# Patient Record
Sex: FEMALE | Race: WHITE | NOT HISPANIC OR LATINO | Employment: OTHER | URBAN - METROPOLITAN AREA
[De-identification: names, ages, dates, MRNs, and addresses within clinical notes are randomized per-mention and may not be internally consistent; named-entity substitution may affect disease eponyms.]

---

## 2017-03-20 ENCOUNTER — OFFICE VISIT (OUTPATIENT)
Dept: FAMILY MEDICINE | Facility: CLINIC | Age: 57
End: 2017-03-20
Payer: COMMERCIAL

## 2017-03-20 VITALS
HEART RATE: 92 BPM | DIASTOLIC BLOOD PRESSURE: 82 MMHG | HEIGHT: 62 IN | RESPIRATION RATE: 16 BRPM | SYSTOLIC BLOOD PRESSURE: 119 MMHG | OXYGEN SATURATION: 98 % | TEMPERATURE: 99 F | WEIGHT: 177.25 LBS | BODY MASS INDEX: 32.62 KG/M2

## 2017-03-20 DIAGNOSIS — J01.00 ACUTE MAXILLARY SINUSITIS, RECURRENCE NOT SPECIFIED: ICD-10-CM

## 2017-03-20 DIAGNOSIS — Z72.0 TOBACCO ABUSE: ICD-10-CM

## 2017-03-20 DIAGNOSIS — I73.00 RAYNAUD'S PHENOMENON WITHOUT GANGRENE: ICD-10-CM

## 2017-03-20 DIAGNOSIS — J20.9 ACUTE BRONCHITIS, UNSPECIFIED ORGANISM: Primary | ICD-10-CM

## 2017-03-20 DIAGNOSIS — Z12.39 BREAST CANCER SCREENING: ICD-10-CM

## 2017-03-20 PROCEDURE — 3074F SYST BP LT 130 MM HG: CPT | Mod: S$GLB,,, | Performed by: INTERNAL MEDICINE

## 2017-03-20 PROCEDURE — 1160F RVW MEDS BY RX/DR IN RCRD: CPT | Mod: S$GLB,,, | Performed by: INTERNAL MEDICINE

## 2017-03-20 PROCEDURE — 99213 OFFICE O/P EST LOW 20 MIN: CPT | Mod: S$GLB,,, | Performed by: INTERNAL MEDICINE

## 2017-03-20 PROCEDURE — 3079F DIAST BP 80-89 MM HG: CPT | Mod: S$GLB,,, | Performed by: INTERNAL MEDICINE

## 2017-03-20 RX ORDER — GUAIFENESIN, PSEUDOEPHEDRINE HYDROCHLORIDE 600; 60 MG/1; MG/1
1 TABLET, EXTENDED RELEASE ORAL 2 TIMES DAILY
Qty: 30 TABLET | Refills: 1 | Status: SHIPPED | OUTPATIENT
Start: 2017-03-20 | End: 2017-03-30

## 2017-03-20 RX ORDER — NIFEDIPINE 30 MG/1
30 TABLET, EXTENDED RELEASE ORAL DAILY
Qty: 30 TABLET | Refills: 11 | Status: SHIPPED | OUTPATIENT
Start: 2017-03-20 | End: 2017-07-26 | Stop reason: SDUPTHER

## 2017-03-20 RX ORDER — PREDNISONE 20 MG/1
40 TABLET ORAL DAILY
Qty: 4 TABLET | Refills: 0 | Status: SHIPPED | OUTPATIENT
Start: 2017-03-20 | End: 2017-03-22

## 2017-03-20 RX ORDER — BUPROPION HYDROCHLORIDE 75 MG/1
75 TABLET ORAL 2 TIMES DAILY
Qty: 60 TABLET | Refills: 11 | Status: SHIPPED | OUTPATIENT
Start: 2017-03-20 | End: 2018-03-22

## 2017-03-20 NOTE — PROGRESS NOTES
"Subjective:       Patient ID: Cristin Seagl is a 56 y.o. female.    Chief Complaint: Sinus Problem and Cough    HPI       CHIEF COMPLAINT: Cough(+).  HPI:     ONSET/TIMING: .    ago    DURATION:               Paroxysmal: no .    QUALITY/COURSE:. unchanged    INTENSITY/SEVERITY: Severity is #  5 (10 point scale)      The following symptoms/statements are positive if BOLD, negative otherwise.      CONTEXT/WHEN:  Tobacco_use. Smokers_in_home. Seasonal_pattern. Allergies/Hayfever. Sinusitis. Irritant_Exposure(smoke/dust/fumes). Exposure_to_others_with_similar_symptoms.        Similar_problems_in_past.   PAST TREATMENT OR EVALUATION:   previous PPD. Recent_previous_chest_x-ray. Recent_antibiotics.  Associated Symptoms:     sputum production: scant. copious. Hemoptysis.  Medical History: Past_pulmonary_infections.  Cardiovascular_disease.chronic_lung_disease.  tuberculosis. Asthma. AIDS. Gastroesophageal_reflux_disease .      Review of Systems   Constitutional: Negative for chills, diaphoresis, fever and unexpected weight change.   HENT: Positive for rhinorrhea and sinus pressure. Negative for sore throat.    Respiratory: Positive for cough. Negative for shortness of breath and wheezing.    Cardiovascular: Negative for chest pain.   Musculoskeletal: Negative for myalgias.       Objective:      Vitals:    03/20/17 1101   BP: 119/82   Pulse: 92   Resp: 16   Temp: 98.6 °F (37 °C)   TempSrc: Oral   SpO2: 98%   Weight: 80.4 kg (177 lb 4 oz)   Height: 5' 2" (1.575 m)   PainSc: 0-No pain     Physical Exam   Constitutional: She appears well-developed and well-nourished.   HENT:   Right Ear: External ear normal.   Left Ear: External ear normal.   Mouth/Throat: Oropharynx is clear and moist. No oropharyngeal exudate.   Congested   Cardiovascular: Normal rate, regular rhythm and normal heart sounds.  Exam reveals no friction rub.    No murmur heard.  Pulmonary/Chest: Effort normal and breath sounds normal. No respiratory " distress. She has no wheezes. She has no rales. She exhibits no tenderness.   Abdominal: Soft. Bowel sounds are normal. There is no tenderness.   Musculoskeletal: She exhibits no edema.   Lymphadenopathy:     She has no cervical adenopathy.   Nursing note and vitals reviewed.        Assessment:       1. Acute bronchitis, unspecified organism    2. Acute maxillary sinusitis, recurrence not specified    3. Tobacco abuse    4. Raynaud's phenomenon without gangrene    5. Breast cancer screening          Plan:     Acute bronchitis, unspecified organism  -     predniSONE (DELTASONE) 20 MG tablet; Take 2 tablets (40 mg total) by mouth once daily.  Dispense: 4 tablet; Refill: 0    Acute maxillary sinusitis, recurrence not specified  -     pseudoephedrine-guaifenesin  mg (MUCINEX D)  mg per tablet; Take 1 tablet by mouth 2 (two) times daily.  Dispense: 30 tablet; Refill: 1  -     predniSONE (DELTASONE) 20 MG tablet; Take 2 tablets (40 mg total) by mouth once daily.  Dispense: 4 tablet; Refill: 0    Tobacco abuse  -     buPROPion (WELLBUTRIN) 75 MG tablet; Take 1 tablet (75 mg total) by mouth 2 (two) times daily.  Dispense: 60 tablet; Refill: 11    Raynaud's phenomenon without gangrene  -     nifedipine 30 MG ORAL TR24 (PROCARDIA-XL) 30 MG (OSM) 24 hr tablet; Take 1 tablet (30 mg total) by mouth once daily.  Dispense: 30 tablet; Refill: 11    Breast cancer screening  -     Mammo Digital Screening Bilateral With CAD; Future; Expected date: 3/20/17    Return in about 6 weeks (around 5/1/2017).

## 2017-03-20 NOTE — PATIENT INSTRUCTIONS
Robitussin-DM for cough    Don't quit smoking for 2 weeks.  Tell 100 strangers that you can't wait to quit smoking but Dr. Sawant won't let you.  Then tell them the benefits of screening smoking.  Do this as enthusiastically as you can.    Start the Wellbutrin now.    Cool mist vaporizor.  Hot fluids. Hot tea with lemon and honey.

## 2017-03-20 NOTE — MR AVS SNAPSHOT
[facility]  [address] [address] [address]  Phone: [phone]  Fax: [phone]                  Cristin Segal   3/20/2017 11:00 AM   Office Visit    Description:  Female : 1960   Provider:  Vidal Sawant MD   Department:  [facility]           Reason for Visit     Sinus Problem     Cough           Diagnoses this Visit        Comments    Acute bronchitis, unspecified organism    -  Primary     Acute maxillary sinusitis, recurrence not specified         Tobacco abuse         Raynaud's phenomenon without gangrene         Breast cancer screening                To Do List           Future Appointments        Provider Department Dept Phone    2017 4:20 PM Vidal Sawant MD [facility] [phone]      Goals (5 Years of Data)     None      Follow-Up and Disposition     Return in about 6 weeks (around 2017).    Follow-up and Disposition History       These Medications        Disp Refills Start End    nifedipine 30 MG ORAL TR24 (PROCARDIA-XL) 30 MG (OSM) 24 hr tablet 30 tablet 11 3/20/2017 3/20/2018    Take 1 tablet (30 mg total) by mouth once daily. - Oral    Pharmacy: [facility]/pharmacy #[id number] - [place], [place] - [address] AT [facility] Ph #: [phone]       buPROPion (WELLBUTRIN) 75 MG tablet 60 tablet 11 3/20/2017 3/20/2018    Take 1 tablet (75 mg total) by mouth 2 (two) times daily. - Oral    Pharmacy: [facility]/pharmacy #[id number] - [place], [place] - [address] AT [facility] Ph #: [phone]       pseudoephedrine-guaifenesin  mg (MUCINEX D)  mg per tablet 30 tablet 1 3/20/2017 3/30/2017    Take 1 tablet by mouth 2 (two) times daily. - Oral    Pharmacy: [facility]/pharmacy #[id number] - [place], [place] - [address] AT [facility] Ph #: [phone]       predniSONE (DELTASONE) 20 MG tablet 4 tablet 0 3/20/2017 3/22/2017    Take 2 tablets (40 mg total) by mouth once  daily. - Oral    Pharmacy: [facility]/pharmacy #[id number] - [place], [place] - [address] AT [facility] Ph #: [phone]         [facility]     [facility] On Call Nurse Care Line - 24/7 Assistance  Registered nurses in the [facility] On Call Center provide clinical advisement, health education, appointment booking, and other advisory services.  Call for this free service at [phone].             Medications           Message regarding Medications     Verify the changes and/or additions to your medication regime listed below are the same as discussed with your clinician today.  If any of these changes or additions are incorrect, please notify your healthcare provider.        START taking these NEW medications        Refills    nifedipine 30 MG ORAL TR24 (PROCARDIA-XL) 30 MG (OSM) 24 hr tablet 11    Sig: Take 1 tablet (30 mg total) by mouth once daily.    Class: Normal    Route: Oral    buPROPion (WELLBUTRIN) 75 MG tablet 11    Sig: Take 1 tablet (75 mg total) by mouth 2 (two) times daily.    Class: Normal    Route: Oral    pseudoephedrine-guaifenesin  mg (MUCINEX D)  mg per tablet 1    Sig: Take 1 tablet by mouth 2 (two) times daily.    Class: Print    Route: Oral    predniSONE (DELTASONE) 20 MG tablet 0    Sig: Take 2 tablets (40 mg total) by mouth once daily.    Class: Normal    Route: Oral      STOP taking these medications     nifedipine (ADALAT CC) 30 MG TbSR Take 30 mg by mouth once daily.    hydrocodone-acetaminophen 10-325mg (NORCO)  mg Tab Take 1 tablet by mouth every 12 (twelve) hours.    hydrocodone-acetaminophen 10-325mg (NORCO)  mg Tab Take 1 tablet by mouth every 12 (twelve) hours as needed.           Verify that the below list of medications is an accurate representation of the medications you are currently taking.  If none reported, the list may be blank. If incorrect, please contact your healthcare provider. Carry this list with you in case of emergency.          "  Current Medications     cyclobenzaprine (FLEXERIL) 10 MG tablet Take 10 mg by mouth 3 (three) times daily as needed.    hydroxychloroquine (PLAQUENIL) 200 mg tablet Take 200 mg by mouth 2 (two) times daily as needed.     KLOR-CON M20 20 mEq tablet     metformin (GLUCOPHAGE) 500 MG tablet Take 1 tablet (500 mg total) by mouth 2 (two) times daily with meals.    omeprazole (PRILOSEC) 20 MG capsule Take 20 mg by mouth once daily.     buPROPion (WELLBUTRIN) 75 MG tablet Take 1 tablet (75 mg total) by mouth 2 (two) times daily.    nifedipine 30 MG ORAL TR24 (PROCARDIA-XL) 30 MG (OSM) 24 hr tablet Take 1 tablet (30 mg total) by mouth once daily.    predniSONE (DELTASONE) 20 MG tablet Take 2 tablets (40 mg total) by mouth once daily.    pseudoephedrine-guaifenesin  mg (MUCINEX D)  mg per tablet Take 1 tablet by mouth 2 (two) times daily.           Clinical Reference Information           Your Vitals Were     BP Pulse Temp Resp Height Weight    119/82 (BP Location: Right arm, Patient Position: Sitting, BP Method: Automatic) 92 98.6 °F (37 °C) (Oral) 16 5' 2" (1.575 m) 80.4 kg (177 lb 4 oz)    SpO2 BMI             98% 32.42 kg/m2         Blood Pressure          Most Recent Value    BP  119/82      Allergies as of 3/20/2017     Pcn [Penicillins]      Immunizations Administered on Date of Encounter - 3/20/2017     None      Orders Placed During Today's Visit     Future Labs/Procedures Expected by Expires    Mammo Digital Screening Bilateral With CAD  3/20/2017 5/20/2018      Instructions    Robitussin-DM for cough    Don't quit smoking for 2 weeks.  Tell 100 strangers that you can't wait to quit smoking but Dr. Sawant won't let you.  Then tell them the benefits of screening smoking.  Do this as enthusiastically as you can.    Start the Wellbutrin now.    Cool mist vaporizor.  Hot fluids. Hot tea with lemon and honey.         Smoking Cessation     If you would like to quit smoking:   You may be eligible for free " services if you are a [place] resident and started smoking cigarettes before September 1, 1988.  Call the [employer]oking [employer]ess[employer] ([employer]) toll free at (596) 733-7115 or (013) 547-7294.   Call 1-800-QUIT-NOW if you do not meet the above criteria.            Language Assistance Services     ATTENTION: Language assistance services are available, free of charge. Please call [phone].      ATENCIÓN: Si habla español, tiene a chance disposición servicios gratuitos de asistencia lingüística. Llame al [phone].     CHÚ Ý: N?u b?n nói Ti?ng Vi?t, có các d?ch v? h? tr? ngôn ng? mi?n phí dành cho b?n. G?i s? [phone].         [facility] complies with applicable Federal civil rights laws and does not discriminate on the basis of race, color, national origin, age, disability, or sex.

## 2017-03-30 ENCOUNTER — HISTORICAL (OUTPATIENT)
Dept: ADMINISTRATIVE | Facility: HOSPITAL | Age: 57
End: 2017-03-30

## 2017-03-30 LAB
ALBUMIN SERPL-MCNC: 4 G/DL (ref 3.1–4.7)
ALP SERPL-CCNC: 97 IU/L (ref 40–104)
ALT (SGPT): 62 IU/L (ref 3–33)
AST SERPL-CCNC: 42 IU/L (ref 10–40)
BASOPHILS NFR BLD: 0.1 K/UL (ref 0–0.2)
BASOPHILS NFR BLD: 0.9 %
BILIRUB SERPL-MCNC: 0.8 MG/DL (ref 0.3–1)
BUN SERPL-MCNC: 8 MG/DL (ref 8–20)
CALCIUM SERPL-MCNC: 9.1 MG/DL (ref 7.7–10.4)
CHLORIDE: 97 MMOL/L (ref 98–110)
CK SERPL-CCNC: 162 IU/L (ref 13–135)
CO2 SERPL-SCNC: 25.8 MMOL/L (ref 22.8–31.6)
CREATININE: 0.59 MG/DL (ref 0.6–1.4)
EOSINOPHIL NFR BLD: 0.1 K/UL (ref 0–0.7)
EOSINOPHIL NFR BLD: 0.9 %
ERYTHROCYTE [DISTWIDTH] IN BLOOD BY AUTOMATED COUNT: 14 % (ref 11.7–14.9)
GLUCOSE: 115 MG/DL (ref 70–99)
GRAN #: 6.3 K/UL (ref 1.4–6.5)
GRAN%: 55.5 %
HCT VFR BLD AUTO: 37.8 % (ref 36–48)
HGB BLD-MCNC: 12.9 G/DL (ref 12–15)
IMMATURE GRANS (ABS): 0.1 K/UL (ref 0–1)
IMMATURE GRANULOCYTES: 0.4 %
LYMPH #: 4 K/UL (ref 1.2–3.4)
LYMPH%: 34.8 %
MCH RBC QN AUTO: 31.9 PG (ref 25–35)
MCHC RBC AUTO-ENTMCNC: 34.1 G/DL (ref 31–36)
MCV RBC AUTO: 93.6 FL (ref 79–98)
MONO #: 0.9 K/UL (ref 0.1–0.6)
MONO%: 7.5 %
NUCLEATED RBCS: 0 %
PLATELET # BLD AUTO: 386 K/UL (ref 140–440)
PMV BLD AUTO: 9.1 FL (ref 8.8–12.7)
POTASSIUM SERPL-SCNC: 3.6 MMOL/L (ref 3.5–5)
PROT SERPL-MCNC: 8.1 G/DL (ref 6–8.2)
RBC # BLD AUTO: 4.04 M/UL (ref 3.5–5.5)
SODIUM: 138 MMOL/L (ref 134–144)
TSH SERPL DL<=0.005 MIU/L-ACNC: 2.82 ULU/ML (ref 0.3–5.6)
WBC # BLD AUTO: 11.4 K/UL (ref 5–10)

## 2017-04-27 DIAGNOSIS — E11.9 TYPE 2 DIABETES MELLITUS WITHOUT COMPLICATION, WITHOUT LONG-TERM CURRENT USE OF INSULIN: Primary | ICD-10-CM

## 2017-05-01 ENCOUNTER — DOCUMENTATION ONLY (OUTPATIENT)
Dept: FAMILY MEDICINE | Facility: CLINIC | Age: 57
End: 2017-05-01

## 2017-05-01 RX ORDER — METFORMIN HYDROCHLORIDE 500 MG/1
500 TABLET ORAL 2 TIMES DAILY WITH MEALS
Qty: 60 TABLET | Refills: 10 | Status: SHIPPED | OUTPATIENT
Start: 2017-05-01 | End: 2019-01-09 | Stop reason: SDUPTHER

## 2017-05-01 NOTE — PROGRESS NOTES
Health Maintenance Due   Topic Date Due    Foot Exam  09/11/1970    Eye Exam  09/11/1970    TETANUS VACCINE  09/11/1978    Pneumococcal PPSV23 (Medium Risk) (1) 09/11/1978    Pap Smear  09/11/1981    Colonoscopy  09/11/2010    Urine Microalbumin  06/05/2015    Hemoglobin A1c  05/03/2016    Mammogram  06/10/2016    Lipid Panel  02/03/2017

## 2017-05-24 RX ORDER — CYCLOBENZAPRINE HCL 10 MG
TABLET ORAL
Qty: 30 TABLET | Refills: 1 | Status: SHIPPED | OUTPATIENT
Start: 2017-05-24 | End: 2017-07-26 | Stop reason: SDUPTHER

## 2017-06-08 ENCOUNTER — HOSPITAL ENCOUNTER (OUTPATIENT)
Dept: RADIOLOGY | Facility: CLINIC | Age: 57
Discharge: HOME | End: 2017-06-08
Attending: INTERNAL MEDICINE
Payer: COMMERCIAL

## 2017-06-08 DIAGNOSIS — Z12.39 BREAST CANCER SCREENING: ICD-10-CM

## 2017-06-08 PROCEDURE — 77067 SCR MAMMO BI INCL CAD: CPT | Mod: 26,,, | Performed by: RADIOLOGY

## 2017-06-08 PROCEDURE — 77067 SCR MAMMO BI INCL CAD: CPT | Mod: TC

## 2017-06-08 PROCEDURE — 77063 BREAST TOMOSYNTHESIS BI: CPT | Mod: 26,,, | Performed by: RADIOLOGY

## 2017-07-26 ENCOUNTER — OFFICE VISIT (OUTPATIENT)
Dept: RHEUMATOLOGY | Facility: CLINIC | Age: 57
End: 2017-07-26
Payer: COMMERCIAL

## 2017-07-26 VITALS — WEIGHT: 188 LBS | DIASTOLIC BLOOD PRESSURE: 61 MMHG | BODY MASS INDEX: 34.39 KG/M2 | SYSTOLIC BLOOD PRESSURE: 124 MMHG

## 2017-07-26 DIAGNOSIS — I73.00 RAYNAUD'S PHENOMENON WITHOUT GANGRENE: ICD-10-CM

## 2017-07-26 DIAGNOSIS — M15.9 OSTEOARTHRITIS, GENERALIZED: ICD-10-CM

## 2017-07-26 DIAGNOSIS — M35.1 MIXED CONNECTIVE TISSUE DISEASE: Primary | ICD-10-CM

## 2017-07-26 PROCEDURE — 99213 OFFICE O/P EST LOW 20 MIN: CPT | Mod: ,,, | Performed by: INTERNAL MEDICINE

## 2017-07-26 RX ORDER — HYDROXYCHLOROQUINE SULFATE 200 MG/1
200 TABLET, FILM COATED ORAL DAILY
Qty: 30 TABLET | Refills: 3 | Status: SHIPPED | OUTPATIENT
Start: 2017-07-26 | End: 2017-08-29 | Stop reason: SDUPTHER

## 2017-07-26 RX ORDER — NIFEDIPINE 30 MG/1
30 TABLET, EXTENDED RELEASE ORAL DAILY
Qty: 30 TABLET | Refills: 11 | Status: SHIPPED | OUTPATIENT
Start: 2017-07-26 | End: 2018-07-26

## 2017-07-26 RX ORDER — CYCLOBENZAPRINE HCL 10 MG
10 TABLET ORAL 3 TIMES DAILY PRN
Qty: 30 TABLET | Refills: 3 | Status: SHIPPED | OUTPATIENT
Start: 2017-07-26 | End: 2018-02-09 | Stop reason: SDUPTHER

## 2017-07-26 NOTE — PROGRESS NOTES
[facility] RHEUMATOLOGY            PROGRESS NOTE      Subjective:       Patient ID:   NAME: Cristin Segal : 1960     56 y.o. female    Referring Doc: No ref. provider found  Other Physicians:    Chief Complaint:  diffuse connective tissue disease (follow up, no changes since last visit) and Raynauds Syndrome      History of Present Illness:     Patient returns today for a regularly scheduled follow-up visit forMixed connective tissue disease and osteoarthritis       The patient is doing well. No chronic fatigue skin rashes chest pains cough or shortness of breath. No recent Raynaud's episodes  No gastrointestinal complaints. Patient has not had a colonoscopy ever.            ROS:   GEN:  No  fever, night sweats . weight is stable   No fatigue  SKIN: no rashes, no bruising, no ulcerations, no Raynaud's  HEENT: no HA's, No visual changes, no mucosal ulcers, +sicca symptoms only oral mucosa  CV:   no CP, SOB, PND, SANTILLAN, no orthopnea, no palpitations  PULM: normal with no SOB, cough, hemoptysis, sputum or pleuritic pain  GI:  no abdominal pain, nausea, vomiting, constipation, diarrhea, melanotic stools, BRBPR, hematemesis, no dysphagia  :   no dysuria  NEURO: occ l hand  paresthesias, headaches, visual disturbances, muscle weakness  MUSCULOSKELETAL:no joint swelling, prolonged AM stiffness, no back pain, no muscle pain  Allergies:  Review of patient's allergies indicates:   Allergen Reactions    Pcn [penicillins]        Medications:    Current Outpatient Prescriptions:     buPROPion (WELLBUTRIN) 75 MG tablet, Take 1 tablet (75 mg total) by mouth 2 (two) times daily., Disp: 60 tablet, Rfl: 11    cyclobenzaprine (FLEXERIL) 10 MG tablet, TAKE 1 TABLET BY MOUTH 3 TIMES A DAY AS NEEDED, Disp: 30 tablet, Rfl: 1    hydroxychloroquine (PLAQUENIL) 200 mg tablet, Take 200 mg by mouth 2 (two) times daily as needed. , Disp: , Rfl:     KLOR-CON M20 20 mEq tablet, , Disp: , Rfl: 10    metformin (GLUCOPHAGE) 500  MG tablet, Take 1 tablet (500 mg total) by mouth 2 (two) times daily with meals., Disp: 60 tablet, Rfl: 10    nifedipine 30 MG ORAL TR24 (PROCARDIA-XL) 30 MG (OSM) 24 hr tablet, Take 1 tablet (30 mg total) by mouth once daily., Disp: 30 tablet, Rfl: 11    omeprazole (PRILOSEC) 20 MG capsule, Take 20 mg by mouth once daily. , Disp: , Rfl:     PMHx/PSHx Updates:    Last Eye Exam : Feb 2017      Objective:     Vitals:  Blood pressure 124/61, weight 85.3 kg (188 lb).    Physical Examination:   GEN: no apparent distress, comfortable; AAOx3  SKIN: no rashes,no ulceration, no Raynaud's, no petechiae, no SQ nodules,  HEAD: normal  EYES: no pallor, no icterus,ENT:  ,no mucosal dryness or ulcerations  NECK: no masses, thyroid normal, trachea midline, no LAD/LN's, supple  CV: RRR with no murmur; l S1 and S2 reg. ,no gallop no rubs,   CHEST: Normal respiratory effort; CTAB; normal breath sounds; no wheeze or crackles  ABDOM: nontender and nondistended; soft; no masses; no rebound/guarding  MUSC/Skeletal: ROM normal; no crepitus; joints without synovitis,  no deformities  No joint swelling or tenderness of PIP, MCP, wrist, elbow, shoulder, or knee joints  EXTREM: no clubbing, cyanosis, no edema,normal  pulses   NEURO: grossly intact; motor WNL; AAOx3; ,PSYCH: normal mood, affect and behavior  LYMPH: normal cervical, supraclavicular          Labs:   Lab Results   Component Value Date    WBC 11.4 (H) 03/30/2017    HGB 12.9 03/30/2017    HCT 37.8 03/30/2017    MCV 93.6 03/30/2017     03/30/2017    CMP  @LASTLAB(NA,K,CL,CO2,GLU,BUN,Creatinine,Calcium,PROT,Albumin,Bilitot,Alkphos,AST,ALT,CRP,ESR,RF,CCP,BROOK,SSA,CPK,uric acid) )@  I have reviewed all available lab results and radiology reports.    Radiology/Diagnostic Studies:        Assessment/Plan:   (1) 56 y.o. female with diagnosis of Mixed connective tissue disease and Osteoarthritis. Patient is doing well.    Plan: CBC, CMP, UA and CRP    Strongly encouraged her to make  an appointment with a gastroenterologist for colonoscopy.        Discussion:     I have explained all of the above in detail and the patient understands all of the current recommendation(s). I have answered all questions to the best of my ability and to their complete satisfaction.       The patient is to continue with the current management plan         RTC in   4 months      Electronically signed by Erica Guzman MD

## 2017-07-31 RX ORDER — CYCLOBENZAPRINE HCL 10 MG
TABLET ORAL
Qty: 30 TABLET | Refills: 1 | Status: SHIPPED | OUTPATIENT
Start: 2017-07-31 | End: 2018-03-22 | Stop reason: SDUPTHER

## 2017-08-29 RX ORDER — HYDROXYCHLOROQUINE SULFATE 200 MG/1
200 TABLET, FILM COATED ORAL DAILY
Qty: 30 TABLET | Refills: 3 | Status: SHIPPED | OUTPATIENT
Start: 2017-08-29 | End: 2018-03-28 | Stop reason: SDUPTHER

## 2017-10-26 DIAGNOSIS — Z12.11 COLON CANCER SCREENING: ICD-10-CM

## 2017-12-11 RX ORDER — OMEPRAZOLE 20 MG/1
CAPSULE, DELAYED RELEASE ORAL
Qty: 90 CAPSULE | Refills: 3 | Status: SHIPPED | OUTPATIENT
Start: 2017-12-11 | End: 2019-02-03 | Stop reason: SDUPTHER

## 2018-02-09 RX ORDER — CYCLOBENZAPRINE HCL 10 MG
10 TABLET ORAL 3 TIMES DAILY PRN
Qty: 30 TABLET | Refills: 3 | Status: SHIPPED | OUTPATIENT
Start: 2018-02-09 | End: 2018-03-28 | Stop reason: SDUPTHER

## 2018-03-22 ENCOUNTER — OFFICE VISIT (OUTPATIENT)
Dept: PODIATRY | Facility: CLINIC | Age: 58
End: 2018-03-22
Payer: COMMERCIAL

## 2018-03-22 VITALS — BODY MASS INDEX: 33.79 KG/M2 | WEIGHT: 183.63 LBS | HEIGHT: 62 IN

## 2018-03-22 DIAGNOSIS — M79.672 FOOT PAIN, LEFT: ICD-10-CM

## 2018-03-22 DIAGNOSIS — L84 CALLUS: ICD-10-CM

## 2018-03-22 DIAGNOSIS — M24.573 EQUINUS CONTRACTURE OF ANKLE: ICD-10-CM

## 2018-03-22 DIAGNOSIS — M77.9 CAPSULITIS: Primary | ICD-10-CM

## 2018-03-22 PROCEDURE — 29540 STRAPPING ANKLE &/FOOT: CPT | Mod: LT,S$GLB,, | Performed by: PODIATRIST

## 2018-03-22 PROCEDURE — 99213 OFFICE O/P EST LOW 20 MIN: CPT | Mod: 25,S$GLB,, | Performed by: PODIATRIST

## 2018-03-22 PROCEDURE — 99999 PR PBB SHADOW E&M-EST. PATIENT-LVL III: CPT | Mod: PBBFAC,,, | Performed by: PODIATRIST

## 2018-03-22 RX ORDER — NIFEDIPINE 30 MG/1
TABLET, FILM COATED, EXTENDED RELEASE ORAL
COMMUNITY
Start: 2018-03-15 | End: 2018-12-30 | Stop reason: SDUPTHER

## 2018-03-22 RX ORDER — DICLOFENAC SODIUM 10 MG/G
2 GEL TOPICAL 4 TIMES DAILY
Qty: 100 G | Refills: 2 | Status: SHIPPED | OUTPATIENT
Start: 2018-03-22 | End: 2019-01-09

## 2018-03-22 NOTE — PROGRESS NOTES
Subjective:      Patient ID: Cristin Segal is a 57 y.o. female.    Chief Complaint: Foot Problem (possible wart/vallouses)    Sharp deep pain bottom left forefoot.  Gradual onset, worsening over past several weeks, aggravated by increased weight bearing, shoe gear, pressure.  No previous medical treatment.  OTC pain med not helping.  Denies trauma, surgery both feet.    Review of Systems   Constitution: Negative for chills, diaphoresis, fever, malaise/fatigue and night sweats.   Cardiovascular: Negative for claudication, cyanosis, leg swelling and syncope.   Skin: Positive for suspicious lesions. Negative for color change, dry skin, nail changes, rash and unusual hair distribution.   Musculoskeletal: Positive for joint pain. Negative for falls, joint swelling, muscle cramps, muscle weakness and stiffness.   Gastrointestinal: Negative for constipation, diarrhea, nausea and vomiting.   Neurological: Positive for sensory change. Negative for brief paralysis, disturbances in coordination, focal weakness, numbness, paresthesias and tremors.           Objective:      Physical Exam   Constitutional: She is oriented to person, place, and time. She appears well-developed and well-nourished. She is cooperative. No distress.   Cardiovascular:   Pulses:       Popliteal pulses are 2+ on the right side, and 2+ on the left side.        Dorsalis pedis pulses are 2+ on the right side, and 2+ on the left side.        Posterior tibial pulses are 2+ on the right side, and 2+ on the left side.   Capillary refill 3 seconds all toes/distal feet, all toes/both feet warm to touch.      Negative lymphadenopathy bilateral popliteal fossa and tarsal tunnel.      Negavie lower extremity edema bilateral.     Musculoskeletal:        Right ankle: She exhibits normal range of motion, no swelling, no ecchymosis, no deformity, no laceration and normal pulse. Achilles tendon normal. Achilles tendon exhibits no pain, no defect and normal  Giang's test results.   Pain to palpation inferior mtpj 2 left without evidence of trauma or infection.    Ankle dorsiflexion decreased at <10 degrees bilateral with moderate increase with knee flexion bilateral.    Otherwise, Normal angle, base, station of gait. All ten toes without clubbing, cyanosis, or signs of ischemia.  No pain to palpation bilateral lower extremities.  Range of motion, stability, muscle strength, and muscle tone normal bilateral feet and legs.     Lymphadenopathy:   Negative lymphadenopathy bilateral popliteal fossa and tarsal tunnel.    Negative lymphangitic streaking bilateral feet/ankles/legs.   Neurological: She is alert and oriented to person, place, and time. She has normal strength. She displays no atrophy and no tremor. A sensory deficit is present. She exhibits normal muscle tone. Gait normal.   Reflex Scores:       Patellar reflexes are 2+ on the right side and 2+ on the left side.       Achilles reflexes are 2+ on the right side and 2+ on the left side.  Negative tinel sign to percussion sural, superficial peroneal, deep peroneal, saphenous, and posterior tibial nerves right and left ankles and feet.      Negative moulder sign/click bilateral all intermetatarsal spaces.    Decreased/absent vibratory sensation bilateral feet to 128Hz tuning fork.     Skin: Skin is warm, dry and intact. Capillary refill takes 2 to 3 seconds. No abrasion, no bruising, no burn, no ecchymosis, no laceration, no lesion and no rash noted. She is not diaphoretic. No cyanosis or erythema. No pallor. Nails show no clubbing.     Focal hyperkeratotic lesion consisting entirely of hyperkeratotic tissue without open skin, drainage, pus, fluctuance, malodor, or signs of infection plantar 2nd mtpj left.    Otherwise, Skin is normal age and health appropriate color, turgor, texture, and temperature bilateral lower extremities without ulceration, hyperpigmentation, discoloration, masses nodules or cords palpated.   No ecchymosis, erythema, edema, or cardinal signs of infection bilateral lower extremities.     Psychiatric: She has a normal mood and affect.             Assessment:       Encounter Diagnoses   Name Primary?    Capsulitis Yes    Equinus contracture of ankle     Callus     Foot pain, left          Plan:       Cristin was seen today for foot problem.    Diagnoses and all orders for this visit:    Capsulitis  -     X-Ray Foot Complete Left; Future    Equinus contracture of ankle  -     X-Ray Foot Complete Left; Future    Callus    Foot pain, left  -     X-Ray Foot Complete Left; Future    Other orders  -     diclofenac sodium 1 % Gel; Apply 2 g topically 4 (four) times daily.      I counseled the patient on her conditions, their implications and medical management.        Patient will stretch the tendo achilles complex three times daily as demonstrated in the office.  Literature was dispensed illustrating proper stretching technique.    I applied a plantar rest strapping to the patient's left foot to offload symptomatic area, support the arch, and relieve pain.    Patient will obtain over the counter arch supports and wear them in shoes whenever possible.  Athletic shoes intended for walking or running are usually best.    The patient was advised that NSAID-type medications have two very important potential side effects: gastrointestinal irritation including hemorrhage and renal injuries. She was asked to take the medication with food and to stop if she experiences any GI upset. I asked her to call for vomiting, abdominal pain or black/bloody stools. The patient expresses understanding of these issues and questions were answered.    Discussed conservative treatment with shoes of adequate dimensions, material, and style to alleviate symptoms and delay or prevent surgical intervention.    Rx xrays, voltaren gel.          Follow-up in about 1 month (around 4/22/2018).

## 2018-03-23 ENCOUNTER — HOSPITAL ENCOUNTER (OUTPATIENT)
Dept: RADIOLOGY | Facility: CLINIC | Age: 58
Discharge: HOME | End: 2018-03-23
Attending: PODIATRIST
Payer: COMMERCIAL

## 2018-03-23 DIAGNOSIS — M24.573 EQUINUS CONTRACTURE OF ANKLE: ICD-10-CM

## 2018-03-23 DIAGNOSIS — M77.9 CAPSULITIS: ICD-10-CM

## 2018-03-23 DIAGNOSIS — M79.672 FOOT PAIN, LEFT: ICD-10-CM

## 2018-03-23 PROCEDURE — 73630 X-RAY EXAM OF FOOT: CPT | Mod: TC,FY,PO,LT

## 2018-03-23 PROCEDURE — 73630 X-RAY EXAM OF FOOT: CPT | Mod: 26,LT,S$GLB, | Performed by: RADIOLOGY

## 2018-03-28 ENCOUNTER — OFFICE VISIT (OUTPATIENT)
Dept: RHEUMATOLOGY | Facility: CLINIC | Age: 58
End: 2018-03-28
Payer: COMMERCIAL

## 2018-03-28 VITALS
WEIGHT: 182.81 LBS | SYSTOLIC BLOOD PRESSURE: 128 MMHG | BODY MASS INDEX: 33.43 KG/M2 | DIASTOLIC BLOOD PRESSURE: 84 MMHG

## 2018-03-28 DIAGNOSIS — M89.9 BONE DISEASE: ICD-10-CM

## 2018-03-28 DIAGNOSIS — M35.1 MIXED CONNECTIVE TISSUE DISEASE: Primary | ICD-10-CM

## 2018-03-28 DIAGNOSIS — M15.9 OSTEOARTHRITIS, GENERALIZED: ICD-10-CM

## 2018-03-28 PROCEDURE — 99213 OFFICE O/P EST LOW 20 MIN: CPT | Mod: ,,, | Performed by: INTERNAL MEDICINE

## 2018-03-28 RX ORDER — CYCLOBENZAPRINE HCL 10 MG
10 TABLET ORAL 3 TIMES DAILY PRN
Qty: 60 TABLET | Refills: 3 | Status: SHIPPED | OUTPATIENT
Start: 2018-03-28 | End: 2019-02-14 | Stop reason: SDUPTHER

## 2018-03-28 RX ORDER — HYDROXYCHLOROQUINE SULFATE 200 MG/1
200 TABLET, FILM COATED ORAL DAILY
Qty: 30 TABLET | Refills: 3 | Status: SHIPPED | OUTPATIENT
Start: 2018-03-28 | End: 2018-08-13 | Stop reason: SDUPTHER

## 2018-03-28 NOTE — PROGRESS NOTES
[facility], formerly [facility] RHEUMATOLOGY            PROGRESS NOTE      Subjective:       Patient ID:   NAME: Cristin Segal : 1960     57 y.o. female    Referring Doc: No ref. provider found  Other Physicians:    Chief Complaint:  Mixed connective tissue disease      History of Present Illness:     Patient returns today for a regularly scheduled follow-up visit for  MCTD     The patient is doing well. No chest pains, cough or shortness of breath. No swollen joints. No mucosal ulcerations.  No Raynaud's          ROS:   GEN:  No  fever, night sweats . weight is stable   No fatigue  SKIN: no rashes, no bruising, no ulcerations, no Raynaud's  HEENT: no HA's, No visual changes, no mucosal ulcers, no sicca symptoms,  CV:   no CP, SOB, PND, SANTILLAN, no orthopnea, no palpitations  PULM: normal with no SOB, cough, hemoptysis, sputum or pleuritic pain  GI:  no abdominal pain, nausea, vomiting, constipation, diarrhea, melanotic stools, BRBPR, hematemesis, no dysphagia  :   no dysuria  NEURO: no paresthesias, headaches, visual disturbances, muscle weakness  MUSCULOSKELETAL:no joint swelling, prolonged AM stiffness, no back pain, no muscle pain  Allergies:  Review of patient's allergies indicates:   Allergen Reactions    Pcn [penicillins]        Medications:    Current Outpatient Prescriptions:     cyclobenzaprine (FLEXERIL) 10 MG tablet, Take 1 tablet (10 mg total) by mouth 3 (three) times daily as needed., Disp: 60 tablet, Rfl: 3    diclofenac sodium 1 % Gel, Apply 2 g topically 4 (four) times daily., Disp: 100 g, Rfl: 2    hydroxychloroquine (PLAQUENIL) 200 mg tablet, Take 1 tablet (200 mg total) by mouth once daily., Disp: 30 tablet, Rfl: 3    KLOR-CON M20 20 mEq tablet, , Disp: , Rfl: 10    metformin (GLUCOPHAGE) 500 MG tablet, Take 1 tablet (500 mg total) by mouth 2 (two) times daily with meals., Disp: 60 tablet, Rfl: 10    NIFEdipine (ADALAT CC) 30 MG TbSR, , Disp: , Rfl:     nifedipine (PROCARDIA-XL) 30 MG (OSM) 24  hr tablet, Take 1 tablet (30 mg total) by mouth once daily., Disp: 30 tablet, Rfl: 11    omeprazole (PRILOSEC) 20 MG capsule, 1 CAPSULE BY MOUTH DAILY, Disp: 90 capsule, Rfl: 3    PMHx/PSHx Updates:      Last Eye Exam: due this month      Objective:     Vitals:  Blood pressure 128/84, weight 82.9 kg (182 lb 12.8 oz).    Physical Examination:   GEN: no apparent distress, comfortable; AAOx3  SKIN: no rashes,no ulceration, no Raynaud's, no petechiae, no SQ nodules,  HEAD: normal  EYES: no pallor, no icterus, PERRLA  ENT:  ,no mucosal dryness or ulcerations  NECK: no masses, thyroid normal, trachea midline, no LAD/LN's, supple  CV: RRR with no murmur; l S1 and S2 reg. ,no gallop no rubs,   CHEST: Normal respiratory effort; CTAB; normal breath sounds; no wheeze or crackles  MUSC/Skeletal: ROM normal; no crepitus; joints without synovitis,  no deformities  No joint swelling or tenderness of PIP, MCP, wrist, elbow, shoulder, or knee joints  EXTREM: no clubbing, cyanosis, + trace  edema,normal  pulses   NEURO: grossly intact; motor WNL; AAOx3;   PSYCH: normal mood, affect and behavior  LYMPH: normal cervical, supraclavicular          Labs:   Lab Results   Component Value Date    WBC 11.4 (H) 03/30/2017    HGB 12.9 03/30/2017    HCT 37.8 03/30/2017    MCV 93.6 03/30/2017     03/30/2017    CMP  @LASTLAB(NA,K,CL,CO2,GLU,BUN,Creatinine,Calcium,PROT,Albumin,Bilitot,Alkphos,AST,ALT,CRP,ESR,RF,CCP,BROOK,SSA,CPK,uric acid) )@  I have reviewed all available lab results and radiology reports.    Radiology/Diagnostic Studies:        Assessment/Plan:   (1) 57 y.o. female with diagnosis of Mixed Connective Tissue Disease. She is stable      CBC CMP urinalysis  DEXA bone density          Discussion:     I have explained all of the above in detail and the patient understands all of the current recommendation(s). I have answered all questions to the best of my ability and to their complete satisfaction.       The patient is to  continue with the current management plan         RTC in 4 months        Electronically signed by Erica Guzman MD

## 2018-04-19 ENCOUNTER — OFFICE VISIT (OUTPATIENT)
Dept: PODIATRY | Facility: CLINIC | Age: 58
End: 2018-04-19
Payer: COMMERCIAL

## 2018-04-19 VITALS — HEIGHT: 62 IN | WEIGHT: 182 LBS | BODY MASS INDEX: 33.49 KG/M2

## 2018-04-19 DIAGNOSIS — M77.9 CAPSULITIS: Primary | ICD-10-CM

## 2018-04-19 DIAGNOSIS — M79.672 FOOT PAIN, LEFT: ICD-10-CM

## 2018-04-19 DIAGNOSIS — M24.573 EQUINUS CONTRACTURE OF ANKLE: ICD-10-CM

## 2018-04-19 PROCEDURE — 99999 PR PBB SHADOW E&M-EST. PATIENT-LVL III: CPT | Mod: PBBFAC,,, | Performed by: PODIATRIST

## 2018-04-19 PROCEDURE — 99212 OFFICE O/P EST SF 10 MIN: CPT | Mod: S$GLB,,, | Performed by: PODIATRIST

## 2018-04-19 NOTE — PROGRESS NOTES
Subjective:      Patient ID: Cristin Segal is a 57 y.o. female.    Chief Complaint: Foot Pain (Left)    Sharp deep pain bottom left forefoot.  Gradual onset, worsening over past several weeks, aggravated by increased weight bearing, shoe gear, pressure.  Improved with arch supports, stretches, athletic shoes.  xrays negative acute injury.  Denies trauma, surgery both feet.    Review of Systems   Constitution: Negative for chills, diaphoresis, fever, malaise/fatigue and night sweats.   Cardiovascular: Negative for claudication, cyanosis, leg swelling and syncope.   Skin: Positive for suspicious lesions. Negative for color change, dry skin, nail changes, rash and unusual hair distribution.   Musculoskeletal: Positive for joint pain. Negative for falls, joint swelling, muscle cramps, muscle weakness and stiffness.   Gastrointestinal: Negative for constipation, diarrhea, nausea and vomiting.   Neurological: Positive for sensory change. Negative for brief paralysis, disturbances in coordination, focal weakness, numbness, paresthesias and tremors.           Objective:      Physical Exam   Constitutional: She is oriented to person, place, and time. She appears well-developed and well-nourished. She is cooperative. No distress.   Cardiovascular:   Pulses:       Popliteal pulses are 2+ on the right side, and 2+ on the left side.        Dorsalis pedis pulses are 2+ on the right side, and 2+ on the left side.        Posterior tibial pulses are 2+ on the right side, and 2+ on the left side.   Capillary refill 3 seconds all toes/distal feet, all toes/both feet warm to touch.      Negative lymphadenopathy bilateral popliteal fossa and tarsal tunnel.      Negavie lower extremity edema bilateral.     Musculoskeletal:        Right ankle: She exhibits normal range of motion, no swelling, no ecchymosis, no deformity, no laceration and normal pulse. Achilles tendon normal. Achilles tendon exhibits no pain, no defect and normal  Giang's test results.   No current pain to palpation inferior mtpj 2 left without evidence of trauma or infection.    Ankle dorsiflexion decreased at <10 degrees bilateral with moderate increase with knee flexion bilateral.    Otherwise, Normal angle, base, station of gait. All ten toes without clubbing, cyanosis, or signs of ischemia.  No pain to palpation bilateral lower extremities.  Range of motion, stability, muscle strength, and muscle tone normal bilateral feet and legs.     Lymphadenopathy:   Negative lymphadenopathy bilateral popliteal fossa and tarsal tunnel.    Negative lymphangitic streaking bilateral feet/ankles/legs.   Neurological: She is alert and oriented to person, place, and time. She has normal strength. She displays no atrophy and no tremor. A sensory deficit is present. She exhibits normal muscle tone. Gait normal.   Reflex Scores:       Patellar reflexes are 2+ on the right side and 2+ on the left side.       Achilles reflexes are 2+ on the right side and 2+ on the left side.  Negative tinel sign to percussion sural, superficial peroneal, deep peroneal, saphenous, and posterior tibial nerves right and left ankles and feet.      Negative moulder sign/click bilateral all intermetatarsal spaces.    Decreased/absent vibratory sensation bilateral feet to 128Hz tuning fork.     Skin: Skin is warm, dry and intact. Capillary refill takes 2 to 3 seconds. No abrasion, no bruising, no burn, no ecchymosis, no laceration, no lesion and no rash noted. She is not diaphoretic. No cyanosis or erythema. No pallor. Nails show no clubbing.     Focal hyperkeratotic lesion consisting entirely of hyperkeratotic tissue without open skin, drainage, pus, fluctuance, malodor, or signs of infection plantar 2nd mtpj left improved well with self care.    Otherwise, Skin is normal age and health appropriate color, turgor, texture, and temperature bilateral lower extremities without ulceration, hyperpigmentation,  discoloration, masses nodules or cords palpated.  No ecchymosis, erythema, edema, or cardinal signs of infection bilateral lower extremities.     Psychiatric: She has a normal mood and affect.             Assessment:       Encounter Diagnoses   Name Primary?    Capsulitis Yes    Equinus contracture of ankle     Foot pain, left          Plan:       Cristin was seen today for foot pain.    Diagnoses and all orders for this visit:    Capsulitis    Equinus contracture of ankle    Foot pain, left      I counseled the patient on her conditions, their implications and medical management.    Continue stretches, inserts, athletic shoes.    voltaren gel prn.          Follow-up if symptoms worsen or fail to improve.

## 2018-08-02 ENCOUNTER — TELEPHONE (OUTPATIENT)
Dept: SPINE | Facility: CLINIC | Age: 58
End: 2018-08-02

## 2018-08-02 NOTE — TELEPHONE ENCOUNTER
----- Message from Linda Lopez sent at 8/2/2018 11:52 AM CDT -----  Type: Needs Medical Advice    Who Called: patient  Symptoms (please be specific):  S[facility]  How long has patient had these symptoms:  Sugar  Pharmacy name and phone #:  [facility]  Best Call Back Number: [phone] (home)     Additional Information: Patient called regarding her appt with JODY Jean was cancelled on tomorrow and wanted to schedule now with Dr. Victor

## 2018-08-13 RX ORDER — HYDROXYCHLOROQUINE SULFATE 200 MG/1
200 TABLET, FILM COATED ORAL DAILY
Qty: 30 TABLET | Refills: 3 | Status: SHIPPED | OUTPATIENT
Start: 2018-08-13 | End: 2019-02-14 | Stop reason: SDUPTHER

## 2018-08-14 RX ORDER — HYDROXYCHLOROQUINE SULFATE 200 MG/1
200 TABLET, FILM COATED ORAL DAILY
Qty: 30 TABLET | Refills: 3 | Status: SHIPPED | OUTPATIENT
Start: 2018-08-14 | End: 2019-06-26 | Stop reason: SDUPTHER

## 2018-11-01 DIAGNOSIS — Z12.11 COLON CANCER SCREENING: ICD-10-CM

## 2018-12-07 RX ORDER — METFORMIN HYDROCHLORIDE 500 MG/1
500 TABLET ORAL 2 TIMES DAILY WITH MEALS
Qty: 60 TABLET | Refills: 9 | OUTPATIENT
Start: 2018-12-07

## 2018-12-31 RX ORDER — NIFEDIPINE 30 MG/1
TABLET, FILM COATED, EXTENDED RELEASE ORAL
Qty: 90 TABLET | Refills: 2 | Status: SHIPPED | OUTPATIENT
Start: 2018-12-31 | End: 2019-02-14 | Stop reason: SDUPTHER

## 2019-01-02 RX ORDER — METFORMIN HYDROCHLORIDE 500 MG/1
500 TABLET ORAL 2 TIMES DAILY WITH MEALS
Qty: 60 TABLET | Refills: 10 | OUTPATIENT
Start: 2019-01-02

## 2019-01-02 NOTE — TELEPHONE ENCOUNTER
Pt is requesting medication refill on Metformin 500 mg   Last refill: 5/1/17  Last visit: 3/20/17  Follow Up: 7/2/19

## 2019-01-02 NOTE — TELEPHONE ENCOUNTER
Called pt regarding below message. Pt states she needs to establish care with a new PCP. Informed pt that Dr. Katz is no longer accepting new patients. Scheduled est care appt with Dr. Gtz. Appt date, time, and location given. Pt verbalized understanding with no further questions.     ----- Message from Jayashree Garcia sent at 1/2/2019  2:05 PM CST -----  Contact: self   Patient states last time she was there with  Jose De Jesus Segal, Dr Katz said she would take her as a new patient. Please call back at [phone] (home)

## 2019-01-03 NOTE — TELEPHONE ENCOUNTER
Pt returned call to clinic regarding below message. Informed pt that refill was denied due to needing an appt. Appt date, time, and location given. Pt verbalized understanding with no further questions.

## 2019-01-09 ENCOUNTER — LAB VISIT (OUTPATIENT)
Dept: LAB | Facility: HOSPITAL | Age: 59
End: 2019-01-09
Attending: NURSE PRACTITIONER
Payer: COMMERCIAL

## 2019-01-09 ENCOUNTER — OFFICE VISIT (OUTPATIENT)
Dept: FAMILY MEDICINE | Facility: CLINIC | Age: 59
End: 2019-01-09
Payer: COMMERCIAL

## 2019-01-09 VITALS
HEART RATE: 101 BPM | BODY MASS INDEX: 29.82 KG/M2 | TEMPERATURE: 98 F | HEIGHT: 62 IN | SYSTOLIC BLOOD PRESSURE: 137 MMHG | WEIGHT: 162.06 LBS | DIASTOLIC BLOOD PRESSURE: 88 MMHG

## 2019-01-09 DIAGNOSIS — Z12.31 BREAST CANCER SCREENING BY MAMMOGRAM: ICD-10-CM

## 2019-01-09 DIAGNOSIS — Z00.00 ANNUAL PHYSICAL EXAM: ICD-10-CM

## 2019-01-09 DIAGNOSIS — Z00.00 ANNUAL PHYSICAL EXAM: Primary | ICD-10-CM

## 2019-01-09 DIAGNOSIS — E11.8 CONTROLLED TYPE 2 DIABETES MELLITUS WITH COMPLICATION, WITHOUT LONG-TERM CURRENT USE OF INSULIN: ICD-10-CM

## 2019-01-09 DIAGNOSIS — E78.5 HYPERLIPIDEMIA, UNSPECIFIED HYPERLIPIDEMIA TYPE: ICD-10-CM

## 2019-01-09 LAB
ALBUMIN SERPL BCP-MCNC: 4 G/DL
ALP SERPL-CCNC: 82 U/L
ALT SERPL W/O P-5'-P-CCNC: 48 U/L
ANION GAP SERPL CALC-SCNC: 13 MMOL/L
AST SERPL-CCNC: 55 U/L
BASOPHILS # BLD AUTO: 0.08 K/UL
BASOPHILS NFR BLD: 1.1 %
BILIRUB SERPL-MCNC: 0.4 MG/DL
BUN SERPL-MCNC: 9 MG/DL
CALCIUM SERPL-MCNC: 10.4 MG/DL
CHLORIDE SERPL-SCNC: 101 MMOL/L
CHOLEST SERPL-MCNC: 248 MG/DL
CHOLEST/HDLC SERPL: 3 {RATIO}
CO2 SERPL-SCNC: 26 MMOL/L
CREAT SERPL-MCNC: 0.8 MG/DL
DIFFERENTIAL METHOD: ABNORMAL
EOSINOPHIL # BLD AUTO: 0.1 K/UL
EOSINOPHIL NFR BLD: 1 %
ERYTHROCYTE [DISTWIDTH] IN BLOOD BY AUTOMATED COUNT: 13.6 %
EST. GFR  (AFRICAN AMERICAN): >60 ML/MIN/1.73 M^2
EST. GFR  (NON AFRICAN AMERICAN): >60 ML/MIN/1.73 M^2
ESTIMATED AVG GLUCOSE: 100 MG/DL
GLUCOSE SERPL-MCNC: 97 MG/DL
HBA1C MFR BLD HPLC: 5.1 %
HCT VFR BLD AUTO: 41.6 %
HDLC SERPL-MCNC: 84 MG/DL
HDLC SERPL: 33.9 %
HGB BLD-MCNC: 14 G/DL
IMM GRANULOCYTES # BLD AUTO: 0.03 K/UL
IMM GRANULOCYTES NFR BLD AUTO: 0.4 %
LDLC SERPL CALC-MCNC: 146.2 MG/DL
LYMPHOCYTES # BLD AUTO: 2.9 K/UL
LYMPHOCYTES NFR BLD: 40.7 %
MCH RBC QN AUTO: 35.2 PG
MCHC RBC AUTO-ENTMCNC: 33.7 G/DL
MCV RBC AUTO: 105 FL
MONOCYTES # BLD AUTO: 0.7 K/UL
MONOCYTES NFR BLD: 9.6 %
NEUTROPHILS # BLD AUTO: 3.4 K/UL
NEUTROPHILS NFR BLD: 47.2 %
NONHDLC SERPL-MCNC: 164 MG/DL
NRBC BLD-RTO: 0 /100 WBC
PLATELET # BLD AUTO: 370 K/UL
PMV BLD AUTO: 9.5 FL
POTASSIUM SERPL-SCNC: 3.7 MMOL/L
PROT SERPL-MCNC: 8.7 G/DL
RBC # BLD AUTO: 3.98 M/UL
SODIUM SERPL-SCNC: 140 MMOL/L
TRIGL SERPL-MCNC: 89 MG/DL
WBC # BLD AUTO: 7.17 K/UL

## 2019-01-09 PROCEDURE — 99396 PREV VISIT EST AGE 40-64: CPT | Mod: S$GLB,,, | Performed by: NURSE PRACTITIONER

## 2019-01-09 PROCEDURE — 99999 PR PBB SHADOW E&M-EST. PATIENT-LVL III: ICD-10-PCS | Mod: PBBFAC,,, | Performed by: NURSE PRACTITIONER

## 2019-01-09 PROCEDURE — 80061 LIPID PANEL: CPT

## 2019-01-09 PROCEDURE — 36415 COLL VENOUS BLD VENIPUNCTURE: CPT | Mod: PO

## 2019-01-09 PROCEDURE — 80053 COMPREHEN METABOLIC PANEL: CPT

## 2019-01-09 PROCEDURE — 83036 HEMOGLOBIN GLYCOSYLATED A1C: CPT

## 2019-01-09 PROCEDURE — 99396 PR PREVENTIVE VISIT,EST,40-64: ICD-10-PCS | Mod: S$GLB,,, | Performed by: NURSE PRACTITIONER

## 2019-01-09 PROCEDURE — 99999 PR PBB SHADOW E&M-EST. PATIENT-LVL III: CPT | Mod: PBBFAC,,, | Performed by: NURSE PRACTITIONER

## 2019-01-09 PROCEDURE — 85025 COMPLETE CBC W/AUTO DIFF WBC: CPT

## 2019-01-09 RX ORDER — METFORMIN HYDROCHLORIDE 500 MG/1
500 TABLET ORAL 2 TIMES DAILY WITH MEALS
Qty: 60 TABLET | Refills: 10 | Status: SHIPPED | OUTPATIENT
Start: 2019-01-09 | End: 2019-02-25 | Stop reason: ALTCHOICE

## 2019-01-09 NOTE — PROGRESS NOTES
Subjective:       Patient ID: Cristin Segal is a 58 y.o. female.    Chief Complaint: Medication Refill    HPI     Ms. Segal is a 58 year old female who presents today for medication refill, it has been a while since she has been seen by primary care.  Her history is reflected in the history and problem list section.  She does not have any new complaints today.   She is compliant with her medications, however, she has been out of her metformin for 1.5 weeks.  Her blood glucose has been running during this time 80's-low 100's.  Her blood pressure today is 137/88, her weight is 73.5kg ( 4/2018 she was 82.5kg).   Patient is down approx 20lbs and has been doing the ketogenic diet.  She walks for exercise with her dogs.      Eye Exam- Appointment today , Dr. Cat   Mammogram- Needs, last one was last year  Pap Smear - will make appointment on her own with Dr. Malone   Fit Kit- Patient received in mail last week, reminded to complete and return as directed.     Review of Systems   Constitutional: Negative for chills, fatigue and fever.   HENT: Positive for congestion (chronic pnd). Negative for sinus pressure and sinus pain.    Eyes: Negative for pain, discharge, redness and itching.   Respiratory: Negative for chest tightness, shortness of breath and wheezing.    Cardiovascular: Negative for chest pain and palpitations.   Gastrointestinal: Negative for blood in stool, constipation, diarrhea, nausea and vomiting.   Endocrine: Negative for polydipsia, polyphagia and polyuria.   Genitourinary: Negative for difficulty urinating, dysuria, flank pain and frequency.   Musculoskeletal: Negative for back pain, myalgias and neck pain.   Skin: Negative for color change, pallor, rash and wound.   Neurological: Negative for dizziness, light-headedness, numbness and headaches.   Hematological: Does not bruise/bleed easily.   Psychiatric/Behavioral: Negative for dysphoric mood. The patient is not nervous/anxious.         Objective:      Physical Exam   Constitutional: She is oriented to person, place, and time. She appears well-developed and well-nourished.   HENT:   Head: Normocephalic and atraumatic.   Eyes: Conjunctivae are normal. Pupils are equal, round, and reactive to light. Right eye exhibits no discharge. Left eye exhibits no discharge.   Neck: Normal range of motion. Neck supple. No thyromegaly present.   Cardiovascular: Normal rate, regular rhythm, normal heart sounds and intact distal pulses.   Pulmonary/Chest: Breath sounds normal. No respiratory distress. She has no wheezes.   Abdominal: Soft. Bowel sounds are normal. She exhibits no distension. There is no tenderness. There is no rebound.   Musculoskeletal: Normal range of motion. She exhibits no edema or deformity.   Lymphadenopathy:     She has no cervical adenopathy.   Neurological: She is alert and oriented to person, place, and time. No cranial nerve deficit or sensory deficit.   Skin: Skin is warm and dry. No rash noted.   Psychiatric: She has a normal mood and affect.       Assessment:       1. Controlled type 2 diabetes mellitus with complication, without long-term current use of insulin    2. Annual physical exam    3. Breast cancer screening by mammogram    4. Hyperlipidemia, unspecified hyperlipidemia type    5. BMI 29.0-29.9,adult        Plan:         Annual physical exam  -     Comprehensive metabolic panel; Future; Expected date: 01/09/2019  -     CBC auto differential; Future; Expected date: 01/09/2019    Controlled type 2 diabetes mellitus with complication, without long-term current use of insulin  -     Hemoglobin A1c; Future; Expected date: 07/08/2019  -     Lipid panel; Future; Expected date: 07/08/2019  -     metFORMIN (GLUCOPHAGE) 500 MG tablet; Take 1 tablet (500 mg total) by mouth 2 (two) times daily with meals.  Dispense: 60 tablet; Refill: 10    Breast cancer screening by mammogram  -     Mammo Digital Screening Bilateral With CAD; Future;  Expected date: 01/09/2019    Hyperlipidemia, unspecified hyperlipidemia type  - Currently not taking any medications, will reevaluate with results of Lipid panel  BMI 29.0-29.9,adult  - Patient with a 20lb intentional weight loss   - Continue diet and exercise   The 10-year ASCVD risk score (Brigida SWAIN Jr., et al., 2013) is: 5.9%    Values used to calculate the score:      Age: 58 years      Sex: Female      Is Non- : No      Diabetic: No      Tobacco smoker: Yes      Systolic Blood Pressure: 137 mmHg      Is BP treated: No      HDL Cholesterol: 84 mg/dL      Total Cholesterol: 248 mg/dL

## 2019-01-09 NOTE — PATIENT INSTRUCTIONS
Mammogram scheduled today , Complete and return fitkit as directed   Will call with lab results as they come in  Keep follow up appointment with Dr. Gtz in July

## 2019-01-10 ENCOUNTER — TELEPHONE (OUTPATIENT)
Dept: FAMILY MEDICINE | Facility: CLINIC | Age: 59
End: 2019-01-10

## 2019-01-10 DIAGNOSIS — R74.01 TRANSAMINITIS: ICD-10-CM

## 2019-01-10 DIAGNOSIS — D53.9 MACROCYTIC ANEMIA: Primary | ICD-10-CM

## 2019-01-10 NOTE — TELEPHONE ENCOUNTER
Spoke with patient and gave her the results per DARIANA George. Patient confirmed her understanding of the results without any further questions.

## 2019-01-15 ENCOUNTER — HOSPITAL ENCOUNTER (OUTPATIENT)
Dept: RADIOLOGY | Facility: CLINIC | Age: 59
Discharge: HOME | End: 2019-01-15
Attending: NURSE PRACTITIONER
Payer: COMMERCIAL

## 2019-01-15 ENCOUNTER — LAB VISIT (OUTPATIENT)
Dept: LAB | Facility: HOSPITAL | Age: 59
End: 2019-01-15
Attending: NURSE PRACTITIONER
Payer: COMMERCIAL

## 2019-01-15 ENCOUNTER — TELEPHONE (OUTPATIENT)
Dept: FAMILY MEDICINE | Facility: CLINIC | Age: 59
End: 2019-01-15

## 2019-01-15 DIAGNOSIS — Z12.31 BREAST CANCER SCREENING BY MAMMOGRAM: ICD-10-CM

## 2019-01-15 DIAGNOSIS — R74.01 TRANSAMINITIS: ICD-10-CM

## 2019-01-15 DIAGNOSIS — D53.9 MACROCYTIC ANEMIA: ICD-10-CM

## 2019-01-15 LAB
FOLATE SERPL-MCNC: 7.9 NG/ML
HAV IGM SERPL QL IA: NEGATIVE
HBV CORE IGM SERPL QL IA: NEGATIVE
HBV SURFACE AG SERPL QL IA: NEGATIVE
HCV AB SERPL QL IA: NEGATIVE
VIT B12 SERPL-MCNC: 395 PG/ML

## 2019-01-15 PROCEDURE — 77063 MAMMO DIGITAL SCREENING BILAT WITH TOMOSYNTHESIS_CAD: ICD-10-PCS | Mod: 26,,, | Performed by: RADIOLOGY

## 2019-01-15 PROCEDURE — 77063 BREAST TOMOSYNTHESIS BI: CPT | Mod: 26,,, | Performed by: RADIOLOGY

## 2019-01-15 PROCEDURE — 36415 COLL VENOUS BLD VENIPUNCTURE: CPT | Mod: PO

## 2019-01-15 PROCEDURE — 80074 ACUTE HEPATITIS PANEL: CPT

## 2019-01-15 PROCEDURE — 77067 SCR MAMMO BI INCL CAD: CPT | Mod: 26,,, | Performed by: RADIOLOGY

## 2019-01-15 PROCEDURE — 82746 ASSAY OF FOLIC ACID SERUM: CPT

## 2019-01-15 PROCEDURE — 77067 SCR MAMMO BI INCL CAD: CPT | Mod: TC,PO

## 2019-01-15 PROCEDURE — 77067 MAMMO DIGITAL SCREENING BILAT WITH TOMOSYNTHESIS_CAD: ICD-10-PCS | Mod: 26,,, | Performed by: RADIOLOGY

## 2019-01-15 PROCEDURE — 82607 VITAMIN B-12: CPT

## 2019-01-17 ENCOUNTER — HOSPITAL ENCOUNTER (OUTPATIENT)
Dept: RADIOLOGY | Facility: CLINIC | Age: 59
Discharge: HOME | End: 2019-01-17
Attending: NURSE PRACTITIONER
Payer: COMMERCIAL

## 2019-01-17 DIAGNOSIS — R74.01 TRANSAMINITIS: ICD-10-CM

## 2019-01-17 PROCEDURE — 76700 US ABDOMEN COMPLETE: ICD-10-PCS | Mod: 26,,, | Performed by: RADIOLOGY

## 2019-01-17 PROCEDURE — 76700 US EXAM ABDOM COMPLETE: CPT | Mod: 26,,, | Performed by: RADIOLOGY

## 2019-01-17 PROCEDURE — 76700 US EXAM ABDOM COMPLETE: CPT | Mod: TC,PO

## 2019-01-18 ENCOUNTER — TELEPHONE (OUTPATIENT)
Dept: FAMILY MEDICINE | Facility: CLINIC | Age: 59
End: 2019-01-18

## 2019-01-18 DIAGNOSIS — D53.9 MACROCYTIC ANEMIA: ICD-10-CM

## 2019-01-18 DIAGNOSIS — R74.01 TRANSAMINITIS: Primary | ICD-10-CM

## 2019-01-18 NOTE — TELEPHONE ENCOUNTER
----- Message from Doris Tovar NP sent at 1/18/2019 10:00 AM CST -----  Please let patient know that I received the results of her blood work and u/s     Her hepatitis panel was negative   The u/s did not show any real abnormalities, and because liver enzymes can be transiently elevated due to viral or other inflammatory causes I would like to recheck this again in 3 months.     Additionally, her B12 and Folate levels were normal, and as such I would also like to recheck her CBC in 3 months, we can do these two things together.  I will place the orders for these.

## 2019-01-22 ENCOUNTER — LAB VISIT (OUTPATIENT)
Dept: LAB | Facility: HOSPITAL | Age: 59
End: 2019-01-22
Attending: INTERNAL MEDICINE
Payer: COMMERCIAL

## 2019-01-22 DIAGNOSIS — Z12.11 COLON CANCER SCREENING: ICD-10-CM

## 2019-01-22 PROCEDURE — 82274 ASSAY TEST FOR BLOOD FECAL: CPT

## 2019-01-23 LAB — HEMOCCULT STL QL IA: NEGATIVE

## 2019-02-03 RX ORDER — OMEPRAZOLE 20 MG/1
CAPSULE, DELAYED RELEASE ORAL
Qty: 90 CAPSULE | Refills: 2 | Status: SHIPPED | OUTPATIENT
Start: 2019-02-03 | End: 2019-12-23 | Stop reason: SDUPTHER

## 2019-02-14 ENCOUNTER — OFFICE VISIT (OUTPATIENT)
Dept: RHEUMATOLOGY | Facility: CLINIC | Age: 59
End: 2019-02-14
Payer: COMMERCIAL

## 2019-02-14 VITALS — WEIGHT: 160 LBS | BODY MASS INDEX: 29.26 KG/M2 | SYSTOLIC BLOOD PRESSURE: 127 MMHG | DIASTOLIC BLOOD PRESSURE: 83 MMHG

## 2019-02-14 DIAGNOSIS — M35.1 MIXED CONNECTIVE TISSUE DISEASE: Primary | ICD-10-CM

## 2019-02-14 PROCEDURE — 99213 OFFICE O/P EST LOW 20 MIN: CPT | Mod: ,,, | Performed by: INTERNAL MEDICINE

## 2019-02-14 PROCEDURE — 99213 PR OFFICE/OUTPT VISIT, EST, LEVL III, 20-29 MIN: ICD-10-PCS | Mod: ,,, | Performed by: INTERNAL MEDICINE

## 2019-02-14 PROCEDURE — 3008F PR BODY MASS INDEX (BMI) DOCUMENTED: ICD-10-PCS | Mod: ,,, | Performed by: INTERNAL MEDICINE

## 2019-02-14 PROCEDURE — 3008F BODY MASS INDEX DOCD: CPT | Mod: ,,, | Performed by: INTERNAL MEDICINE

## 2019-02-14 RX ORDER — HYDROXYCHLOROQUINE SULFATE 200 MG/1
200 TABLET, FILM COATED ORAL DAILY
Qty: 30 TABLET | Refills: 3 | Status: SHIPPED | OUTPATIENT
Start: 2019-02-14 | End: 2019-02-25 | Stop reason: SDUPTHER

## 2019-02-14 RX ORDER — NIFEDIPINE 30 MG/1
30 TABLET, FILM COATED, EXTENDED RELEASE ORAL DAILY
Qty: 90 TABLET | Refills: 2 | Status: SHIPPED | OUTPATIENT
Start: 2019-02-14 | End: 2019-06-26 | Stop reason: SDUPTHER

## 2019-02-14 RX ORDER — CYCLOBENZAPRINE HCL 10 MG
10 TABLET ORAL 3 TIMES DAILY PRN
Qty: 60 TABLET | Refills: 3 | Status: SHIPPED | OUTPATIENT
Start: 2019-02-14 | End: 2019-06-26 | Stop reason: SDUPTHER

## 2019-02-14 NOTE — PROGRESS NOTES
[facility] RHEUMATOLOGY            PROGRESS NOTE      Subjective:       Patient ID:   NAME: Cristin Segal : 1960     58 y.o. female    Referring Doc: No ref. provider found  Other Physicians:    Chief Complaint:  mixed connective tissue disease      History of Present Illness:     Patient returns today for a regularly scheduled follow-up visit for  Mixed connective tissue disease     The patient  Doing well. No chest pains. Occasional cough with environmental allergies. No joint swelling. No muscle weakness. No dysphagia. No Raynaud's. Since her last visit she has been treated for dog bites  right arm and leg. She is doing well.            ROS:   GEN:  No  fever, night sweats . weight is stable   No fatigue  SKIN: no rashes, no bruising, no ulcerations, no Raynaud's  HEENT: no HA's, No visual changes, no mucosal ulcers, no sicca symptoms,  CV:   no CP, SOB, PND, SANTILLAN, no orthopnea, no palpitations  PULM: normal with no SOB,+ occ  cough, hemoptysis, sputum or pleuritic pain  GI:  no abdominal pain, nausea, vomiting, constipation, diarrhea, melanotic stools, BRBPR, hematemesis, no dysphagia  :   no dysuria  NEURO: no paresthesias, headaches, visual disturbances, muscle weakness  MUSCULOSKELETAL:no joint swelling, prolonged AM stiffness, no back pain, no muscle pain  Allergies:  Review of patient's allergies indicates:   Allergen Reactions    Pcn [penicillins]        Medications:    Current Outpatient Medications:     cyclobenzaprine (FLEXERIL) 10 MG tablet, Take 1 tablet (10 mg total) by mouth 3 (three) times daily as needed., Disp: 60 tablet, Rfl: 3    hydroxychloroquine (PLAQUENIL) 200 mg tablet, Take 1 tablet (200 mg total) by mouth once daily., Disp: 30 tablet, Rfl: 3    hydroxychloroquine (PLAQUENIL) 200 mg tablet, TAKE 1 TABLET (200 MG TOTAL) BY MOUTH ONCE DAILY., Disp: 30 tablet, Rfl: 3    KLOR-CON M20 20 mEq tablet, , Disp: , Rfl: 10    metFORMIN (GLUCOPHAGE) 500 MG tablet, Take 1 tablet  (500 mg total) by mouth 2 (two) times daily with meals., Disp: 60 tablet, Rfl: 10    NIFEdipine (ADALAT CC) 30 MG TbSR, TAKE 1 TABLET BY MOUTH EVERY DAY, Disp: 90 tablet, Rfl: 2    omeprazole (PRILOSEC) 20 MG capsule, TAKE ONE CAPSULE BY MOUTH EVERY DAY, Disp: 90 capsule, Rfl: 2    PMHx/PSHx Updates:        Last Eye Exam: UTD      Objective:     Vitals:  Blood pressure 127/83, weight 72.6 kg (160 lb).    Physical Examination:   GEN: no apparent distress, comfortable; AAOx3  SKIN: no rashes,no ulceration, no Raynaud's, no petechiae, no SQ nodules,  HEAD: normal  EYES: no pallor, no icterus,  NECK: no masses, thyroid normal, trachea midline, no LAD/LN's, supple  CV: RRR with no murmur; l S1 and S2 reg. ,no gallop no rubs,   CHEST: Normal respiratory effort; CTAB; normal breath sounds; no wheeze or crackles  MUSC/Skeletal: ROM normal; no crepitus; joints without synovitis,  no deformities  No joint swelling or tenderness of PIP, MCP, wrist, elbow, shoulder, or knee joints  EXTREM: no clubbing, cyanosis, no edema,normal  pulses   NEURO: grossly intact; motor WNL; AAOx3;   PSYCH: normal mood, affect and behavior  LYMPH: normal cervical, supraclavicular          Labs:   Lab Results   Component Value Date    WBC 7.17 01/09/2019    HGB 14.0 01/09/2019    HCT 41.6 01/09/2019     (H) 01/09/2019     (H) 01/09/2019    CMP  @LASTLAB(NA,K,CL,CO2,GLU,BUN,Creatinine,Calcium,PROT,Albumin,Bilitot,Alkphos,AST,ALT,CRP,ESR,RF,CCP,BROOK,SSA,CPK,uric acid) )@  I have reviewed all available lab results and radiology reports.    Radiology/Diagnostic Studies:        Assessment/Plan:   (1) 58 y.o. female with diagnosis of mixed connective tissue disease. She is doing well on Plaquenil; has not have any active disease for a long time.    CBC CMP done recently. Overall ok except for macrocytosis. She had B12 and folate checked; they were normal.  PLAN:  urinalysis            Discussion:     I have explained all of the above in  detail and the patient understands all of the current recommendation(s). I have answered all questions to the best of my ability and to their complete satisfaction.       The patient is to continue with the current management plan         RTC in   4 months       Electronically signed by Erica Guzman MD

## 2019-02-25 ENCOUNTER — OFFICE VISIT (OUTPATIENT)
Dept: FAMILY MEDICINE | Facility: CLINIC | Age: 59
End: 2019-02-25
Payer: COMMERCIAL

## 2019-02-25 ENCOUNTER — HOSPITAL ENCOUNTER (OUTPATIENT)
Dept: RADIOLOGY | Facility: HOSPITAL | Age: 59
Discharge: HOME | End: 2019-02-25
Attending: PHYSICIAN ASSISTANT
Payer: COMMERCIAL

## 2019-02-25 ENCOUNTER — TELEPHONE (OUTPATIENT)
Dept: FAMILY MEDICINE | Facility: CLINIC | Age: 59
End: 2019-02-25

## 2019-02-25 ENCOUNTER — DOCUMENTATION ONLY (OUTPATIENT)
Dept: FAMILY MEDICINE | Facility: CLINIC | Age: 59
End: 2019-02-25

## 2019-02-25 VITALS
SYSTOLIC BLOOD PRESSURE: 111 MMHG | DIASTOLIC BLOOD PRESSURE: 75 MMHG | WEIGHT: 156.94 LBS | BODY MASS INDEX: 28.88 KG/M2 | HEART RATE: 108 BPM | HEIGHT: 62 IN | TEMPERATURE: 98 F

## 2019-02-25 DIAGNOSIS — K11.20 PAROTITIS: Primary | ICD-10-CM

## 2019-02-25 DIAGNOSIS — K11.20 PAROTITIS: ICD-10-CM

## 2019-02-25 PROCEDURE — 99214 PR OFFICE/OUTPT VISIT, EST, LEVL IV, 30-39 MIN: ICD-10-PCS | Mod: S$GLB,,, | Performed by: PHYSICIAN ASSISTANT

## 2019-02-25 PROCEDURE — 70488 CT MAXILLOFACIAL W/O & W/DYE: CPT | Mod: 26,,, | Performed by: RADIOLOGY

## 2019-02-25 PROCEDURE — 99214 OFFICE O/P EST MOD 30 MIN: CPT | Mod: S$GLB,,, | Performed by: PHYSICIAN ASSISTANT

## 2019-02-25 PROCEDURE — 99999 PR PBB SHADOW E&M-EST. PATIENT-LVL III: ICD-10-PCS | Mod: PBBFAC,,, | Performed by: PHYSICIAN ASSISTANT

## 2019-02-25 PROCEDURE — 70488 CT MAXILLOFACIAL W/O & W/DYE: CPT | Mod: TC

## 2019-02-25 PROCEDURE — 3008F BODY MASS INDEX DOCD: CPT | Mod: CPTII,S$GLB,, | Performed by: PHYSICIAN ASSISTANT

## 2019-02-25 PROCEDURE — 70488 CT MAXILLOFACIAL W WO CONTRAST: ICD-10-PCS | Mod: 26,,, | Performed by: RADIOLOGY

## 2019-02-25 PROCEDURE — 3008F PR BODY MASS INDEX (BMI) DOCUMENTED: ICD-10-PCS | Mod: CPTII,S$GLB,, | Performed by: PHYSICIAN ASSISTANT

## 2019-02-25 PROCEDURE — 25500020 PHARM REV CODE 255: Performed by: PHYSICIAN ASSISTANT

## 2019-02-25 PROCEDURE — 99999 PR PBB SHADOW E&M-EST. PATIENT-LVL III: CPT | Mod: PBBFAC,,, | Performed by: PHYSICIAN ASSISTANT

## 2019-02-25 RX ADMIN — IOHEXOL 75 ML: 350 INJECTION, SOLUTION INTRAVENOUS at 04:02

## 2019-02-25 NOTE — PROGRESS NOTES
Pre-Visit Chart Review  For Appointment Scheduled on 02/25/19    Health Maintenance Due   Topic Date Due    Eye Exam  09/11/1970    TETANUS VACCINE  09/11/1978    Pneumococcal Vaccine (Medium Risk) (1 of 1 - PPSV23) 09/11/1979    Pap Smear with HPV Cotest  09/11/1981    Urine Microalbumin  05/05/2017    Influenza Vaccine  08/01/2018    Foot Exam  03/22/2019

## 2019-02-25 NOTE — TELEPHONE ENCOUNTER
Spoke to Loni notified that Mrs Daniels is gone for the day advised if this can be completed tomorrow.   Can be completed tomorrow, but only tomorrow requesting this to be completed in the AM  it is the last day [employer] has a window and then she will not be able to obtain auth after tomorrow

## 2019-02-25 NOTE — TELEPHONE ENCOUNTER
Loni Wolf Staff ([facility])             Hello,     I need clincial notes to be put in pt's chart asap please. I am tring to obtain auth and can not proceed with out them. [employer] has a 1 day window after the test has been completed to obtain auth and I need the notes to be put in today.       Thanks, Loni Fuentes   [facility]   [phone]  [id number]

## 2019-02-26 ENCOUNTER — TELEPHONE (OUTPATIENT)
Dept: FAMILY MEDICINE | Facility: CLINIC | Age: 59
End: 2019-02-26

## 2019-02-26 RX ORDER — CLINDAMYCIN HYDROCHLORIDE 300 MG/1
300 CAPSULE ORAL EVERY 8 HOURS
Qty: 30 CAPSULE | Refills: 0 | Status: SHIPPED | OUTPATIENT
Start: 2019-02-26 | End: 2019-04-24 | Stop reason: ALTCHOICE

## 2019-02-26 NOTE — PROGRESS NOTES
Subjective:       Patient ID: Cristin Segal is a 58 y.o. female.    Chief Complaint: Swelling right side of jaw    HPI   Patient is a 58-year-old  female with diabetes type 2, hyperlipidemia, mixed connective tissue disease presenting to the clinic with 1 day of left-sided facial pain, swelling, redness and warmth.  Patient states that the area is very tender to palpation in she can feel a knot in this area.  She denies any fevers, difficulty swallowing, stridor, trismus.  She has never had this before.  Review of Systems   Constitutional: Negative for activity change, appetite change, chills, diaphoresis, fatigue and fever.   HENT: Positive for ear pain, facial swelling and voice change. Negative for congestion, ear discharge, hearing loss, mouth sores, nosebleeds, postnasal drip, rhinorrhea, sneezing, sore throat, tinnitus and trouble swallowing.    Respiratory: Negative.  Negative for cough, shortness of breath and wheezing.    Cardiovascular: Negative.  Negative for chest pain.   Gastrointestinal: Negative for abdominal pain, blood in stool, constipation, diarrhea, nausea and vomiting.   Genitourinary: Negative for dysuria, frequency, hematuria and urgency.   Musculoskeletal: Negative.    Skin: Negative.  Negative for color change and rash.   Neurological: Negative for dizziness and syncope.   Psychiatric/Behavioral: Negative for agitation, behavioral problems and confusion.       Objective:      Physical Exam   Constitutional: Vital signs are normal. She appears well-developed and well-nourished. No distress.   HENT:   Head: Normocephalic and atraumatic.       Right Ear: Hearing, tympanic membrane and ear canal normal. No lacerations. No tenderness. No foreign bodies. There is mastoid tenderness. No middle ear effusion.   Left Ear: Hearing, tympanic membrane, external ear and ear canal normal.   Nose: Nose normal. No mucosal edema or rhinorrhea. Right sinus exhibits no maxillary sinus tenderness  and no frontal sinus tenderness. Left sinus exhibits no maxillary sinus tenderness and no frontal sinus tenderness.   Mouth/Throat: Uvula is midline, oropharynx is clear and moist and mucous membranes are normal. She does not have dentures. No oral lesions. No trismus in the jaw. Normal dentition. No uvula swelling, lacerations or dental caries.   Cardiovascular: Normal rate, regular rhythm, S1 normal, S2 normal and normal heart sounds. Exam reveals no gallop.   No murmur heard.  Pulses:       Radial pulses are 2+ on the right side, and 2+ on the left side.   <2sec cap refill fingers bilat     Pulmonary/Chest: Effort normal and breath sounds normal. No respiratory distress. She has no wheezes. She has no rhonchi.   Lymphadenopathy:        Head (right side): Preauricular and posterior auricular adenopathy present. No submental, no submandibular, no tonsillar and no occipital adenopathy present.   Skin: Skin is warm and dry. She is not diaphoretic.   Appropriate skin turgor   Psychiatric: She has a normal mood and affect. Her speech is normal and behavior is normal. Judgment and thought content normal. Cognition and memory are normal.       Assessment:       1. Parotitis        Plan:       Cristin was seen today for swelling right side of jaw.    Diagnoses and all orders for this visit:    Parotitis  -     CT Maxillofacial W Wo Contrast; Future  -     Creatinine, serum; Future  Will f/u with imaging and discuss further recommendations. Will wait for imaging as this may not be of an infectious etiology or may be early onset?

## 2019-02-26 NOTE — TELEPHONE ENCOUNTER
----- Message from Vale Dyer sent at 2/26/2019 10:23 AM CST -----  Contact: patient  Type:  Test Results    Who Called:  patient  Name of Test (Lab/Mammo/Etc):  CT Scan  Date of Test:  02/25/19  Ordering Provider:  Maryann Daniels  Where the test was performed[facility] Call Back Number:  [phone]  Additional Information:  Requesting a call back to discuss test results

## 2019-02-26 NOTE — TELEPHONE ENCOUNTER
Spoke with the patient myself.  Gave patient results of CT exam.  She agrees to see Dr. Maki tomorrow.  We inform patient to start clindamycin 300 mg t.i.d. With probiotic. Dr. Maki will provide further recommendations tomorrow

## 2019-02-26 NOTE — TELEPHONE ENCOUNTER
Spoke to patient notified of results. Advised waiting for nurse to call back regarding starting treatment. Advised appointment tomorrow with Dr Uriarte patient states she works tomorrow when she called to reschedule appointment first available appointment to rescheduled 3/20 advised that she can see Dr Hernandez or Dr Christina advise patient to contact office for appointment that fits her schedule patient states understanding

## 2019-02-26 NOTE — TELEPHONE ENCOUNTER
----- Message from JODY Penn sent at 2/26/2019  8:13 AM CST -----  Please call patient and let her know that CT did now mass in the right parotid glad with surrounding swelling. No stone was appreciated. There is concern that this could be the start of an early abcsess.    I would like her to see ENT asap. Can we call our ENT and see if they can see her today?

## 2019-02-27 ENCOUNTER — OFFICE VISIT (OUTPATIENT)
Dept: OTOLARYNGOLOGY | Facility: CLINIC | Age: 59
End: 2019-02-27
Payer: COMMERCIAL

## 2019-02-27 VITALS — BODY MASS INDEX: 29.53 KG/M2 | WEIGHT: 160.5 LBS | HEIGHT: 62 IN

## 2019-02-27 DIAGNOSIS — K11.21 ACUTE PAROTITIS: Primary | ICD-10-CM

## 2019-02-27 PROCEDURE — 99243 PR OFFICE CONSULTATION,LEVEL III: ICD-10-PCS | Mod: S$GLB,,, | Performed by: OTOLARYNGOLOGY

## 2019-02-27 PROCEDURE — 99999 PR PBB SHADOW E&M-EST. PATIENT-LVL III: ICD-10-PCS | Mod: PBBFAC,,, | Performed by: OTOLARYNGOLOGY

## 2019-02-27 PROCEDURE — 99999 PR PBB SHADOW E&M-EST. PATIENT-LVL III: CPT | Mod: PBBFAC,,, | Performed by: OTOLARYNGOLOGY

## 2019-02-27 PROCEDURE — 99243 OFF/OP CNSLTJ NEW/EST LOW 30: CPT | Mod: S$GLB,,, | Performed by: OTOLARYNGOLOGY

## 2019-02-27 RX ORDER — IBUPROFEN 800 MG/1
800 TABLET ORAL EVERY 6 HOURS PRN
Qty: 40 TABLET | Refills: 0 | Status: SHIPPED | OUTPATIENT
Start: 2019-02-27 | End: 2020-08-17

## 2019-02-27 NOTE — PATIENT INSTRUCTIONS
"  Salivary Gland Infection  Salivary glands make saliva. Saliva is mostly water. It also has minerals and proteins that help break down food and keep the mouth and teeth healthy. There are three pairs of salivary glands:  · Parotid glands (in front of the ear)  · Submandibular glands (below the jaw)  · Sublingual glands (below the tongue)  A salivary gland can become infected by bacteria (germs). Things that make this more likely include dehydration and taking medicines that affect saliva flow. Infection is also more likely when the duct (channel) that carries saliva from the gland to the mouth is blocked. It may be blocked by a salivary gland "stone." This is a collection of minerals that forms in the salivary gland.  Signs of infection include fever, severe pain in the gland, and redness and swelling over the gland. It may hurt to open the mouth. Symptoms may be worse when the flow of saliva is stimulated, such as by the smell of food.   Antibiotics are used to treat the infection. Drainage of the infection with a simple surgical procedure is sometimes needed. It a salivary gland stone is present, a procedure may be done to remove it.  Home Care:  · Take antibiotics as directed until they are finished. Do this even if you start to feel better after only a few days.  · Unless another medicine was prescribed, take over-the-counter medicines, such as acetaminophen or ibuprofen, to help relieve pain.  · Moist heat can also help relieve swelling and pain. Wet a cloth with warm water and put it over the affected gland for 10-15 minutes several times a day.  · Gently massage the gland a few times a day.   · Suck on lemon or other tart hard candies to encourage flow of saliva.  To help prevent blockages and infections:  · Drink 6-8 glasses of fluid per day (such as water, tea, and clear soup) to keep well hydrated.  · If you smoke, ask your healthcare provider for help to quit. Smoking makes salivary gland stones more " likely.  · Maintain good dental hygiene. Brush and floss your teeth daily. See your dentist for regular cleanings.  Follow-up care  Follow up with your healthcare provider or as advised.   When to seek medical advice  Call your healthcare provider if any of the following occur:  · Fever over 100.4°F (38ºC) after two days of taking antibiotics  · Symptoms get worse or don't improve within a few days  · Trouble breathing or swallowing  Date Last Reviewed: 5/4/2015  © 7849-9128 [employer] [address] [place] [address], [place] [address]. All rights reserved. This information is not intended as a substitute for professional medical care. Always follow your healthcare professional's instructions.

## 2019-02-27 NOTE — PROGRESS NOTES
Subjective:       Patient ID: Cristin Segal is a 58 y.o. female.    Chief Complaint: parotid mass    Cristin is here for right parotid swelling.   Length of symptoms: 4 days, has been increasing at this time. Began with what she thought was a pimple inside her ear and then progressed to right sided parotid / facial swelling, + erythema, + warmth. No fevers. Was started Clindamycin. Since starting antibiotic, a small amount of improvement but still + TTP and swollen.  No prior history of parotitis. No pain with eating.   Therapies tried include: warm compress.   Associated symptoms:  No facial weakness.       Social History     Tobacco Use   Smoking Status Current Every Day Smoker    Packs/day: 0.50   Smokeless Tobacco Never Used     Social History     Substance and Sexual Activity   Alcohol Use Yes    Comment: occasional          Review of Systems   Constitutional: Negative for activity change and appetite change.   Eyes: Negative for discharge.   Respiratory: Negative for difficulty breathing and wheezing   Cardiovascular: Negative for chest pain.   Gastrointestinal: Negative for abdominal distention and abdominal pain.   Endocrine: Negative for cold intolerance and heat intolerance.   Genitourinary: Negative for dysuria.   Musculoskeletal: Negative for gait problem and joint swelling.   Skin: Negative for color change and pallor.   Neurological: Negative for syncope and weakness.   Psychiatric/Behavioral: Negative for agitation and confusion.     Objective:        Constitutional:   She is oriented to person, place, and time. She appears well-developed and well-nourished. She appears alert. She is active. Normal speech.      Head:  Normocephalic and atraumatic. Head is without TMJ tenderness. No scars. Parotid tender.  Facial strength is normal.    2 cm right firm swelling of the parotid in the preauricular region extending down into the tail.  Moderate tenderness to palpation  No purulence expressed from  Stensen's duct, dry oral cavity.      Ears:    Right Ear: No drainage or swelling. No middle ear effusion.   Left Ear: No drainage or swelling.  No middle ear effusion.     Nose:  No mucosal edema, rhinorrhea or sinus tenderness. No turbinate hypertrophy.      Mouth/Throat  Oropharynx clear and moist without lesions or asymmetry, normal uvula midline and mirror exam normal. Normal dentition. No uvula swelling, lacerations or trismus. No oropharyngeal exudate. Tonsillar erythema, tonsillar exudate.      Neck:  Full range of motion with neck supple. Thyroid tenderness is present. No tracheal deviation, no edema, no erythema, normal range of motion, no stridor, no crepitus and no neck rigidity present. No thyroid mass present.     She has cervical adenopathy (Mildly tender external jugular chain node, right).         Cardiovascular:   Intact distal pulses and normal pulses.      Pulmonary/Chest:   Effort normal and breath sounds normal. No stridor.     Psychiatric:   Her speech is normal and behavior is normal. Her mood appears not anxious. Her affect is not labile.     Neurological:   She is alert and oriented to person, place, and time. No sensory deficit.     Skin:   No abrasions, lacerations, lesions, or rashes. No abrasion and no bruising noted.         Tests / Results:  I personally reviewed the CT max face and my findings reveal:  2 cm irregular hypodense lesion of superficial parotid on the right with mild subtle contrast enhancement in the periphery.  Reactive adenopathy in the jugular chain and level 2 3      Assessment:       1. Acute parotitis          Plan:       She is starting to respond to treatment  Continue clindamycin for 10 days  Warm compresses, smoking cessation, high-dose ibuprofen  Discussed that once infection is improved, we will confirm whether mass is still present.  If so, biopsy.  She will call me if she has worsening, low suspicion it will turn into an abscess but will monitor  Follow-up  3 weeks or sooner as needed

## 2019-02-27 NOTE — LETTER
February 27, 2019      JODY Penn  [address] [facility] [address]           [facility]  [address] [facility] [address] [place] [address]  Phone: [phone]  Fax: [phone]          Patient: Cristin Segal   MR Number: [id number]   YOB: 1960   Date of Visit: 2/27/2019       Dear Maryann Daniels:    Thank you for referring Cristin Segal to me for evaluation. Attached you will find relevant portions of my assessment and plan of care.    If you have questions, please do not hesitate to call me. I look forward to following Cristin Segal along with you.    Sincerely,    Abner Maki MD    Enclosure  CC:  No Recipients    If you would like to receive this communication electronically, please contact [email or web address] or [phone] to request more information on [employer] Link access.    For providers and/or their staff who would like to refer a patient to [facility], please contact us through our one-stop-shop provider referral line, [facility], at [phone].    If you feel you have received this communication in error or would no longer like to receive these types of communications, please e-mail [email or web address]

## 2019-03-13 ENCOUNTER — TELEPHONE (OUTPATIENT)
Dept: OTOLARYNGOLOGY | Facility: CLINIC | Age: 59
End: 2019-03-13

## 2019-03-13 NOTE — TELEPHONE ENCOUNTER
----- Message from Mk Lipscomb sent at 3/13/2019  2:55 PM CDT -----  Contact: same  Patient called in and stated Dr. Maki wanted to see her back in office if after she took her antibiotics the gland in her neck did not go down.  Patient stated it has not and would like to be seen sooner then the next available of 4/2/19.  Patient call back is [phone] ([facility]).  [phone] cell

## 2019-03-21 ENCOUNTER — OFFICE VISIT (OUTPATIENT)
Dept: OTOLARYNGOLOGY | Facility: CLINIC | Age: 59
End: 2019-03-21
Payer: COMMERCIAL

## 2019-03-21 VITALS
BODY MASS INDEX: 29.33 KG/M2 | WEIGHT: 159.38 LBS | HEIGHT: 62 IN | DIASTOLIC BLOOD PRESSURE: 80 MMHG | SYSTOLIC BLOOD PRESSURE: 120 MMHG

## 2019-03-21 DIAGNOSIS — K11.21 ACUTE PAROTITIS: Primary | ICD-10-CM

## 2019-03-21 PROCEDURE — 99999 PR PBB SHADOW E&M-EST. PATIENT-LVL III: CPT | Mod: PBBFAC,,, | Performed by: OTOLARYNGOLOGY

## 2019-03-21 PROCEDURE — 99999 PR PBB SHADOW E&M-EST. PATIENT-LVL III: ICD-10-PCS | Mod: PBBFAC,,, | Performed by: OTOLARYNGOLOGY

## 2019-03-21 PROCEDURE — 3008F BODY MASS INDEX DOCD: CPT | Mod: CPTII,S$GLB,, | Performed by: OTOLARYNGOLOGY

## 2019-03-21 PROCEDURE — 99213 OFFICE O/P EST LOW 20 MIN: CPT | Mod: S$GLB,,, | Performed by: OTOLARYNGOLOGY

## 2019-03-21 PROCEDURE — 3008F PR BODY MASS INDEX (BMI) DOCUMENTED: ICD-10-PCS | Mod: CPTII,S$GLB,, | Performed by: OTOLARYNGOLOGY

## 2019-03-21 PROCEDURE — 99213 PR OFFICE/OUTPT VISIT, EST, LEVL III, 20-29 MIN: ICD-10-PCS | Mod: S$GLB,,, | Performed by: OTOLARYNGOLOGY

## 2019-03-21 NOTE — PROGRESS NOTES
Subjective:       Patient ID: Cristin Segal is a 58 y.o. female.    Chief Complaint: Other (lump in neck painful to touch per patient )    Cristin is here for follow-up of R parotitis in setting of possible underlying parotid mass.     HPI 2/27/19  Length of symptoms: 4 days, has been increasing at this time. Began with what she thought was a pimple inside her ear and then progressed to right sided parotid / facial swelling, + erythema, + warmth. No fevers. Was started Clindamycin. Since starting antibiotic, a small amount of improvement but still + TTP and swollen.  No prior history of parotitis. No pain with eating.   Therapies tried include: warm compress.   Associated symptoms:  No facial weakness.    Interval: completed antibiotics. Improvement in parotid swelling and pain. Still having neck swelling and tenderness but improved. No fevers. No other new issues.     Review of Systems   Constitutional: Negative for activity change and appetite change.   Respiratory: Negative for difficulty breathing and wheezing   Cardiovascular: Negative for chest pain.      Objective:        Constitutional:   Vital signs are normal. She appears well-developed and well-nourished.     Head:  Normocephalic and atraumatic. Facial strength is normal.      Ears:  Hearing normal to normal and whispered voice; external ear normal without scars, lesions, or masses; ear canal, tympanic membrane, and middle ear normal..     Nose:  Nose normal including turbinates, nasal mucosa, sinuses and nasal septum.     Mouth/Throat  Oropharynx clear and moist without lesions or asymmetry.     Neck:  Neck normal without thyromegaly masses, asymmetry, normal tracheal structure, crepitus, and tenderness.             Tests / Results:  None    Assessment:       1. Acute parotitis          Plan:         Improved parotid infection, I can no longer palpate abnormality in parotid. She still have a reactive level II node which I will continue to follow.  Continue conservative therapy. FU 1 month for re-eval (will image if persistent / worrisome) or sooner as needed.

## 2019-04-24 ENCOUNTER — OFFICE VISIT (OUTPATIENT)
Dept: OTOLARYNGOLOGY | Facility: CLINIC | Age: 59
End: 2019-04-24
Payer: COMMERCIAL

## 2019-04-24 VITALS — BODY MASS INDEX: 30.26 KG/M2 | WEIGHT: 164.44 LBS | HEIGHT: 62 IN

## 2019-04-24 DIAGNOSIS — R59.0 CERVICAL LYMPHADENOPATHY: ICD-10-CM

## 2019-04-24 DIAGNOSIS — K11.21 ACUTE PAROTITIS: Primary | ICD-10-CM

## 2019-04-24 PROCEDURE — 99214 OFFICE O/P EST MOD 30 MIN: CPT | Mod: S$GLB,,, | Performed by: OTOLARYNGOLOGY

## 2019-04-24 PROCEDURE — 99999 PR PBB SHADOW E&M-EST. PATIENT-LVL II: ICD-10-PCS | Mod: PBBFAC,,, | Performed by: OTOLARYNGOLOGY

## 2019-04-24 PROCEDURE — 99214 PR OFFICE/OUTPT VISIT, EST, LEVL IV, 30-39 MIN: ICD-10-PCS | Mod: S$GLB,,, | Performed by: OTOLARYNGOLOGY

## 2019-04-24 PROCEDURE — 99999 PR PBB SHADOW E&M-EST. PATIENT-LVL II: CPT | Mod: PBBFAC,,, | Performed by: OTOLARYNGOLOGY

## 2019-04-24 PROCEDURE — 3008F BODY MASS INDEX DOCD: CPT | Mod: CPTII,S$GLB,, | Performed by: OTOLARYNGOLOGY

## 2019-04-24 PROCEDURE — 3008F PR BODY MASS INDEX (BMI) DOCUMENTED: ICD-10-PCS | Mod: CPTII,S$GLB,, | Performed by: OTOLARYNGOLOGY

## 2019-04-24 RX ORDER — AMOXICILLIN AND CLAVULANATE POTASSIUM 875; 125 MG/1; MG/1
1 TABLET, FILM COATED ORAL 2 TIMES DAILY
Qty: 20 TABLET | Refills: 0 | Status: SHIPPED | OUTPATIENT
Start: 2019-04-24 | End: 2019-05-04

## 2019-04-25 NOTE — PROGRESS NOTES
Subjective:       Patient ID: Cristin Segal is a 58 y.o. female.    Chief Complaint: Lump (painful to touch at time per patient)    Cristin is here for follow-up of R parotitis in setting of possible underlying parotid mass.     HPI 2/27/19  Length of symptoms: 4 days, has been increasing at this time. Began with what she thought was a pimple inside her ear and then progressed to right sided parotid / facial swelling, + erythema, + warmth. No fevers. Was started Clindamycin. Since starting antibiotic, a small amount of improvement but still + TTP and swollen.  No prior history of parotitis. No pain with eating.   Therapies tried include: warm compress.   Associated symptoms:  No facial weakness.    Interval: completed antibiotics. Improvement in parotid swelling and pain. Still having neck swelling and tenderness but improved. No fevers. No other new issues.     Interval: still feeling neck tenderness and swelling Level II, right. No parotid concerns. No change in size.     Review of Systems   Constitutional: Negative for activity change and appetite change.   Respiratory: Negative for difficulty breathing and wheezing   Cardiovascular: Negative for chest pain.      Objective:        Constitutional:   Vital signs are normal. She appears well-developed and well-nourished.     Head:  Normocephalic and atraumatic. Facial strength is normal.      Ears:  Hearing normal to normal and whispered voice; external ear normal without scars, lesions, or masses; ear canal, tympanic membrane, and middle ear normal..     Nose:  Nose normal including turbinates, nasal mucosa, sinuses and nasal septum.     Mouth/Throat  Oropharynx clear and moist without lesions or asymmetry.     Neck:  Neck normal without thyromegaly masses, asymmetry, normal tracheal structure, crepitus, and tenderness.             Tests / Results:  None    Assessment:       1. Acute parotitis    2. Cervical lymphadenopathy          Plan:         Improved  parotid infection, I can no longer palpate abnormality in parotid. She still have a reactive level II node which is not concerning based on size, but  and mildly enlarged. Augmentin x 10 days. I will touch base with her neck week and if persistent, will repeat imaging.

## 2019-05-06 ENCOUNTER — TELEPHONE (OUTPATIENT)
Dept: OTOLARYNGOLOGY | Facility: CLINIC | Age: 59
End: 2019-05-06

## 2019-05-06 DIAGNOSIS — R22.1 NECK MASS: Primary | ICD-10-CM

## 2019-05-06 NOTE — TELEPHONE ENCOUNTER
----- Message from Marcella Smith sent at 5/6/2019 11:49 AM CDT -----  Contact: patient  Type: Needs Medical Advice    Who Called:  patient    Best Call Back Number: [phone]  Additional Information: states that the lumps in her neck is still there after taking the antibiotics and would like to know what to do next. Please advise

## 2019-05-07 NOTE — TELEPHONE ENCOUNTER
I spoke with patient returning call from yesterday she still has the swelling and wanted to discuss the plan.

## 2019-05-08 ENCOUNTER — LAB VISIT (OUTPATIENT)
Dept: LAB | Facility: HOSPITAL | Age: 59
End: 2019-05-08
Attending: OTOLARYNGOLOGY
Payer: COMMERCIAL

## 2019-05-08 DIAGNOSIS — R22.1 NECK MASS: ICD-10-CM

## 2019-05-08 LAB
CREAT SERPL-MCNC: 0.9 MG/DL (ref 0.5–1.4)
EST. GFR  (AFRICAN AMERICAN): >60 ML/MIN/1.73 M^2
EST. GFR  (NON AFRICAN AMERICAN): >60 ML/MIN/1.73 M^2

## 2019-05-08 PROCEDURE — 36415 COLL VENOUS BLD VENIPUNCTURE: CPT | Mod: PO

## 2019-05-08 PROCEDURE — 82565 ASSAY OF CREATININE: CPT | Mod: PO

## 2019-05-08 PROCEDURE — 82565 ASSAY OF CREATININE: CPT

## 2019-05-10 ENCOUNTER — HOSPITAL ENCOUNTER (OUTPATIENT)
Dept: RADIOLOGY | Facility: HOSPITAL | Age: 59
Discharge: HOME | End: 2019-05-10
Attending: OTOLARYNGOLOGY
Payer: COMMERCIAL

## 2019-05-10 DIAGNOSIS — R22.1 NECK MASS: ICD-10-CM

## 2019-05-10 PROCEDURE — 70491 CT SOFT TISSUE NECK W/DYE: CPT | Mod: 26,,, | Performed by: RADIOLOGY

## 2019-05-10 PROCEDURE — 25500020 PHARM REV CODE 255: Performed by: OTOLARYNGOLOGY

## 2019-05-10 PROCEDURE — 70491 CT SOFT TISSUE NECK WITH CONTRAST: ICD-10-PCS | Mod: 26,,, | Performed by: RADIOLOGY

## 2019-05-10 PROCEDURE — 70491 CT SOFT TISSUE NECK W/DYE: CPT | Mod: TC

## 2019-05-10 RX ADMIN — IOHEXOL 75 ML: 350 INJECTION, SOLUTION INTRAVENOUS at 09:05

## 2019-05-14 ENCOUNTER — TELEPHONE (OUTPATIENT)
Dept: OTOLARYNGOLOGY | Facility: CLINIC | Age: 59
End: 2019-05-14

## 2019-05-14 DIAGNOSIS — R59.0 CERVICAL LYMPHADENOPATHY: ICD-10-CM

## 2019-05-14 DIAGNOSIS — K11.8 MASS OF PAROTID GLAND: Primary | ICD-10-CM

## 2019-05-14 NOTE — TELEPHONE ENCOUNTER
----- Message from Nevaeh Garcia sent at 5/14/2019  3:50 PM CDT -----  Type:  Patient Returning Call    Who Called:  self  Who Left Message for Patient:   Dr Uriarte   Does the patient know what this is regarding?:     Best Call Back Number:  [phone] (home)     Additional Information:

## 2019-05-20 ENCOUNTER — TELEPHONE (OUTPATIENT)
Dept: OTOLARYNGOLOGY | Facility: CLINIC | Age: 59
End: 2019-05-20

## 2019-05-20 DIAGNOSIS — R59.0 CERVICAL LYMPHADENOPATHY: Primary | ICD-10-CM

## 2019-05-20 NOTE — TELEPHONE ENCOUNTER
----- Message from Suzan Vides sent at 5/20/2019  9:30 AM CDT -----  On patients procedures, both need to be under Ultrasound , the Lymph node needs to be changed to be US FNA Lymph node , and make sure put same notes in comment section for radiologist to read as you have . Any questions please call the number below, thanking you in advance for your help in this important matter.    Suzan Vides    [phone]

## 2019-05-29 ENCOUNTER — HOSPITAL ENCOUNTER (OUTPATIENT)
Dept: RADIOLOGY | Facility: HOSPITAL | Age: 59
Discharge: HOME | End: 2019-05-29
Attending: OTOLARYNGOLOGY
Payer: COMMERCIAL

## 2019-05-29 DIAGNOSIS — K11.8 MASS OF PAROTID GLAND: ICD-10-CM

## 2019-05-29 DIAGNOSIS — R59.0 CERVICAL LYMPHADENOPATHY: ICD-10-CM

## 2019-05-29 PROCEDURE — 10005 PR FINE NEEDLE ASP BIOPSY, W/US GUIDANCE, 1ST LESION: ICD-10-PCS | Mod: RT,,, | Performed by: RADIOLOGY

## 2019-05-29 PROCEDURE — 88172 CYTP DX EVAL FNA 1ST EA SITE: CPT | Performed by: PATHOLOGY

## 2019-05-29 PROCEDURE — 10005 FNA BX W/US GDN 1ST LES: CPT

## 2019-05-29 PROCEDURE — 10006 FNA BX W/US GDN EA ADDL: CPT

## 2019-05-29 PROCEDURE — 10006 US FINE NEEDLE ASPIRATION BIOPSY , ADDL LESION: ICD-10-PCS | Mod: RT,,, | Performed by: RADIOLOGY

## 2019-05-29 PROCEDURE — 88173 CYTOLOGY SPECIMEN-FNA NON-RADIOLOGY CLINICIAN PERFORMED W/O ON SITE: ICD-10-PCS | Mod: 26,,, | Performed by: PATHOLOGY

## 2019-05-29 PROCEDURE — 10005 FNA BX W/US GDN 1ST LES: CPT | Mod: RT,,, | Performed by: RADIOLOGY

## 2019-05-29 PROCEDURE — 88173 CYTOPATH EVAL FNA REPORT: CPT | Mod: 26,,, | Performed by: PATHOLOGY

## 2019-05-29 PROCEDURE — 10006 FNA BX W/US GDN EA ADDL: CPT | Mod: RT,,, | Performed by: RADIOLOGY

## 2019-06-06 ENCOUNTER — TELEPHONE (OUTPATIENT)
Dept: OTOLARYNGOLOGY | Facility: CLINIC | Age: 59
End: 2019-06-06

## 2019-06-06 NOTE — TELEPHONE ENCOUNTER
----- Message from Abner Maki MD sent at 6/3/2019  5:17 PM CDT -----  Discussed with patient.  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  Discussed treatment options including observation with repeat imaging in 6 months verses superficial parotidectomy with frozen section and possible total / neck dissection.     Please call her tomorrow or the following day to set a 1 month follow-up with me to discuss.    I discussed risk of parotidectomy including hematoma, seroma, scar, sialocele, injury to facial nerve causing temporary or permanent weakness, recurrence, need for further treatment, cosmetic change (depression over side of face), gustatory sweating, first bite.  I also discussed that in the case of high grade malignancy, concomitant neck dissection would be performed. Questions were answered and we will move forward as planned.

## 2019-06-19 ENCOUNTER — PATIENT OUTREACH (OUTPATIENT)
Dept: ADMINISTRATIVE | Facility: HOSPITAL | Age: 59
End: 2019-06-19

## 2019-06-25 ENCOUNTER — OFFICE VISIT (OUTPATIENT)
Dept: OTOLARYNGOLOGY | Facility: CLINIC | Age: 59
End: 2019-06-25
Payer: COMMERCIAL

## 2019-06-25 VITALS — WEIGHT: 160.69 LBS | BODY MASS INDEX: 29.57 KG/M2 | HEIGHT: 62 IN

## 2019-06-25 DIAGNOSIS — K11.8 MASS OF PAROTID GLAND: Primary | ICD-10-CM

## 2019-06-25 PROCEDURE — 3008F BODY MASS INDEX DOCD: CPT | Mod: CPTII,S$GLB,, | Performed by: OTOLARYNGOLOGY

## 2019-06-25 PROCEDURE — 99214 OFFICE O/P EST MOD 30 MIN: CPT | Mod: S$GLB,,, | Performed by: OTOLARYNGOLOGY

## 2019-06-25 PROCEDURE — 99999 PR PBB SHADOW E&M-EST. PATIENT-LVL III: ICD-10-PCS | Mod: PBBFAC,,, | Performed by: OTOLARYNGOLOGY

## 2019-06-25 PROCEDURE — 99214 PR OFFICE/OUTPT VISIT, EST, LEVL IV, 30-39 MIN: ICD-10-PCS | Mod: S$GLB,,, | Performed by: OTOLARYNGOLOGY

## 2019-06-25 PROCEDURE — 99999 PR PBB SHADOW E&M-EST. PATIENT-LVL III: CPT | Mod: PBBFAC,,, | Performed by: OTOLARYNGOLOGY

## 2019-06-25 PROCEDURE — 3008F PR BODY MASS INDEX (BMI) DOCUMENTED: ICD-10-PCS | Mod: CPTII,S$GLB,, | Performed by: OTOLARYNGOLOGY

## 2019-06-26 ENCOUNTER — OFFICE VISIT (OUTPATIENT)
Dept: RHEUMATOLOGY | Facility: CLINIC | Age: 59
End: 2019-06-26
Payer: COMMERCIAL

## 2019-06-26 VITALS
WEIGHT: 156.19 LBS | BODY MASS INDEX: 28.57 KG/M2 | SYSTOLIC BLOOD PRESSURE: 103 MMHG | DIASTOLIC BLOOD PRESSURE: 69 MMHG

## 2019-06-26 DIAGNOSIS — M35.1 MIXED CONNECTIVE TISSUE DISEASE: Primary | ICD-10-CM

## 2019-06-26 PROCEDURE — 3008F PR BODY MASS INDEX (BMI) DOCUMENTED: ICD-10-PCS | Mod: ,,, | Performed by: INTERNAL MEDICINE

## 2019-06-26 PROCEDURE — 3008F BODY MASS INDEX DOCD: CPT | Mod: ,,, | Performed by: INTERNAL MEDICINE

## 2019-06-26 PROCEDURE — 99213 PR OFFICE/OUTPT VISIT, EST, LEVL III, 20-29 MIN: ICD-10-PCS | Mod: ,,, | Performed by: INTERNAL MEDICINE

## 2019-06-26 PROCEDURE — 99213 OFFICE O/P EST LOW 20 MIN: CPT | Mod: ,,, | Performed by: INTERNAL MEDICINE

## 2019-06-26 RX ORDER — NIFEDIPINE 30 MG/1
30 TABLET, FILM COATED, EXTENDED RELEASE ORAL DAILY
Qty: 90 TABLET | Refills: 2 | Status: SHIPPED | OUTPATIENT
Start: 2019-06-26 | End: 2020-01-08 | Stop reason: SDUPTHER

## 2019-06-26 RX ORDER — HYDROXYCHLOROQUINE SULFATE 200 MG/1
200 TABLET, FILM COATED ORAL DAILY
Qty: 30 TABLET | Refills: 3 | Status: SHIPPED | OUTPATIENT
Start: 2019-06-26 | End: 2020-01-06 | Stop reason: SDUPTHER

## 2019-06-26 RX ORDER — CYCLOBENZAPRINE HCL 10 MG
10 TABLET ORAL 3 TIMES DAILY PRN
Qty: 60 TABLET | Refills: 3 | Status: SHIPPED | OUTPATIENT
Start: 2019-06-26 | End: 2020-01-08 | Stop reason: SDUPTHER

## 2019-06-26 NOTE — PROGRESS NOTES
[facility] formerly [facility] RHEUMATOLOGY            PROGRESS NOTE      Subjective:       Patient ID:   NAME: Cristin Segal : 1960     58 y.o. female    Referring Doc: No ref. provider found  Other Physicians:    Chief Complaint:  Mixed connective tissue disease      History of Present Illness:     Patient returns today for a regularly scheduled follow-up visit for    Mixed connective Tissue Disease.   The patient is doing well except for right parotid mass. She will have surgery in the near future. Occasional postnasal drip with cough. No chest pains. No rashes. No mucosal ulcerations ,no sicca symptoms            ROS:   GEN:  No  fever, night sweats . weight is stable   + some fatigue  SKIN: no rashes, no bruising, no ulcerations, no Raynaud's  HEENT: no HA's, No visual changes, no mucosal ulcers, no sicca symptoms,  CV:   no CP, SOB, PND, SANTILLAN, no orthopnea, no palpitations  PULM: normal with no SOB, cough, hemoptysis, sputum or pleuritic pain  GI:  no abdominal pain, nausea, vomiting, constipation, diarrhea, melanotic stools, BRBPR, hematemesis, no dysphagia  :   no dysuria  NEURO: no paresthesias, headaches, visual disturbances, muscle weakness  MUSCULOSKELETAL:no joint swelling, prolonged AM stiffness, no back pain, no muscle pain  Allergies:  Review of patient's allergies indicates:   Allergen Reactions    Pcn [penicillins]        Medications:    Current Outpatient Medications:     cyclobenzaprine (FLEXERIL) 10 MG tablet, Take 1 tablet (10 mg total) by mouth 3 (three) times daily as needed., Disp: 60 tablet, Rfl: 3    hydroxychloroquine (PLAQUENIL) 200 mg tablet, TAKE 1 TABLET (200 MG TOTAL) BY MOUTH ONCE DAILY., Disp: 30 tablet, Rfl: 3    ibuprofen (ADVIL,MOTRIN) 800 MG tablet, Take 1 tablet (800 mg total) by mouth every 6 (six) hours as needed for Pain., Disp: 40 tablet, Rfl: 0    KLOR-CON M20 20 mEq tablet, , Disp: , Rfl: 10    NIFEdipine (ADALAT CC) 30 MG TbSR, Take 1 tablet (30 mg total) by mouth  once daily., Disp: 90 tablet, Rfl: 2    omeprazole (PRILOSEC) 20 MG capsule, TAKE ONE CAPSULE BY MOUTH EVERY DAY, Disp: 90 capsule, Rfl: 2    PMHx/PSHx Updates:    Last Eye Exam UTD      Objective:     Vitals:  Blood pressure 103/69, weight 70.9 kg (156 lb 3.2 oz).    Physical Examination:   GEN: no apparent distress, comfortable; AAOx3  SKIN: no rashes,no ulceration, no Raynaud's, no petechiae, no SQ nodules,  HEAD: normal  EYES: no pallor, no icterus,  NECK:small slightly tender 1 cm mass in the inferirangle of the jaw , thyroid normal, trachea midline, no LAD/LN's, supple  CV: RRR with no murmur; l S1 and S2 reg. ,no gallop no rubs,   CHEST: Normal respiratory effort; CTAB; normal breath sounds; no wheeze or crackle  MUSC/Skeletal: ROM normal; no crepitus; joints without synovitis,  no deformities  No joint swelling or tenderness of PIP, MCP, wrist, elbow, shoulder, or knee joints  EXTREM: no clubbing, cyanosis, no edema,normal  pulses   NEURO: grossly intact; motor WNL; AAOx3;   PSYCH: normal mood, affect and behavior  LYMPH: normal cervical, supraclavicular          Labs:   Lab Results   Component Value Date    WBC 7.17 01/09/2019    HGB 14.0 01/09/2019    HCT 41.6 01/09/2019     (H) 01/09/2019     (H) 01/09/2019    CMP  @LASTLAB(NA,K,CL,CO2,GLU,BUN,Creatinine,Calcium,PROT,Albumin,Bilitot,Alkphos,AST,ALT,CRP,ESR,RF,CCP,JOSH,SSA,CPK,uric acid) )@  I have reviewed all available lab results and radiology reports.    Radiology/Diagnostic Studies:        Assessment/Plan:   (1) 58 y.o. female with diagnosis of mixed connective tissue disease and fibromyalgia.  2)right parotid mass. Will have surgery sound  3)osteoarthritis      cBC CMP urinalysis and josh panel      Discussion:     I have explained all of the above in detail and the patient understands all of the current recommendation(s). I have answered all questions to the best of my ability and to their complete satisfaction.       The patient is to  continue with the current management plan         RTC in   4 months      Electronically signed by Erica Guzman MD

## 2019-06-26 NOTE — PROGRESS NOTES
Subjective:       Patient ID: Cristin Segal is a 58 y.o. female.    Chief Complaint: Follow-up    Cristin is here for follow-up of R parotitis in setting of possible underlying parotid mass.     HPI 2/27/19  Length of symptoms: 4 days, has been increasing at this time. Began with what she thought was a pimple inside her ear and then progressed to right sided parotid / facial swelling, + erythema, + warmth. No fevers. Was started Clindamycin. Since starting antibiotic, a small amount of improvement but still + TTP and swollen.  No prior history of parotitis. No pain with eating.   Therapies tried include: warm compress.   Associated symptoms:  No facial weakness.    Interval:  Still with tender inferior nodule.  Swelling has improved following FNA.  She had discussed with her family the plan and is decided to proceed with surgery.     Review of Systems   Constitutional: Negative for activity change and appetite change.   Respiratory: Negative for difficulty breathing and wheezing   Cardiovascular: Negative for chest pain.      Objective:        Constitutional:   Vital signs are normal. She appears well-developed and well-nourished.     Head:  Normocephalic and atraumatic. Facial strength is normal.      Ears:  Hearing normal to normal and whispered voice; external ear normal without scars, lesions, or masses; ear canal, tympanic membrane, and middle ear normal..     Nose:  Nose normal including turbinates, nasal mucosa, sinuses and nasal septum.     Mouth/Throat  Oropharynx clear and moist without lesions or asymmetry.     Neck:  Neck normal without thyromegaly masses, asymmetry, normal tracheal structure, crepitus, and tenderness.             Tests / Results:  1. Right lymph node (fine needle aspiration):  Abundant obscuring blood  Rare benign oncocytic cells and lymphocytes  Warthin's tumor is a consideration  2. Right parotid gland (fine needle aspiration):  Cyst contents and lymphocytes    Assessment:        1. 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  She does have pain in 1 of the nodules inferiorly.  We did discuss options.  She would like to proceed with surgery including right superficial parotidectomy with frozen section and possible neck dissection.    I discussed risk of parotidectomy including hematoma, seroma, scar, sialocele, injury to facial nerve causing temporary or permanent weakness, recurrence, need for further treatment, cosmetic change (depression over side of face), gustatory sweating, first bite.  I also discussed that in the case of high grade malignancy, concomitant neck dissection would be performed. Questions were answered and we will move forward as planned.

## 2019-06-28 LAB
ALBUMIN SERPL-MCNC: 4.5 G/DL (ref 3.1–4.7)
ALP SERPL-CCNC: 73 IU/L (ref 40–104)
ALT (SGPT): 49 IU/L (ref 3–33)
AST SERPL-CCNC: 62 IU/L (ref 10–40)
BASOPHILS NFR BLD: 0.1 K/UL (ref 0–0.2)
BASOPHILS NFR BLD: 1.3 %
BILIRUB SERPL-MCNC: 1.1 MG/DL (ref 0.3–1)
BUN SERPL-MCNC: 14 MG/DL (ref 8–20)
CALCIUM SERPL-MCNC: 9.5 MG/DL (ref 7.7–10.4)
CHLORIDE: 101 MMOL/L (ref 98–110)
CO2 SERPL-SCNC: 26 MMOL/L (ref 22.8–31.6)
CREATININE: 0.74 MG/DL (ref 0.6–1.4)
CRP SERPL-MCNC: 2.17 MG/DL (ref 0–1.4)
EOSINOPHIL NFR BLD: 0.2 K/UL (ref 0–0.7)
EOSINOPHIL NFR BLD: 3.1 %
ERYTHROCYTE [DISTWIDTH] IN BLOOD BY AUTOMATED COUNT: 12.6 % (ref 11.7–14.9)
GLUCOSE: 111 MG/DL (ref 70–99)
GRAN #: 3.1 K/UL (ref 1.4–6.5)
GRAN%: 46 %
HCT VFR BLD AUTO: 39.7 % (ref 36–48)
HGB BLD-MCNC: 13.5 G/DL (ref 12–15)
IMMATURE GRANS (ABS): 0 K/UL (ref 0–1)
IMMATURE GRANULOCYTES: 0.4 %
LYMPH #: 2.7 K/UL (ref 1.2–3.4)
LYMPH%: 39.3 %
MCH RBC QN AUTO: 34.9 PG (ref 25–35)
MCHC RBC AUTO-ENTMCNC: 34 G/DL (ref 31–36)
MCV RBC AUTO: 102.6 FL (ref 79–98)
MONO #: 0.7 K/UL (ref 0.1–0.6)
MONO%: 9.9 %
NUCLEATED RBCS: 0 %
PLATELET # BLD AUTO: 323 K/UL (ref 140–440)
PMV BLD AUTO: 8.9 FL (ref 8.8–12.7)
POTASSIUM SERPL-SCNC: 3.7 MMOL/L (ref 3.5–5)
PROT SERPL-MCNC: 8.8 G/DL (ref 6–8.2)
RBC # BLD AUTO: 3.87 M/UL (ref 3.5–5.5)
SODIUM: 138 MMOL/L (ref 134–144)
WBC # BLD AUTO: 6.8 K/UL (ref 5–10)

## 2019-06-29 LAB
C3 SERPL-MCNC: 157 MG/DL (ref 82–167)
C4 NEF SERPL-MCNC: 36 MG/DL (ref 14–44)

## 2019-07-01 LAB — DSDNA AB SER IA-ACNC: <1 IU/ML (ref 0–9)

## 2019-07-02 ENCOUNTER — HOSPITAL ENCOUNTER (OUTPATIENT)
Dept: RADIOLOGY | Facility: CLINIC | Age: 59
Discharge: HOME | End: 2019-07-02
Attending: FAMILY MEDICINE
Payer: COMMERCIAL

## 2019-07-02 ENCOUNTER — OFFICE VISIT (OUTPATIENT)
Dept: FAMILY MEDICINE | Facility: CLINIC | Age: 59
End: 2019-07-02
Payer: COMMERCIAL

## 2019-07-02 VITALS
DIASTOLIC BLOOD PRESSURE: 80 MMHG | HEIGHT: 62 IN | HEART RATE: 105 BPM | SYSTOLIC BLOOD PRESSURE: 125 MMHG | BODY MASS INDEX: 29.25 KG/M2 | TEMPERATURE: 98 F | WEIGHT: 158.94 LBS

## 2019-07-02 DIAGNOSIS — E78.5 HYPERLIPIDEMIA, UNSPECIFIED HYPERLIPIDEMIA TYPE: ICD-10-CM

## 2019-07-02 DIAGNOSIS — M79.642 LEFT HAND PAIN: ICD-10-CM

## 2019-07-02 DIAGNOSIS — K21.9 GASTROESOPHAGEAL REFLUX DISEASE, ESOPHAGITIS PRESENCE NOT SPECIFIED: ICD-10-CM

## 2019-07-02 DIAGNOSIS — E11.9 DIABETES MELLITUS WITHOUT COMPLICATION: Primary | ICD-10-CM

## 2019-07-02 DIAGNOSIS — Z91.89 AT RISK FOR CORONARY ARTERY DISEASE: ICD-10-CM

## 2019-07-02 DIAGNOSIS — E13.43 GASTROPARESIS DUE TO SECONDARY DIABETES: ICD-10-CM

## 2019-07-02 DIAGNOSIS — K11.8 PAROTID MASS: ICD-10-CM

## 2019-07-02 DIAGNOSIS — I73.00 RAYNAUD'S DISEASE WITHOUT GANGRENE: ICD-10-CM

## 2019-07-02 PROCEDURE — 73130 XR HAND COMPLETE 3 VIEW LEFT: ICD-10-PCS | Mod: 26,LT,S$GLB, | Performed by: RADIOLOGY

## 2019-07-02 PROCEDURE — 73130 X-RAY EXAM OF HAND: CPT | Mod: TC,FY,PO,LT

## 2019-07-02 PROCEDURE — 73130 X-RAY EXAM OF HAND: CPT | Mod: 26,LT,S$GLB, | Performed by: RADIOLOGY

## 2019-07-02 PROCEDURE — 99999 PR PBB SHADOW E&M-EST. PATIENT-LVL III: ICD-10-PCS | Mod: PBBFAC,,, | Performed by: FAMILY MEDICINE

## 2019-07-02 PROCEDURE — 99999 PR PBB SHADOW E&M-EST. PATIENT-LVL III: CPT | Mod: PBBFAC,,, | Performed by: FAMILY MEDICINE

## 2019-07-02 PROCEDURE — 3044F HG A1C LEVEL LT 7.0%: CPT | Mod: CPTII,S$GLB,, | Performed by: FAMILY MEDICINE

## 2019-07-02 PROCEDURE — 99214 PR OFFICE/OUTPT VISIT, EST, LEVL IV, 30-39 MIN: ICD-10-PCS | Mod: S$GLB,,, | Performed by: FAMILY MEDICINE

## 2019-07-02 PROCEDURE — 3008F BODY MASS INDEX DOCD: CPT | Mod: CPTII,S$GLB,, | Performed by: FAMILY MEDICINE

## 2019-07-02 PROCEDURE — 3044F PR MOST RECENT HEMOGLOBIN A1C LEVEL <7.0%: ICD-10-PCS | Mod: CPTII,S$GLB,, | Performed by: FAMILY MEDICINE

## 2019-07-02 PROCEDURE — 99214 OFFICE O/P EST MOD 30 MIN: CPT | Mod: S$GLB,,, | Performed by: FAMILY MEDICINE

## 2019-07-02 PROCEDURE — 3008F PR BODY MASS INDEX (BMI) DOCUMENTED: ICD-10-PCS | Mod: CPTII,S$GLB,, | Performed by: FAMILY MEDICINE

## 2019-07-02 NOTE — PROGRESS NOTES
Subjective:       Patient ID: Cristin Segal is a 58 y.o. female.    Chief Complaint: Establish Care    HPI  Review of Systems   Constitutional: Negative for fatigue and unexpected weight change.   Respiratory: Negative for chest tightness and shortness of breath.    Cardiovascular: Negative for chest pain, palpitations and leg swelling.   Gastrointestinal: Negative for abdominal pain.   Musculoskeletal: Positive for arthralgias and joint swelling.   Neurological: Negative for dizziness, syncope, light-headedness and headaches.       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 Maki.  Had face swelling    Left 4th digit DIP twisted in dog leash 1 week ago. Still swollen and red    Rheum Dr. Guzman MCTD on plaquenil. Pneumo vaccine 4 years.      Elevated LFTs - no fatty liver or enlargement on either CT ab 5/15 nor u/s and 1/19. Hepatitis panel normal    Type 2 DM- controlled without meds.  Last a1c 1/19  5.1. 5.4 yesterday.   LAst eye exam Dr. Cat 1/19    GYn PAP Dr. Elmore < 3 years    Macrocytosis- b12 and folate normal 1/19    HPL - high hdl, ldl 146  Your cholesterol is high.   Your 10 year risk of having a heart attack or a stroke is:The 10-year ASCVD risk score ([place] BRYNN Jr., et al., 2013) is: 9.4%    Values used to calculate the score:      Age: 58 years      Sex: Female      Is Non- : No      Diabetic: Yes      Tobacco smoker: Yes      Systolic Blood Pressure: 125 mmHg      Is BP treated: No      HDL  Cholesterol: 84 mg/dL      Total Cholesterol: 248 mg/dL      Objective:      Physical Exam   Constitutional: She is oriented to person, place, and time. She appears well-developed and well-nourished.   Cardiovascular: Normal rate, regular rhythm and normal heart sounds.   Pulses:       Dorsalis pedis pulses are 3+ on the right side, and 3+ on the left side.        Posterior tibial pulses are 3+ on the right side, and 3+ on the left side.   Pulmonary/Chest: Effort normal and breath sounds normal.   Musculoskeletal: She exhibits no edema.        Hands:       Right foot: There is normal range of motion and no deformity.        Left foot: There is normal range of motion and no deformity.   Feet:   Right Foot:   Protective Sensation: 10 sites tested. 10 sites sensed.   Skin Integrity: Negative for ulcer, blister or skin breakdown.   Left Foot:   Protective Sensation: 10 sites tested. 10 sites sensed.   Skin Integrity: Positive for callus. Negative for ulcer, blister or skin breakdown. Left foot warmth: giovanna heel.   Neurological: She is alert and oriented to person, place, and time.   Skin: Skin is warm and dry.   Psychiatric: She has a normal mood and affect.   Nursing note and vitals reviewed.      Assessment:       1. Diabetes mellitus without complication    2. Raynaud's disease without gangrene    3. Gastroparesis due to secondary diabetes    4. Gastroesophageal reflux disease, esophagitis presence not specified    5. At risk for coronary artery disease    6. Hyperlipidemia, unspecified hyperlipidemia type    7. Left hand pain    8. 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 Diabetes mellitus without complication  Stable condition.  Continue current medications.  Will adjust based on lab findings or if condition changes.    - Microalbumin/creatinine urine ratio; Future  - CBC auto differential; Future  - Comprehensive metabolic panel; Future  - Hemoglobin A1c; Future    2. Raynaud's disease without  gangrene  Cont to avoid cold, vibration and cont adalat    3. Gastroparesis due to secondary diabetes  Cont small frequent meals and current mgmt    4. Gastroesophageal reflux disease, esophagitis presence not specified  Cont ppi    5. At risk for coronary artery disease  Screen and treat as indicated:    - CT Cardiac Scoring; Future    6. Hyperlipidemia, unspecified hyperlipidemia type  Screen and treat as indicated:    - Lipid panel; Future    7. Left hand pain  R/o fracture   - X-Ray Hand Complete Left; Future    8. 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

## 2019-07-03 ENCOUNTER — TELEPHONE (OUTPATIENT)
Dept: FAMILY MEDICINE | Facility: CLINIC | Age: 59
End: 2019-07-03

## 2019-07-03 ENCOUNTER — TELEPHONE (OUTPATIENT)
Dept: ORTHOPEDICS | Facility: CLINIC | Age: 59
End: 2019-07-03

## 2019-07-03 DIAGNOSIS — S62.609A CLOSED FRACTURE DISLOCATION OF DISTAL INTERPHALANGEAL (DIP) JOINT OF FINGER, INITIAL ENCOUNTER: Primary | ICD-10-CM

## 2019-07-03 NOTE — TELEPHONE ENCOUNTER
I attempted to contact pt to schedule her an appointment with Dr Garcia for a ring finger injury.  No answer, left message.

## 2019-07-03 NOTE — TELEPHONE ENCOUNTER
Left message for patient that I needed to discuss her xray results and that she should be looking out for a call from [place] to see an orthopedist per Dr. Gtz.

## 2019-07-05 ENCOUNTER — TELEPHONE (OUTPATIENT)
Dept: FAMILY MEDICINE | Facility: CLINIC | Age: 59
End: 2019-07-05

## 2019-07-05 NOTE — TELEPHONE ENCOUNTER
----- Message from Kolton Akbar LPN sent at 7/5/2019 12:12 PM CDT -----  I have been attempting to contact pt to schedule her to see Dr Garcia this Monday 7/8.  I have left several messages but have not gotten any response back from the patient.

## 2019-07-05 NOTE — TELEPHONE ENCOUNTER
I left a voicemail on the patient's home and cell phone numbers to try to help Dr. Delgadillo's office in touch with her.

## 2019-07-07 PROBLEM — K11.8 PAROTID MASS: Status: ACTIVE | Noted: 2019-07-07

## 2019-07-08 LAB — MISCELLANEOUS LAB TEST ORDER: NORMAL

## 2019-07-09 ENCOUNTER — TELEPHONE (OUTPATIENT)
Dept: FAMILY MEDICINE | Facility: CLINIC | Age: 59
End: 2019-07-09

## 2019-07-10 NOTE — TELEPHONE ENCOUNTER
"Dr. Garcia's office was finally able to reach her and she told them "stop calling me" because she had a doctor she wanted to see and was going to instead of [facility].   "

## 2019-07-23 NOTE — TELEPHONE ENCOUNTER
----- Message from Jem Mendoza sent at 7/23/2019  3:15 PM CDT -----  Type:  RX Refill Request    Who Called:  Self   Refill or New Rx: refill RX Name and Strength:  KLOR-CON M20 20 mEq tablet  How is the patient currently taking it? (ex. 1XDay):  1 x day Is this a 30 day or 90 day RX:  30 day supply   Preferred Pharmacy with phone number:  [facility] on file  Local or Mail Order:  local  Ordering Provider:  Dr Gtz  [facility] Call Back Number:  [phone] Information:

## 2019-07-24 RX ORDER — POTASSIUM CHLORIDE 20 MEQ/1
20 TABLET, EXTENDED RELEASE ORAL DAILY
Qty: 30 TABLET | Refills: 11 | Status: SHIPPED | OUTPATIENT
Start: 2019-07-24 | End: 2020-07-13

## 2019-08-01 PROBLEM — K11.8 MASS OF PAROTID GLAND: Status: ACTIVE | Noted: 2019-08-01

## 2019-08-08 ENCOUNTER — TELEPHONE (OUTPATIENT)
Dept: OTOLARYNGOLOGY | Facility: CLINIC | Age: 59
End: 2019-08-08

## 2019-08-08 NOTE — TELEPHONE ENCOUNTER
----- Message from Abner Maki MD sent at 8/8/2019  7:43 AM CDT -----  Please let patient know that the tumor was benign on final pathology. If she has any questions, let me know. Otherwise will see her at followup appointment.

## 2019-08-12 ENCOUNTER — OFFICE VISIT (OUTPATIENT)
Dept: OTOLARYNGOLOGY | Facility: CLINIC | Age: 59
End: 2019-08-12
Payer: COMMERCIAL

## 2019-08-12 VITALS
SYSTOLIC BLOOD PRESSURE: 132 MMHG | WEIGHT: 158.94 LBS | HEIGHT: 62 IN | BODY MASS INDEX: 29.25 KG/M2 | DIASTOLIC BLOOD PRESSURE: 81 MMHG

## 2019-08-12 DIAGNOSIS — Z98.890 H/O SUPERFICIAL PAROTIDECTOMY: Primary | ICD-10-CM

## 2019-08-12 PROCEDURE — 99999 PR PBB SHADOW E&M-EST. PATIENT-LVL III: CPT | Mod: PBBFAC,,, | Performed by: NURSE PRACTITIONER

## 2019-08-12 PROCEDURE — 99024 PR POST-OP FOLLOW-UP VISIT: ICD-10-PCS | Mod: S$GLB,,, | Performed by: NURSE PRACTITIONER

## 2019-08-12 PROCEDURE — 99024 POSTOP FOLLOW-UP VISIT: CPT | Mod: S$GLB,,, | Performed by: NURSE PRACTITIONER

## 2019-08-12 PROCEDURE — 99999 PR PBB SHADOW E&M-EST. PATIENT-LVL III: ICD-10-PCS | Mod: PBBFAC,,, | Performed by: NURSE PRACTITIONER

## 2019-08-12 NOTE — PROGRESS NOTES
Subjective:       Patient ID: Cristin Segal is a 58 y.o. female.    Chief Complaint: Other (10 post op suture removal )    HPI   Patient is 11 days postop right supericial parotidectomy with dissection and preservation of facial nerve by Dr. Maki. Doing well. Minimal right facial weakness and numbness. Some swelling. No drainage. No pain (she only took 3 of the 15 pills prescribed and states she only took them to help fall asleep, not for pain).     Review of Systems   Constitutional: Negative.    HENT: Positive for facial swelling.    Eyes: Negative.    Respiratory: Negative.    Cardiovascular: Negative.    Gastrointestinal: Negative.    Musculoskeletal: Negative.    Skin: Negative.    Neurological: Negative.    Hematological: Negative.    Psychiatric/Behavioral: Negative.        Objective:      Physical Exam   Constitutional: She is oriented to person, place, and time. Vital signs are normal. She appears well-developed and well-nourished. She is cooperative. She does not appear ill. No distress.   HENT:   Head: Normocephalic and atraumatic.   Right Ear: Hearing, tympanic membrane, external ear and ear canal normal. Tympanic membrane is not erythematous. No middle ear effusion.   Left Ear: Hearing, tympanic membrane, external ear and ear canal normal. Tympanic membrane is not erythematous.  No middle ear effusion.   Nose: Nose normal.   Mouth/Throat: Uvula is midline, oropharynx is clear and moist and mucous membranes are normal.   Eyes: EOM and lids are normal. Right eye exhibits no discharge. Left eye exhibits no discharge. No scleral icterus.   Neck: Trachea normal and normal range of motion. Neck supple. No tracheal deviation present.       Cardiovascular: Normal rate.   Pulmonary/Chest: Effort normal. No stridor. No respiratory distress. She has no wheezes.   Musculoskeletal: Normal range of motion.   Neurological: She is alert and oriented to person, place, and time. She has normal strength.  Coordination and gait normal.   Skin: Skin is warm, dry and intact. She is not diaphoretic. No cyanosis. No pallor.   Psychiatric: She has a normal mood and affect. Her speech is normal and behavior is normal. Judgment and thought content normal. Cognition and memory are normal.   Nursing note and vitals reviewed.   Sutures were removed from right lateral neck incision to right preauricular/tragus    Assessment:     S/P right supericial parotidectomy with dissection and preservation of facial nerve      Plan:     Return in 3 weeks to see Dr. Maki as scheduled. Call us immediately for any questions/concerns.

## 2019-08-29 ENCOUNTER — PATIENT OUTREACH (OUTPATIENT)
Dept: ADMINISTRATIVE | Facility: HOSPITAL | Age: 59
End: 2019-08-29

## 2019-09-03 ENCOUNTER — OFFICE VISIT (OUTPATIENT)
Dept: OTOLARYNGOLOGY | Facility: CLINIC | Age: 59
End: 2019-09-03
Payer: COMMERCIAL

## 2019-09-03 VITALS — BODY MASS INDEX: 28.93 KG/M2 | WEIGHT: 157.19 LBS | HEIGHT: 62 IN

## 2019-09-03 DIAGNOSIS — D11.9 WARTHIN'S TUMOR: Primary | ICD-10-CM

## 2019-09-03 PROCEDURE — 99024 POSTOP FOLLOW-UP VISIT: CPT | Mod: S$GLB,,, | Performed by: OTOLARYNGOLOGY

## 2019-09-03 PROCEDURE — 99999 PR PBB SHADOW E&M-EST. PATIENT-LVL II: ICD-10-PCS | Mod: PBBFAC,,, | Performed by: OTOLARYNGOLOGY

## 2019-09-03 PROCEDURE — 99024 PR POST-OP FOLLOW-UP VISIT: ICD-10-PCS | Mod: S$GLB,,, | Performed by: OTOLARYNGOLOGY

## 2019-09-03 PROCEDURE — 99999 PR PBB SHADOW E&M-EST. PATIENT-LVL II: CPT | Mod: PBBFAC,,, | Performed by: OTOLARYNGOLOGY

## 2019-09-03 NOTE — PROGRESS NOTES
Cristin is here for a post-operative visit following right superficial parotidectomy 08/01/2019    Pathology: multi-focal Warthin's tumor    No issues, doing well  Mild buccal weakness improving  Swelling improving    Exam:  Alert, active, appropriate  Incision c/d/i, wound with appropriate mild edema, no erythema  Expected mild edema and swelling over SCM  Good facial movements with subtle upper lip weakness with excursion - improved from post-op       Healing well  Follow-up 3 mos. Sooner prn

## 2019-09-05 ENCOUNTER — PATIENT OUTREACH (OUTPATIENT)
Dept: ADMINISTRATIVE | Facility: HOSPITAL | Age: 59
End: 2019-09-05

## 2019-09-30 ENCOUNTER — PATIENT OUTREACH (OUTPATIENT)
Dept: ADMINISTRATIVE | Facility: HOSPITAL | Age: 59
End: 2019-09-30

## 2019-09-30 NOTE — LETTER
AUTHORIZATION FOR RELEASE OF   CONFIDENTIAL INFORMATION    Dear Dr. Ryne Cat,    We are seeing Cristin Segal, date of birth 1960, in the clinic at [facility]. Angela Gtz MD is the patient's PCP. Cristin Segal has an outstanding lab/procedure at the time we reviewed her chart. In order to help keep her health information updated, she has authorized us to request the following medical record(s):        (  )  MAMMOGRAM                                      (  )  COLONOSCOPY      (  )  PAP SMEAR                                          (  )  OUTSIDE LAB RESULTS     (  )  DEXA SCAN                                          ( X )  EYE EXAM            (  )  FOOT EXAM                                          (  )  ENTIRE RECORD     (  )  OUTSIDE IMMUNIZATIONS                 (  )  _______________         Please fax records to [facility], Amy L Hammons, MD, [phone]    Laura Alfaro LPN  Clinical Care Coordinator  [address] [address]  P: [phone]  F: [phone]            Patient Name: Cristin Segal  : 1960  Patient Phone #: [phone]

## 2019-12-18 RX ORDER — HYDROXYCHLOROQUINE SULFATE 200 MG/1
TABLET, FILM COATED ORAL
Qty: 90 TABLET | Refills: 1 | OUTPATIENT
Start: 2019-12-18

## 2019-12-23 RX ORDER — OMEPRAZOLE 20 MG/1
CAPSULE, DELAYED RELEASE ORAL
Qty: 90 CAPSULE | Refills: 2 | Status: SHIPPED | OUTPATIENT
Start: 2019-12-23 | End: 2020-01-08 | Stop reason: SDUPTHER

## 2020-01-06 RX ORDER — HYDROXYCHLOROQUINE SULFATE 200 MG/1
TABLET, FILM COATED ORAL
Qty: 90 TABLET | Refills: 1 | Status: SHIPPED | OUTPATIENT
Start: 2020-01-06 | End: 2020-01-08 | Stop reason: SDUPTHER

## 2020-01-08 ENCOUNTER — OFFICE VISIT (OUTPATIENT)
Dept: RHEUMATOLOGY | Facility: CLINIC | Age: 60
End: 2020-01-08
Payer: COMMERCIAL

## 2020-01-08 VITALS
WEIGHT: 153.38 LBS | BODY MASS INDEX: 28.06 KG/M2 | DIASTOLIC BLOOD PRESSURE: 73 MMHG | SYSTOLIC BLOOD PRESSURE: 111 MMHG

## 2020-01-08 DIAGNOSIS — M35.1 MIXED CONNECTIVE TISSUE DISEASE: Primary | ICD-10-CM

## 2020-01-08 PROCEDURE — 99213 OFFICE O/P EST LOW 20 MIN: CPT | Mod: S$GLB,,, | Performed by: INTERNAL MEDICINE

## 2020-01-08 PROCEDURE — 99213 PR OFFICE/OUTPT VISIT, EST, LEVL III, 20-29 MIN: ICD-10-PCS | Mod: S$GLB,,, | Performed by: INTERNAL MEDICINE

## 2020-01-08 PROCEDURE — 3008F BODY MASS INDEX DOCD: CPT | Mod: S$GLB,,, | Performed by: INTERNAL MEDICINE

## 2020-01-08 PROCEDURE — 3008F PR BODY MASS INDEX (BMI) DOCUMENTED: ICD-10-PCS | Mod: S$GLB,,, | Performed by: INTERNAL MEDICINE

## 2020-01-08 RX ORDER — CYCLOBENZAPRINE HCL 10 MG
10 TABLET ORAL 3 TIMES DAILY PRN
Qty: 60 TABLET | Refills: 3 | Status: SHIPPED | OUTPATIENT
Start: 2020-01-08 | End: 2020-08-17 | Stop reason: SDUPTHER

## 2020-01-08 RX ORDER — OMEPRAZOLE 20 MG/1
20 CAPSULE, DELAYED RELEASE ORAL DAILY
Qty: 90 CAPSULE | Refills: 2 | Status: SHIPPED | OUTPATIENT
Start: 2020-01-08 | End: 2021-01-31 | Stop reason: SDUPTHER

## 2020-01-08 RX ORDER — HYDROXYCHLOROQUINE SULFATE 200 MG/1
200 TABLET, FILM COATED ORAL DAILY
Qty: 90 TABLET | Refills: 1 | Status: SHIPPED | OUTPATIENT
Start: 2020-01-08 | End: 2020-08-17 | Stop reason: SDUPTHER

## 2020-01-08 RX ORDER — NIFEDIPINE 30 MG/1
30 TABLET, FILM COATED, EXTENDED RELEASE ORAL DAILY
Qty: 90 TABLET | Refills: 2 | Status: SHIPPED | OUTPATIENT
Start: 2020-01-08 | End: 2020-08-17 | Stop reason: SDUPTHER

## 2020-01-08 NOTE — PROGRESS NOTES
[facility] RHEUMATOLOGY            PROGRESS NOTE      Subjective:       Patient ID:   NAME: Cristin Segal : 1960     59 y.o. female    Referring Doc: Self, Aaareferral  Other Physicians:    Chief Complaint:  Mixed connective tissue disease (Needs refills on Flexeril, Nifedipine, and Prilosec)      History of Present Illness:     Patient returns today for a regularly scheduled follow-up visit for   mixed connective tissue disease.    The patient is doing well except for allergic rhinitis.  No chest pains.  Occasional cough.  No GI complaints.  No joint swelling or rashes.  Occasional episodes of Raynaud's.  No dysphagia            ROS:   GEN:  No  fever, night sweats . weight is stable   + some  fatigue  SKIN: no rashes, no bruising, no ulcerations, + Raynaud's  HEENT: no HA's, No visual changes, no mucosal ulcers, no sicca symptoms,  CV:   no CP, SOB, PND, SANTILLAN, no orthopnea, no palpitations  PULM: normal with no SOB, cough, hemoptysis, sputum or pleuritic pain  GI:  no abdominal pain, nausea, vomiting, constipation, diarrhea, melanotic stools, BRBPR, hematemesis, no dysphagia  :   no dysuria  NEURO: no paresthesias, headaches, visual disturbances, muscle weakness  MUSCULOSKELETAL:no joint swelling, prolonged AM stiffness, + occ back pain, no muscle pain  Allergies:  Review of patient's allergies indicates:   Allergen Reactions    Pcn [penicillins] Anaphylaxis     Unknown for her- family history with anaphylaxis       Medications:    Current Outpatient Medications:     cyclobenzaprine (FLEXERIL) 10 MG tablet, Take 1 tablet (10 mg total) by mouth 3 (three) times daily as needed., Disp: 60 tablet, Rfl: 3    hydroxychloroquine (PLAQUENIL) 200 mg tablet, TAKE 1 TABLET BY MOUTH EVERY DAY, Disp: 90 tablet, Rfl: 1    ibuprofen (ADVIL,MOTRIN) 800 MG tablet, Take 1 tablet (800 mg total) by mouth every 6 (six) hours as needed for Pain., Disp: 40 tablet, Rfl: 0    NIFEdipine (ADALAT CC) 30 MG TbSR, Take 1  tablet (30 mg total) by mouth once daily., Disp: 90 tablet, Rfl: 2    omeprazole (PRILOSEC) 20 MG capsule, TAKE ONE CAPSULE BY MOUTH EVERY DAY, Disp: 90 capsule, Rfl: 2    ondansetron (ZOFRAN-ODT) 4 MG TbDL, Take 1 tablet (4 mg total) by mouth every 6 (six) hours as needed., Disp: 10 tablet, Rfl: 0    oxyCODONE-acetaminophen (PERCOCET) 5-325 mg per tablet, Take 1 tablet by mouth every 4 (four) hours as needed for Pain., Disp: 15 tablet, Rfl: 0    potassium chloride SA (KLOR-CON M20) 20 MEQ tablet, Take 1 tablet (20 mEq total) by mouth once daily., Disp: 30 tablet, Rfl: 11  No current facility-administered medications for this visit.     Facility-Administered Medications Ordered in Other Visits:     lactated ringers infusion, , Intravenous, Continuous, Ean Patricio MD, Last Rate: 100 mL/hr at 08/01/19 0745    PMHx/PSHx Updates:        Last Eye Exam: UTD      Objective:     Vitals:  Blood pressure 111/73, weight 69.6 kg (153 lb 6.4 oz).    Physical Examination:   GEN: no apparent distress, comfortable; AAOx3  SKIN: no rashes,no ulceration, no Raynaud's, no petechiae, no SQ nodules,  HEAD: normal  EYES: no pallor, no icterus,  NECK: no masses, thyroid normal, trachea midline, no LAD/LN's, supple  CV: RRR with no murmur; l S1 and S2 reg. ,no gallop no rubs,   CHEST: Normal respiratory effort; CTAB; normal breath sounds; + sl exp wheeze ,no  crackles  MUSC/Skeletal: ROM normal; no crepitus; joints without synovitis,  no deformities  No joint swelling or tenderness of PIP, MCP, wrist, elbow, shoulder, or knee joints  EXTREM: no clubbing, cyanosis, no edema,normal  pulses   NEURO: grossly intact; motor WNL; AAOx3; ,  PSYCH: normal mood, affect and behavior  LYMPH: normal cervical, supraclavicular          Labs:   Lab Results   Component Value Date    WBC 6.8 06/28/2019    HGB 12.9 08/01/2019    HCT 39.7 06/28/2019    .6 (H) 06/28/2019     06/28/2019     CMP  @LASTLAB(NA,K,CL,CO2,GLU,BUN,Creatinine,Calcium,PROT,Albumin,Bilitot,Alkphos,AST,ALT,CRP,ESR,RF,CCP,BROOK,SSA,CPK,uric acid) )@  I have reviewed all available lab results and radiology reports.    Radiology/Diagnostic Studies:    Eye Exam UTD    Assessment/Plan:   (1) 59 y.o. female with diagnosis of mixed connective tissue disease.  She is stable  CBC CMP urinalysis                Discussion:     I have explained all of the above in detail and the patient understands all of the current recommendation(s). I have answered all questions to the best of my ability and to their complete satisfaction.       The patient is to continue with the current management plan         RTC in  4 months or before if needed       Electronically signed by Erica Guzman MD

## 2020-01-15 ENCOUNTER — HOSPITAL ENCOUNTER (OUTPATIENT)
Dept: RADIOLOGY | Facility: CLINIC | Age: 60
Discharge: HOME | End: 2020-01-15
Attending: FAMILY MEDICINE
Payer: COMMERCIAL

## 2020-01-15 ENCOUNTER — OFFICE VISIT (OUTPATIENT)
Dept: FAMILY MEDICINE | Facility: CLINIC | Age: 60
End: 2020-01-15
Payer: COMMERCIAL

## 2020-01-15 VITALS
HEART RATE: 100 BPM | RESPIRATION RATE: 14 BRPM | TEMPERATURE: 98 F | SYSTOLIC BLOOD PRESSURE: 122 MMHG | BODY MASS INDEX: 28.07 KG/M2 | WEIGHT: 152.56 LBS | HEIGHT: 62 IN | OXYGEN SATURATION: 96 % | DIASTOLIC BLOOD PRESSURE: 70 MMHG

## 2020-01-15 DIAGNOSIS — R22.30 MASS OF SHOULDER REGION: ICD-10-CM

## 2020-01-15 DIAGNOSIS — L53.9 ERYTHEMA: ICD-10-CM

## 2020-01-15 DIAGNOSIS — M25.512 ACUTE PAIN OF LEFT SHOULDER: ICD-10-CM

## 2020-01-15 DIAGNOSIS — M25.512 ACUTE PAIN OF LEFT SHOULDER: Primary | ICD-10-CM

## 2020-01-15 PROCEDURE — 99999 PR PBB SHADOW E&M-EST. PATIENT-LVL IV: ICD-10-PCS | Mod: PBBFAC,,, | Performed by: FAMILY MEDICINE

## 2020-01-15 PROCEDURE — 73030 XR SHOULDER COMPLETE 2 OR MORE VIEWS LEFT: ICD-10-PCS | Mod: 26,LT,S$GLB, | Performed by: RADIOLOGY

## 2020-01-15 PROCEDURE — 73030 X-RAY EXAM OF SHOULDER: CPT | Mod: TC,FY,PO,LT

## 2020-01-15 PROCEDURE — 99213 OFFICE O/P EST LOW 20 MIN: CPT | Mod: S$GLB,,, | Performed by: FAMILY MEDICINE

## 2020-01-15 PROCEDURE — 99213 PR OFFICE/OUTPT VISIT, EST, LEVL III, 20-29 MIN: ICD-10-PCS | Mod: S$GLB,,, | Performed by: FAMILY MEDICINE

## 2020-01-15 PROCEDURE — 99999 PR PBB SHADOW E&M-EST. PATIENT-LVL IV: CPT | Mod: PBBFAC,,, | Performed by: FAMILY MEDICINE

## 2020-01-15 PROCEDURE — 3008F BODY MASS INDEX DOCD: CPT | Mod: CPTII,S$GLB,, | Performed by: FAMILY MEDICINE

## 2020-01-15 PROCEDURE — 3008F PR BODY MASS INDEX (BMI) DOCUMENTED: ICD-10-PCS | Mod: CPTII,S$GLB,, | Performed by: FAMILY MEDICINE

## 2020-01-15 PROCEDURE — 73030 X-RAY EXAM OF SHOULDER: CPT | Mod: 26,LT,S$GLB, | Performed by: RADIOLOGY

## 2020-01-15 RX ORDER — HYDROCODONE BITARTRATE AND ACETAMINOPHEN 5; 325 MG/1; MG/1
1 TABLET ORAL EVERY 6 HOURS PRN
Qty: 20 TABLET | Refills: 0 | Status: SHIPPED | OUTPATIENT
Start: 2020-01-15 | End: 2020-03-17

## 2020-01-15 NOTE — PROGRESS NOTES
Subjective:       Patient ID: Cristin Segal is a 59 y.o. female.    Chief Complaint: Pain / Stiffness in left arm    HPI  Review of Systems   Constitutional: Negative for fatigue and unexpected weight change.   Respiratory: Negative for chest tightness and shortness of breath.    Cardiovascular: Negative for chest pain, palpitations and leg swelling.   Gastrointestinal: Negative for abdominal pain.   Musculoskeletal: Positive for arthralgias.   Neurological: Negative for dizziness, syncope, light-headedness and headaches.       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    Felt sudden left upper arm/shoulder pain while reaching up for large manual from [employer] yesterday. Has not been able to flex shoulder and do routine manual activities.   Objective:      Physical Exam   Constitutional: She is oriented to person, place, and time. She appears well-developed and well-nourished.   Cardiovascular: Normal rate, regular rhythm and normal heart sounds.   Pulmonary/Chest: Effort normal and breath sounds normal.   Musculoskeletal: She exhibits no edema.        Left shoulder: She exhibits decreased range of motion, tenderness, swelling, pain and spasm. She exhibits no bony tenderness, no effusion, no crepitus, no deformity, no laceration, normal pulse and normal strength.        Arms:  Neurological: She is alert and oriented to person, place, and time.   Skin: Skin is warm and dry.   Psychiatric: She has a normal mood and affect.   Nursing note and  vitals reviewed.      Assessment:       1. Acute pain of left shoulder    2. Mass of shoulder region    3. Erythema        Plan:         1. Acute pain of left shoulder  Suspected tendon rupture of shoulder or possible spontaneous infection/abscess.  Refer  - X-Ray Shoulder 2 or More Views Left; Future  - MRI Shoulder W WO Contrast Left; Future  - HYDROcodone-acetaminophen (NORCO) 5-325 mg per tablet; Take 1 tablet by mouth every 6 (six) hours as needed for Pain.  Dispense: 20 tablet; Refill: 0    2. Mass of shoulder region  Work up  - MRI Shoulder W WO Contrast Left; Future    3. Erythema  Order and treat as indicated  - MRI Shoulder W WO Contrast Left; Future        Time spent with patient: 20 minutes    Patient with be reevaluated in based on work up or sooner prn    Greater than 50% of this visit was spent counseling as described in above documentation:Yes

## 2020-01-16 ENCOUNTER — TELEPHONE (OUTPATIENT)
Dept: FAMILY MEDICINE | Facility: CLINIC | Age: 60
End: 2020-01-16

## 2020-01-16 ENCOUNTER — HOSPITAL ENCOUNTER (OUTPATIENT)
Dept: RADIOLOGY | Facility: HOSPITAL | Age: 60
Discharge: HOME | End: 2020-01-16
Attending: FAMILY MEDICINE
Payer: COMMERCIAL

## 2020-01-16 DIAGNOSIS — M25.512 ACUTE PAIN OF LEFT SHOULDER: ICD-10-CM

## 2020-01-16 DIAGNOSIS — M25.512 ACUTE PAIN OF LEFT SHOULDER: Primary | ICD-10-CM

## 2020-01-16 DIAGNOSIS — R22.30 MASS OF SHOULDER REGION: ICD-10-CM

## 2020-01-16 DIAGNOSIS — R93.6 ABNORMAL MRI, SHOULDER: ICD-10-CM

## 2020-01-16 DIAGNOSIS — L53.9 ERYTHEMA: ICD-10-CM

## 2020-01-16 PROCEDURE — 73223 MRI JOINT UPR EXTR W/O&W/DYE: CPT | Mod: TC,LT

## 2020-01-16 PROCEDURE — A9585 GADOBUTROL INJECTION: HCPCS | Performed by: FAMILY MEDICINE

## 2020-01-16 PROCEDURE — 25500020 PHARM REV CODE 255: Performed by: FAMILY MEDICINE

## 2020-01-16 RX ORDER — GADOBUTROL 604.72 MG/ML
6.5 INJECTION INTRAVENOUS
Status: COMPLETED | OUTPATIENT
Start: 2020-01-16 | End: 2020-01-16

## 2020-01-16 RX ADMIN — GADOBUTROL 6.5 ML: 604.72 INJECTION INTRAVENOUS at 10:01

## 2020-01-16 NOTE — TELEPHONE ENCOUNTER
----- Message from Mk Lipscomb sent at 1/16/2020  2:29 PM CST -----  Contact: same  Type:  Test Results    Who Called:  patient  Name of Test (Lab/Mammo/Etc):  MRI  Date of Test:  1/16/2020  Ordering Provider:  Ulisses  Where the test was performed:  [facility]  Best Call Back Number:  [phone]  Additional Information:  n/a

## 2020-01-16 NOTE — TELEPHONE ENCOUNTER
----- Message from Vale Dyer sent at 1/16/2020  9:57 AM CST -----  Contact: Leda  Type: Needs Medical Advice    Who Called:  Leda with [facility]  Symptoms (please be specific):    How long has patient had these symptoms:    Pharmacy name and phone #:    Best Call Back Number: [phone]  Additional Information: requesting a call back have questions regarding mri order,patient is at the facility now

## 2020-01-17 ENCOUNTER — LAB VISIT (OUTPATIENT)
Dept: LAB | Facility: HOSPITAL | Age: 60
End: 2020-01-17
Attending: INTERNAL MEDICINE
Payer: COMMERCIAL

## 2020-01-17 ENCOUNTER — LAB VISIT (OUTPATIENT)
Dept: LAB | Facility: HOSPITAL | Age: 60
End: 2020-01-17
Attending: FAMILY MEDICINE
Payer: COMMERCIAL

## 2020-01-17 DIAGNOSIS — M35.1 MIXED CONNECTIVE TISSUE DISEASE: ICD-10-CM

## 2020-01-17 DIAGNOSIS — E11.9 DIABETES MELLITUS WITHOUT COMPLICATION: ICD-10-CM

## 2020-01-17 DIAGNOSIS — E78.5 HYPERLIPIDEMIA, UNSPECIFIED HYPERLIPIDEMIA TYPE: ICD-10-CM

## 2020-01-17 LAB
ALBUMIN SERPL BCP-MCNC: 4.3 G/DL (ref 3.5–5.2)
ALBUMIN/CREAT UR: 43.3 UG/MG (ref 0–30)
ALP SERPL-CCNC: 72 U/L (ref 55–135)
ALT SERPL W/O P-5'-P-CCNC: 35 U/L (ref 10–44)
ANION GAP SERPL CALC-SCNC: 13 MMOL/L (ref 8–16)
AST SERPL-CCNC: 41 U/L (ref 10–40)
BACTERIA #/AREA URNS HPF: NEGATIVE /HPF
BASOPHILS # BLD AUTO: 0.1 K/UL (ref 0–0.2)
BASOPHILS NFR BLD: 1.3 % (ref 0–1.9)
BILIRUB SERPL-MCNC: 0.6 MG/DL (ref 0.1–1)
BILIRUB UR QL STRIP: NEGATIVE
BUN SERPL-MCNC: 13 MG/DL (ref 6–20)
CALCIUM SERPL-MCNC: 9.3 MG/DL (ref 8.7–10.5)
CHLORIDE SERPL-SCNC: 99 MMOL/L (ref 95–110)
CHOLEST SERPL-MCNC: 234 MG/DL (ref 120–199)
CHOLEST/HDLC SERPL: 2.5 {RATIO} (ref 2–5)
CLARITY UR: CLEAR
CO2 SERPL-SCNC: 26 MMOL/L (ref 23–29)
COLOR UR: YELLOW
CREAT SERPL-MCNC: 0.7 MG/DL (ref 0.5–1.4)
CREAT UR-MCNC: 183 MG/DL (ref 15–325)
DIFFERENTIAL METHOD: ABNORMAL
EOSINOPHIL # BLD AUTO: 0.2 K/UL (ref 0–0.5)
EOSINOPHIL NFR BLD: 2.1 % (ref 0–8)
ERYTHROCYTE [DISTWIDTH] IN BLOOD BY AUTOMATED COUNT: 12.9 % (ref 11.5–14.5)
EST. GFR  (AFRICAN AMERICAN): >60 ML/MIN/1.73 M^2
EST. GFR  (NON AFRICAN AMERICAN): >60 ML/MIN/1.73 M^2
ESTIMATED AVG GLUCOSE: 108 MG/DL (ref 68–131)
GLUCOSE SERPL-MCNC: 108 MG/DL (ref 70–110)
GLUCOSE UR QL STRIP: NEGATIVE
HBA1C MFR BLD HPLC: 5.4 % (ref 4.5–6.2)
HCT VFR BLD AUTO: 36.6 % (ref 37–48.5)
HDLC SERPL-MCNC: 93 MG/DL (ref 40–75)
HDLC SERPL: 39.7 % (ref 20–50)
HGB BLD-MCNC: 12.4 G/DL (ref 12–16)
HGB UR QL STRIP: NEGATIVE
HYALINE CASTS #/AREA URNS LPF: 48 /LPF
IMM GRANULOCYTES # BLD AUTO: 0.04 K/UL (ref 0–0.04)
IMM GRANULOCYTES NFR BLD AUTO: 0.5 % (ref 0–0.5)
KETONES UR QL STRIP: ABNORMAL
LDLC SERPL CALC-MCNC: 122.8 MG/DL (ref 63–159)
LEUKOCYTE ESTERASE UR QL STRIP: NEGATIVE
LYMPHOCYTES # BLD AUTO: 2.7 K/UL (ref 1–4.8)
LYMPHOCYTES NFR BLD: 35.3 % (ref 18–48)
MCH RBC QN AUTO: 35.1 PG (ref 27–31)
MCHC RBC AUTO-ENTMCNC: 33.9 G/DL (ref 32–36)
MCV RBC AUTO: 104 FL (ref 82–98)
MICROALBUMIN UR DL<=1MG/L-MCNC: 79.2 UG/ML
MICROSCOPIC COMMENT: ABNORMAL
MONOCYTES # BLD AUTO: 0.7 K/UL (ref 0.3–1)
MONOCYTES NFR BLD: 9.3 % (ref 4–15)
NEUTROPHILS # BLD AUTO: 3.9 K/UL (ref 1.8–7.7)
NEUTROPHILS NFR BLD: 51.5 % (ref 38–73)
NITRITE UR QL STRIP: NEGATIVE
NONHDLC SERPL-MCNC: 141 MG/DL
NRBC BLD-RTO: 0 /100 WBC
PH UR STRIP: 6 [PH] (ref 5–8)
PLATELET # BLD AUTO: 381 K/UL (ref 150–350)
PMV BLD AUTO: 9.3 FL (ref 9.2–12.9)
POTASSIUM SERPL-SCNC: 3.8 MMOL/L (ref 3.5–5.1)
PROT SERPL-MCNC: 8.6 G/DL (ref 6–8.4)
PROT UR QL STRIP: ABNORMAL
RBC # BLD AUTO: 3.53 M/UL (ref 4–5.4)
RBC #/AREA URNS HPF: 5 /HPF (ref 0–4)
SODIUM SERPL-SCNC: 138 MMOL/L (ref 136–145)
SP GR UR STRIP: 1.02 (ref 1–1.03)
SQUAMOUS #/AREA URNS HPF: 7 /HPF
TRIGL SERPL-MCNC: 91 MG/DL (ref 30–150)
URN SPEC COLLECT METH UR: ABNORMAL
UROBILINOGEN UR STRIP-ACNC: ABNORMAL EU/DL
WBC # BLD AUTO: 7.6 K/UL (ref 3.9–12.7)
WBC #/AREA URNS HPF: 2 /HPF (ref 0–5)

## 2020-01-17 PROCEDURE — 80061 LIPID PANEL: CPT

## 2020-01-17 PROCEDURE — 81001 URINALYSIS AUTO W/SCOPE: CPT

## 2020-01-17 PROCEDURE — 80053 COMPREHEN METABOLIC PANEL: CPT

## 2020-01-17 PROCEDURE — 85025 COMPLETE CBC W/AUTO DIFF WBC: CPT

## 2020-01-17 PROCEDURE — 36415 COLL VENOUS BLD VENIPUNCTURE: CPT

## 2020-01-17 PROCEDURE — 86038 ANTINUCLEAR ANTIBODIES: CPT

## 2020-01-17 PROCEDURE — 83036 HEMOGLOBIN GLYCOSYLATED A1C: CPT

## 2020-01-17 PROCEDURE — 82043 UR ALBUMIN QUANTITATIVE: CPT

## 2020-01-17 NOTE — TELEPHONE ENCOUNTER
Patient states she cannot get to [place] for an appointment this afternoon. Reports that pain has actually improved and she is afebrile. Dr Gtz was informed and agreeable to patient seeing ortho next week. Patient has been scheduled to see Dr Tee on Monday 1/20/20 at 3:15 PM. She has been instructed to report to the ER over the weekend if she develops fever, increased pain or swelling/redness in the area. Patient verbalized understanding.

## 2020-01-18 LAB — ANA TITR SER IF: POSITIVE {TITER}

## 2020-01-20 ENCOUNTER — OFFICE VISIT (OUTPATIENT)
Dept: ORTHOPEDICS | Facility: CLINIC | Age: 60
End: 2020-01-20
Payer: COMMERCIAL

## 2020-01-20 VITALS
DIASTOLIC BLOOD PRESSURE: 75 MMHG | HEART RATE: 103 BPM | WEIGHT: 152.56 LBS | BODY MASS INDEX: 28.07 KG/M2 | SYSTOLIC BLOOD PRESSURE: 124 MMHG | HEIGHT: 62 IN

## 2020-01-20 DIAGNOSIS — M25.512 ACUTE PAIN OF LEFT SHOULDER: ICD-10-CM

## 2020-01-20 DIAGNOSIS — R93.6 ABNORMAL MRI, SHOULDER: ICD-10-CM

## 2020-01-20 PROCEDURE — 3008F BODY MASS INDEX DOCD: CPT | Mod: CPTII,S$GLB,, | Performed by: ORTHOPAEDIC SURGERY

## 2020-01-20 PROCEDURE — 99203 PR OFFICE/OUTPT VISIT, NEW, LEVL III, 30-44 MIN: ICD-10-PCS | Mod: 25,S$GLB,, | Performed by: ORTHOPAEDIC SURGERY

## 2020-01-20 PROCEDURE — 99203 OFFICE O/P NEW LOW 30 MIN: CPT | Mod: 25,S$GLB,, | Performed by: ORTHOPAEDIC SURGERY

## 2020-01-20 PROCEDURE — 3008F PR BODY MASS INDEX (BMI) DOCUMENTED: ICD-10-PCS | Mod: CPTII,S$GLB,, | Performed by: ORTHOPAEDIC SURGERY

## 2020-01-20 PROCEDURE — 99999 PR PBB SHADOW E&M-EST. PATIENT-LVL III: CPT | Mod: PBBFAC,,, | Performed by: ORTHOPAEDIC SURGERY

## 2020-01-20 PROCEDURE — 99999 PR PBB SHADOW E&M-EST. PATIENT-LVL III: ICD-10-PCS | Mod: PBBFAC,,, | Performed by: ORTHOPAEDIC SURGERY

## 2020-01-20 PROCEDURE — 20610 DRAIN/INJ JOINT/BURSA W/O US: CPT | Mod: LT,S$GLB,, | Performed by: ORTHOPAEDIC SURGERY

## 2020-01-20 PROCEDURE — 20610 LARGE JOINT ASPIRATION/INJECTION: L SUBACROMIAL BURSA: ICD-10-PCS | Mod: LT,S$GLB,, | Performed by: ORTHOPAEDIC SURGERY

## 2020-01-20 RX ADMIN — TRIAMCINOLONE ACETONIDE 40 MG: 40 INJECTION, SUSPENSION INTRA-ARTICULAR; INTRAMUSCULAR at 03:01

## 2020-01-20 NOTE — LETTER
January 22, 2020      Angela Gtz MD  [address]  [place] [address]           [facility]  [facility] [address]  Phone: [phone]          Patient: Cristin Segal   MR Number: [id number]   YOB: 1960   Date of Visit: 1/20/2020       Dear Dr. Angela Gtz:    Thank you for referring Cristin Segal to me for evaluation. Attached you will find relevant portions of my assessment and plan of care.    If you have questions, please do not hesitate to call me. I look forward to following Cristin Segal along with you.    Sincerely,    Sergo Tee MD    Enclosure  CC:  No Recipients    If you would like to receive this communication electronically, please contact [email or web address] or [phone] to request more information on [employer] Link access.    For providers and/or their staff who would like to refer a patient to [facility], please contact us through our one-stop-shop provider referral line, [facility], at [phone].    If you feel you have received this communication in error or would no longer like to receive these types of communications, please e-mail [email or web address]

## 2020-01-22 ENCOUNTER — HOSPITAL ENCOUNTER (OUTPATIENT)
Dept: RADIOLOGY | Facility: HOSPITAL | Age: 60
Discharge: HOME | End: 2020-01-22
Attending: FAMILY MEDICINE

## 2020-01-22 DIAGNOSIS — Z91.89 AT RISK FOR CORONARY ARTERY DISEASE: ICD-10-CM

## 2020-01-22 PROCEDURE — 75571 CT HRT W/O DYE W/CA TEST: CPT | Mod: TC

## 2020-01-22 PROCEDURE — 75571 CT HRT W/O DYE W/CA TEST: CPT | Mod: 26,,, | Performed by: RADIOLOGY

## 2020-01-22 PROCEDURE — 75571 CT CALCIUM SCORING CARDIAC: ICD-10-PCS | Mod: 26,,, | Performed by: RADIOLOGY

## 2020-01-22 RX ORDER — TRIAMCINOLONE ACETONIDE 40 MG/ML
40 INJECTION, SUSPENSION INTRA-ARTICULAR; INTRAMUSCULAR
Status: DISCONTINUED | OUTPATIENT
Start: 2020-01-20 | End: 2020-01-22 | Stop reason: HOSPADM

## 2020-01-22 NOTE — PROCEDURES
Large Joint Aspiration/Injection: L subacromial bursa  Date/Time: 1/20/2020 3:15 PM  Performed by: Sergo Tee MD  Authorized by: Sergo Tee MD     Consent Done?:  Yes (Verbal)  Indications:  Pain  Timeout: Prior to procedure the correct patient, procedure, and site was verified      Location:  Shoulder  Site:  L subacromial bursa  Prep: Patient was prepped and draped in usual sterile fashion    Approach:  Lateral  Medications:  40 mg triamcinolone acetonide 40 mg/mL

## 2020-01-22 NOTE — PROGRESS NOTES
Past Medical History:   Diagnosis Date    Cataract     Diabetes mellitus type II     diet controlled    Fracture     left 4th finger  - splinted    Hyperlipidemia     MCTD (mixed connective tissue disease)     Raynaud's disease        Past Surgical History:   Procedure Laterality Date    CARPAL TUNNEL RELEASE      right    CATARACT EXTRACTION W/ INTRAOCULAR LENS  IMPLANT, BILATERAL      EXCISION OF PAROTID GLAND Right 8/1/2019    Procedure: PAROTIDECTOMY;  Surgeon: Abner Maki MD;  [facility];  Service: ENT;  Laterality: Right;    HIP SURGERY Left 6/18/15    JANA    KNEE CARTILAGE SURGERY      right    TOTAL HIP ARTHROPLASTY Right 05/19/2016    JANA       Current Outpatient Medications   Medication Sig    cyclobenzaprine (FLEXERIL) 10 MG tablet Take 1 tablet (10 mg total) by mouth 3 (three) times daily as needed.    HYDROcodone-acetaminophen (NORCO) 5-325 mg per tablet Take 1 tablet by mouth every 6 (six) hours as needed for Pain.    hydroxychloroquine (PLAQUENIL) 200 mg tablet Take 1 tablet (200 mg total) by mouth once daily.    ibuprofen (ADVIL,MOTRIN) 800 MG tablet Take 1 tablet (800 mg total) by mouth every 6 (six) hours as needed for Pain.    NIFEdipine (ADALAT CC) 30 MG TbSR Take 1 tablet (30 mg total) by mouth once daily.    omeprazole (PRILOSEC) 20 MG capsule Take 1 capsule (20 mg total) by mouth once daily.    ondansetron (ZOFRAN-ODT) 4 MG TbDL Take 1 tablet (4 mg total) by mouth every 6 (six) hours as needed.    potassium chloride SA (KLOR-CON M20) 20 MEQ tablet Take 1 tablet (20 mEq total) by mouth once daily.     No current facility-administered medications for this visit.      Facility-Administered Medications Ordered in Other Visits   Medication    lactated ringers infusion       Review of patient's allergies indicates:   Allergen Reactions    Pcn [penicillins] Anaphylaxis     Unknown for her- family history with anaphylaxis       Family History   Problem Relation Age  of Onset    Diabetes Mother     Kidney disease Mother     Aneurysm Mother 73        brain    Hyperlipidemia Mother     Hypertension Sister     Breast cancer Sister     Diabetes Sister        Social History     Socioeconomic History    Marital status:      Spouse name: Not on file    Number of children: Not on file    Years of education: Not on file    Highest education level: Not on file   Occupational History     Employer: [employer]   Social Needs    Financial resource strain: Not on file    Food insecurity:     Worry: Not on file     Inability: Not on file    Transportation needs:     Medical: Not on file     Non-medical: Not on file   Tobacco Use    Smoking status: Current Every Day Smoker     Packs/day: 0.50    Smokeless tobacco: Never Used   Substance and Sexual Activity    Alcohol use: Yes     Comment: occasional    Drug use: No    Sexual activity: Not on file   Lifestyle    Physical activity:     Days per week: Not on file     Minutes per session: Not on file    Stress: Not on file   Relationships    Social connections:     Talks on phone: Not on file     Gets together: Not on file     Attends Voodoo service: Not on file     Active member of club or organization: Not on file     Attends meetings of clubs or organizations: Not on file     Relationship status: Not on file   Other Topics Concern    Not on file   Social History Narrative    Not on file       Chief Complaint:   Chief Complaint   Patient presents with    Left Shoulder - Pain, Initial Visit       Consulting Physician: Angela Gtz MD    History of present illness:    This is a 59 y.o. year old female who complains of left shoulder pain since 01/13/2020.  She states that she was reaching out and felt a sudden pain in her arm.  Since that time she has had a dull achy pain in the arm.  She puts her pain today as a 3/10 worse with activities.  She feels that she is getting better since the initial  "injury.    Review of Systems:    Constitution:   Denies chills, fever, and sweats.  HENT:   Denies headaches or blurry vision.  Cardiovascular:  Denies chest pain or irregular heart beat.  Respiratory:   Denies cough or shortness of breath.  Gastrointestinal:  Denies abdominal pain, nausea, or vomiting.  Musculoskeletal:   Denies muscle cramps.  Neurological:   Denies dizziness or focal weakness.  Psychiatric/Behavior: Normal mental status.  Hematology/Lymph:  Denies bleeding problem or easy bruising/bleeding.  Skin:    Denies rash or suspicious lesions.    Examination:    Vital Signs:    Vitals:    01/20/20 1529   BP: 124/75   Pulse: 103   Weight: 69.2 kg (152 lb 8.9 oz)   Height: 5' 2" (1.575 m)   PainSc:   3   PainLoc: Shoulder       Body mass index is 27.9 kg/m².    Constitution:   Well-developed, well nourished patient in no acute distress.  Neurological:   Alert and oriented x 3 and cooperative to examination.     Psychiatric/Behavior: Normal mental status.  Respiratory:   No shortness of breath.  Eyes:    Extraoccular muscles intact  Skin:    No scars, rash or suspicious lesions.    Physical Exam: Left Shoulder Exam     Skin  Rash:   None  Scars:   None    Inspection/Observation   Swelling:   None  Erythema:   None  Bruising:   None  Wounds:   None  Scapular Winging:  None  Scapular Dyskinesia:  None  Atrophy:   None  Masses:   None  Lymphadenopathy: None    Tenderness   AC Joint:   None  Biceps tendon  Mild    Range of Motion   Active Forward Flexion:  180   Active External Rotation:  >45  Active Internal Rotation:  Low Back    Passive Forward Flexion:  180  Passive External Rotation: >45  Passive Internal Rotation at 90 degrees: Normal    Muscle Strength   Forward Flexion:  5/5  External Rotation: 5/5  Internal Rotation:  5/5    Instability:  None    Tests & Signs   Cross Arm:   Negative  Hawkin's test:   Negative  Impingement:   Positive    Other   Sensation:  Normal  Pulse:    2+ radial          Imaging: " X-rays ordered and images interpreted today personally by me of left shoulder appear normal. She has an MRI that we reviewed today that shows some small fraying of the rotator cuff along with bursitis and biceps tendinitis.         Assessment: Acute pain of left shoulder  -     Ambulatory referral/consult to Orthopedics  -     Large Joint Aspiration/Injection: L subacromial bursa    Abnormal MRI, shoulder  -     Ambulatory referral/consult to Orthopedics        Plan:  We discussed treatment options and she would like to proceed with an injection and see how she responds to this.      DISCLAIMER: This note may have been dictated using voice recognition software and may contain grammatical errors.     NOTE: Consult report sent to referring provider via [employer]Â® EMR.

## 2020-01-26 ENCOUNTER — PATIENT OUTREACH (OUTPATIENT)
Dept: ADMINISTRATIVE | Facility: OTHER | Age: 60
End: 2020-01-26

## 2020-01-26 DIAGNOSIS — R93.1 AGATSTON CORONARY ARTERY CALCIUM SCORE GREATER THAN 400: Primary | ICD-10-CM

## 2020-01-26 DIAGNOSIS — Z12.11 ENCOUNTER FOR FECAL IMMUNOCHEMICAL TEST SCREENING: Primary | ICD-10-CM

## 2020-01-28 ENCOUNTER — OFFICE VISIT (OUTPATIENT)
Dept: OTOLARYNGOLOGY | Facility: CLINIC | Age: 60
End: 2020-01-28
Payer: COMMERCIAL

## 2020-01-28 VITALS — BODY MASS INDEX: 27.47 KG/M2 | HEIGHT: 62 IN | WEIGHT: 149.25 LBS

## 2020-01-28 DIAGNOSIS — Z98.890 H/O SUPERFICIAL PAROTIDECTOMY: ICD-10-CM

## 2020-01-28 DIAGNOSIS — D11.9 WARTHIN'S TUMOR: Primary | ICD-10-CM

## 2020-01-28 PROCEDURE — 99212 OFFICE O/P EST SF 10 MIN: CPT | Mod: S$GLB,,, | Performed by: OTOLARYNGOLOGY

## 2020-01-28 PROCEDURE — 99999 PR PBB SHADOW E&M-EST. PATIENT-LVL II: ICD-10-PCS | Mod: PBBFAC,,, | Performed by: OTOLARYNGOLOGY

## 2020-01-28 PROCEDURE — 3008F BODY MASS INDEX DOCD: CPT | Mod: CPTII,S$GLB,, | Performed by: OTOLARYNGOLOGY

## 2020-01-28 PROCEDURE — 99999 PR PBB SHADOW E&M-EST. PATIENT-LVL II: CPT | Mod: PBBFAC,,, | Performed by: OTOLARYNGOLOGY

## 2020-01-28 PROCEDURE — 99212 PR OFFICE/OUTPT VISIT, EST, LEVL II, 10-19 MIN: ICD-10-PCS | Mod: S$GLB,,, | Performed by: OTOLARYNGOLOGY

## 2020-01-28 PROCEDURE — 3008F PR BODY MASS INDEX (BMI) DOCUMENTED: ICD-10-PCS | Mod: CPTII,S$GLB,, | Performed by: OTOLARYNGOLOGY

## 2020-01-28 RX ORDER — LORATADINE 10 MG/1
10 TABLET ORAL DAILY
COMMUNITY
End: 2020-09-11 | Stop reason: ALTCHOICE

## 2020-01-28 NOTE — PROGRESS NOTES
Subjective:       Patient ID: Cristin Segal is a 59 y.o. female.    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    No issues. Doing well. Facial sensation and movement improved    Review of Systems   Constitutional: Negative for activity change and appetite change.   Respiratory: Negative for difficulty breathing and wheezing   Cardiovascular: Negative for chest pain.      Objective:        Constitutional:   Vital signs are normal. She appears well-developed and well-nourished.     Head:  Normocephalic and atraumatic.     Ears:  Hearing normal to normal and whispered voice; external ear normal without scars, lesions, or masses; ear canal, tympanic membrane, and middle ear normal..     Nose:  Nose normal including turbinates, nasal mucosa, sinuses and nasal septum.     Mouth/Throat  Oropharynx clear and moist without lesions or asymmetry.     Neck:  Neck normal without thyromegaly masses, asymmetry, normal tracheal structure, crepitus, and tenderness and no adenopathy.   Well healed modified jannet         Tests / Results:  none    Assessment:       1. Warthin's tumor    2. H/O superficial parotidectomy          Plan:         LOLIS  Doing well, sensation and motor improved  FU prn

## 2020-02-03 ENCOUNTER — LAB VISIT (OUTPATIENT)
Dept: LAB | Facility: HOSPITAL | Age: 60
End: 2020-02-03
Attending: INTERNAL MEDICINE
Payer: COMMERCIAL

## 2020-02-03 DIAGNOSIS — R07.9 CHEST PAIN, UNSPECIFIED: Primary | ICD-10-CM

## 2020-02-03 DIAGNOSIS — E78.5 HYPERLIPEMIA: ICD-10-CM

## 2020-02-03 LAB
CK MB SERPL-MCNC: 3.8 NG/ML (ref 0.1–6.5)
CK SERPL-CCNC: 89 U/L (ref 20–180)
TROPONIN I SERPL DL<=0.01 NG/ML-MCNC: <0.03 NG/ML

## 2020-02-03 PROCEDURE — 84484 ASSAY OF TROPONIN QUANT: CPT

## 2020-02-03 PROCEDURE — 82550 ASSAY OF CK (CPK): CPT

## 2020-02-03 PROCEDURE — 36415 COLL VENOUS BLD VENIPUNCTURE: CPT

## 2020-02-03 PROCEDURE — 82553 CREATINE MB FRACTION: CPT

## 2020-03-17 ENCOUNTER — HOSPITAL ENCOUNTER (EMERGENCY)
Facility: HOSPITAL | Age: 60
Discharge: HOME | End: 2020-03-17
Attending: EMERGENCY MEDICINE
Payer: COMMERCIAL

## 2020-03-17 VITALS
SYSTOLIC BLOOD PRESSURE: 161 MMHG | WEIGHT: 140 LBS | RESPIRATION RATE: 16 BRPM | HEIGHT: 62 IN | TEMPERATURE: 99 F | BODY MASS INDEX: 25.76 KG/M2 | OXYGEN SATURATION: 95 % | DIASTOLIC BLOOD PRESSURE: 89 MMHG | HEART RATE: 101 BPM

## 2020-03-17 DIAGNOSIS — R52 PAIN: ICD-10-CM

## 2020-03-17 DIAGNOSIS — S82.842A CLOSED BIMALLEOLAR FRACTURE OF LEFT ANKLE, INITIAL ENCOUNTER: Primary | ICD-10-CM

## 2020-03-17 PROCEDURE — 25000003 PHARM REV CODE 250: Performed by: EMERGENCY MEDICINE

## 2020-03-17 PROCEDURE — 99283 EMERGENCY DEPT VISIT LOW MDM: CPT | Mod: 25

## 2020-03-17 RX ORDER — HYDROCODONE BITARTRATE AND ACETAMINOPHEN 5; 325 MG/1; MG/1
1 TABLET ORAL
Status: COMPLETED | OUTPATIENT
Start: 2020-03-17 | End: 2020-03-17

## 2020-03-17 RX ORDER — HYDROCODONE BITARTRATE AND ACETAMINOPHEN 5; 325 MG/1; MG/1
1 TABLET ORAL EVERY 6 HOURS PRN
Qty: 12 TABLET | Refills: 0 | Status: SHIPPED | OUTPATIENT
Start: 2020-03-17 | End: 2020-11-08

## 2020-03-17 RX ORDER — ONDANSETRON 2 MG/ML
4 INJECTION INTRAMUSCULAR; INTRAVENOUS
Status: DISCONTINUED | OUTPATIENT
Start: 2020-03-17 | End: 2020-03-17

## 2020-03-17 RX ADMIN — HYDROCODONE BITARTRATE AND ACETAMINOPHEN 1 TABLET: 5; 325 TABLET ORAL at 09:03

## 2020-03-17 NOTE — ED PROVIDER NOTES
Encounter Date: 3/17/2020       History     Chief Complaint   Patient presents with    Ankle Pain     TRIP AND FALL LASTNITE, NEG SCREEN     Patient here with reported left ankle pain status post trip and fall last night complains of swelling and pain to the left ankle she denies any other injuries patient placed a make shift splint on the ankle is unable to bear weight at this time she denies any previous injury to this ankle she is up-to-date on her tetanus immunization        Review of patient's allergies indicates:   Allergen Reactions    Pcn [penicillins] Anaphylaxis     Unknown for her- family history with anaphylaxis     Past Medical History:   Diagnosis Date    Cataract     Diabetes mellitus type II     diet controlled    Fracture     left 4th finger  - splinted    Hyperlipidemia     MCTD (mixed connective tissue disease)     Raynaud's disease      Past Surgical History:   Procedure Laterality Date    CARPAL TUNNEL RELEASE      right    CATARACT EXTRACTION W/ INTRAOCULAR LENS  IMPLANT, BILATERAL      EXCISION OF PAROTID GLAND Right 8/1/2019    Procedure: PAROTIDECTOMY;  Surgeon: Abner Maki MD;  Location: [facility];  Service: ENT;  Laterality: Right;    HIP SURGERY Left 6/18/15    JANA    KNEE CARTILAGE SURGERY      right    TOTAL HIP ARTHROPLASTY Right 05/19/2016    JANA     Family History   Problem Relation Age of Onset    Diabetes Mother     Kidney disease Mother     Aneurysm Mother 73        brain    Hyperlipidemia Mother     Hypertension Sister     Breast cancer Sister     Diabetes Sister      Social History     Tobacco Use    Smoking status: Current Every Day Smoker     Packs/day: 0.50    Smokeless tobacco: Never Used   Substance Use Topics    Alcohol use: Yes     Comment: occasional    Drug use: No     Review of Systems   Constitutional: Negative.    HENT: Negative.    Cardiovascular: Negative.    Gastrointestinal: Negative.    Musculoskeletal: Positive for arthralgias, gait  problem and joint swelling.   Skin: Negative.        Physical Exam     Initial Vitals [03/17/20 0833]   BP Pulse Resp Temp SpO2   (!) 160/86 91 16 98.7 °F (37.1 °C) 97 %      MAP       --         Physical Exam    Constitutional: She appears well-developed and well-nourished. No distress.   HENT:   Head: Normocephalic and atraumatic.   Cardiovascular: Normal rate, regular rhythm and intact distal pulses.   Pulmonary/Chest: No respiratory distress.   Musculoskeletal:   Swelling tenderness bruising noted to the left ankle decreased range of motion secondary to pain cap refill less than 2 sec 2+ dorsalis pedis posterior tibial pulses no proximal fibular pain   Neurological: She is alert and oriented to person, place, and time. GCS score is 15. GCS eye subscore is 4. GCS verbal subscore is 5. GCS motor subscore is 6.   Skin: Skin is warm and dry. Capillary refill takes less than 2 seconds. There is erythema.   Psychiatric: She has a normal mood and affect. Her behavior is normal.         ED Course   Procedures  Labs Reviewed - No data to display       Imaging Results          X-Ray Ankle Complete Left (In process)                  Medical Decision Making:   ED Management:  Discussed patient's radiographic findings with Dr. Rhodes he recommended posterior splinting with stirrups follow-up with him in the office tomorrow for preop screening will provide patient with pain control recommend ice elevation as much as possible today                                 Clinical Impression:       ICD-10-CM ICD-9-CM   1. Closed bimalleolar fracture of left ankle, initial encounter S82.842A 824.4   2. Pain R52 780.96                                El Phillips MD  03/17/20 3864

## 2020-03-17 NOTE — ED NOTES
Last night, patient's feet got tangled up and she fell.  She cannot put weight on it.  She feels some grinding when she moves it.

## 2020-03-19 ENCOUNTER — PATIENT OUTREACH (OUTPATIENT)
Dept: ADMINISTRATIVE | Facility: HOSPITAL | Age: 60
End: 2020-03-19

## 2020-03-19 NOTE — PROGRESS NOTES
external pap requested Dr Malone   external eye exam requested from Dr Cat    NO PAP FOUND IN LAB [facility] AS OF 3/18/20

## 2020-03-19 NOTE — LETTER
AUTHORIZATION FOR RELEASE OF   CONFIDENTIAL INFORMATION    Dr Cat    We are seeing Cristin Segal, date of birth 1960, in the clinic at [facility]. Angela Gtz MD is the patient's PCP. Cristin Segal has an outstanding lab/procedure at the time we reviewed her chart. In order to help keep her health information updated, she has authorized us to request the following medical record(s):        (  )  MAMMOGRAM                                      (  )  COLONOSCOPY      (  )  PAP SMEAR                                          (  )  OUTSIDE LAB RESULTS     (  )  DEXA SCAN                                          (  X)  EYE EXAM            (  )  FOOT EXAM                                          (  )  ENTIRE RECORD     (  )  OUTSIDE IMMUNIZATIONS                 (  )  _______________         Please fax records to [facility], Amy L Hammons, MD, [phone]    Thank you in advance,      Tania THOMAS  Panel Care Coordinator  [facility] [facility] [address] [place] [address]  Phone [phone]  Fax [phone]          Patient Name: Cristni Segal  : 1960  Patient Phone #: [phone]

## 2020-03-19 NOTE — LETTER
AUTHORIZATION FOR RELEASE OF   CONFIDENTIAL INFORMATION    Dr Malone    We are seeing Cristin Segal, date of birth 1960, in the clinic at [facility]. Angela Gtz MD is the patient's PCP. Cristin Segal has an outstanding lab/procedure at the time we reviewed her chart. In order to help keep her health information updated, she has authorized us to request the following medical record(s):        (  )  MAMMOGRAM                                      (  )  COLONOSCOPY      ( X )  PAP SMEAR                                          (  )  OUTSIDE LAB RESULTS     (  )  DEXA SCAN                                          (  )  EYE EXAM            (  )  FOOT EXAM                                          (  )  ENTIRE RECORD     (  )  OUTSIDE IMMUNIZATIONS                 (  )  _______________         Please fax records to [facility], Amy L Hammons, MD, [phone]    Thank you in advance,        Tania THOMAS  Panel Care Coordinator  [facility] [address] [place] [address]  Phone [phone]  Fax [phone]          Patient Name: Cristin Segal  : 1960  Patient Phone #: [phone]

## 2020-04-23 DIAGNOSIS — Z01.84 ANTIBODY RESPONSE EXAMINATION: ICD-10-CM

## 2020-05-05 ENCOUNTER — PATIENT MESSAGE (OUTPATIENT)
Dept: ADMINISTRATIVE | Facility: HOSPITAL | Age: 60
End: 2020-05-05

## 2020-05-23 DIAGNOSIS — Z01.84 ANTIBODY RESPONSE EXAMINATION: ICD-10-CM

## 2020-06-22 DIAGNOSIS — Z01.84 ANTIBODY RESPONSE EXAMINATION: ICD-10-CM

## 2020-07-21 ENCOUNTER — APPOINTMENT (OUTPATIENT)
Dept: LAB | Facility: HOSPITAL | Age: 60
End: 2020-07-21
Attending: INTERNAL MEDICINE
Payer: COMMERCIAL

## 2020-07-21 LAB
ALBUMIN SERPL BCP-MCNC: 4.6 G/DL (ref 3.5–5.2)
ALP SERPL-CCNC: 92 U/L (ref 55–135)
ALT SERPL W/O P-5'-P-CCNC: 17 U/L (ref 10–44)
ANION GAP SERPL CALC-SCNC: 12 MMOL/L (ref 8–16)
AST SERPL-CCNC: 21 U/L (ref 10–40)
BASOPHILS # BLD AUTO: 0.08 K/UL (ref 0–0.2)
BASOPHILS NFR BLD: 0.9 % (ref 0–1.9)
BILIRUB SERPL-MCNC: 0.5 MG/DL (ref 0.1–1)
BUN SERPL-MCNC: 14 MG/DL (ref 6–20)
CALCIUM SERPL-MCNC: 10 MG/DL (ref 8.7–10.5)
CHLORIDE SERPL-SCNC: 99 MMOL/L (ref 95–110)
CHOLEST SERPL-MCNC: 236 MG/DL (ref 120–199)
CHOLEST/HDLC SERPL: 2.3 {RATIO} (ref 2–5)
CO2 SERPL-SCNC: 27 MMOL/L (ref 23–29)
CREAT SERPL-MCNC: 0.9 MG/DL (ref 0.5–1.4)
DIFFERENTIAL METHOD: ABNORMAL
EOSINOPHIL # BLD AUTO: 0.2 K/UL (ref 0–0.5)
EOSINOPHIL NFR BLD: 1.7 % (ref 0–8)
ERYTHROCYTE [DISTWIDTH] IN BLOOD BY AUTOMATED COUNT: 13.4 % (ref 11.5–14.5)
EST. GFR  (AFRICAN AMERICAN): >60 ML/MIN/1.73 M^2
EST. GFR  (NON AFRICAN AMERICAN): >60 ML/MIN/1.73 M^2
ESTIMATED AVG GLUCOSE: 105 MG/DL (ref 68–131)
GLUCOSE SERPL-MCNC: 111 MG/DL (ref 70–110)
HBA1C MFR BLD HPLC: 5.3 % (ref 4.5–6.2)
HCT VFR BLD AUTO: 37.4 % (ref 37–48.5)
HDLC SERPL-MCNC: 104 MG/DL (ref 40–75)
HDLC SERPL: 44.1 % (ref 20–50)
HGB BLD-MCNC: 12.9 G/DL (ref 12–16)
IMM GRANULOCYTES # BLD AUTO: 0.03 K/UL (ref 0–0.04)
IMM GRANULOCYTES NFR BLD AUTO: 0.3 % (ref 0–0.5)
LDLC SERPL CALC-MCNC: 97.6 MG/DL (ref 63–159)
LYMPHOCYTES # BLD AUTO: 3.6 K/UL (ref 1–4.8)
LYMPHOCYTES NFR BLD: 40.1 % (ref 18–48)
MCH RBC QN AUTO: 34.1 PG (ref 27–31)
MCHC RBC AUTO-ENTMCNC: 34.5 G/DL (ref 32–36)
MCV RBC AUTO: 99 FL (ref 82–98)
MONOCYTES # BLD AUTO: 0.6 K/UL (ref 0.3–1)
MONOCYTES NFR BLD: 6.4 % (ref 4–15)
NEUTROPHILS # BLD AUTO: 4.6 K/UL (ref 1.8–7.7)
NEUTROPHILS NFR BLD: 50.6 % (ref 38–73)
NONHDLC SERPL-MCNC: 132 MG/DL
NRBC BLD-RTO: 0 /100 WBC
PLATELET # BLD AUTO: 380 K/UL (ref 150–350)
PMV BLD AUTO: 8.8 FL (ref 9.2–12.9)
POTASSIUM SERPL-SCNC: 3.9 MMOL/L (ref 3.5–5.1)
PROT SERPL-MCNC: 8.6 G/DL (ref 6–8.4)
RBC # BLD AUTO: 3.78 M/UL (ref 4–5.4)
SODIUM SERPL-SCNC: 138 MMOL/L (ref 136–145)
TRIGL SERPL-MCNC: 172 MG/DL (ref 30–150)
WBC # BLD AUTO: 9.03 K/UL (ref 3.9–12.7)

## 2020-07-21 PROCEDURE — 83036 HEMOGLOBIN GLYCOSYLATED A1C: CPT

## 2020-07-21 PROCEDURE — 80061 LIPID PANEL: CPT

## 2020-07-21 PROCEDURE — 85025 COMPLETE CBC W/AUTO DIFF WBC: CPT

## 2020-07-21 PROCEDURE — 80053 COMPREHEN METABOLIC PANEL: CPT

## 2020-07-21 PROCEDURE — 36415 COLL VENOUS BLD VENIPUNCTURE: CPT

## 2020-07-22 DIAGNOSIS — Z01.84 ANTIBODY RESPONSE EXAMINATION: ICD-10-CM

## 2020-08-10 DIAGNOSIS — M85.89 OTHER SPECIFIED DISORDERS OF BONE DENSITY AND STRUCTURE, MULTIPLE SITES: Primary | ICD-10-CM

## 2020-08-10 DIAGNOSIS — M35.1 MIXED CONNECTIVE TISSUE DISEASE: ICD-10-CM

## 2020-08-12 ENCOUNTER — HOSPITAL ENCOUNTER (OUTPATIENT)
Dept: RADIOLOGY | Facility: HOSPITAL | Age: 60
Discharge: HOME | End: 2020-08-12
Attending: INTERNAL MEDICINE
Payer: COMMERCIAL

## 2020-08-12 DIAGNOSIS — M35.1 MIXED CONNECTIVE TISSUE DISEASE: ICD-10-CM

## 2020-08-12 DIAGNOSIS — M85.89 OTHER SPECIFIED DISORDERS OF BONE DENSITY AND STRUCTURE, MULTIPLE SITES: ICD-10-CM

## 2020-08-12 PROCEDURE — 77080 DXA BONE DENSITY AXIAL: CPT | Mod: TC,PO

## 2020-08-17 ENCOUNTER — OFFICE VISIT (OUTPATIENT)
Dept: RHEUMATOLOGY | Facility: CLINIC | Age: 60
End: 2020-08-17
Payer: COMMERCIAL

## 2020-08-17 VITALS
DIASTOLIC BLOOD PRESSURE: 71 MMHG | WEIGHT: 141.31 LBS | TEMPERATURE: 98 F | BODY MASS INDEX: 25.84 KG/M2 | SYSTOLIC BLOOD PRESSURE: 106 MMHG

## 2020-08-17 DIAGNOSIS — E55.9 HYPOVITAMINOSIS D: Primary | ICD-10-CM

## 2020-08-17 PROCEDURE — 3008F PR BODY MASS INDEX (BMI) DOCUMENTED: ICD-10-PCS | Mod: S$GLB,,, | Performed by: INTERNAL MEDICINE

## 2020-08-17 PROCEDURE — 3008F BODY MASS INDEX DOCD: CPT | Mod: S$GLB,,, | Performed by: INTERNAL MEDICINE

## 2020-08-17 PROCEDURE — 99213 OFFICE O/P EST LOW 20 MIN: CPT | Mod: S$GLB,,, | Performed by: INTERNAL MEDICINE

## 2020-08-17 PROCEDURE — 99213 PR OFFICE/OUTPT VISIT, EST, LEVL III, 20-29 MIN: ICD-10-PCS | Mod: S$GLB,,, | Performed by: INTERNAL MEDICINE

## 2020-08-17 RX ORDER — HYDROXYCHLOROQUINE SULFATE 200 MG/1
200 TABLET, FILM COATED ORAL DAILY
Qty: 90 TABLET | Refills: 1 | Status: SHIPPED | OUTPATIENT
Start: 2020-08-17 | End: 2020-12-17 | Stop reason: SDUPTHER

## 2020-08-17 RX ORDER — ROSUVASTATIN CALCIUM 10 MG/1
10 TABLET, COATED ORAL NIGHTLY
COMMUNITY
Start: 2020-07-28 | End: 2021-03-23

## 2020-08-17 RX ORDER — CYCLOBENZAPRINE HCL 10 MG
10 TABLET ORAL 3 TIMES DAILY PRN
Qty: 60 TABLET | Refills: 3 | Status: SHIPPED | OUTPATIENT
Start: 2020-08-17 | End: 2020-12-17 | Stop reason: SDUPTHER

## 2020-08-17 RX ORDER — NIFEDIPINE 30 MG/1
30 TABLET, FILM COATED, EXTENDED RELEASE ORAL DAILY
Qty: 90 TABLET | Refills: 2 | Status: SHIPPED | OUTPATIENT
Start: 2020-08-17 | End: 2020-12-17 | Stop reason: SDUPTHER

## 2020-08-17 RX ORDER — TRAMADOL HYDROCHLORIDE 50 MG/1
TABLET ORAL
COMMUNITY
Start: 2020-06-16 | End: 2020-09-11 | Stop reason: ALTCHOICE

## 2020-08-17 NOTE — PROGRESS NOTES
[facility] RHEUMATOLOGY            PROGRESS NOTE      Subjective:       Patient ID:   NAME: Cristin Segal : 1960     59 y.o. female    Referring Doc: No ref. provider found  Other Physicians:    Chief Complaint:  Mixed connective tissue disease      History of Present Illness:     Patient returns today for a regularly scheduled follow-up visit for   mixed connective tissue disease.  The patient is doing well recovering from surgery in the right ankle due to fracture back in March.  No fevers ,cough or shortness of breath.  No chest pains.  No skin rashes, mucosal ulcerations or sicca symptoms.  No GI complaints.  No dysphagia.  No muscle weakness.          ROS:   GEN:  No  fever, night sweats . weight is stable   No fatigue  SKIN: no rashes, no bruising, no ulcerations, no Raynaud's  HEENT: no HA's, No visual changes, no mucosal ulcers, no sicca symptoms,  CV:   no CP, SOB, PND, SANTILLAN, no orthopnea, no palpitations  PULM: normal with no SOB, cough, hemoptysis, sputum or pleuritic pain  GI:  no abdominal pain, nausea, vomiting, constipation, diarrhea, melanotic stools, BRBPR, hematemesis, no dysphagia  :   no dysuria  NEURO: no paresthesias, headaches, visual disturbances, muscle weakness  MUSCULOSKELETAL:no joint swelling, prolonged AM stiffness, no back pain, no muscle pain  Allergies:  Review of patient's allergies indicates:   Allergen Reactions    Pcn [penicillins] Anaphylaxis     Unknown for her- family history with anaphylaxis       Medications:    Current Outpatient Medications:     cyclobenzaprine (FLEXERIL) 10 MG tablet, Take 1 tablet (10 mg total) by mouth 3 (three) times daily as needed., Disp: 60 tablet, Rfl: 3    HYDROcodone-acetaminophen (NORCO) 5-325 mg per tablet, Take 1 tablet by mouth every 6 (six) hours as needed for Pain., Disp: 12 tablet, Rfl: 0    hydrOXYchloroQUINE (PLAQUENIL) 200 mg tablet, Take 1 tablet (200 mg total) by mouth once daily., Disp: 90 tablet, Rfl: 1     KLOR-CON M20 20 mEq tablet, TAKE 1 TABLET (20 MEQ TOTAL) BY MOUTH ONCE DAILY., Disp: 90 tablet, Rfl: 3    loratadine (CLARITIN) 10 mg tablet, Take 10 mg by mouth once daily., Disp: , Rfl:     NIFEdipine (ADALAT CC) 30 MG TbSR, Take 1 tablet (30 mg total) by mouth once daily., Disp: 90 tablet, Rfl: 2    omeprazole (PRILOSEC) 20 MG capsule, Take 1 capsule (20 mg total) by mouth once daily., Disp: 90 capsule, Rfl: 2    rosuvastatin (CRESTOR) 10 MG tablet, Take 10 mg by mouth once daily., Disp: , Rfl:     traMADoL (ULTRAM) 50 mg tablet, TAKE 1 TABLET BY MOUTH EVERY 12 HOURS AS NEEDED FOR PAIN, Disp: , Rfl:     ondansetron (ZOFRAN-ODT) 4 MG TbDL, Take 1 tablet (4 mg total) by mouth every 6 (six) hours as needed. (Patient not taking: Reported on 1/28/2020), Disp: 10 tablet, Rfl: 0  No current facility-administered medications for this visit.     Facility-Administered Medications Ordered in Other Visits:     lactated ringers infusion, , Intravenous, Continuous, Ean Patricio MD, Last Rate: 100 mL/hr at 08/01/19 0745    PMHx/PSHx Updates:        Last Eye Exam :UTD      Objective:     Vitals:  Blood pressure 106/71, temperature 97.7 °F (36.5 °C), weight 64.1 kg (141 lb 4.8 oz).    Physical Examination:   GEN: no apparent distress, comfortable; AAOx3  SKIN: no rashes,no ulceration, no Raynaud's, no petechiae, no SQ nodules,  HEAD: normal  EYES: no pallor, no icterus  NECK: no masses, thyroid normal, trachea midline, no LAD/LN's, supple  CV: RRR with no murmur; l S1 and S2 reg. ,no gallop no rubs,   CHEST: Normal respiratory effort; CTAB; normal breath sounds; no wheeze or crackles  MUSC/Skeletal: ROM normal; no crepitus; joints without synovitis,  no deformities  No joint swelling or tenderness of PIP, MCP, wrist, elbow, shoulder, or knee joints  EXTREM: no clubbing, cyanosis, no edema,normal  pulses   NEURO: grossly intact; motor WNL; AAOx3;   PSYCH: normal mood, affect and behavior  LYMPH: normal cervical,  supraclavicular          Labs:   Lab Results   Component Value Date    WBC 9.03 07/21/2020    HGB 12.9 07/21/2020    HCT 37.4 07/21/2020    MCV 99 (H) 07/21/2020     (H) 07/21/2020    CMP  @LASTLAB(NA,K,CL,CO2,GLU,BUN,Creatinine,Calcium,PROT,Albumin,Bilitot,Alkphos,AST,ALT,CRP,ESR,RF,CCP,BROOK,SSA,CPK,uric acid) )@  I have reviewed all available lab results and radiology reports.    Radiology/Diagnostic Studies:        Assessment/Plan:   (1) 59 y.o. female with diagnosis of mixed connective tissue disease.  Patient is doing well.  Recent ankle fracture.  Bone density is normal  Reviewed recent CBC and CMP.  They are normal    Vitamin-D level      Discussion:     I have explained all of the above in detail and the patient understands all of the current recommendation(s). I have answered all questions to the best of my ability and to their complete satisfaction.       The patient is to continue with the current management plan         RTC in   4 months or before if needed      Electronically signed by Erica Guzman MD

## 2020-08-21 DIAGNOSIS — Z01.84 ANTIBODY RESPONSE EXAMINATION: ICD-10-CM

## 2020-08-25 ENCOUNTER — LAB VISIT (OUTPATIENT)
Dept: LAB | Facility: HOSPITAL | Age: 60
End: 2020-08-25
Attending: INTERNAL MEDICINE
Payer: COMMERCIAL

## 2020-08-25 DIAGNOSIS — E55.9 HYPOVITAMINOSIS D: ICD-10-CM

## 2020-08-25 DIAGNOSIS — Z01.84 ANTIBODY RESPONSE EXAMINATION: ICD-10-CM

## 2020-08-25 LAB
25(OH)D3+25(OH)D2 SERPL-MCNC: 21 NG/ML (ref 30–96)
SARS-COV-2 IGG SERPLBLD QL IA.RAPID: NEGATIVE

## 2020-08-25 PROCEDURE — 36415 COLL VENOUS BLD VENIPUNCTURE: CPT

## 2020-08-25 PROCEDURE — 82306 VITAMIN D 25 HYDROXY: CPT

## 2020-08-25 PROCEDURE — 86769 SARS-COV-2 COVID-19 ANTIBODY: CPT

## 2020-08-30 NOTE — TELEPHONE ENCOUNTER
Spoke with patient and gave her the results of her mammogram. Patient confirmed her understanding of the results without any further questions.   
none

## 2020-09-11 ENCOUNTER — OFFICE VISIT (OUTPATIENT)
Dept: FAMILY MEDICINE | Facility: CLINIC | Age: 60
End: 2020-09-11
Payer: COMMERCIAL

## 2020-09-11 VITALS
DIASTOLIC BLOOD PRESSURE: 62 MMHG | TEMPERATURE: 98 F | WEIGHT: 145.06 LBS | SYSTOLIC BLOOD PRESSURE: 110 MMHG | BODY MASS INDEX: 26.69 KG/M2 | HEART RATE: 104 BPM | HEIGHT: 62 IN

## 2020-09-11 DIAGNOSIS — S01.01XA LACERATION OF OCCIPITAL SCALP, INITIAL ENCOUNTER: ICD-10-CM

## 2020-09-11 DIAGNOSIS — Z48.02 VISIT FOR SUTURE REMOVAL: Primary | ICD-10-CM

## 2020-09-11 DIAGNOSIS — E11.9 TYPE 2 DIABETES MELLITUS WITHOUT COMPLICATION, UNSPECIFIED WHETHER LONG TERM INSULIN USE: ICD-10-CM

## 2020-09-11 PROCEDURE — 3008F BODY MASS INDEX DOCD: CPT | Mod: CPTII,S$GLB,, | Performed by: NURSE PRACTITIONER

## 2020-09-11 PROCEDURE — 99213 OFFICE O/P EST LOW 20 MIN: CPT | Mod: S$GLB,,, | Performed by: NURSE PRACTITIONER

## 2020-09-11 PROCEDURE — 99213 PR OFFICE/OUTPT VISIT, EST, LEVL III, 20-29 MIN: ICD-10-PCS | Mod: S$GLB,,, | Performed by: NURSE PRACTITIONER

## 2020-09-11 PROCEDURE — 3008F PR BODY MASS INDEX (BMI) DOCUMENTED: ICD-10-PCS | Mod: CPTII,S$GLB,, | Performed by: NURSE PRACTITIONER

## 2020-09-11 PROCEDURE — 99999 PR PBB SHADOW E&M-EST. PATIENT-LVL III: CPT | Mod: PBBFAC,,, | Performed by: NURSE PRACTITIONER

## 2020-09-11 PROCEDURE — 99999 PR PBB SHADOW E&M-EST. PATIENT-LVL III: ICD-10-PCS | Mod: PBBFAC,,, | Performed by: NURSE PRACTITIONER

## 2020-09-11 NOTE — PROGRESS NOTES
This dictation has been generated using Modal Fluency Dictation some phonetic errors may occur. Please contact author for clarification if needed.     Problem List Items Addressed This Visit     None      Visit Diagnoses     Visit for suture removal    -  Primary    Laceration of occipital scalp, initial encounter                 REMOVED STAPLES AND ONE SUTURE. MILTON CHECK WNL.     No follow-ups on file.    ________________________________________________________________  ________________________________________________________________      Chief Complaint   Patient presents with    Suture / Staple Removal     History of present illness  This 60 y.o. presents today for complaint of STAPLE/SUTURE REMOVAL.   Patient reports slip and fall.  Had staples and sutures placed.  ER visit September 2nd.  No loss of consciousness.  Limited review of systems negative      Past Medical History:   Diagnosis Date    Cataract     Diabetes mellitus type II     diet controlled    Fracture     left 4th finger  - splinted    Hyperlipidemia     MCTD (mixed connective tissue disease)     Raynaud's disease        Past Surgical History:   Procedure Laterality Date    CARPAL TUNNEL RELEASE      right    CATARACT EXTRACTION W/ INTRAOCULAR LENS  IMPLANT, BILATERAL      EXCISION OF PAROTID GLAND Right 8/1/2019    Procedure: PAROTIDECTOMY;  Surgeon: Abner Maki MD;  [facility];  Service: ENT;  Laterality: Right;    HIP SURGERY Left 6/18/15    JA[facility]    KNEE CARTILAGE SURGERY      right    TOTAL HIP ARTHROPLASTY Right 05/19/2016    JANA       Family History   Problem Relation Age of Onset    Diabetes Mother     Kidney disease Mother     Aneurysm Mother 73        brain    Hyperlipidemia Mother     Hypertension Sister     Breast cancer Sister     Diabetes Sister        Social History     Socioeconomic History    Marital status:      Spouse name: Not on file    Number of children: Not on file    Years of  education: Not on file    Highest education level: Not on file   Occupational History     Employer: [employer]   Social Needs    Financial resource strain: Not on file    Food insecurity     Worry: Not on file     Inability: Not on file    Transportation needs     Medical: Not on file     Non-medical: Not on file   Tobacco Use    Smoking status: Current Every Day Smoker     Packs/day: 0.50    Smokeless tobacco: Never Used   Substance and Sexual Activity    Alcohol use: Yes     Comment: occasional    Drug use: No    Sexual activity: Not on file   Lifestyle    Physical activity     Days per week: Not on file     Minutes per session: Not on file    Stress: Not on file   Relationships    Social connections     Talks on phone: Not on file     Gets together: Not on file     Attends Che[employer] service: Not on file     Active member of club or organization: Not on file     Attends meetings of clubs or organizations: Not on file     Relationship status: Not on file   Other Topics Concern    Not on file   Social History Narrative    Not on file       Current Outpatient Medications   Medication Sig Dispense Refill    cyclobenzaprine (FLEXERIL) 10 MG tablet Take 1 tablet (10 mg total) by mouth 3 (three) times daily as needed. 60 tablet 3    HYDROcodone-acetaminophen (NORCO) 5-325 mg per tablet Take 1 tablet by mouth every 6 (six) hours as needed for Pain. 12 tablet 0    hydrOXYchloroQUINE (PLAQUENIL) 200 mg tablet Take 1 tablet (200 mg total) by mouth once daily. 90 tablet 1    KLOR-CON M20 20 mEq tablet TAKE 1 TABLET (20 MEQ TOTAL) BY MOUTH ONCE DAILY. 90 tablet 3    NIFEdipine (ADALAT CC) 30 MG TbSR Take 1 tablet (30 mg total) by mouth once daily. 90 tablet 2    omeprazole (PRILOSEC) 20 MG capsule Take 1 capsule (20 mg total) by mouth once daily. 90 capsule 2    rosuvastatin (CRESTOR) 10 MG tablet Take 10 mg by mouth once daily.       No current facility-administered medications for this  visit.      Facility-Administered Medications Ordered in Other Visits   Medication Dose Route Frequency Provider Last Rate Last Dose    lactated ringers infusion   Intravenous Continuous Ean Patricio  mL/hr at 08/01/19 0745         Review of patient's allergies indicates:   Allergen Reactions    Pcn [penicillins] Anaphylaxis     Unknown for her- family history with anaphylaxis    Lipitor [atorvastatin]        Physical examination  Vitals Reviewed\  Vitals:    09/11/20 1415   BP: 110/62   Pulse: 104   Temp: 98.1 °F (36.7 °C)     Weight: 65.8 kg (145 lb 1 oz)    Gen. Well-dressed well-nourished   Skin warm dry and intact.  No rashes noted.  Four staples 1 suture intact.  No evidence of infection.  Removed tolerated well.  Neck is supple without adenopathy  Chest.  Respirations are even unlabored.    Neuro. Awake alert oriented x4.  Normal judgment and cognition noted.  Extremities no clubbing cyanosis or edema noted.     Call or return to clinic prn if these symptoms worsen or fail to improve as anticipated.

## 2020-09-17 ENCOUNTER — PATIENT MESSAGE (OUTPATIENT)
Dept: ADMINISTRATIVE | Facility: HOSPITAL | Age: 60
End: 2020-09-17

## 2020-09-25 ENCOUNTER — PATIENT MESSAGE (OUTPATIENT)
Dept: OTHER | Facility: OTHER | Age: 60
End: 2020-09-25

## 2020-10-05 ENCOUNTER — PATIENT MESSAGE (OUTPATIENT)
Dept: ADMINISTRATIVE | Facility: HOSPITAL | Age: 60
End: 2020-10-05

## 2020-11-08 ENCOUNTER — HOSPITAL ENCOUNTER (INPATIENT)
Facility: HOSPITAL | Age: 60
LOS: 1 days | Discharge: HOME HEALTH | DRG: 536 | End: 2020-11-09
Attending: EMERGENCY MEDICINE | Admitting: INTERNAL MEDICINE
Payer: COMMERCIAL

## 2020-11-08 DIAGNOSIS — S72.122A DISPLACED FRACTURE OF LESSER TROCHANTER OF LEFT FEMUR, INITIAL ENCOUNTER FOR CLOSED FRACTURE: Primary | ICD-10-CM
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
## 2020-11-08 DIAGNOSIS — R52 PAIN: ICD-10-CM

## 2020-11-08 PROBLEM — E87.20 METABOLIC ACIDOSIS: Status: ACTIVE | Noted: 2020-11-08

## 2020-11-08 PROBLEM — D53.9 MACROCYTIC ANEMIA: Status: ACTIVE | Noted: 2020-11-08

## 2020-11-08 PROBLEM — I10 ESSENTIAL HYPERTENSION: Status: ACTIVE | Noted: 2020-11-08

## 2020-11-08 PROBLEM — M25.552 HIP PAIN, LEFT: Status: ACTIVE | Noted: 2020-11-08

## 2020-11-08 LAB
ALBUMIN SERPL BCP-MCNC: 4.3 G/DL (ref 3.5–5.2)
ALP SERPL-CCNC: 81 U/L (ref 55–135)
ALT SERPL W/O P-5'-P-CCNC: 22 U/L (ref 10–44)
ANION GAP SERPL CALC-SCNC: 17 MMOL/L (ref 8–16)
AST SERPL-CCNC: 30 U/L (ref 10–40)
BASOPHILS # BLD AUTO: 0.07 K/UL (ref 0–0.2)
BASOPHILS NFR BLD: 0.9 % (ref 0–1.9)
BILIRUB SERPL-MCNC: 1 MG/DL (ref 0.1–1)
BILIRUB UR QL STRIP: NEGATIVE
BUN SERPL-MCNC: 14 MG/DL (ref 6–20)
CALCIUM SERPL-MCNC: 9.3 MG/DL (ref 8.7–10.5)
CHLORIDE SERPL-SCNC: 97 MMOL/L (ref 95–110)
CLARITY UR: CLEAR
CO2 SERPL-SCNC: 21 MMOL/L (ref 23–29)
COLOR UR: YELLOW
CREAT SERPL-MCNC: 0.8 MG/DL (ref 0.5–1.4)
DIFFERENTIAL METHOD: ABNORMAL
EOSINOPHIL # BLD AUTO: 0.1 K/UL (ref 0–0.5)
EOSINOPHIL NFR BLD: 0.8 % (ref 0–8)
ERYTHROCYTE [DISTWIDTH] IN BLOOD BY AUTOMATED COUNT: 12.6 % (ref 11.5–14.5)
EST. GFR  (AFRICAN AMERICAN): >60 ML/MIN/1.73 M^2
EST. GFR  (NON AFRICAN AMERICAN): >60 ML/MIN/1.73 M^2
GLUCOSE SERPL-MCNC: 72 MG/DL (ref 70–110)
GLUCOSE UR QL STRIP: NEGATIVE
HCT VFR BLD AUTO: 30.3 % (ref 37–48.5)
HGB BLD-MCNC: 10.4 G/DL (ref 12–16)
HGB UR QL STRIP: NEGATIVE
IMM GRANULOCYTES # BLD AUTO: 0.02 K/UL (ref 0–0.04)
IMM GRANULOCYTES NFR BLD AUTO: 0.3 % (ref 0–0.5)
INR PPP: 1.1
KETONES UR QL STRIP: ABNORMAL
LEUKOCYTE ESTERASE UR QL STRIP: NEGATIVE
LYMPHOCYTES # BLD AUTO: 3.3 K/UL (ref 1–4.8)
LYMPHOCYTES NFR BLD: 43 % (ref 18–48)
MCH RBC QN AUTO: 34.2 PG (ref 27–31)
MCHC RBC AUTO-ENTMCNC: 34.3 G/DL (ref 32–36)
MCV RBC AUTO: 100 FL (ref 82–98)
MONOCYTES # BLD AUTO: 0.7 K/UL (ref 0.3–1)
MONOCYTES NFR BLD: 8.7 % (ref 4–15)
NEUTROPHILS # BLD AUTO: 3.6 K/UL (ref 1.8–7.7)
NEUTROPHILS NFR BLD: 46.3 % (ref 38–73)
NITRITE UR QL STRIP: NEGATIVE
NRBC BLD-RTO: 0 /100 WBC
PH UR STRIP: 5 [PH] (ref 5–8)
PLATELET # BLD AUTO: 249 K/UL (ref 150–350)
PMV BLD AUTO: 8.8 FL (ref 9.2–12.9)
POTASSIUM SERPL-SCNC: 3.6 MMOL/L (ref 3.5–5.1)
PROT SERPL-MCNC: 7.9 G/DL (ref 6–8.4)
PROT UR QL STRIP: ABNORMAL
PROTHROMBIN TIME: 13.3 SEC (ref 10.6–14.8)
RBC # BLD AUTO: 3.04 M/UL (ref 4–5.4)
SARS-COV-2 RDRP RESP QL NAA+PROBE: NEGATIVE
SODIUM SERPL-SCNC: 135 MMOL/L (ref 136–145)
SP GR UR STRIP: 1.02 (ref 1–1.03)
URN SPEC COLLECT METH UR: ABNORMAL
UROBILINOGEN UR STRIP-ACNC: NEGATIVE EU/DL
WBC # BLD AUTO: 7.66 K/UL (ref 3.9–12.7)

## 2020-11-08 PROCEDURE — 99285 EMERGENCY DEPT VISIT HI MDM: CPT | Mod: 25

## 2020-11-08 PROCEDURE — 12000002 HC ACUTE/MED SURGE SEMI-PRIVATE ROOM

## 2020-11-08 PROCEDURE — U0002 COVID-19 LAB TEST NON-CDC: HCPCS

## 2020-11-08 PROCEDURE — 93005 ELECTROCARDIOGRAM TRACING: CPT | Performed by: INTERNAL MEDICINE

## 2020-11-08 PROCEDURE — 85025 COMPLETE CBC W/AUTO DIFF WBC: CPT

## 2020-11-08 PROCEDURE — 81003 URINALYSIS AUTO W/O SCOPE: CPT

## 2020-11-08 PROCEDURE — 80053 COMPREHEN METABOLIC PANEL: CPT

## 2020-11-08 PROCEDURE — 93010 ELECTROCARDIOGRAM REPORT: CPT | Mod: ,,, | Performed by: INTERNAL MEDICINE

## 2020-11-08 PROCEDURE — 93010 EKG 12-LEAD: ICD-10-PCS | Mod: ,,, | Performed by: INTERNAL MEDICINE

## 2020-11-08 PROCEDURE — 85610 PROTHROMBIN TIME: CPT

## 2020-11-08 PROCEDURE — 25000003 PHARM REV CODE 250: Performed by: NURSE PRACTITIONER

## 2020-11-08 PROCEDURE — 25000003 PHARM REV CODE 250: Performed by: EMERGENCY MEDICINE

## 2020-11-08 RX ORDER — MORPHINE SULFATE 4 MG/ML
4 INJECTION, SOLUTION INTRAMUSCULAR; INTRAVENOUS EVERY 4 HOURS PRN
Status: DISCONTINUED | OUTPATIENT
Start: 2020-11-08 | End: 2020-11-09 | Stop reason: HOSPADM

## 2020-11-08 RX ORDER — MAGNESIUM SULFATE 1 G/100ML
1 INJECTION INTRAVENOUS
Status: DISCONTINUED | OUTPATIENT
Start: 2020-11-08 | End: 2020-11-09 | Stop reason: HOSPADM

## 2020-11-08 RX ORDER — PANTOPRAZOLE SODIUM 40 MG/1
40 TABLET, DELAYED RELEASE ORAL DAILY
Status: DISCONTINUED | OUTPATIENT
Start: 2020-11-09 | End: 2020-11-09 | Stop reason: HOSPADM

## 2020-11-08 RX ORDER — ONDANSETRON 2 MG/ML
4 INJECTION INTRAMUSCULAR; INTRAVENOUS EVERY 8 HOURS PRN
Status: DISCONTINUED | OUTPATIENT
Start: 2020-11-08 | End: 2020-11-09 | Stop reason: HOSPADM

## 2020-11-08 RX ORDER — POTASSIUM CHLORIDE 7.45 MG/ML
40 INJECTION INTRAVENOUS
Status: DISCONTINUED | OUTPATIENT
Start: 2020-11-08 | End: 2020-11-09 | Stop reason: HOSPADM

## 2020-11-08 RX ORDER — NIFEDIPINE 30 MG/1
30 TABLET, EXTENDED RELEASE ORAL DAILY
Status: DISCONTINUED | OUTPATIENT
Start: 2020-11-09 | End: 2020-11-09 | Stop reason: HOSPADM

## 2020-11-08 RX ORDER — HYDROCODONE BITARTRATE AND ACETAMINOPHEN 5; 325 MG/1; MG/1
1 TABLET ORAL EVERY 8 HOURS PRN
Qty: 21 TABLET | Refills: 0 | Status: SHIPPED | OUTPATIENT
Start: 2020-11-08 | End: 2020-11-09 | Stop reason: SDUPTHER

## 2020-11-08 RX ORDER — MAGNESIUM SULFATE HEPTAHYDRATE 40 MG/ML
2 INJECTION, SOLUTION INTRAVENOUS
Status: DISCONTINUED | OUTPATIENT
Start: 2020-11-08 | End: 2020-11-09 | Stop reason: HOSPADM

## 2020-11-08 RX ORDER — POTASSIUM CHLORIDE 20 MEQ/1
40 TABLET, EXTENDED RELEASE ORAL
Status: DISCONTINUED | OUTPATIENT
Start: 2020-11-08 | End: 2020-11-09 | Stop reason: HOSPADM

## 2020-11-08 RX ORDER — POLYETHYLENE GLYCOL 3350 17 G/17G
17 POWDER, FOR SOLUTION ORAL 2 TIMES DAILY PRN
Status: DISCONTINUED | OUTPATIENT
Start: 2020-11-08 | End: 2020-11-09 | Stop reason: HOSPADM

## 2020-11-08 RX ORDER — AMOXICILLIN 250 MG
1 CAPSULE ORAL 2 TIMES DAILY
Status: DISCONTINUED | OUTPATIENT
Start: 2020-11-08 | End: 2020-11-09 | Stop reason: HOSPADM

## 2020-11-08 RX ORDER — ROSUVASTATIN CALCIUM 10 MG/1
10 TABLET, COATED ORAL NIGHTLY
Status: DISCONTINUED | OUTPATIENT
Start: 2020-11-08 | End: 2020-11-09 | Stop reason: HOSPADM

## 2020-11-08 RX ORDER — POTASSIUM CHLORIDE 20 MEQ/1
20 TABLET, EXTENDED RELEASE ORAL
Status: DISCONTINUED | OUTPATIENT
Start: 2020-11-08 | End: 2020-11-09 | Stop reason: HOSPADM

## 2020-11-08 RX ORDER — LANOLIN ALCOHOL/MO/W.PET/CERES
800 CREAM (GRAM) TOPICAL
Status: DISCONTINUED | OUTPATIENT
Start: 2020-11-08 | End: 2020-11-09 | Stop reason: HOSPADM

## 2020-11-08 RX ORDER — MAGNESIUM SULFATE HEPTAHYDRATE 40 MG/ML
4 INJECTION, SOLUTION INTRAVENOUS
Status: DISCONTINUED | OUTPATIENT
Start: 2020-11-08 | End: 2020-11-09 | Stop reason: HOSPADM

## 2020-11-08 RX ORDER — HYDROCODONE BITARTRATE AND ACETAMINOPHEN 5; 325 MG/1; MG/1
1 TABLET ORAL
Status: COMPLETED | OUTPATIENT
Start: 2020-11-08 | End: 2020-11-08

## 2020-11-08 RX ORDER — CYCLOBENZAPRINE HCL 10 MG
10 TABLET ORAL 3 TIMES DAILY PRN
Status: DISCONTINUED | OUTPATIENT
Start: 2020-11-08 | End: 2020-11-09 | Stop reason: HOSPADM

## 2020-11-08 RX ORDER — SODIUM CHLORIDE 0.9 % (FLUSH) 0.9 %
10 SYRINGE (ML) INJECTION
Status: DISCONTINUED | OUTPATIENT
Start: 2020-11-08 | End: 2020-11-09 | Stop reason: HOSPADM

## 2020-11-08 RX ORDER — ACETAMINOPHEN 325 MG/1
650 TABLET ORAL EVERY 4 HOURS PRN
Status: DISCONTINUED | OUTPATIENT
Start: 2020-11-08 | End: 2020-11-09 | Stop reason: HOSPADM

## 2020-11-08 RX ORDER — POTASSIUM CHLORIDE 7.45 MG/ML
20 INJECTION INTRAVENOUS
Status: DISCONTINUED | OUTPATIENT
Start: 2020-11-08 | End: 2020-11-09 | Stop reason: HOSPADM

## 2020-11-08 RX ORDER — TALC
6 POWDER (GRAM) TOPICAL NIGHTLY PRN
Status: DISCONTINUED | OUTPATIENT
Start: 2020-11-08 | End: 2020-11-09 | Stop reason: HOSPADM

## 2020-11-08 RX ORDER — HYDROCODONE BITARTRATE AND ACETAMINOPHEN 5; 325 MG/1; MG/1
1 TABLET ORAL EVERY 6 HOURS PRN
Status: DISCONTINUED | OUTPATIENT
Start: 2020-11-08 | End: 2020-11-09 | Stop reason: HOSPADM

## 2020-11-08 RX ORDER — HYDROXYCHLOROQUINE SULFATE 200 MG/1
200 TABLET, FILM COATED ORAL DAILY
Status: DISCONTINUED | OUTPATIENT
Start: 2020-11-09 | End: 2020-11-09 | Stop reason: HOSPADM

## 2020-11-08 RX ADMIN — ROSUVASTATIN CALCIUM 10 MG: 10 TABLET, FILM COATED ORAL at 11:11

## 2020-11-08 RX ADMIN — HYDROCODONE BITARTRATE AND ACETAMINOPHEN 1 TABLET: 5; 325 TABLET ORAL at 05:11

## 2020-11-08 RX ADMIN — HYDROCODONE BITARTRATE AND ACETAMINOPHEN 1 TABLET: 5; 325 TABLET ORAL at 11:11

## 2020-11-08 RX ADMIN — SENNOSIDES AND DOCUSATE SODIUM 1 TABLET: 8.6; 5 TABLET ORAL at 11:11

## 2020-11-08 NOTE — FIRST PROVIDER EVALUATION
Medical screening exam completed.  I have conducted a focused provider triage encounter, findings are as follows:    Brief history of present illness: left hip pain     There were no vitals filed for this visit.    Pertinent physical exam:  S/p mechnical fall last pm slipped on wet mold. Has previous hip replacement.     Brief workup plan:  xrays    Preliminary workup initiated; this workup will be continued and followed by the physician or advanced practice provider that is assigned to the patient when roomed.

## 2020-11-08 NOTE — ED NOTES
Pt states that she slipped on sidewalk last night while walking her dog. She landed on her left hip. Pt denies hitting her head or any LOC

## 2020-11-08 NOTE — ED PROVIDER NOTES
Encounter Date: 11/8/2020       History     Chief Complaint   Patient presents with    Hip Pain     left hip pain after fall last pm     60-year-old female presents complaining of left hip pain, patient reports she had a slip and fall on concrete last night, patient denies striking her head and denies loss of consciousness patient reports that she is able to ambulate although she is only able to do so with pain, patient has a history of bilateral hip replacement patient also has a history of diabetes.  Patient denies any other injuries or complaints at this time.        Review of patient's allergies indicates:   Allergen Reactions    Pcn [penicillins] Anaphylaxis     Unknown for her- family history with anaphylaxis    Lipitor [atorvastatin]      Past Medical History:   Diagnosis Date    Cataract     Diabetes mellitus type II     diet controlled    Fracture     left 4th finger  - splinted    Hyperlipidemia     MCTD (mixed connective tissue disease)     Raynaud's disease      Past Surgical History:   Procedure Laterality Date    CARPAL TUNNEL RELEASE      right    CATARACT EXTRACTION W/ INTRAOCULAR LENS  IMPLANT, BILATERAL      EXCISION OF PAROTID GLAND Right 8/1/2019    Procedure: PAROTIDECTOMY;  Surgeon: Abner Maki MD;  Location: [facility];  Service: ENT;  Laterality: Right;    HIP SURGERY Left 6/18/15    [facility]    KNEE CARTILAGE SURGERY      right    TOTAL HIP ARTHROPLASTY Right 05/19/2016    [facility]     Family History   Problem Relation Age of Onset    Diabetes Mother     Kidney disease Mother     Aneurysm Mother 73        brain    Hyperlipidemia Mother     Hypertension Sister     Breast cancer Sister     Diabetes Sister      Social History     Tobacco Use    Smoking status: Current Every Day Smoker     Packs/day: 0.50    Smokeless tobacco: Never Used   Substance Use Topics    Alcohol use: Yes     Comment: occasional    Drug use: No     Review of Systems   Constitutional: Negative for  fever.   HENT: Negative for congestion, rhinorrhea, sore throat and trouble swallowing.    Eyes: Negative for visual disturbance.   Respiratory: Negative for cough, chest tightness, shortness of breath and wheezing.    Cardiovascular: Negative for chest pain, palpitations and leg swelling.   Gastrointestinal: Negative for abdominal distention, abdominal pain, constipation, diarrhea, nausea and vomiting.   Genitourinary: Negative for difficulty urinating, dysuria, flank pain and frequency.   Musculoskeletal: Positive for arthralgias. Negative for back pain, joint swelling and neck pain.   Skin: Negative for color change and rash.   Neurological: Negative for dizziness, syncope, speech difficulty, weakness, numbness and headaches.   All other systems reviewed and are negative.      Physical Exam     Initial Vitals [11/08/20 1626]   BP Pulse Resp Temp SpO2   130/72 98 (!) 158 98.8 °F (37.1 °C) 100 %      MAP       --         Physical Exam    Nursing note and vitals reviewed.  Constitutional: She appears well-developed and well-nourished. She is not diaphoretic. No distress.   HENT:   Head: Normocephalic and atraumatic.   Right Ear: External ear normal.   Left Ear: External ear normal.   Nose: Nose normal.   Mouth/Throat: Oropharynx is clear and moist. No oropharyngeal exudate.   Eyes: Conjunctivae and EOM are normal. Pupils are equal, round, and reactive to light. Right eye exhibits no discharge. Left eye exhibits no discharge. No scleral icterus.   Neck: Normal range of motion. Neck supple. No thyromegaly present. No tracheal deviation present. No JVD present.   Cardiovascular: Normal rate, regular rhythm, normal heart sounds and intact distal pulses. Exam reveals no gallop and no friction rub.    No murmur heard.  Pulmonary/Chest: Breath sounds normal. No stridor. No respiratory distress. She has no wheezes. She has no rhonchi. She has no rales. She exhibits no tenderness.   Abdominal: Soft. Bowel sounds are normal.  She exhibits no distension and no mass. There is no abdominal tenderness. There is no rebound and no guarding.   Musculoskeletal: Normal range of motion. Tenderness present. No edema.      Comments: Tenderness to palpation to the left hip, there is no crepitus there is no ligamentous laxity patient retains full range of motion patient has normal movement of the knee and ankle on the left with 2+ distal pulses and capillary refill less than 2 seconds patient has a stable pelvis and has no bony tenderness to palpation around the pelvis   Lymphadenopathy:     She has no cervical adenopathy.   Neurological: She is alert and oriented to person, place, and time. She has normal strength. She displays normal reflexes. No cranial nerve deficit or sensory deficit.   Skin: Skin is warm and dry. No rash and no abscess noted. No erythema. No pallor.         ED Course   Procedures  Labs Reviewed   CBC W/ AUTO DIFFERENTIAL - Abnormal; Notable for the following components:       Result Value    RBC 3.04 (*)     Hemoglobin 10.4 (*)     Hematocrit 30.3 (*)      (*)     MCH 34.2 (*)     MPV 8.8 (*)     All other components within normal limits   COMPREHENSIVE METABOLIC PANEL - Abnormal; Notable for the following components:    Sodium 135 (*)     CO2 21 (*)     Anion Gap 17 (*)     All other components within normal limits   URINALYSIS - Abnormal; Notable for the following components:    Protein, UA Trace (*)     Ketones, UA 1+ (*)     All other components within normal limits   PROTIME-INR          Imaging Results          X-Ray Chest AP Portable (In process)                CT Hip Without Contrast Left (Final result)  Result time 11/08/20 18:59:27    Final result by Bharath Epstein MD (11/08/20 18:59:27)                 Narrative:    CT left hip without contrast    CLINICAL DATA: Trauma, left hip pain    CMS MANDATED QUALITY DATA - CT RADIATION  436    All CT scans at this facility utilize dose modulation,  iterative  reconstruction, and/or weight based dosing when appropriate to reduce  radiation dose to as low as reasonably achievable.    Findings: Thin section axial noncontrast images were obtained, with  reformatted imaging in the sagittal and coronal planes.    As reported radiographically, there is an acute vertically oriented  noncomminuted fracture at the base of the lesser trochanter of the  left femur. The lesser trochanteric fracture fragment is displaced  anteriorly from its donor site. The level of the fracture is traversed  by the intramedullary portion of the left hip prosthesis, which  remains normally positioned, without evidence of hardware loosening.    No other fractures are identified. There is no dislocation.    IMPRESSION:  1. Acute vertically oriented fracture at the base of the lesser  trochanter, with anterior and slight medial displacement of the lesser  trochanteric fracture fragment.  2. Left hip arthroplasty without complicating features.    Electronically Signed by Yevgeniy Epstein M.D. on 11/8/2020 7:09 PM                             X-Ray Hip 2 View Left (Final result)  Result time 11/08/20 17:35:23    Final result by Bharath Epstein MD (11/08/20 17:35:23)                 Narrative:    Left hip 2 views    CLINICAL DATA: Fall injury    FINDINGS: 2 views are submitted.    Changes of left hip arthroplasty are noted. Prosthesis appears  appropriately positioned, with no evidence of loosening.    There is an acute vertically oriented fracture at the level of the  lesser trochanter, new when compared to November 2017, but presumably  involving a nonweightbearing component of the proximal femur. Soft  tissues are unremarkable.    IMPRESSION:  1. Acute vertically oriented fracture at the base of the lesser  trochanter on the left as described.  2. Left hip arthroplasty in appropriate position.    Electronically Signed by Yevgeniy Epstein M.D. on 11/8/2020 5:39 PM                              X-Ray Pelvis Routine AP (Final result)  Result time 11/08/20 17:35:23    Final result by Bharath Epstein MD (11/08/20 17:35:23)                 Narrative:    Pelvis 2 views    CLINICAL DATA: Trauma    FINDINGS: 2 views are submitted. Changes of bilateral hip arthroplasty  are noted. The prostheses appear appropriately positioned.    Vertically oriented acute fracture at the base of the lesser  trochanter on the left is again noted. No other fractures are  identified. Soft tissues are unremarkable.    IMPRESSION:  1. Acute fracture at the base of the lesser trochanter on the left as  noted previously.  2. Bilateral hip arthroplasties.    Electronically Signed by Yevgeniy Epstein M.D. on 11/8/2020 5:41 PM                               Medical Decision Making:   History:   Old Medical Records: I decided to obtain old medical records.  Initial Assessment:   Emergent evaluation of a 60-year-old female presenting with left hip pain differential diagnosis includes soft tissue injury, fracture, sprain              Attending Attestation:             Attending ED Notes:   I discussed the case with Dr. Tee, he requests CT of the hip, he reports that if it is an isolated lesser trochanter fracture that it does not require surgery, patient reports currently her pain is well controlled, patient will receive CT of hip and will re-evaluate.    CT shows no significant different findings from x-ray, patient does have a small vertically oriented fracture along the area of the lesser trochanter however no instability noted of the prosthesis, I discussed the findings with Dr. Tee who suggests patient be discharged with pain control, she is to follow up with Dr. Curran and is advised to ambulate with assistance such as crutches or cane.  She is cautioned to return immediately to the emergency department for any worsening or for any further concerns.    Addendum:  Dr. Tee called back and reports he believes he sees another  fracture on the CT of the lower extremity which was not communicated by the radiologist, given this concern he recommends patient be admitted to hospital and orthopedics will evaluate the patient for further operative repair.                    Clinical Impression:       ICD-10-CM ICD-9-CM   1. Displaced fracture of lesser trochanter of left femur, initial encounter for closed fracture  S72.122A 820.20   2. Fall  W19.XXXA E888.9   3. Pain  R52 780.96                          ED Disposition Condition    Admit                             Tyrese Willard MD  11/08/20 2033

## 2020-11-09 VITALS
HEIGHT: 62 IN | TEMPERATURE: 98 F | DIASTOLIC BLOOD PRESSURE: 74 MMHG | OXYGEN SATURATION: 97 % | RESPIRATION RATE: 19 BRPM | BODY MASS INDEX: 25.88 KG/M2 | SYSTOLIC BLOOD PRESSURE: 121 MMHG | HEART RATE: 81 BPM | WEIGHT: 140.63 LBS

## 2020-11-09 LAB
ANION GAP SERPL CALC-SCNC: 12 MMOL/L (ref 8–16)
BASOPHILS # BLD AUTO: 0.09 K/UL (ref 0–0.2)
BASOPHILS NFR BLD: 1.4 % (ref 0–1.9)
BUN SERPL-MCNC: 13 MG/DL (ref 6–20)
CALCIUM SERPL-MCNC: 9.4 MG/DL (ref 8.7–10.5)
CHLORIDE SERPL-SCNC: 97 MMOL/L (ref 95–110)
CO2 SERPL-SCNC: 27 MMOL/L (ref 23–29)
CREAT SERPL-MCNC: 0.7 MG/DL (ref 0.5–1.4)
DIFFERENTIAL METHOD: ABNORMAL
EOSINOPHIL # BLD AUTO: 0.1 K/UL (ref 0–0.5)
EOSINOPHIL NFR BLD: 2.1 % (ref 0–8)
ERYTHROCYTE [DISTWIDTH] IN BLOOD BY AUTOMATED COUNT: 12.7 % (ref 11.5–14.5)
EST. GFR  (AFRICAN AMERICAN): >60 ML/MIN/1.73 M^2
EST. GFR  (NON AFRICAN AMERICAN): >60 ML/MIN/1.73 M^2
GLUCOSE SERPL-MCNC: 83 MG/DL (ref 70–110)
HCT VFR BLD AUTO: 31.2 % (ref 37–48.5)
HGB BLD-MCNC: 10.4 G/DL (ref 12–16)
IMM GRANULOCYTES # BLD AUTO: 0.02 K/UL (ref 0–0.04)
IMM GRANULOCYTES NFR BLD AUTO: 0.3 % (ref 0–0.5)
LYMPHOCYTES # BLD AUTO: 2.7 K/UL (ref 1–4.8)
LYMPHOCYTES NFR BLD: 43.5 % (ref 18–48)
MAGNESIUM SERPL-MCNC: 1.3 MG/DL (ref 1.6–2.6)
MCH RBC QN AUTO: 34 PG (ref 27–31)
MCHC RBC AUTO-ENTMCNC: 33.3 G/DL (ref 32–36)
MCV RBC AUTO: 102 FL (ref 82–98)
MONOCYTES # BLD AUTO: 0.6 K/UL (ref 0.3–1)
MONOCYTES NFR BLD: 9.3 % (ref 4–15)
NEUTROPHILS # BLD AUTO: 2.7 K/UL (ref 1.8–7.7)
NEUTROPHILS NFR BLD: 43.4 % (ref 38–73)
NRBC BLD-RTO: 0 /100 WBC
PLATELET # BLD AUTO: 238 K/UL (ref 150–350)
PMV BLD AUTO: 8.6 FL (ref 9.2–12.9)
POTASSIUM SERPL-SCNC: 3.8 MMOL/L (ref 3.5–5.1)
RBC # BLD AUTO: 3.06 M/UL (ref 4–5.4)
SODIUM SERPL-SCNC: 136 MMOL/L (ref 136–145)
WBC # BLD AUTO: 6.23 K/UL (ref 3.9–12.7)

## 2020-11-09 PROCEDURE — 85025 COMPLETE CBC W/AUTO DIFF WBC: CPT

## 2020-11-09 PROCEDURE — 36415 COLL VENOUS BLD VENIPUNCTURE: CPT

## 2020-11-09 PROCEDURE — 99223 1ST HOSP IP/OBS HIGH 75: CPT | Mod: ,,, | Performed by: ORTHOPAEDIC SURGERY

## 2020-11-09 PROCEDURE — 80048 BASIC METABOLIC PNL TOTAL CA: CPT

## 2020-11-09 PROCEDURE — 25000003 PHARM REV CODE 250: Performed by: NURSE PRACTITIONER

## 2020-11-09 PROCEDURE — 99223 PR INITIAL HOSPITAL CARE,LEVL III: ICD-10-PCS | Mod: ,,, | Performed by: ORTHOPAEDIC SURGERY

## 2020-11-09 PROCEDURE — 63600175 PHARM REV CODE 636 W HCPCS: Performed by: NURSE PRACTITIONER

## 2020-11-09 PROCEDURE — 83735 ASSAY OF MAGNESIUM: CPT

## 2020-11-09 RX ORDER — HYDROCODONE BITARTRATE AND ACETAMINOPHEN 5; 325 MG/1; MG/1
1 TABLET ORAL EVERY 8 HOURS PRN
Qty: 21 TABLET | Refills: 0 | Status: SHIPPED | OUTPATIENT
Start: 2020-11-09 | End: 2020-11-17 | Stop reason: SDUPTHER

## 2020-11-09 RX ADMIN — HYDROCODONE BITARTRATE AND ACETAMINOPHEN 1 TABLET: 5; 325 TABLET ORAL at 08:11

## 2020-11-09 RX ADMIN — HYDROXYCHLOROQUINE SULFATE 200 MG: 200 TABLET, FILM COATED ORAL at 08:11

## 2020-11-09 RX ADMIN — NIFEDIPINE 30 MG: 30 TABLET, FILM COATED, EXTENDED RELEASE ORAL at 08:11

## 2020-11-09 RX ADMIN — SENNOSIDES AND DOCUSATE SODIUM 1 TABLET: 8.6; 5 TABLET ORAL at 08:11

## 2020-11-09 RX ADMIN — CYCLOBENZAPRINE 10 MG: 10 TABLET, FILM COATED ORAL at 03:11

## 2020-11-09 RX ADMIN — PANTOPRAZOLE SODIUM 40 MG: 40 TABLET, DELAYED RELEASE ORAL at 08:11

## 2020-11-09 RX ADMIN — MORPHINE SULFATE 4 MG: 4 INJECTION INTRAVENOUS at 03:11

## 2020-11-09 NOTE — HPI
Ms. Segal is a 60 year old female with a history of mixed connective tissue dz on plaquenil, HTN, HLD, and bilat hip replacements who presents today with left hip pain after a mechanical fall last night. It is severe. It is associated with inability to lift left leg and decreased ability to bear wt on the left leg. She denies dizziness, fever, chills, N/V/D, head trauma or LOC. She had a rt hip replacement in 2016 and left hip replacement in 2017. 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 JOSE L Nesbitt for surgical clearance that she reports was normal. In the ED, she had xrays that revealed Acute fracture at the base of the lesser trochanter on the left which was communicated with Dr. Tee. He recommended a CT of the left hip and after reviewing the images, recommended admission for possible surgical intervention with Dr. MARIANGEL Curran in the AM.

## 2020-11-09 NOTE — H&P
[facility] Medicine  History & Physical    DOS: 11/08/2020  9:48 PM      Patient Name: Cristin Segal  MRN: [id number]  Admission Date: 11/8/2020  Attending Physician: Dr. Jones  Primary Care Provider: Angela tGz MD         Patient information was obtained from patient and ER records.     Subjective:     Principal Problem:Displaced fracture of lesser trochanter of left femur, initial encounter for closed fracture    Chief Complaint:   Chief Complaint   Patient presents with    Hip Pain     left hip pain after fall last pm        HPI: Ms. Segal is a 60 year old female with a history of mixed connective tissue dz on plaquenil, HTN, HLD, and bilat hip replacements who presents today with left hip pain after a mechanical fall last night. It is severe. It is associated with inability to lift left leg and decreased ability to bear wt on the left leg. She denies dizziness, fever, chills, N/V/D, head trauma or LOC. She had a rt hip replacement in 2016 and left hip replacement in 2017. 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 JOSE L Nesbitt for surgical clearance that she reports was normal. In the ED, she had xrays that revealed Acute fracture at the base of the lesser trochanter on the left which was communicated with Dr. Tee. He recommended a CT of the left hip and after reviewing the images, recommended admission for possible surgical intervention with Dr. MARIANGEL Curran in the AM.     Past Medical History:   Diagnosis Date    Cataract     Diabetes mellitus type II     diet controlled    Fracture     left 4th finger  - splinted    Hyperlipidemia     MCTD (mixed connective tissue disease)     Raynaud's disease        Past Surgical History:   Procedure Laterality Date    CARPAL TUNNEL RELEASE      right    CATARACT EXTRACTION W/ INTRAOCULAR LENS  IMPLANT, BILATERAL      EXCISION OF PAROTID GLAND Right 8/1/2019    Procedure:  PAROTIDECTOMY;  Surgeon: Abner Maki MD;  Location: [facility];  Service: ENT;  Laterality: Right;    HIP SURGERY Left 6/18/15    [facility]    KNEE CARTILAGE SURGERY      right    TOTAL HIP ARTHROPLASTY Right 05/19/2016    JANA       Review of patient's allergies indicates:   Allergen Reactions    Pcn [penicillins] Anaphylaxis     Unknown for her- family history with anaphylaxis    Lipitor [atorvastatin]        Current Facility-Administered Medications on File Prior to Encounter   Medication    lactated ringers infusion     Current Outpatient Medications on File Prior to Encounter   Medication Sig    cyclobenzaprine (FLEXERIL) 10 MG tablet Take 1 tablet (10 mg total) by mouth 3 (three) times daily as needed.    hydrOXYchloroQUINE (PLAQUENIL) 200 mg tablet Take 1 tablet (200 mg total) by mouth once daily.    KLOR-CON M20 20 mEq tablet TAKE 1 TABLET (20 MEQ TOTAL) BY MOUTH ONCE DAILY. (Patient taking differently: Take 20 mEq by mouth once daily. )    NIFEdipine (ADALAT CC) 30 MG TbSR Take 1 tablet (30 mg total) by mouth once daily.    omeprazole (PRILOSEC) 20 MG capsule Take 1 capsule (20 mg total) by mouth once daily.    rosuvastatin (CRESTOR) 10 MG tablet Take 10 mg by mouth every evening.     [DISCONTINUED] HYDROcodone-acetaminophen (NORCO) 5-325 mg per tablet Take 1 tablet by mouth every 6 (six) hours as needed for Pain.     Family History     Problem Relation (Age of Onset)    Aneurysm Mother (73)    Breast cancer Sister    Diabetes Mother, Sister    Hyperlipidemia Mother    Hypertension Sister    Kidney disease Mother        Tobacco Use    Smoking status: Current Every Day Smoker     Packs/day: 0.50    Smokeless tobacco: Never Used   Substance and Sexual Activity    Alcohol use: Yes     Comment: occasional    Drug use: No    Sexual activity: Not on file     Review of Systems   Constitutional: Negative for activity change, appetite change, chills, diaphoresis, fatigue, fever and unexpected weight  change.   HENT: Negative for congestion, ear pain, facial swelling, hearing loss, sore throat and trouble swallowing.    Eyes: Negative for pain and discharge.   Respiratory: Negative for cough, chest tightness, shortness of breath and wheezing.    Cardiovascular: Negative for chest pain, palpitations and leg swelling.   Gastrointestinal: Negative for abdominal pain, blood in stool, diarrhea, nausea and vomiting.   Endocrine: Negative for polydipsia, polyphagia and polyuria.   Genitourinary: Negative for difficulty urinating, dysuria, flank pain, frequency and urgency.   Musculoskeletal: Positive for arthralgias and gait problem. Negative for back pain, joint swelling, neck pain and neck stiffness.   Skin: Negative for rash and wound.   Allergic/Immunologic: Negative for environmental allergies and immunocompromised state.   Neurological: Negative for dizziness, seizures, syncope, speech difficulty, weakness, light-headedness, numbness and headaches.   Hematological: Negative for adenopathy.   Psychiatric/Behavioral: Negative for sleep disturbance and suicidal ideas. The patient is not nervous/anxious.    All other systems reviewed and are negative.    Objective:     Vital Signs (Most Recent):  Temp: 98.8 °F (37.1 °C) (11/08/20 1626)  Pulse: 84 (11/08/20 2101)  Resp: 17 (11/08/20 2101)  BP: 128/77 (11/08/20 1954)  SpO2: 100 % (11/08/20 2101) Vital Signs (24h Range):  Temp:  [98.8 °F (37.1 °C)] 98.8 °F (37.1 °C)  Pulse:  [81-98] 84  Resp:  [] 17  SpO2:  [97 %-100 %] 100 %  BP: (128-166)/(72-77) 128/77     Weight: 61.7 kg (136 lb)  Body mass index is 24.87 kg/m².    Physical Exam  Vitals signs and nursing note reviewed.   Constitutional:       Appearance: Normal appearance.   HENT:      Head: Normocephalic and atraumatic.      Nose: Nose normal.      Mouth/Throat:      Mouth: Mucous membranes are moist.      Pharynx: Oropharynx is clear.   Eyes:      Extraocular Movements: Extraocular movements intact.       Pupils: Pupils are equal, round, and reactive to light.   Neck:      Musculoskeletal: Normal range of motion and neck supple.   Cardiovascular:      Rate and Rhythm: Normal rate and regular rhythm.      Pulses: Normal pulses.      Heart sounds: Murmur present.   Pulmonary:      Effort: Pulmonary effort is normal.      Breath sounds: Normal breath sounds.   Abdominal:      General: Bowel sounds are normal.      Palpations: Abdomen is soft.   Musculoskeletal:         General: Tenderness present.      Comments: Inability to lift left leg off of bed, pain with manipulation of the left leg   Skin:     General: Skin is warm and dry.      Capillary Refill: Capillary refill takes less than 2 seconds.   Neurological:      General: No focal deficit present.      Mental Status: She is alert and oriented to person, place, and time.   Psychiatric:         Mood and Affect: Mood normal.         Behavior: Behavior normal.           CRANIAL NERVES     CN III, IV, VI   Pupils are equal, round, and reactive to light.       Significant Labs:   CBC:   Recent Labs   Lab 11/08/20 2011   WBC 7.66   HGB 10.4*   HCT 30.3*        CMP:   Recent Labs   Lab 11/08/20 2011   *   K 3.6   CL 97   CO2 21*   GLU 72   BUN 14   CREATININE 0.8   CALCIUM 9.3   PROT 7.9   ALBUMIN 4.3   BILITOT 1.0   ALKPHOS 81   AST 30   ALT 22   ANIONGAP 17*   EGFRNONAA >60.0       Significant Imaging: EKG: I have reviewed all pertinent results/findings within the past 24 hours and my personal findings are: EKG with 1st degree AV block     X-ray Hip 2 View Left    Result Date: 11/8/2020  Left hip 2 views CLINICAL DATA: Fall injury FINDINGS: 2 views are submitted. Changes of left hip arthroplasty are noted. Prosthesis appears appropriately positioned, with no evidence of loosening. There is an acute vertically oriented fracture at the level of the lesser trochanter, new when compared to November 2017, but presumably involving a nonweightbearing component of  the proximal femur. Soft tissues are unremarkable. IMPRESSION: 1. Acute vertically oriented fracture at the base of the lesser trochanter on the left as described. 2. Left hip arthroplasty in appropriate position. Electronically Signed by Yevgeniy Epstein M.D. on 11/8/2020 5:39 PM    X-ray Pelvis Routine Ap    Result Date: 11/8/2020  Pelvis 2 views CLINICAL DATA: Trauma FINDINGS: 2 views are submitted. Changes of bilateral hip arthroplasty are noted. The prostheses appear appropriately positioned. Vertically oriented acute fracture at the base of the lesser trochanter on the left is again noted. No other fractures are identified. Soft tissues are unremarkable. IMPRESSION: 1. Acute fracture at the base of the lesser trochanter on the left as noted previously. 2. Bilateral hip arthroplasties. Electronically Signed by Yevgeniy Epstein M.D. on 11/8/2020 5:41 PM    Ct Hip Without Contrast Left    Result Date: 11/8/2020  CT left hip without contrast CLINICAL DATA: Trauma, left hip pain CMS MANDATED QUALITY DATA - CT RADIATION  436 All CT scans at this facility utilize dose modulation, iterative reconstruction, and/or weight based dosing when appropriate to reduce radiation dose to as low as reasonably achievable. Findings: Thin section axial noncontrast images were obtained, with reformatted imaging in the sagittal and coronal planes. As reported radiographically, there is an acute vertically oriented noncomminuted fracture at the base of the lesser trochanter of the left femur. The lesser trochanteric fracture fragment is displaced anteriorly from its donor site. The level of the fracture is traversed by the intramedullary portion of the left hip prosthesis, which remains normally positioned, without evidence of hardware loosening. No other fractures are identified. There is no dislocation. IMPRESSION: 1. Acute vertically oriented fracture at the base of the lesser trochanter, with anterior and slight medial displacement  of the lesser trochanteric fracture fragment. 2. Left hip arthroplasty without complicating features. Electronically Signed by Yevgeniy Epstein M.D. on 11/8/2020 7:09 PM      Assessment/Plan:     Active Hospital Problems    Diagnosis    *Displaced fracture of lesser trochanter of left femur, initial encounter for closed fracture    Macrocytic anemia    Hip pain, left    Essential hypertension    mild Metabolic acidosis with mildly elevated anion gap     PLAN:  Admit to med/surg  Consult Dr. Tee - he states he will reach out to Dr. MARIANGEL Curran for possible surgical intervention, I also placed an official consult for Dr. MARIANGEL Curran  Pain control  Continue home meds  Give IV hydration, repeat BMP in AM   Anemia should be monitored outpatient  nonwt bearing to the left leg, if she can transfer to bedside commode, then we can hold off on milton. If she is unstable, will place milton  Consider prophylactic lovenox for prolonged hospitalization, holding tonight for possible surgical intervention in the AM  Will hold NPO after midnight    VTE Risk Mitigation (From admission, onward)    None             Ailyn Nolasco NP  Department of Hospital Medicine   [facility]

## 2020-11-09 NOTE — PLAN OF CARE
Pt being discharged with [facility], walker delivered from [facility] closet     11/09/20 3428   Final Note   Assessment Type Final Discharge Note   Anticipated Discharge Disposition Home-Health   Post-Acute Status   HME Status Set-up Complete   Home Health Status Set-up Complete   Discharge Delays None known at this time

## 2020-11-09 NOTE — NURSING
Patient checked every 2 hours safety precautions observed patient experienced no distress all medications administered per the MAR

## 2020-11-09 NOTE — PLAN OF CARE
Pt lives with  and is unsure of discharge needs, RW, HH with PT vs rehab?  Pt will be followed through course of hospitalization for needs    PCP= Angela Gtz  [facility] [address]     11/09/20 1113   Discharge Assessment   Assessment Type Discharge Planning Assessment   Confirmed/corrected address and phone number on facesheet? Yes   Assessment information obtained from? Patient;Medical Record   Communicated expected length of stay with patient/caregiver no   Prior to hospitilization cognitive status: Alert/Oriented   Prior to hospitalization functional status: Independent   Current cognitive status: Alert/Oriented   Current Functional Status: Independent   Facility Arrived From: home   Lives With spouse   Able to Return to Prior Arrangements yes   Is patient able to care for self after discharge? Unable to determine at this time (comments)   Who are your caregiver(s) and their phone number(s)? Jose De Jesus Segal [phone]   Patient's perception of discharge disposition home or selfcare;home health   Readmission Within the Last 30 Days no previous admission in last 30 days   Patient currently being followed by outpatient case management? No   Patient currently receives any other outside agency services? No   Equipment Currently Used at Home cane, straight   Do you have any problems affording any of your prescribed medications? No   Is the patient taking medications as prescribed? yes   Does the patient have transportation home? Yes   Transportation Anticipated family or friend will provide   Dialysis Name and Scheduled days n/a   Does the patient receive services at the [facility]? No   Discharge Plan A Home with family   Discharge Plan B Home with family   DME Needed Upon Discharge  other (see comments)  (tbd , hip fx will wait for recs pt/ot)   Patient/Family in Agreement with Plan yes

## 2020-11-09 NOTE — SUBJECTIVE & OBJECTIVE
Past Medical History:   Diagnosis Date    Cataract     Diabetes mellitus type II     diet controlled    Fracture     left 4th finger  - splinted    Hyperlipidemia     MCTD (mixed connective tissue disease)     Raynaud's disease        Past Surgical History:   Procedure Laterality Date    CARPAL TUNNEL RELEASE      right    CATARACT EXTRACTION W/ INTRAOCULAR LENS  IMPLANT, BILATERAL      EXCISION OF PAROTID GLAND Right 8/1/2019    Procedure: PAROTIDECTOMY;  Surgeon: Abner Maki MD;  Location: [facility];  Service: ENT;  Laterality: Right;    HIP SURGERY Left 6/18/15    [facility]    KNEE CARTILAGE SURGERY      right    TOTAL HIP ARTHROPLASTY Right 05/19/2016    JANA       Review of patient's allergies indicates:   Allergen Reactions    Pcn [penicillins] Anaphylaxis     Unknown for her- family history with anaphylaxis    Lipitor [atorvastatin]        Current Facility-Administered Medications on File Prior to Encounter   Medication    lactated ringers infusion     Current Outpatient Medications on File Prior to Encounter   Medication Sig    cyclobenzaprine (FLEXERIL) 10 MG tablet Take 1 tablet (10 mg total) by mouth 3 (three) times daily as needed.    hydrOXYchloroQUINE (PLAQUENIL) 200 mg tablet Take 1 tablet (200 mg total) by mouth once daily.    KLOR-CON M20 20 mEq tablet TAKE 1 TABLET (20 MEQ TOTAL) BY MOUTH ONCE DAILY. (Patient taking differently: Take 20 mEq by mouth once daily. )    NIFEdipine (ADALAT CC) 30 MG TbSR Take 1 tablet (30 mg total) by mouth once daily.    omeprazole (PRILOSEC) 20 MG capsule Take 1 capsule (20 mg total) by mouth once daily.    rosuvastatin (CRESTOR) 10 MG tablet Take 10 mg by mouth every evening.     [DISCONTINUED] HYDROcodone-acetaminophen (NORCO) 5-325 mg per tablet Take 1 tablet by mouth every 6 (six) hours as needed for Pain.     Family History     Problem Relation (Age of Onset)    Aneurysm Mother (73)    Breast cancer Sister    Diabetes Mother, Sister     Hyperlipidemia Mother    Hypertension Sister    Kidney disease Mother        Tobacco Use    Smoking status: Current Every Day Smoker     Packs/day: 0.50    Smokeless tobacco: Never Used   Substance and Sexual Activity    Alcohol use: Yes     Comment: occasional    Drug use: No    Sexual activity: Not on file     Review of Systems   Constitutional: Negative for activity change, appetite change, chills, diaphoresis, fatigue, fever and unexpected weight change.   HENT: Negative for congestion, ear pain, facial swelling, hearing loss, sore throat and trouble swallowing.    Eyes: Negative for pain and discharge.   Respiratory: Negative for cough, chest tightness, shortness of breath and wheezing.    Cardiovascular: Negative for chest pain, palpitations and leg swelling.   Gastrointestinal: Negative for abdominal pain, blood in stool, diarrhea, nausea and vomiting.   Endocrine: Negative for polydipsia, polyphagia and polyuria.   Genitourinary: Negative for difficulty urinating, dysuria, flank pain, frequency and urgency.   Musculoskeletal: Positive for arthralgias and gait problem. Negative for back pain, joint swelling, neck pain and neck stiffness.   Skin: Negative for rash and wound.   Allergic/Immunologic: Negative for environmental allergies and immunocompromised state.   Neurological: Negative for dizziness, seizures, syncope, speech difficulty, weakness, light-headedness, numbness and headaches.   Hematological: Negative for adenopathy.   Psychiatric/Behavioral: Negative for sleep disturbance and suicidal ideas. The patient is not nervous/anxious.    All other systems reviewed and are negative.    Objective:     Vital Signs (Most Recent):  Temp: 98.8 °F (37.1 °C) (11/08/20 1626)  Pulse: 84 (11/08/20 2101)  Resp: 17 (11/08/20 2101)  BP: 128/77 (11/08/20 1954)  SpO2: 100 % (11/08/20 2101) Vital Signs (24h Range):  Temp:  [98.8 °F (37.1 °C)] 98.8 °F (37.1 °C)  Pulse:  [81-98] 84  Resp:  [] 17  SpO2:  [97  %-100 %] 100 %  BP: (128-166)/(72-77) 128/77     Weight: 61.7 kg (136 lb)  Body mass index is 24.87 kg/m².    Physical Exam  Vitals signs and nursing note reviewed.   Constitutional:       Appearance: Normal appearance.   HENT:      Head: Normocephalic and atraumatic.      Nose: Nose normal.      Mouth/Throat:      Mouth: Mucous membranes are moist.      Pharynx: Oropharynx is clear.   Eyes:      Extraocular Movements: Extraocular movements intact.      Pupils: Pupils are equal, round, and reactive to light.   Neck:      Musculoskeletal: Normal range of motion and neck supple.   Cardiovascular:      Rate and Rhythm: Normal rate and regular rhythm.      Pulses: Normal pulses.      Heart sounds: Murmur present.   Pulmonary:      Effort: Pulmonary effort is normal.      Breath sounds: Normal breath sounds.   Abdominal:      General: Bowel sounds are normal.      Palpations: Abdomen is soft.   Musculoskeletal:         General: Tenderness present.      Comments: Inability to lift left leg off of bed, pain with manipulation of the left leg   Skin:     General: Skin is warm and dry.      Capillary Refill: Capillary refill takes less than 2 seconds.   Neurological:      General: No focal deficit present.      Mental Status: She is alert and oriented to person, place, and time.   Psychiatric:         Mood and Affect: Mood normal.         Behavior: Behavior normal.           CRANIAL NERVES     CN III, IV, VI   Pupils are equal, round, and reactive to light.       Significant Labs:   CBC:   Recent Labs   Lab 11/08/20 2011   WBC 7.66   HGB 10.4*   HCT 30.3*        CMP:   Recent Labs   Lab 11/08/20 2011   *   K 3.6   CL 97   CO2 21*   GLU 72   BUN 14   CREATININE 0.8   CALCIUM 9.3   PROT 7.9   ALBUMIN 4.3   BILITOT 1.0   ALKPHOS 81   AST 30   ALT 22   ANIONGAP 17*   EGFRNONAA >60.0       Significant Imaging: EKG: I have reviewed all pertinent results/findings within the past 24 hours and my personal findings are:  EKG with 1st degree AV block     X-ray Hip 2 View Left    Result Date: 11/8/2020  Left hip 2 views CLINICAL DATA: Fall injury FINDINGS: 2 views are submitted. Changes of left hip arthroplasty are noted. Prosthesis appears appropriately positioned, with no evidence of loosening. There is an acute vertically oriented fracture at the level of the lesser trochanter, new when compared to November 2017, but presumably involving a nonweightbearing component of the proximal femur. Soft tissues are unremarkable. IMPRESSION: 1. Acute vertically oriented fracture at the base of the lesser trochanter on the left as described. 2. Left hip arthroplasty in appropriate position. Electronically Signed by Yevgeniy Epstein M.D. on 11/8/2020 5:39 PM    X-ray Pelvis Routine Ap    Result Date: 11/8/2020  Pelvis 2 views CLINICAL DATA: Trauma FINDINGS: 2 views are submitted. Changes of bilateral hip arthroplasty are noted. The prostheses appear appropriately positioned. Vertically oriented acute fracture at the base of the lesser trochanter on the left is again noted. No other fractures are identified. Soft tissues are unremarkable. IMPRESSION: 1. Acute fracture at the base of the lesser trochanter on the left as noted previously. 2. Bilateral hip arthroplasties. Electronically Signed by Yevgeniy Epstein M.D. on 11/8/2020 5:41 PM    Ct Hip Without Contrast Left    Result Date: 11/8/2020  CT left hip without contrast CLINICAL DATA: Trauma, left hip pain CMS MANDATED QUALITY DATA - CT RADIATION  436 All CT scans at this facility utilize dose modulation, iterative reconstruction, and/or weight based dosing when appropriate to reduce radiation dose to as low as reasonably achievable. Findings: Thin section axial noncontrast images were obtained, with reformatted imaging in the sagittal and coronal planes. As reported radiographically, there is an acute vertically oriented noncomminuted fracture at the base of the lesser trochanter of the left  femur. The lesser trochanteric fracture fragment is displaced anteriorly from its donor site. The level of the fracture is traversed by the intramedullary portion of the left hip prosthesis, which remains normally positioned, without evidence of hardware loosening. No other fractures are identified. There is no dislocation. IMPRESSION: 1. Acute vertically oriented fracture at the base of the lesser trochanter, with anterior and slight medial displacement of the lesser trochanteric fracture fragment. 2. Left hip arthroplasty without complicating features. Electronically Signed by Yevgeniy Epstein M.D. on 11/8/2020 7:09 PM

## 2020-11-09 NOTE — CONSULTS
Past Medical History:   Diagnosis Date    Cataract     Diabetes mellitus type II     diet controlled    Fracture     left 4th finger  - splinted    Hyperlipidemia     MCTD (mixed connective tissue disease)     Raynaud's disease        Past Surgical History:   Procedure Laterality Date    CARPAL TUNNEL RELEASE      right    CATARACT EXTRACTION W/ INTRAOCULAR LENS  IMPLANT, BILATERAL      EXCISION OF PAROTID GLAND Right 8/1/2019    Procedure: PAROTIDECTOMY;  Surgeon: Abner Maki MD;  Location: [facility];  Service: ENT;  Laterality: Right;    HIP SURGERY Left 6/18/15    JANA    KNEE CARTILAGE SURGERY      right    TOTAL HIP ARTHROPLASTY Right 05/19/2016    JANA       Current Facility-Administered Medications   Medication    acetaminophen tablet 650 mg    calcium chloride 1 g in dextrose 5 % 100 mL IVPB    calcium chloride 1 g in dextrose 5 % 100 mL IVPB    calcium chloride 1 g in dextrose 5 % 100 mL IVPB    cyclobenzaprine tablet 10 mg    HYDROcodone-acetaminophen 5-325 mg per tablet 1 tablet    hydrOXYchloroQUINE tablet 200 mg    magnesium oxide tablet 800 mg    magnesium sulfate 2g in water 50mL IVPB (premix)    magnesium sulfate 2g in water 50mL IVPB (premix)    magnesium sulfate 2g in water 50mL IVPB (premix)    magnesium sulfate in dextrose IVPB (premix) 1 g    melatonin tablet 6 mg    morphine injection 4 mg    NIFEdipine 24 hr tablet 30 mg    ondansetron injection 4 mg    pantoprazole EC tablet 40 mg    polyethylene glycol packet 17 g    potassium chloride 10 mEq in 100 mL IVPB    potassium chloride 10 mEq in 100 mL IVPB    potassium chloride 10 mEq in 100 mL IVPB    potassium chloride 10 mEq in 100 mL IVPB    potassium chloride SA CR tablet 20 mEq    potassium chloride SA CR tablet 20 mEq    potassium chloride SA CR tablet 40 mEq    potassium chloride SA CR tablet 40 mEq    rosuvastatin tablet 10 mg    senna-docusate 8.6-50 mg per tablet 1 tablet    sodium chloride  0.9% bolus 500 mL    sodium chloride 0.9% flush 10 mL    sodium phosphate 15 mmol in dextrose 5 % 250 mL IVPB    sodium phosphate 15 mmol in dextrose 5 % 250 mL IVPB    sodium phosphate 20.01 mmol in dextrose 5 % 250 mL IVPB    sodium phosphate 20.01 mmol in dextrose 5 % 250 mL IVPB    sodium phosphate 30 mmol in dextrose 5 % 250 mL IVPB     Facility-Administered Medications Ordered in Other Encounters   Medication    lactated ringers infusion       Review of patient's allergies indicates:   Allergen Reactions    Pcn [penicillins] Anaphylaxis     Unknown for her- family history with anaphylaxis    Lipitor [atorvastatin]        Family History   Problem Relation Age of Onset    Diabetes Mother     Kidney disease Mother     Aneurysm Mother 73        brain    Hyperlipidemia Mother     Hypertension Sister     Breast cancer Sister     Diabetes Sister        Social History     Socioeconomic History    Marital status:      Spouse name: Not on file    Number of children: Not on file    Years of education: Not on file    Highest education level: Not on file   Occupational History     Employer: [employer]   Social Needs    Financial resource strain: Not on file    Food insecurity     Worry: Not on file     Inability: Not on file    Transportation needs     Medical: Not on file     Non-medical: Not on file   Tobacco Use    Smoking status: Current Every Day Smoker     Packs/day: 0.50    Smokeless tobacco: Never Used   Substance and Sexual Activity    Alcohol use: Yes     Comment: occasional    Drug use: No    Sexual activity: Not on file   Lifestyle    Physical activity     Days per week: Not on file     Minutes per session: Not on file    Stress: Not on file   Relationships    Social connections     Talks on phone: Not on file     Gets together: Not on file     Attends [employer] service: Not on file     Active member of club or organization: Not on file     Attends meetings  of clubs or organizations: Not on file     Relationship status: Not on file   Other Topics Concern    Not on file   Social History Narrative    Not on file       Chief Complaint:   Chief Complaint   Patient presents with    Hip Pain     left hip pain after fall last pm       Consulting Physician: Self, Aaareferral    History of present illness:    This is a 60 y.o. year old female who complains of left hip pain following a fall.  She has a history of bilateral total hip arthroplasties.    Review of Systems:    Constitution:   Denies chills, fever, and sweats.  HENT:   Denies headaches or blurry vision.  Cardiovascular:  Denies chest pain or irregular heart beat.  Respiratory:   Denies cough or shortness of breath.  Gastrointestinal:  Denies abdominal pain, nausea, or vomiting.  Musculoskeletal:   Denies muscle cramps.  Neurological:   Denies dizziness or focal weakness.  Psychiatric/Behavior: Normal mental status.  Hematology/Lymph:  Denies bleeding problem or easy bruising/bleeding.  Skin:    Denies rash or suspicious lesions.    Examination:    Vital Signs:    Vitals:    11/09/20 0455 11/09/20 0701 11/09/20 0858 11/09/20 1146   BP: 128/77 (!) 157/84  121/74   Pulse: 75 88  81   Resp: 16 19 18 19   Temp: 98.6 °F (37 °C) 98.8 °F (37.1 °C)  98.2 °F (36.8 °C)   TempSrc: Oral      SpO2: 99% 98%  97%   Weight:       Height:           Body mass index is 25.73 kg/m².    Constitution:   Well-developed, well nourished patient in no acute distress.  Neurological:   Alert and oriented x 3 and cooperative to examination.     Psychiatric/Behavior: Normal mental status.  Respiratory:   No shortness of breath.  Eyes:    Extraoccular muscles intact  Skin:    No scars, rash or suspicious lesions.    Physical Exam: Left Hip Exam    Gait:   Non weight bearing    Skin  Rash:   None  Scars:   None    Inspection  Erythema:  None  Bruising:  Mild  Swelling:  Mild  Masses:  None  Lymphadenopathy: None    Range of Motion: Not tested due  to fracture    Tenderness:  Diffuse    Strength:  Not tested due to fracture    Stability:  Not tested due to fracture    Sensation:  Intact    Vascular  Pulses:  Palpable distally          Imaging: X-rays and CT images interpreted today personally by me of the left hip shows a fracture of the lesser trochanter with a possible fracture line extending around the greater trochanter.         Assessment: Displaced fracture of lesser trochanter of left femur, initial encounter for closed fracture    Fall    Pain  -     EKG 12-lead; Standing    Other orders  -     X-Ray Pelvis Routine AP; Standing  -     X-Ray Hip 2 View Left; Standing  -     HYDROcodone-acetaminophen 5-325 mg per tablet 1 tablet  -     Cancel: Inpatient consult to Orthopedic Surgery; Standing  -     CT Hip Without Contrast Left; Standing  -     HYDROcodone-acetaminophen (NORCO) 5-325 mg per tablet; Take 1 tablet by mouth every 8 (eight) hours as needed for Pain.  Dispense: 21 tablet; Refill: 0  -     Cancel: Inpatient consult to Internal Medicine; Standing  -     CBC auto differential; Standing  -     Comprehensive metabolic panel; Standing  -     Protime-INR; Standing  -     Urinalysis; Standing  -     Insert Saline lock IV; Standing  -     X-Ray Chest AP Portable; Standing  -     Admit to Inpatient; Standing  -     COVID-19 Rapid Screening; Standing  -     Inpatient consult to Orthopedic Surgery; Standing  -     cyclobenzaprine tablet 10 mg  -     hydrOXYchloroQUINE tablet 200 mg  -     NIFEdipine 24 hr tablet 30 mg  -     pantoprazole EC tablet 40 mg  -     rosuvastatin tablet 10 mg  -     Vital Signs; Standing  -     Progressive Mobility Protocol (mobilize patient to their highest level of functioning at least twice daily); Standing  -     Notify Physician; Standing  -     sodium chloride 0.9% flush 10 mL  -     Insert saline lock; Standing  -     IP VTE LOW RISK PATIENT; Standing  -     Full code; Standing  -     Cancel: Diet Adult Regular (IDDSI  Level 7); Standing  -     Diet NPO; Standing  -     Place sequential compression device; Standing  -     acetaminophen tablet 650 mg  -     HYDROcodone-acetaminophen 5-325 mg per tablet 1 tablet  -     morphine injection 4 mg  -     ondansetron injection 4 mg  -     senna-docusate 8.6-50 mg per tablet 1 tablet  -     polyethylene glycol packet 17 g  -     melatonin tablet 6 mg  -     Basic Metabolic Panel (BMP); Standing  -     Magnesium; Standing  -     CBC with Automated Differential; Standing  -     Inpatient consult to Orthopedics; Standing  -     potassium chloride SA CR tablet 20 mEq  -     potassium chloride SA CR tablet 40 mEq  -     potassium chloride SA CR tablet 20 mEq  -     potassium chloride SA CR tablet 40 mEq  -     potassium chloride 10 mEq in 100 mL IVPB  -     potassium chloride 10 mEq in 100 mL IVPB  -     potassium chloride 10 mEq in 100 mL IVPB  -     potassium chloride 10 mEq in 100 mL IVPB  -     magnesium oxide tablet 800 mg  -     magnesium sulfate 2g in water 50mL IVPB (premix)  -     magnesium sulfate in dextrose IVPB (premix) 1 g  -     magnesium sulfate 2g in water 50mL IVPB (premix)  -     magnesium sulfate 2g in water 50mL IVPB (premix)  -     sodium phosphate 15 mmol in dextrose 5 % 250 mL IVPB  -     sodium phosphate 20.01 mmol in dextrose 5 % 250 mL IVPB  -     sodium phosphate 15 mmol in dextrose 5 % 250 mL IVPB  -     sodium phosphate 30 mmol in dextrose 5 % 250 mL IVPB  -     sodium phosphate 20.01 mmol in dextrose 5 % 250 mL IVPB  -     calcium chloride 1 g in dextrose 5 % 100 mL IVPB  -     calcium chloride 1 g in dextrose 5 % 100 mL IVPB  -     calcium chloride 1 g in dextrose 5 % 100 mL IVPB  -     sodium chloride 0.9% bolus 500 mL        Plan:  We explained to her that the fracture appears to be stable in terms of the total hip arthroplasty.  We will allow her to begin to mobilize with nonweightbearing to toe-touch weight-bearing.  We explained her that all take several  weeks for this to start healing back together.  She decided she would like to go home today.  Will follow her up in the office in 2 weeks.      DISCLAIMER: This note may have been dictated using voice recognition software and may contain grammatical errors.     NOTE: Consult report sent to referring provider via [employer] EMR.

## 2020-11-09 NOTE — HOSPITAL COURSE
Patient was admitted with hip pain after a mechanical fall.  She had history of bilateral total hip arthroplasties.  She was reviewed by orthopedic surgeon and fracture appeared to be stable and discharged in stable condition with nonweightbearing and to follow-up with him in 2 weeks as OP.

## 2020-11-09 NOTE — DISCHARGE SUMMARY
[facility] Medicine  Discharge Summary      Patient Name: Cristin Segal  MRN: [id number]  Admission Date: 11/8/2020  Hospital Length of Stay: 1 days  Discharge Date and Time:  11/09/2020 5:03 PM  Attending Physician: Trevin Lima MD   Discharging Provider: Trevin Lima MD  Primary Care Provider: Angela Gtz MD      HPI:   Ms. Segal is a 60 year old female with a history of mixed connective tissue dz on plaquenil, HTN, HLD, and bilat hip replacements who presents today with left hip pain after a mechanical fall last night. It is severe. It is associated with inability to lift left leg and decreased ability to bear wt on the left leg. She denies dizziness, fever, chills, N/V/D, head trauma or LOC. She had a rt hip replacement in 2016 and left hip replacement in 2017. 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 JOSE L Nesbitt for surgical clearance that she reports was normal. In the ED, she had xrays that revealed Acute fracture at the base of the lesser trochanter on the left which was communicated with Dr. Tee. He recommended a CT of the left hip and after reviewing the images, recommended admission for possible surgical intervention with Dr. MARIANGEL Curran in the AM.     * No surgery found *      Hospital Course:   Patient was admitted with hip pain after a mechanical fall.  She had history of bilateral total hip arthroplasties.  She was reviewed by orthopedic surgeon and fracture appeared to be stable and discharged in stable condition with nonweightbearing and to follow-up with him in 2 weeks as OP.     Consults:   Consults (From admission, onward)        Status Ordering Provider     Inpatient consult to Orthopedic Surgery  Once     Provider:  Will Curran II, MD    Acknowledged JOLYNN PAN     Inpatient consult to Orthopedics  Once     Provider:  Sergo Tee MD    Completed JOLYNN PAN          No new Assessment  "& Plan notes have been filed under this hospital service since the last note was generated.  Service: Hospital Medicine    Final Active Diagnoses:    Diagnosis Date Noted POA    PRINCIPAL PROBLEM:  Displaced fracture of lesser trochanter of left femur, initial encounter for closed fracture [S72.122A] 11/08/2020 Yes    Macrocytic anemia [D53.9] 11/08/2020 Yes    Hip pain, left [M25.552] 11/08/2020 Yes    Essential hypertension [I10] 11/08/2020 Yes    mild Metabolic acidosis with mildly elevated anion gap [E87.2] 11/08/2020 Yes      Problems Resolved During this Admission:       Discharged Condition: good    Disposition: Home or Self Care    Follow Up:   Contact information for follow-up providers     Angela Gtz MD. Schedule an appointment as soon as possible for a visit in 2 days.    Specialty: Family Medicine  Contact information:  [address] [facility] [address]  [phone]             [facility].    Specialty: Emergency Medicine  Why: If symptoms worsen  Contact information:  [address] [facility] [id number] [phone]  Additional information:  1st floor           Sergo Tee MD In 2 weeks.    Specialties: Sports Medicine, Orthopedic Surgery  Contact information:  [address] [place] [address]  [address]  [facility] [address]  [phone]                   Contact information for after-discharge care     Home Medical Care     [facility] .    Service: Home Health Services  Contact information:  [address]  [facility] [address]  [phone]                           Patient Instructions:      WALKER FOR HOME USE     Order Specific Question Answer Comments   Type of Walker: Adult (5'4"-6'6")    With wheels? No    Height: 5' 2" (1.575 m)    Weight: 63.8 kg (140 lb 10.5 oz)    Length of need (1-99 months): 99    Does patient have medical equipment at home? cane, straight    Please check all that apply: Patient's condition impairs ambulation.  "   Please check all that apply: Patient is unable to safely ambulate without equipment.      Referral to Home health   Referral Priority: Routine Referral Type: Home Health Care   Referral Reason: Specialty Services Required   Requested Specialty: Home Health Services   Number of Visits Requested: 1     Diet Cardiac     Notify your health care provider if you experience any of the following:  severe uncontrolled pain     Activity as tolerated       Significant Diagnostic Studies: Labs:   CMP   Recent Labs   Lab 11/08/20 2011 11/09/20  0525   * 136   K 3.6 3.8   CL 97 97   CO2 21* 27   GLU 72 83   BUN 14 13   CREATININE 0.8 0.7   CALCIUM 9.3 9.4   PROT 7.9  --    ALBUMIN 4.3  --    BILITOT 1.0  --    ALKPHOS 81  --    AST 30  --    ALT 22  --    ANIONGAP 17* 12   ESTGFRAFRICA >60.0 >60.0   EGFRNONAA >60.0 >60.0    and CBC   Recent Labs   Lab 11/08/20 2011 11/09/20  0525   WBC 7.66 6.23   HGB 10.4* 10.4*   HCT 30.3* 31.2*    238       Pending Diagnostic Studies:     None         Medications:  Reconciled Home Medications:      Medication List      START taking these medications    HYDROcodone-acetaminophen 5-325 mg per tablet  Commonly known as: NORCO  Take 1 tablet by mouth every 8 (eight) hours as needed for Pain.        CHANGE how you take these medications    cyclobenzaprine 10 MG tablet  Commonly known as: FLEXERIL  Take 1 tablet (10 mg total) by mouth 3 (three) times daily as needed.  What changed: when to take this     KLOR-CON M20 20 MEQ tablet  Generic drug: potassium chloride SA  TAKE 1 TABLET (20 MEQ TOTAL) BY MOUTH ONCE DAILY.  What changed: See the new instructions.        CONTINUE taking these medications    hydrOXYchloroQUINE 200 mg tablet  Commonly known as: PLAQUENIL  Take 1 tablet (200 mg total) by mouth once daily.     NIFEdipine 30 MG Tbsr  Commonly known as: ADALAT CC  Take 1 tablet (30 mg total) by mouth once daily.     omeprazole 20 MG capsule  Commonly known as: PRILOSEC  Take 1  capsule (20 mg total) by mouth once daily.     rosuvastatin 10 MG tablet  Commonly known as: CRESTOR  Take 10 mg by mouth every evening.            Indwelling Lines/Drains at time of discharge:   Lines/Drains/Airways     None                 Time spent on the discharge of patient: 23 minutes  Patient was seen and examined on the date of discharge and determined to be suitable for discharge.         Trevin Lima MD  Department of Hospital Medicine  [facility]

## 2020-11-09 NOTE — PLAN OF CARE
Pt had home health in the past and told this cm she wanted to use them again, pt did not want a list.  Hilda DELGADO was asked for they have changed their name to noemi    11/09/20 7445   Post-Acute Status   Post-Acute Authorization Home Health;E   HME Status Referrals Sent   Home Health Status Referrals Sent

## 2020-11-16 DIAGNOSIS — M25.552 LEFT HIP PAIN: Primary | ICD-10-CM

## 2020-11-17 ENCOUNTER — HOSPITAL ENCOUNTER (OUTPATIENT)
Dept: RADIOLOGY | Facility: HOSPITAL | Age: 60
Discharge: HOME | End: 2020-11-17
Attending: ORTHOPAEDIC SURGERY
Payer: COMMERCIAL

## 2020-11-17 ENCOUNTER — OFFICE VISIT (OUTPATIENT)
Dept: ORTHOPEDICS | Facility: CLINIC | Age: 60
End: 2020-11-17
Payer: COMMERCIAL

## 2020-11-17 ENCOUNTER — OFFICE VISIT (OUTPATIENT)
Dept: FAMILY MEDICINE | Facility: CLINIC | Age: 60
End: 2020-11-17
Payer: COMMERCIAL

## 2020-11-17 ENCOUNTER — EXTERNAL HOME HEALTH (OUTPATIENT)
Dept: HOME HEALTH SERVICES | Facility: HOSPITAL | Age: 60
End: 2020-11-17

## 2020-11-17 ENCOUNTER — PATIENT OUTREACH (OUTPATIENT)
Dept: ADMINISTRATIVE | Facility: OTHER | Age: 60
End: 2020-11-17

## 2020-11-17 VITALS
BODY MASS INDEX: 28.11 KG/M2 | WEIGHT: 152.75 LBS | OXYGEN SATURATION: 96 % | SYSTOLIC BLOOD PRESSURE: 120 MMHG | DIASTOLIC BLOOD PRESSURE: 60 MMHG | HEIGHT: 62 IN | HEART RATE: 96 BPM | RESPIRATION RATE: 16 BRPM | TEMPERATURE: 98 F

## 2020-11-17 VITALS — WEIGHT: 140 LBS | HEIGHT: 62 IN | BODY MASS INDEX: 25.76 KG/M2 | RESPIRATION RATE: 18 BRPM

## 2020-11-17 DIAGNOSIS — E11.8 CONTROLLED TYPE 2 DIABETES MELLITUS WITH COMPLICATION, WITHOUT LONG-TERM CURRENT USE OF INSULIN: ICD-10-CM

## 2020-11-17 DIAGNOSIS — Z23 NEED FOR PNEUMOCOCCAL VACCINATION: ICD-10-CM

## 2020-11-17 DIAGNOSIS — R20.2 PARESTHESIA: ICD-10-CM

## 2020-11-17 DIAGNOSIS — E78.5 HYPERLIPIDEMIA, UNSPECIFIED HYPERLIPIDEMIA TYPE: ICD-10-CM

## 2020-11-17 DIAGNOSIS — M35.1 MCTD (MIXED CONNECTIVE TISSUE DISEASE): ICD-10-CM

## 2020-11-17 DIAGNOSIS — Z12.31 ENCOUNTER FOR SCREENING MAMMOGRAM FOR MALIGNANT NEOPLASM OF BREAST: ICD-10-CM

## 2020-11-17 DIAGNOSIS — Z23 FLU VACCINE NEED: ICD-10-CM

## 2020-11-17 DIAGNOSIS — S72.002D CLOSED FRACTURE OF LEFT HIP WITH ROUTINE HEALING, SUBSEQUENT ENCOUNTER: Primary | ICD-10-CM

## 2020-11-17 DIAGNOSIS — D53.9 MACROCYTIC ANEMIA: ICD-10-CM

## 2020-11-17 DIAGNOSIS — K11.8 PAROTID MASS: ICD-10-CM

## 2020-11-17 DIAGNOSIS — M25.552 LEFT HIP PAIN: ICD-10-CM

## 2020-11-17 DIAGNOSIS — E55.9 VITAMIN D DEFICIENCY: ICD-10-CM

## 2020-11-17 DIAGNOSIS — Z12.11 ENCOUNTER FOR FIT (FECAL IMMUNOCHEMICAL TEST) SCREENING: ICD-10-CM

## 2020-11-17 DIAGNOSIS — I10 ESSENTIAL HYPERTENSION: Primary | ICD-10-CM

## 2020-11-17 DIAGNOSIS — S72.122A DISPLACED FRACTURE OF LESSER TROCHANTER OF LEFT FEMUR, INITIAL ENCOUNTER FOR CLOSED FRACTURE: ICD-10-CM

## 2020-11-17 DIAGNOSIS — K21.9 GASTROESOPHAGEAL REFLUX DISEASE, UNSPECIFIED WHETHER ESOPHAGITIS PRESENT: ICD-10-CM

## 2020-11-17 DIAGNOSIS — Z23 NEED FOR SHINGLES VACCINE: ICD-10-CM

## 2020-11-17 PROCEDURE — 99214 PR OFFICE/OUTPT VISIT, EST, LEVL IV, 30-39 MIN: ICD-10-PCS | Mod: S$GLB,,, | Performed by: FAMILY MEDICINE

## 2020-11-17 PROCEDURE — 1125F PR PAIN SEVERITY QUANTIFIED, PAIN PRESENT: ICD-10-PCS | Mod: S$GLB,,, | Performed by: ORTHOPAEDIC SURGERY

## 2020-11-17 PROCEDURE — 3008F BODY MASS INDEX DOCD: CPT | Mod: CPTII,S$GLB,, | Performed by: FAMILY MEDICINE

## 2020-11-17 PROCEDURE — 3008F PR BODY MASS INDEX (BMI) DOCUMENTED: ICD-10-PCS | Mod: CPTII,S$GLB,, | Performed by: ORTHOPAEDIC SURGERY

## 2020-11-17 PROCEDURE — 90750 HZV VACC RECOMBINANT IM: CPT | Mod: S$GLB,,, | Performed by: FAMILY MEDICINE

## 2020-11-17 PROCEDURE — 99999 PR PBB SHADOW E&M-EST. PATIENT-LVL IV: CPT | Mod: PBBFAC,,, | Performed by: ORTHOPAEDIC SURGERY

## 2020-11-17 PROCEDURE — 3044F PR MOST RECENT HEMOGLOBIN A1C LEVEL <7.0%: ICD-10-PCS | Mod: CPTII,S$GLB,, | Performed by: FAMILY MEDICINE

## 2020-11-17 PROCEDURE — 90472 IMMUNIZATION ADMIN EACH ADD: CPT | Mod: S$GLB,,, | Performed by: FAMILY MEDICINE

## 2020-11-17 PROCEDURE — 3008F PR BODY MASS INDEX (BMI) DOCUMENTED: ICD-10-PCS | Mod: CPTII,S$GLB,, | Performed by: FAMILY MEDICINE

## 2020-11-17 PROCEDURE — 99999 PR PBB SHADOW E&M-EST. PATIENT-LVL IV: ICD-10-PCS | Mod: PBBFAC,,, | Performed by: ORTHOPAEDIC SURGERY

## 2020-11-17 PROCEDURE — 1125F AMNT PAIN NOTED PAIN PRSNT: CPT | Mod: S$GLB,,, | Performed by: ORTHOPAEDIC SURGERY

## 2020-11-17 PROCEDURE — 90732 PPSV23 VACC 2 YRS+ SUBQ/IM: CPT | Mod: S$GLB,,, | Performed by: FAMILY MEDICINE

## 2020-11-17 PROCEDURE — 73502 XR HIP 2 VIEW LEFT: ICD-10-PCS | Mod: 26,LT,, | Performed by: RADIOLOGY

## 2020-11-17 PROCEDURE — 73502 X-RAY EXAM HIP UNI 2-3 VIEWS: CPT | Mod: 26,LT,, | Performed by: RADIOLOGY

## 2020-11-17 PROCEDURE — 3044F HG A1C LEVEL LT 7.0%: CPT | Mod: CPTII,S$GLB,, | Performed by: FAMILY MEDICINE

## 2020-11-17 PROCEDURE — 1125F AMNT PAIN NOTED PAIN PRSNT: CPT | Mod: S$GLB,,, | Performed by: FAMILY MEDICINE

## 2020-11-17 PROCEDURE — 90471 IMMUNIZATION ADMIN: CPT | Mod: S$GLB,,, | Performed by: FAMILY MEDICINE

## 2020-11-17 PROCEDURE — 99999 PR PBB SHADOW E&M-EST. PATIENT-LVL IV: CPT | Mod: PBBFAC,,, | Performed by: FAMILY MEDICINE

## 2020-11-17 PROCEDURE — 73502 X-RAY EXAM HIP UNI 2-3 VIEWS: CPT | Mod: TC,PN,LT

## 2020-11-17 PROCEDURE — 3008F BODY MASS INDEX DOCD: CPT | Mod: CPTII,S$GLB,, | Performed by: ORTHOPAEDIC SURGERY

## 2020-11-17 PROCEDURE — 1125F PR PAIN SEVERITY QUANTIFIED, PAIN PRESENT: ICD-10-PCS | Mod: S$GLB,,, | Performed by: FAMILY MEDICINE

## 2020-11-17 PROCEDURE — 90750 ZOSTER RECOMBINANT VACCINE: ICD-10-PCS | Mod: S$GLB,,, | Performed by: FAMILY MEDICINE

## 2020-11-17 PROCEDURE — 99999 PR PBB SHADOW E&M-EST. PATIENT-LVL IV: ICD-10-PCS | Mod: PBBFAC,,, | Performed by: FAMILY MEDICINE

## 2020-11-17 PROCEDURE — 90732 PNEUMOCOCCAL POLYSACCHARIDE VACCINE 23-VALENT =>2YO SQ IM: ICD-10-PCS | Mod: S$GLB,,, | Performed by: FAMILY MEDICINE

## 2020-11-17 PROCEDURE — 99213 OFFICE O/P EST LOW 20 MIN: CPT | Mod: S$GLB,,, | Performed by: ORTHOPAEDIC SURGERY

## 2020-11-17 PROCEDURE — 90472 ZOSTER RECOMBINANT VACCINE: ICD-10-PCS | Mod: S$GLB,,, | Performed by: FAMILY MEDICINE

## 2020-11-17 PROCEDURE — 99214 OFFICE O/P EST MOD 30 MIN: CPT | Mod: S$GLB,,, | Performed by: FAMILY MEDICINE

## 2020-11-17 PROCEDURE — 99213 PR OFFICE/OUTPT VISIT, EST, LEVL III, 20-29 MIN: ICD-10-PCS | Mod: S$GLB,,, | Performed by: ORTHOPAEDIC SURGERY

## 2020-11-17 PROCEDURE — 90471 PNEUMOCOCCAL POLYSACCHARIDE VACCINE 23-VALENT =>2YO SQ IM: ICD-10-PCS | Mod: S$GLB,,, | Performed by: FAMILY MEDICINE

## 2020-11-17 RX ORDER — HYDROCODONE BITARTRATE AND ACETAMINOPHEN 5; 325 MG/1; MG/1
1 TABLET ORAL EVERY 6 HOURS PRN
Qty: 21 TABLET | Refills: 0 | Status: SHIPPED | OUTPATIENT
Start: 2020-11-17 | End: 2020-12-29 | Stop reason: SDUPTHER

## 2020-11-17 NOTE — PROGRESS NOTES
Past Medical History:   Diagnosis Date    Cataract     Diabetes mellitus type II     diet controlled    Fracture     left 4th finger  - splinted    Hyperlipidemia     MCTD (mixed connective tissue disease)     Raynaud's disease        Past Surgical History:   Procedure Laterality Date    CARPAL TUNNEL RELEASE      right    CATARACT EXTRACTION W/ INTRAOCULAR LENS  IMPLANT, BILATERAL      EXCISION OF PAROTID GLAND Right 8/1/2019    Procedure: PAROTIDECTOMY;  Surgeon: Abner Maki MD;  Location: [facility];  Service: ENT;  Laterality: Right;    HIP SURGERY Left 6/18/15    JANA    KNEE CARTILAGE SURGERY      right    TOTAL HIP ARTHROPLASTY Right 05/19/2016    JANA       Current Outpatient Medications   Medication Sig    cyclobenzaprine (FLEXERIL) 10 MG tablet Take 1 tablet (10 mg total) by mouth 3 (three) times daily as needed. (Patient taking differently: Take 10 mg by mouth once daily. )    hydrOXYchloroQUINE (PLAQUENIL) 200 mg tablet Take 1 tablet (200 mg total) by mouth once daily.    KLOR-CON M20 20 mEq tablet TAKE 1 TABLET (20 MEQ TOTAL) BY MOUTH ONCE DAILY. (Patient taking differently: Take 20 mEq by mouth once daily. )    NIFEdipine (ADALAT CC) 30 MG TbSR Take 1 tablet (30 mg total) by mouth once daily.    omeprazole (PRILOSEC) 20 MG capsule Take 1 capsule (20 mg total) by mouth once daily.    rosuvastatin (CRESTOR) 10 MG tablet Take 10 mg by mouth every evening.     HYDROcodone-acetaminophen (NORCO) 5-325 mg per tablet Take 1 tablet by mouth every 6 (six) hours as needed for Pain.     No current facility-administered medications for this visit.      Facility-Administered Medications Ordered in Other Visits   Medication    lactated ringers infusion       Review of patient's allergies indicates:   Allergen Reactions    Pcn [penicillins] Anaphylaxis     Unknown for her- family history with anaphylaxis    Lipitor [atorvastatin]        Family History   Problem Relation Age of Onset    Diabetes  Mother     Kidney disease Mother     Aneurysm Mother 73        brain    Hyperlipidemia Mother     Hypertension Sister     Breast cancer Sister     Diabetes Sister        Social History     Socioeconomic History    Marital status:      Spouse name: Not on file    Number of children: Not on file    Years of education: Not on file    Highest education level: Not on file   Occupational History     Employer: [employer]   Social Needs    Financial resource strain: Not on file    Food insecurity     Worry: Not on file     Inability: Not on file    Transportation needs     Medical: Not on file     Non-medical: Not on file   Tobacco Use    Smoking status: Current Every Day Smoker     Packs/day: 0.50    Smokeless tobacco: Never Used   Substance and Sexual Activity    Alcohol use: Yes     Comment: occasional    Drug use: No    Sexual activity: Not on file   Lifestyle    Physical activity     Days per week: Not on file     Minutes per session: Not on file    Stress: Not on file   Relationships    Social connections     Talks on phone: Not on file     Gets together: Not on file     Attends Confucianism service: Not on file     Active member of club or organization: Not on file     Attends meetings of clubs or organizations: Not on file     Relationship status: Not on file   Other Topics Concern    Not on file   Social History Narrative    Not on file       Chief Complaint:   Chief Complaint   Patient presents with    Hip Injury     left femur fracture DOI 11/7/20       Consulting Physician: No ref. provider found    History of present illness:    This is a 60 y.o. year old female who complains of left hip pain following a fall on 11/07/2020.  She was seen in the hospital.  She has a history of bilateral total hip replacements.  She puts her pain today is a 2/10 is able to ambulate with a walker.    Review of Systems:    Constitution:   Denies chills, fever, and sweats.  HENT:   Denies  "headaches or blurry vision.  Cardiovascular:  Denies chest pain or irregular heart beat.  Respiratory:   Denies cough or shortness of breath.  Gastrointestinal:  Denies abdominal pain, nausea, or vomiting.  Musculoskeletal:   Denies muscle cramps.  Neurological:   Denies dizziness or focal weakness.  Psychiatric/Behavior: Normal mental status.  Hematology/Lymph:  Denies bleeding problem or easy bruising/bleeding.  Skin:    Denies rash or suspicious lesions.    Examination:    Vital Signs:    Vitals:    11/17/20 0959   Resp: 18   Weight: 63.5 kg (140 lb)   Height: 5' 2" (1.575 m)   PainSc:   2       Body mass index is 25.61 kg/m².    Constitution:   Well-developed, well nourished patient in no acute distress.  Neurological:   Alert and oriented x 3 and cooperative to examination.     Psychiatric/Behavior: Normal mental status.  Respiratory:   No shortness of breath.  Eyes:    Extraoccular muscles intact  Skin:    No scars, rash or suspicious lesions.    Physical Exam: Left Hip Exam    Gait:   Normal    Skin  Rash:   None  Scars:   None    Inspection  Erythema:  None  Bruising:  None  Swelling:  None    Range of Motion  Improving    mild pain with hip range of motion.    Strength:  4-4+/5    Stability:  Normal    Sensation:  Intact    Vascular  Pulses:  Palpable distally          Imaging: X-rays ordered and images interpreted today personally by me of left hip show lesser trochanter fracture with a stable total hip arthroplasty.         Assessment: Closed fracture of left hip with routine healing, subsequent encounter        Plan:  We will have her continue to do home physical therapy and allowed toe-touch weight-bearing.  I will see her back in 2 weeks time at which point we hopefully advanced her weight-bearing status.  We have refilled her Norco today.  We have also given her a handicap parking permit.  We will plan to have her return to work hopefully on January 1.      DISCLAIMER: This note may have been dictated " using voice recognition software and may contain grammatical errors.     NOTE: Consult report sent to referring provider via [employer] EMR.

## 2020-11-17 NOTE — PROGRESS NOTES
Chart was reviewed for overdue Proactive [facility] Encounters (REA)  topics  Updates were requested from care everywhere  Health Maintenance has been updated  [place] immunization registry triggered

## 2020-11-17 NOTE — PROGRESS NOTES
Subjective:       Patient ID: Cristin Segal is a 60 y.o. female.    Chief Complaint: Transitional Care (F/U [facility])    HPI  Review of Systems   Constitutional: Negative for fatigue and unexpected weight change.   Respiratory: Negative for chest tightness and shortness of breath.    Cardiovascular: Negative for chest pain, palpitations and leg swelling.   Gastrointestinal: Negative for abdominal pain.   Musculoskeletal: Positive for arthralgias.   Neurological: Negative for dizziness, syncope, light-headedness and headaches.       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    Admitted [facility] 11/8/20 due to hip pain after fall.  Has h/o nil hip replacement.  xrays that revealed Acute fracture at the base of the lesser trochanter on the left . Fracture was stable discharged with non weight bearing. Has been monitored by Dr. Tee and is now ambulating with walker after PT    Had ED visit 3/20 [facility] for fall resulting in left ankle fracture    Had ED visit 9/2/20 [place] for hitting head on freezer door then fell backwards striking back of head    DEXA 8/20 -nl MBD    History:   warthins tumor tumor right superficial parotidectomy surgery   ENT Dr. Maki 8/19        Rheum Dr. GRACE Guzman MCTD on plaquenil.  Takes adalat for raynauds.     HTN controlled with diet       Elevated LFTs - no fatty liver  or enlargement on either CT ab 5/15 nor u/s and 1/19. Hepatitis panel normal     Type 2 DM- controlled without meds.  Last a1c 7/20 5.3.   LAst eye exam Dr. Cat 1/19     GYn PAP Dr. Elmore < 3 years     Macrocytosis- b12 and folate normal 1/19       Objective:      Physical Exam  Vitals signs and nursing note reviewed.   Constitutional:       Appearance: She is well-developed.   Cardiovascular:      Rate and Rhythm: Normal rate and regular rhythm.      Heart sounds: Normal heart sounds.   Pulmonary:      Effort: Pulmonary effort is normal.      Breath sounds: Normal breath sounds.   Skin:     General: Skin is warm and dry.   Neurological:      Mental Status: She is alert and oriented to person, place, and time.         Assessment:       1. Essential hypertension    2. Hyperlipidemia, unspecified hyperlipidemia type    3. MCTD (mixed connective tissue disease)    4. Macrocytic anemia    5. Gastroesophageal reflux disease, unspecified whether esophagitis present    6. Displaced fracture of lesser trochanter of left femur, initial encounter for closed fracture    7. 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 Flu vaccine need    9. Need for pneumococcal vaccination    10. Encounter for FIT (fecal immunochemical test) screening    11. Encounter for screening mammogram for malignant neoplasm of breast    12. Need for shingles vaccine    13. Controlled type 2 diabetes mellitus with complication, without long-term current use of insulin    14. Paresthesia    15. Vitamin D deficiency        Plan:       T  1. Essential hypertension  Controlled on current medications.  Continue current medications.      2. Hyperlipidemia, unspecified hyperlipidemia type  Stable condition.  Continue current medications.  Will adjust based on lab findings or if condition changes.    - CBC Auto Differential; Future  - Comprehensive Metabolic Panel; Future  - Lipid Panel; Future    3. MCTD (mixed connective tissue disease)  Cont rheum care and  mgmt    4. Macrocytic anemia  Screen and treat as indicated:    - Vitamin B12; Future  - Folate; Future    5. Gastroesophageal reflux disease, unspecified whether esophagitis present  Counseled patient on prevention of reflux with changes in diet and behavior.  I recommended avoidance of greasy and spicy foods, caffeine and eating within 3 hours of bedtime.  I counseled the patient to avoid eating large meals and instead eating more frequent small meals.  I also recommended weight loss and elevation of the head of the bed by 6 inches.  If symptoms persist after these changes medication may be needed to control GERD.        6. Displaced fracture of lesser trochanter of left femur, initial encounter for closed fracture  Cont ortho care    7. 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 Flu vaccine need  declined    9. Need for pneumococcal vaccination  Immunize today.  Counseled patient on risks, benefits and side effects.  Patient elected to proceed with vaccination.    - (In Office Administered) Pneumococcal Polysaccharide Vaccine (23 Valent) (SQ/IM)    10. Encounter for FIT (fecal immunochemical test) screening  Patient declines a colonoscopy after discussing benefits and risks. Patient is willing to do FOBT x3  or FIT test at home understanding it is 4 times less effective at detecting cancers/masses/polyps than a screening colonoscopy    - Fecal Immunochemical Test (iFOBT); Future    11. Encounter for screening mammogram for malignant neoplasm of breast  Screen and treat as indicated:    - Mammo Digital Screening Bilat; Future    12. Need for shingles vaccine  Screen and treat as indicated:    - (In Office Administered) Zoster Recombinant Vaccine    13. Controlled type 2 diabetes mellitus with complication, without long-term current use of insulin  Controlled on current medications.  Continue current medications.    - Microalbumin/Creatinine Ratio, Urine; Future  - Hemoglobin A1C; Future    14.  Paresthesia  Screen and treat as indicated:    - TSH; Future    15. Vitamin D deficiency  Screen and treat as indicated:    - Vitamin D; Future    Time spent with patient: 20 minutes    Patient with be reevaluated in 3 months or sooner prn    Greater than 50% of this visit was spent counseling as described in above documentation:Yes

## 2020-11-17 NOTE — PROGRESS NOTES
ID patient by name and date of birth.  Allergies confirmed.  Pneumonia 23 and ShingRix given as per orders , using aseptic technique.  Patient tolerated well.  Information sheet reviewed and given to patient.

## 2020-11-24 DIAGNOSIS — M25.552 LEFT HIP PAIN: Primary | ICD-10-CM

## 2020-11-29 ENCOUNTER — DOCUMENT SCAN (OUTPATIENT)
Dept: HOME HEALTH SERVICES | Facility: HOSPITAL | Age: 60
End: 2020-11-29
Payer: COMMERCIAL

## 2020-12-01 ENCOUNTER — HOSPITAL ENCOUNTER (OUTPATIENT)
Dept: RADIOLOGY | Facility: HOSPITAL | Age: 60
Discharge: HOME | End: 2020-12-01
Attending: ORTHOPAEDIC SURGERY
Payer: COMMERCIAL

## 2020-12-01 ENCOUNTER — OFFICE VISIT (OUTPATIENT)
Dept: ORTHOPEDICS | Facility: CLINIC | Age: 60
End: 2020-12-01
Payer: COMMERCIAL

## 2020-12-01 VITALS — RESPIRATION RATE: 16 BRPM | BODY MASS INDEX: 27.97 KG/M2 | HEIGHT: 62 IN | WEIGHT: 152 LBS

## 2020-12-01 DIAGNOSIS — S72.002D CLOSED FRACTURE OF LEFT HIP WITH ROUTINE HEALING, SUBSEQUENT ENCOUNTER: Primary | ICD-10-CM

## 2020-12-01 DIAGNOSIS — M25.572 LEFT ANKLE PAIN, UNSPECIFIED CHRONICITY: Primary | ICD-10-CM

## 2020-12-01 DIAGNOSIS — M25.552 LEFT HIP PAIN: ICD-10-CM

## 2020-12-01 DIAGNOSIS — M25.572 LEFT ANKLE PAIN, UNSPECIFIED CHRONICITY: ICD-10-CM

## 2020-12-01 PROCEDURE — 3008F BODY MASS INDEX DOCD: CPT | Mod: CPTII,S$GLB,, | Performed by: ORTHOPAEDIC SURGERY

## 2020-12-01 PROCEDURE — 3008F PR BODY MASS INDEX (BMI) DOCUMENTED: ICD-10-PCS | Mod: CPTII,S$GLB,, | Performed by: ORTHOPAEDIC SURGERY

## 2020-12-01 PROCEDURE — 73610 X-RAY EXAM OF ANKLE: CPT | Mod: 26,LT,, | Performed by: RADIOLOGY

## 2020-12-01 PROCEDURE — 1125F PR PAIN SEVERITY QUANTIFIED, PAIN PRESENT: ICD-10-PCS | Mod: S$GLB,,, | Performed by: ORTHOPAEDIC SURGERY

## 2020-12-01 PROCEDURE — 1125F AMNT PAIN NOTED PAIN PRSNT: CPT | Mod: S$GLB,,, | Performed by: ORTHOPAEDIC SURGERY

## 2020-12-01 PROCEDURE — 99999 PR PBB SHADOW E&M-EST. PATIENT-LVL III: CPT | Mod: PBBFAC,,, | Performed by: ORTHOPAEDIC SURGERY

## 2020-12-01 PROCEDURE — 73502 X-RAY EXAM HIP UNI 2-3 VIEWS: CPT | Mod: 26,LT,, | Performed by: RADIOLOGY

## 2020-12-01 PROCEDURE — 99999 PR PBB SHADOW E&M-EST. PATIENT-LVL III: ICD-10-PCS | Mod: PBBFAC,,, | Performed by: ORTHOPAEDIC SURGERY

## 2020-12-01 PROCEDURE — 73502 X-RAY EXAM HIP UNI 2-3 VIEWS: CPT | Mod: TC,PN,LT

## 2020-12-01 PROCEDURE — 99213 OFFICE O/P EST LOW 20 MIN: CPT | Mod: S$GLB,,, | Performed by: ORTHOPAEDIC SURGERY

## 2020-12-01 PROCEDURE — 73610 XR ANKLE COMPLETE 3 VIEW LEFT: ICD-10-PCS | Mod: 26,LT,, | Performed by: RADIOLOGY

## 2020-12-01 PROCEDURE — 99213 PR OFFICE/OUTPT VISIT, EST, LEVL III, 20-29 MIN: ICD-10-PCS | Mod: S$GLB,,, | Performed by: ORTHOPAEDIC SURGERY

## 2020-12-01 PROCEDURE — 73502 XR HIP 2 VIEW LEFT: ICD-10-PCS | Mod: 26,LT,, | Performed by: RADIOLOGY

## 2020-12-01 PROCEDURE — 73610 X-RAY EXAM OF ANKLE: CPT | Mod: TC,PN,LT

## 2020-12-01 NOTE — PROGRESS NOTES
Past Medical History:   Diagnosis Date    Cataract     Diabetes mellitus type II     diet controlled    Fracture     left 4th finger  - splinted    Hyperlipidemia     MCTD (mixed connective tissue disease)     Raynaud's disease        Past Surgical History:   Procedure Laterality Date    CARPAL TUNNEL RELEASE      right    CATARACT EXTRACTION W/ INTRAOCULAR LENS  IMPLANT, BILATERAL      EXCISION OF PAROTID GLAND Right 8/1/2019    Procedure: PAROTIDECTOMY;  Surgeon: Abner Maki MD;  [facility];  Service: ENT;  Laterality: Right;    HIP SURGERY Left 6/18/15    JANA    KNEE CARTILAGE SURGERY      right    TOTAL HIP ARTHROPLASTY Right 05/19/2016    JANA       Current Outpatient Medications   Medication Sig    cyclobenzaprine (FLEXERIL) 10 MG tablet Take 1 tablet (10 mg total) by mouth 3 (three) times daily as needed. (Patient taking differently: Take 10 mg by mouth once daily. )    HYDROcodone-acetaminophen (NORCO) 5-325 mg per tablet Take 1 tablet by mouth every 6 (six) hours as needed for Pain.    hydrOXYchloroQUINE (PLAQUENIL) 200 mg tablet Take 1 tablet (200 mg total) by mouth once daily.    KLOR-CON M20 20 mEq tablet TAKE 1 TABLET (20 MEQ TOTAL) BY MOUTH ONCE DAILY. (Patient taking differently: Take 20 mEq by mouth once daily. )    NIFEdipine (ADALAT CC) 30 MG TbSR Take 1 tablet (30 mg total) by mouth once daily.    omeprazole (PRILOSEC) 20 MG capsule Take 1 capsule (20 mg total) by mouth once daily.    rosuvastatin (CRESTOR) 10 MG tablet Take 10 mg by mouth every evening.      No current facility-administered medications for this visit.      Facility-Administered Medications Ordered in Other Visits   Medication    lactated ringers infusion       Review of patient's allergies indicates:   Allergen Reactions    Pcn [penicillins] Anaphylaxis     Unknown for her- family history with anaphylaxis    Lipitor [atorvastatin]        Family History   Problem Relation Age of Onset    Diabetes  Mother     Kidney disease Mother     Aneurysm Mother 73        brain    Hyperlipidemia Mother     Hypertension Sister     Breast cancer Sister     Diabetes Sister        Social History     Socioeconomic History    Marital status:      Spouse name: Not on file    Number of children: Not on file    Years of education: Not on file    Highest education level: Not on file   Occupational History     Employer: [employer]   Social Needs    Financial resource strain: Not on file    Food insecurity     Worry: Not on file     Inability: Not on file    Transportation needs     Medical: Not on file     Non-medical: Not on file   Tobacco Use    Smoking status: Current Every Day Smoker     Packs/day: 0.50    Smokeless tobacco: Never Used   Substance and Sexual Activity    Alcohol use: Yes     Comment: occasional    Drug use: No    Sexual activity: Not on file   Lifestyle    Physical activity     Days per week: Not on file     Minutes per session: Not on file    Stress: Not on file   Relationships    Social connections     Talks on phone: Not on file     Gets together: Not on file     Attends Rastafarian service: Not on file     Active member of club or organization: Not on file     Attends meetings of clubs or organizations: Not on file     Relationship status: Not on file   Other Topics Concern    Not on file   Social History Narrative    Not on file       Chief Complaint:   Chief Complaint   Patient presents with    Left Hip - Pain, Injury       Consulting Physician: No ref. provider found    History of present illness:    This is a 60 y.o. year old female who complains of left hip pain following a fall on 11/07/2020.  She has a history of bilateral total hip replacements.  She puts her pain today at 2/10 and is able to ambulate with a walker.    Review of Systems:    Constitution:   Denies chills, fever, and sweats.  HENT:   Denies headaches or blurry vision.  Cardiovascular:  Denies  "chest pain or irregular heart beat.  Respiratory:   Denies cough or shortness of breath.  Gastrointestinal:  Denies abdominal pain, nausea, or vomiting.  Musculoskeletal:   Denies muscle cramps.  Neurological:   Denies dizziness or focal weakness.  Psychiatric/Behavior: Normal mental status.  Hematology/Lymph:  Denies bleeding problem or easy bruising/bleeding.  Skin:    Denies rash or suspicious lesions.    Examination:    Vital Signs:    Vitals:    12/01/20 1327   Resp: 16   Weight: 68.9 kg (152 lb)   Height: 5' 2" (1.575 m)   PainSc:   2       Body mass index is 27.8 kg/m².    Constitution:   Well-developed, well nourished patient in no acute distress.  Neurological:   Alert and oriented x 3 and cooperative to examination.     Psychiatric/Behavior: Normal mental status.  Respiratory:   No shortness of breath.  Eyes:    Extraoccular muscles intact  Skin:    No scars, rash or suspicious lesions.    Physical Exam: Left Hip Exam    Gait:   Normal    Skin  Rash:   None  Scars:   None    Inspection  Erythema:  None  Bruising:  None  Swelling:  None    Range of Motion  Improving    mild pain with hip range of motion.    Strength:  4-4+/5    Stability:  Normal    Sensation:  Intact    Vascular  Pulses:  Palpable distally          Imaging: X-rays ordered and images interpreted today personally by me of left hip show lesser trochanter fracture with a stable total hip arthroplasty.         Assessment: Closed fracture of left hip with routine healing, subsequent encounter        Plan:  We will have her continue to do home physical therapy for another week and allow weight-bearing as tolerated.  Next Monday she will switch to outpatient physical therapy.  We will see her back in 4 weeks.      DISCLAIMER: This note may have been dictated using voice recognition software and may contain grammatical errors.     NOTE: Consult report sent to referring provider via EP[employer] EMR.      "

## 2020-12-09 ENCOUNTER — HOSPITAL ENCOUNTER (OUTPATIENT)
Dept: RADIOLOGY | Facility: HOSPITAL | Age: 60
Discharge: HOME | End: 2020-12-09
Attending: FAMILY MEDICINE
Payer: COMMERCIAL

## 2020-12-09 DIAGNOSIS — Z12.31 ENCOUNTER FOR SCREENING MAMMOGRAM FOR MALIGNANT NEOPLASM OF BREAST: ICD-10-CM

## 2020-12-09 PROCEDURE — 77063 MAMMO DIGITAL SCREENING BILAT WITH TOMO: ICD-10-PCS | Mod: 26,,, | Performed by: RADIOLOGY

## 2020-12-09 PROCEDURE — 77067 SCR MAMMO BI INCL CAD: CPT | Mod: TC

## 2020-12-09 PROCEDURE — 77063 BREAST TOMOSYNTHESIS BI: CPT | Mod: 26,,, | Performed by: RADIOLOGY

## 2020-12-09 PROCEDURE — 77067 SCR MAMMO BI INCL CAD: CPT | Mod: 26,,, | Performed by: RADIOLOGY

## 2020-12-09 PROCEDURE — 77067 MAMMO DIGITAL SCREENING BILAT WITH TOMO: ICD-10-PCS | Mod: 26,,, | Performed by: RADIOLOGY

## 2020-12-11 ENCOUNTER — LAB VISIT (OUTPATIENT)
Dept: LAB | Facility: HOSPITAL | Age: 60
End: 2020-12-11
Attending: FAMILY MEDICINE
Payer: COMMERCIAL

## 2020-12-11 ENCOUNTER — PATIENT MESSAGE (OUTPATIENT)
Dept: OTHER | Facility: OTHER | Age: 60
End: 2020-12-11

## 2020-12-11 DIAGNOSIS — Z12.11 ENCOUNTER FOR FIT (FECAL IMMUNOCHEMICAL TEST) SCREENING: ICD-10-CM

## 2020-12-11 PROCEDURE — 82274 ASSAY TEST FOR BLOOD FECAL: CPT

## 2020-12-14 ENCOUNTER — DOCUMENT SCAN (OUTPATIENT)
Dept: HOME HEALTH SERVICES | Facility: HOSPITAL | Age: 60
End: 2020-12-14
Payer: COMMERCIAL

## 2020-12-16 ENCOUNTER — PATIENT OUTREACH (OUTPATIENT)
Dept: ADMINISTRATIVE | Facility: HOSPITAL | Age: 60
End: 2020-12-16

## 2020-12-17 ENCOUNTER — OFFICE VISIT (OUTPATIENT)
Dept: RHEUMATOLOGY | Facility: CLINIC | Age: 60
End: 2020-12-17
Payer: COMMERCIAL

## 2020-12-17 VITALS
BODY MASS INDEX: 26.23 KG/M2 | DIASTOLIC BLOOD PRESSURE: 85 MMHG | WEIGHT: 143.38 LBS | SYSTOLIC BLOOD PRESSURE: 139 MMHG | TEMPERATURE: 97 F

## 2020-12-17 DIAGNOSIS — M35.1 MIXED CONNECTIVE TISSUE DISEASE: Primary | ICD-10-CM

## 2020-12-17 DIAGNOSIS — M25.552 LEFT HIP PAIN: Primary | ICD-10-CM

## 2020-12-17 PROCEDURE — 1125F AMNT PAIN NOTED PAIN PRSNT: CPT | Mod: S$GLB,,, | Performed by: INTERNAL MEDICINE

## 2020-12-17 PROCEDURE — 3008F BODY MASS INDEX DOCD: CPT | Mod: S$GLB,,, | Performed by: INTERNAL MEDICINE

## 2020-12-17 PROCEDURE — 1125F PR PAIN SEVERITY QUANTIFIED, PAIN PRESENT: ICD-10-PCS | Mod: S$GLB,,, | Performed by: INTERNAL MEDICINE

## 2020-12-17 PROCEDURE — 99213 OFFICE O/P EST LOW 20 MIN: CPT | Mod: S$GLB,,, | Performed by: INTERNAL MEDICINE

## 2020-12-17 PROCEDURE — 3008F PR BODY MASS INDEX (BMI) DOCUMENTED: ICD-10-PCS | Mod: S$GLB,,, | Performed by: INTERNAL MEDICINE

## 2020-12-17 PROCEDURE — 99213 PR OFFICE/OUTPT VISIT, EST, LEVL III, 20-29 MIN: ICD-10-PCS | Mod: S$GLB,,, | Performed by: INTERNAL MEDICINE

## 2020-12-17 RX ORDER — NIFEDIPINE 30 MG/1
30 TABLET, FILM COATED, EXTENDED RELEASE ORAL DAILY
Qty: 90 TABLET | Refills: 2 | Status: SHIPPED | OUTPATIENT
Start: 2020-12-17 | End: 2021-09-27 | Stop reason: SDUPTHER

## 2020-12-17 RX ORDER — HYDROXYCHLOROQUINE SULFATE 200 MG/1
200 TABLET, FILM COATED ORAL DAILY
Qty: 90 TABLET | Refills: 1 | Status: SHIPPED | OUTPATIENT
Start: 2020-12-17 | End: 2021-09-27 | Stop reason: SDUPTHER

## 2020-12-17 RX ORDER — CYCLOBENZAPRINE HCL 10 MG
10 TABLET ORAL DAILY
Qty: 30 TABLET | Refills: 3 | Status: SHIPPED | OUTPATIENT
Start: 2020-12-17 | End: 2021-06-22

## 2020-12-17 NOTE — PROGRESS NOTES
[facility] RHEUMATOLOGY            PROGRESS NOTE      Subjective:       Patient ID:   NAME: Cristin Segal : 1960     60 y.o. female    Referring Doc: No ref. provider found  Other Physicians:    Chief Complaint:  Mixed connective tissue disease      History of Present Illness:     Patient returns today for a regularly scheduled follow-up visit for mixed connective tissue disease      The patient is doing well except some pain due to hip fracture( lesser trochanter)1 month ago.  No fevers or change in respiratory complaints.  No rashes.  No mucosal ulcerations.  No muscle weakness.            ROS:   GEN:  No  fever, night sweats . weight is stable   + some fatigue  SKIN: no rashes, no bruising, no ulcerations, + occ Raynaud's  HEENT: no HA's, No visual changes, no mucosal ulcers, no sicca symptoms,  CV:   no CP, SOB, PND, SANTILLAN, no orthopnea, no palpitations  PULM: normal with no SOB, cough, hemoptysis, sputum or pleuritic pain  GI:  no abdominal pain, nausea, vomiting, constipation, diarrhea, melanotic stools, BRBPR, hematemesis, no dysphagia  :   no dysuria  NEURO: no paresthesias, headaches, visual disturbances, muscle weakness  MUSCULOSKELETAL:no joint swelling, prolonged AM stiffness, no back pain, no muscle pain  Allergies:  Review of patient's allergies indicates:   Allergen Reactions    Pcn [penicillins] Anaphylaxis     Unknown for her- family history with anaphylaxis    Lipitor [atorvastatin]        Medications:    Current Outpatient Medications:     cyclobenzaprine (FLEXERIL) 10 MG tablet, Take 1 tablet (10 mg total) by mouth once daily., Disp: 30 tablet, Rfl: 3    HYDROcodone-acetaminophen (NORCO) 5-325 mg per tablet, Take 1 tablet by mouth every 6 (six) hours as needed for Pain., Disp: 21 tablet, Rfl: 0    hydrOXYchloroQUINE (PLAQUENIL) 200 mg tablet, Take 1 tablet (200 mg total) by mouth once daily., Disp: 90 tablet, Rfl: 1    KLOR-CON M20 20 mEq tablet, TAKE 1 TABLET (20 MEQ TOTAL)  BY MOUTH ONCE DAILY. (Patient taking differently: Take 20 mEq by mouth once daily. ), Disp: 90 tablet, Rfl: 3    NIFEdipine (ADALAT CC) 30 MG TbSR, Take 1 tablet (30 mg total) by mouth once daily., Disp: 90 tablet, Rfl: 2    omeprazole (PRILOSEC) 20 MG capsule, Take 1 capsule (20 mg total) by mouth once daily., Disp: 90 capsule, Rfl: 2    rosuvastatin (CRESTOR) 10 MG tablet, Take 10 mg by mouth every evening. , Disp: , Rfl:   No current facility-administered medications for this visit.     Facility-Administered Medications Ordered in Other Visits:     lactated ringers infusion, , Intravenous, Continuous, Ean Patricio MD, Last Rate: 100 mL/hr at 08/01/19 0745    PMHx/PSHx Updates:        Objective:     Vitals:  Blood pressure 139/85, temperature 97.2 °F (36.2 °C), weight 65 kg (143 lb 6.4 oz).    Physical Examination:   GEN: no apparent distress, comfortable; AAOx3  SKIN: no rashes,no ulceration, no Raynaud's, no petechiae, no SQ nodules,  HEAD: normal  EYES: no pallor, no icterus,  NECK: no masses, thyroid normal, trachea midline, no LAD/LN's, supple  CV: RRR with no murmur; l S1 and S2 reg. ,no gallop no rubs,   CHEST: Normal respiratory effort; CTAB; normal breath sounds; no wheeze or crackles  MUSC/Skeletal: ROM normal; no crepitus; joints without synovitis,  no deformities  No joint swelling or tenderness of PIP, MCP, wrist, elbow, shoulder, or knee joints  EXTREM: no clubbing, cyanosis, no edema,normal  pulses   NEURO: grossly intact; motor WNL; AAOx3;  PSYCH: normal mood, affect and behavior  LYMPH: normal cervical, supraclavicular          Labs:   Lab Results   Component Value Date    WBC 7.00 12/09/2020    HGB 11.7 (L) 12/09/2020    HCT 35.5 (L) 12/09/2020     (H) 12/09/2020     12/09/2020    CMP  @LASTLAB(NA,K,CL,CO2,GLU,BUN,Creatinine,Calcium,PROT,Albumin,Bilitot,Alkphos,AST,ALT,CRP,ESR,RF,CCP,BROOK,SSA,CPK,uric acid) )@  I have reviewed all available lab results and radiology  reports.    Radiology/Diagnostic Studies:    Eye exam is up-to-date    Assessment/Plan:   (1) 60 y.o. female with diagnosis of mixed connective tissue diisease.  This is a stable;   reviewed recent blood work: CBC CMP and urinalysis are okay  DEXA bone density from last year without osteoporosis or significant osteopenia          Discussion:     I have explained all of the above in detail and the patient understands all of the current recommendation(s). I have answered all questions to the best of my ability and to their complete satisfaction.       The patient is to continue with the current management plan         RTC in   4 months or before if needed      Electronically signed by Erica Guzman MD

## 2020-12-21 ENCOUNTER — PATIENT OUTREACH (OUTPATIENT)
Dept: ADMINISTRATIVE | Facility: HOSPITAL | Age: 60
End: 2020-12-21

## 2020-12-21 NOTE — PROGRESS NOTES
EYE EXAM gap report review. HM, chart, care everywhere, media reviewed.      Records requested: DR. BROWN

## 2020-12-21 NOTE — LETTER
AUTHORIZATION FOR RELEASE OF   CONFIDENTIAL INFORMATION    Dear DR. BROWN,    We are seeing Cristin Segal, date of birth 1960, in the clinic at [facility]. Angela Gtz MD is the patient's PCP. Cristin Segal has an outstanding lab/procedure at the time we reviewed her chart. In order to help keep her health information updated, she has authorized us to request the following medical record(s):        (  )  MAMMOGRAM                                      (  )  COLONOSCOPY      (  )  PAP SMEAR                                          (  )  OUTSIDE LAB RESULTS     (  )  DEXA SCAN                                          ( X )  EYE EXAM            (  )  FOOT EXAM                                          (  )  ENTIRE RECORD     (  )  OUTSIDE IMMUNIZATIONS                 (  )  _______________         Please fax records to [facility], Amy L Hammons, MD, [phone]    Laura Alfaro LPN  Clinical Care Coordinator  [address] [address]  P: [phone]  F: [phone]            Patient Name: Cristin Segal  : 1960  Patient Phone #: [phone]

## 2020-12-22 LAB — HEMOCCULT STL QL IA: NEGATIVE

## 2020-12-27 ENCOUNTER — PATIENT OUTREACH (OUTPATIENT)
Dept: ADMINISTRATIVE | Facility: OTHER | Age: 60
End: 2020-12-27

## 2020-12-27 NOTE — PROGRESS NOTES
Chart was reviewed for overdue Proactive [facility] Encounters (REA)  topics  Updates were requested from care everywhere  Health Maintenance was unable to be updated  [place] immunization registry triggered

## 2020-12-29 ENCOUNTER — HOSPITAL ENCOUNTER (OUTPATIENT)
Dept: RADIOLOGY | Facility: HOSPITAL | Age: 60
Discharge: HOME | End: 2020-12-29
Attending: ORTHOPAEDIC SURGERY
Payer: COMMERCIAL

## 2020-12-29 ENCOUNTER — OFFICE VISIT (OUTPATIENT)
Dept: ORTHOPEDICS | Facility: CLINIC | Age: 60
End: 2020-12-29
Payer: COMMERCIAL

## 2020-12-29 VITALS — HEIGHT: 62 IN | RESPIRATION RATE: 18 BRPM | BODY MASS INDEX: 26.31 KG/M2 | WEIGHT: 143 LBS

## 2020-12-29 DIAGNOSIS — S72.002D CLOSED FRACTURE OF LEFT HIP WITH ROUTINE HEALING, SUBSEQUENT ENCOUNTER: Primary | ICD-10-CM

## 2020-12-29 DIAGNOSIS — M25.552 LEFT HIP PAIN: ICD-10-CM

## 2020-12-29 PROCEDURE — 99213 OFFICE O/P EST LOW 20 MIN: CPT | Mod: S$GLB,,, | Performed by: ORTHOPAEDIC SURGERY

## 2020-12-29 PROCEDURE — 99213 PR OFFICE/OUTPT VISIT, EST, LEVL III, 20-29 MIN: ICD-10-PCS | Mod: S$GLB,,, | Performed by: ORTHOPAEDIC SURGERY

## 2020-12-29 PROCEDURE — 3008F BODY MASS INDEX DOCD: CPT | Mod: CPTII,S$GLB,, | Performed by: ORTHOPAEDIC SURGERY

## 2020-12-29 PROCEDURE — 3008F PR BODY MASS INDEX (BMI) DOCUMENTED: ICD-10-PCS | Mod: CPTII,S$GLB,, | Performed by: ORTHOPAEDIC SURGERY

## 2020-12-29 PROCEDURE — 99999 PR PBB SHADOW E&M-EST. PATIENT-LVL III: ICD-10-PCS | Mod: PBBFAC,,, | Performed by: ORTHOPAEDIC SURGERY

## 2020-12-29 PROCEDURE — 73502 X-RAY EXAM HIP UNI 2-3 VIEWS: CPT | Mod: TC,PN,LT

## 2020-12-29 PROCEDURE — 73502 X-RAY EXAM HIP UNI 2-3 VIEWS: CPT | Mod: 26,LT,, | Performed by: RADIOLOGY

## 2020-12-29 PROCEDURE — 1125F PR PAIN SEVERITY QUANTIFIED, PAIN PRESENT: ICD-10-PCS | Mod: S$GLB,,, | Performed by: ORTHOPAEDIC SURGERY

## 2020-12-29 PROCEDURE — 73502 XR HIP 2 VIEW LEFT: ICD-10-PCS | Mod: 26,LT,, | Performed by: RADIOLOGY

## 2020-12-29 PROCEDURE — 99999 PR PBB SHADOW E&M-EST. PATIENT-LVL III: CPT | Mod: PBBFAC,,, | Performed by: ORTHOPAEDIC SURGERY

## 2020-12-29 PROCEDURE — 1125F AMNT PAIN NOTED PAIN PRSNT: CPT | Mod: S$GLB,,, | Performed by: ORTHOPAEDIC SURGERY

## 2020-12-29 RX ORDER — HYDROCODONE BITARTRATE AND ACETAMINOPHEN 5; 325 MG/1; MG/1
1 TABLET ORAL EVERY 6 HOURS PRN
Qty: 21 TABLET | Refills: 0 | Status: SHIPPED | OUTPATIENT
Start: 2020-12-29 | End: 2021-02-18

## 2020-12-29 NOTE — PROGRESS NOTES
Past Medical History:   Diagnosis Date    Cataract     Diabetes mellitus type II     diet controlled    Fracture     left 4th finger  - splinted    Hyperlipidemia     MCTD (mixed connective tissue disease)     Raynaud's disease        Past Surgical History:   Procedure Laterality Date    CARPAL TUNNEL RELEASE      right    CATARACT EXTRACTION W/ INTRAOCULAR LENS  IMPLANT, BILATERAL      EXCISION OF PAROTID GLAND Right 8/1/2019    Procedure: PAROTIDECTOMY;  Surgeon: Abner Maki MD;  Location: [facility];  Service: ENT;  Laterality: Right;    HIP SURGERY Left 6/18/15    JANA    KNEE CARTILAGE SURGERY      right    TOTAL HIP ARTHROPLASTY Right 05/19/2016    JANA       Current Outpatient Medications   Medication Sig    cyclobenzaprine (FLEXERIL) 10 MG tablet Take 1 tablet (10 mg total) by mouth once daily.    hydrOXYchloroQUINE (PLAQUENIL) 200 mg tablet Take 1 tablet (200 mg total) by mouth once daily.    KLOR-CON M20 20 mEq tablet TAKE 1 TABLET (20 MEQ TOTAL) BY MOUTH ONCE DAILY. (Patient taking differently: Take 20 mEq by mouth once daily. )    NIFEdipine (ADALAT CC) 30 MG TbSR Take 1 tablet (30 mg total) by mouth once daily.    omeprazole (PRILOSEC) 20 MG capsule Take 1 capsule (20 mg total) by mouth once daily.    rosuvastatin (CRESTOR) 10 MG tablet Take 10 mg by mouth every evening.     HYDROcodone-acetaminophen (NORCO) 5-325 mg per tablet Take 1 tablet by mouth every 6 (six) hours as needed for Pain.     No current facility-administered medications for this visit.      Facility-Administered Medications Ordered in Other Visits   Medication    lactated ringers infusion       Review of patient's allergies indicates:   Allergen Reactions    Pcn [penicillins] Anaphylaxis     Unknown for her- family history with anaphylaxis    Lipitor [atorvastatin]        Family History   Problem Relation Age of Onset    Diabetes Mother     Kidney disease Mother     Aneurysm Mother 73        brain     Hyperlipidemia Mother     Hypertension Sister     Breast cancer Sister     Diabetes Sister        Social History     Socioeconomic History    Marital status:      Spouse name: Not on file    Number of children: Not on file    Years of education: Not on file    Highest education level: Not on file   Occupational History     Employer: [employer]   Social Needs    Financial resource strain: Not on file    Food insecurity     Worry: Not on file     Inability: Not on file    Transportation needs     Medical: Not on file     Non-medical: Not on file   Tobacco Use    Smoking status: Current Every Day Smoker     Packs/day: 0.50    Smokeless tobacco: Never Used   Substance and Sexual Activity    Alcohol use: Yes     Comment: occasional    Drug use: No    Sexual activity: Not on file   Lifestyle    Physical activity     Days per week: Not on file     Minutes per session: Not on file    Stress: Not on file   Relationships    Social connections     Talks on phone: Not on file     Gets together: Not on file     Attends Voodoo service: Not on file     Active member of club or organization: Not on file     Attends meetings of clubs or organizations: Not on file     Relationship status: Not on file   Other Topics Concern    Not on file   Social History Narrative    Not on file       Chief Complaint:   Chief Complaint   Patient presents with    Hip Injury     left hip fracture follow up DOI 11/7/20       Consulting Physician: No ref. provider found    History of present illness:    This is a 60 y.o. year old female who complains of left hip pain following a fall on 11/07/2020.  She has a history of bilateral total hip replacements.  She puts her pain today at 2/10 and is able to ambulate.    Review of Systems:    Constitution:   Denies chills, fever, and sweats.  HENT:   Denies headaches or blurry vision.  Cardiovascular:  Denies chest pain or irregular heart beat.  Respiratory:   Denies  "cough or shortness of breath.  Gastrointestinal:  Denies abdominal pain, nausea, or vomiting.  Musculoskeletal:   Denies muscle cramps.  Neurological:   Denies dizziness or focal weakness.  Psychiatric/Behavior: Normal mental status.  Hematology/Lymph:  Denies bleeding problem or easy bruising/bleeding.  Skin:    Denies rash or suspicious lesions.    Examination:    Vital Signs:    Vitals:    12/29/20 0906   Resp: 18   Weight: 64.9 kg (143 lb)   Height: 5' 2" (1.575 m)   PainSc:   1   PainLoc: Hip       Body mass index is 26.16 kg/m².    Constitution:   Well-developed, well nourished patient in no acute distress.  Neurological:   Alert and oriented x 3 and cooperative to examination.     Psychiatric/Behavior: Normal mental status.  Respiratory:   No shortness of breath.  Eyes:    Extraoccular muscles intact  Skin:    No scars, rash or suspicious lesions.    Physical Exam: Left Hip Exam    Gait:   Normal    Skin  Rash:   None  Scars:   None    Inspection  Erythema:  None  Bruising:  None  Swelling:  None    Range of Motion  Near normal    mild pain with hip range of motion.    Strength:  4-4+/5    Stability:  Normal    Sensation:  Intact    Vascular  Pulses:  Palpable distally          Imaging: X-rays ordered and images interpreted today personally by me of left hip show lesser trochanter fracture with a stable total hip arthroplasty.         Assessment: Closed fracture of left hip with routine healing, subsequent encounter        Plan:  We will have her continue to do physical therapy.  She is unable to work at this point time.  We will see her back in 6 weeks.      DISCLAIMER: This note may have been dictated using voice recognition software and may contain grammatical errors.     NOTE: Consult report sent to referring provider via EPIC EMR.        "

## 2021-01-03 ENCOUNTER — TELEPHONE (OUTPATIENT)
Dept: FAMILY MEDICINE | Facility: CLINIC | Age: 61
End: 2021-01-03

## 2021-01-05 ENCOUNTER — TELEPHONE (OUTPATIENT)
Dept: CARDIOLOGY | Facility: CLINIC | Age: 61
End: 2021-01-05

## 2021-02-04 DIAGNOSIS — M25.552 LEFT HIP PAIN: Primary | ICD-10-CM

## 2021-02-05 ENCOUNTER — PATIENT OUTREACH (OUTPATIENT)
Dept: ADMINISTRATIVE | Facility: OTHER | Age: 61
End: 2021-02-05

## 2021-02-05 NOTE — TELEPHONE ENCOUNTER
MRi shows : tear of rotator cuff and fluid collection in deltoid area which could be blood (hematoma) or abscess from infection.  Needs urgent referral to ortho asap. If fever, pain, redness worsening then she needs to go to ED   none

## 2021-02-09 ENCOUNTER — OFFICE VISIT (OUTPATIENT)
Dept: ORTHOPEDICS | Facility: CLINIC | Age: 61
End: 2021-02-09
Payer: COMMERCIAL

## 2021-02-09 ENCOUNTER — TELEPHONE (OUTPATIENT)
Dept: ORTHOPEDICS | Facility: CLINIC | Age: 61
End: 2021-02-09

## 2021-02-09 ENCOUNTER — HOSPITAL ENCOUNTER (OUTPATIENT)
Dept: RADIOLOGY | Facility: HOSPITAL | Age: 61
Discharge: HOME | End: 2021-02-09
Attending: ORTHOPAEDIC SURGERY
Payer: COMMERCIAL

## 2021-02-09 VITALS — RESPIRATION RATE: 16 BRPM | BODY MASS INDEX: 26.31 KG/M2 | HEIGHT: 62 IN | WEIGHT: 143 LBS

## 2021-02-09 DIAGNOSIS — S72.002D CLOSED FRACTURE OF LEFT HIP WITH ROUTINE HEALING, SUBSEQUENT ENCOUNTER: Primary | ICD-10-CM

## 2021-02-09 DIAGNOSIS — M25.552 LEFT HIP PAIN: ICD-10-CM

## 2021-02-09 PROCEDURE — 99213 PR OFFICE/OUTPT VISIT, EST, LEVL III, 20-29 MIN: ICD-10-PCS | Mod: S$GLB,,, | Performed by: ORTHOPAEDIC SURGERY

## 2021-02-09 PROCEDURE — 73502 XR HIP 2 VIEW LEFT: ICD-10-PCS | Mod: 26,LT,, | Performed by: RADIOLOGY

## 2021-02-09 PROCEDURE — 73502 X-RAY EXAM HIP UNI 2-3 VIEWS: CPT | Mod: TC,PN,LT

## 2021-02-09 PROCEDURE — 99999 PR PBB SHADOW E&M-EST. PATIENT-LVL III: ICD-10-PCS | Mod: PBBFAC,,, | Performed by: ORTHOPAEDIC SURGERY

## 2021-02-09 PROCEDURE — 99999 PR PBB SHADOW E&M-EST. PATIENT-LVL III: CPT | Mod: PBBFAC,,, | Performed by: ORTHOPAEDIC SURGERY

## 2021-02-09 PROCEDURE — 1126F PR PAIN SEVERITY QUANTIFIED, NO PAIN PRESENT: ICD-10-PCS | Mod: S$GLB,,, | Performed by: ORTHOPAEDIC SURGERY

## 2021-02-09 PROCEDURE — 1126F AMNT PAIN NOTED NONE PRSNT: CPT | Mod: S$GLB,,, | Performed by: ORTHOPAEDIC SURGERY

## 2021-02-09 PROCEDURE — 73502 X-RAY EXAM HIP UNI 2-3 VIEWS: CPT | Mod: 26,LT,, | Performed by: RADIOLOGY

## 2021-02-09 PROCEDURE — 99213 OFFICE O/P EST LOW 20 MIN: CPT | Mod: S$GLB,,, | Performed by: ORTHOPAEDIC SURGERY

## 2021-02-09 PROCEDURE — 3008F BODY MASS INDEX DOCD: CPT | Mod: CPTII,S$GLB,, | Performed by: ORTHOPAEDIC SURGERY

## 2021-02-09 PROCEDURE — 3008F PR BODY MASS INDEX (BMI) DOCUMENTED: ICD-10-PCS | Mod: CPTII,S$GLB,, | Performed by: ORTHOPAEDIC SURGERY

## 2021-02-09 RX ORDER — FERROUS SULFATE, DRIED 160(50) MG
1 TABLET, EXTENDED RELEASE ORAL 2 TIMES DAILY WITH MEALS
COMMUNITY
End: 2021-03-23

## 2021-02-15 DIAGNOSIS — E11.8 CONTROLLED TYPE 2 DIABETES MELLITUS WITH COMPLICATION, WITHOUT LONG-TERM CURRENT USE OF INSULIN: ICD-10-CM

## 2021-02-15 DIAGNOSIS — D53.9 MACROCYTIC ANEMIA: ICD-10-CM

## 2021-02-15 DIAGNOSIS — R94.6 BORDERLINE ABNORMAL TFTS: ICD-10-CM

## 2021-02-15 DIAGNOSIS — E78.5 HYPERLIPIDEMIA, UNSPECIFIED HYPERLIPIDEMIA TYPE: Primary | ICD-10-CM

## 2021-02-18 ENCOUNTER — OFFICE VISIT (OUTPATIENT)
Dept: FAMILY MEDICINE | Facility: CLINIC | Age: 61
End: 2021-02-18
Payer: COMMERCIAL

## 2021-02-18 VITALS
HEIGHT: 62 IN | DIASTOLIC BLOOD PRESSURE: 70 MMHG | HEART RATE: 97 BPM | WEIGHT: 142.44 LBS | TEMPERATURE: 97 F | BODY MASS INDEX: 26.21 KG/M2 | OXYGEN SATURATION: 99 % | RESPIRATION RATE: 12 BRPM | SYSTOLIC BLOOD PRESSURE: 110 MMHG

## 2021-02-18 DIAGNOSIS — M35.1 MCTD (MIXED CONNECTIVE TISSUE DISEASE): ICD-10-CM

## 2021-02-18 DIAGNOSIS — E78.5 HYPERLIPIDEMIA, UNSPECIFIED HYPERLIPIDEMIA TYPE: ICD-10-CM

## 2021-02-18 DIAGNOSIS — K21.9 GASTROESOPHAGEAL REFLUX DISEASE, UNSPECIFIED WHETHER ESOPHAGITIS PRESENT: ICD-10-CM

## 2021-02-18 DIAGNOSIS — I10 ESSENTIAL HYPERTENSION: ICD-10-CM

## 2021-02-18 DIAGNOSIS — D53.9 MACROCYTIC ANEMIA: Primary | ICD-10-CM

## 2021-02-18 DIAGNOSIS — R94.6 BORDERLINE ABNORMAL TFTS: ICD-10-CM

## 2021-02-18 PROCEDURE — 3074F PR MOST RECENT SYSTOLIC BLOOD PRESSURE < 130 MM HG: ICD-10-PCS | Mod: CPTII,S$GLB,, | Performed by: FAMILY MEDICINE

## 2021-02-18 PROCEDURE — 3008F BODY MASS INDEX DOCD: CPT | Mod: CPTII,S$GLB,, | Performed by: FAMILY MEDICINE

## 2021-02-18 PROCEDURE — 1126F AMNT PAIN NOTED NONE PRSNT: CPT | Mod: S$GLB,,, | Performed by: FAMILY MEDICINE

## 2021-02-18 PROCEDURE — 3074F SYST BP LT 130 MM HG: CPT | Mod: CPTII,S$GLB,, | Performed by: FAMILY MEDICINE

## 2021-02-18 PROCEDURE — 1126F PR PAIN SEVERITY QUANTIFIED, NO PAIN PRESENT: ICD-10-PCS | Mod: S$GLB,,, | Performed by: FAMILY MEDICINE

## 2021-02-18 PROCEDURE — 3008F PR BODY MASS INDEX (BMI) DOCUMENTED: ICD-10-PCS | Mod: CPTII,S$GLB,, | Performed by: FAMILY MEDICINE

## 2021-02-18 PROCEDURE — 3078F PR MOST RECENT DIASTOLIC BLOOD PRESSURE < 80 MM HG: ICD-10-PCS | Mod: CPTII,S$GLB,, | Performed by: FAMILY MEDICINE

## 2021-02-18 PROCEDURE — 99999 PR PBB SHADOW E&M-EST. PATIENT-LVL IV: CPT | Mod: PBBFAC,,, | Performed by: FAMILY MEDICINE

## 2021-02-18 PROCEDURE — 99214 OFFICE O/P EST MOD 30 MIN: CPT | Mod: S$GLB,,, | Performed by: FAMILY MEDICINE

## 2021-02-18 PROCEDURE — 99999 PR PBB SHADOW E&M-EST. PATIENT-LVL IV: ICD-10-PCS | Mod: PBBFAC,,, | Performed by: FAMILY MEDICINE

## 2021-02-18 PROCEDURE — 99214 PR OFFICE/OUTPT VISIT, EST, LEVL IV, 30-39 MIN: ICD-10-PCS | Mod: S$GLB,,, | Performed by: FAMILY MEDICINE

## 2021-02-18 PROCEDURE — 3078F DIAST BP <80 MM HG: CPT | Mod: CPTII,S$GLB,, | Performed by: FAMILY MEDICINE

## 2021-02-18 RX ORDER — ASPIRIN 81 MG/1
81 TABLET ORAL DAILY
Refills: 0 | COMMUNITY
Start: 2021-02-18 | End: 2021-03-23

## 2021-02-22 ENCOUNTER — OFFICE VISIT (OUTPATIENT)
Dept: CARDIOLOGY | Facility: CLINIC | Age: 61
End: 2021-02-22
Payer: COMMERCIAL

## 2021-02-22 VITALS
SYSTOLIC BLOOD PRESSURE: 128 MMHG | BODY MASS INDEX: 26.68 KG/M2 | OXYGEN SATURATION: 98 % | WEIGHT: 145 LBS | RESPIRATION RATE: 16 BRPM | HEIGHT: 62 IN | DIASTOLIC BLOOD PRESSURE: 70 MMHG | HEART RATE: 101 BPM

## 2021-02-22 DIAGNOSIS — E08.41 DIABETIC MONONEUROPATHY ASSOCIATED WITH DIABETES MELLITUS DUE TO UNDERLYING CONDITION: ICD-10-CM

## 2021-02-22 DIAGNOSIS — S72.122A DISPLACED FRACTURE OF LESSER TROCHANTER OF LEFT FEMUR, INITIAL ENCOUNTER FOR CLOSED FRACTURE: ICD-10-CM

## 2021-02-22 DIAGNOSIS — E78.2 MIXED HYPERLIPIDEMIA: ICD-10-CM

## 2021-02-22 DIAGNOSIS — R00.0 TACHYCARDIA: Primary | ICD-10-CM

## 2021-02-22 DIAGNOSIS — E08.65 DIABETES MELLITUS DUE TO UNDERLYING CONDITION WITH HYPERGLYCEMIA, WITHOUT LONG-TERM CURRENT USE OF INSULIN: ICD-10-CM

## 2021-02-22 DIAGNOSIS — I73.00 RAYNAUD'S DISEASE WITHOUT GANGRENE: ICD-10-CM

## 2021-02-22 DIAGNOSIS — K21.9 GASTROESOPHAGEAL REFLUX DISEASE, UNSPECIFIED WHETHER ESOPHAGITIS PRESENT: ICD-10-CM

## 2021-02-22 DIAGNOSIS — I10 ESSENTIAL HYPERTENSION: ICD-10-CM

## 2021-02-22 DIAGNOSIS — F17.200 CURRENT SMOKER: ICD-10-CM

## 2021-02-22 PROCEDURE — 99214 PR OFFICE/OUTPT VISIT, EST, LEVL IV, 30-39 MIN: ICD-10-PCS | Mod: S$GLB,,, | Performed by: INTERNAL MEDICINE

## 2021-02-22 PROCEDURE — 3078F PR MOST RECENT DIASTOLIC BLOOD PRESSURE < 80 MM HG: ICD-10-PCS | Mod: CPTII,S$GLB,, | Performed by: INTERNAL MEDICINE

## 2021-02-22 PROCEDURE — 99214 OFFICE O/P EST MOD 30 MIN: CPT | Mod: S$GLB,,, | Performed by: INTERNAL MEDICINE

## 2021-02-22 PROCEDURE — 3074F PR MOST RECENT SYSTOLIC BLOOD PRESSURE < 130 MM HG: ICD-10-PCS | Mod: CPTII,S$GLB,, | Performed by: INTERNAL MEDICINE

## 2021-02-22 PROCEDURE — 1125F PR PAIN SEVERITY QUANTIFIED, PAIN PRESENT: ICD-10-PCS | Mod: S$GLB,,, | Performed by: INTERNAL MEDICINE

## 2021-02-22 PROCEDURE — 1125F AMNT PAIN NOTED PAIN PRSNT: CPT | Mod: S$GLB,,, | Performed by: INTERNAL MEDICINE

## 2021-02-22 PROCEDURE — 3008F PR BODY MASS INDEX (BMI) DOCUMENTED: ICD-10-PCS | Mod: CPTII,S$GLB,, | Performed by: INTERNAL MEDICINE

## 2021-02-22 PROCEDURE — 93000 EKG 12-LEAD: ICD-10-PCS | Mod: S$GLB,,, | Performed by: SPECIALIST

## 2021-02-22 PROCEDURE — 3008F BODY MASS INDEX DOCD: CPT | Mod: CPTII,S$GLB,, | Performed by: INTERNAL MEDICINE

## 2021-02-22 PROCEDURE — 3074F SYST BP LT 130 MM HG: CPT | Mod: CPTII,S$GLB,, | Performed by: INTERNAL MEDICINE

## 2021-02-22 PROCEDURE — 3078F DIAST BP <80 MM HG: CPT | Mod: CPTII,S$GLB,, | Performed by: INTERNAL MEDICINE

## 2021-02-22 PROCEDURE — 93000 ELECTROCARDIOGRAM COMPLETE: CPT | Mod: S$GLB,,, | Performed by: SPECIALIST

## 2021-02-22 RX ORDER — PANTOPRAZOLE SODIUM 40 MG/1
40 TABLET, DELAYED RELEASE ORAL DAILY
Qty: 30 TABLET | Refills: 2 | Status: SHIPPED | OUTPATIENT
Start: 2021-02-22 | End: 2021-05-17

## 2021-02-26 ENCOUNTER — PATIENT MESSAGE (OUTPATIENT)
Dept: FAMILY MEDICINE | Facility: CLINIC | Age: 61
End: 2021-02-26

## 2021-03-15 ENCOUNTER — HOSPITAL ENCOUNTER (OUTPATIENT)
Dept: CARDIOLOGY | Facility: CLINIC | Age: 61
Discharge: HOME | End: 2021-03-15
Attending: INTERNAL MEDICINE
Payer: COMMERCIAL

## 2021-03-15 DIAGNOSIS — R00.0 TACHYCARDIA: ICD-10-CM

## 2021-03-15 DIAGNOSIS — I10 ESSENTIAL HYPERTENSION: ICD-10-CM

## 2021-03-15 PROCEDURE — 93224 HOLTER MONITOR - 24 HOUR (CUPID ONLY): ICD-10-PCS | Mod: S$GLB,,, | Performed by: INTERNAL MEDICINE

## 2021-03-15 PROCEDURE — 93224 XTRNL ECG REC UP TO 48 HRS: CPT | Mod: S$GLB,,, | Performed by: INTERNAL MEDICINE

## 2021-03-23 ENCOUNTER — OFFICE VISIT (OUTPATIENT)
Dept: CARDIOLOGY | Facility: CLINIC | Age: 61
End: 2021-03-23
Payer: COMMERCIAL

## 2021-03-23 VITALS
WEIGHT: 147 LBS | DIASTOLIC BLOOD PRESSURE: 68 MMHG | HEART RATE: 89 BPM | HEIGHT: 62 IN | BODY MASS INDEX: 27.05 KG/M2 | OXYGEN SATURATION: 96 % | SYSTOLIC BLOOD PRESSURE: 130 MMHG

## 2021-03-23 DIAGNOSIS — R79.89 ABNORMAL LIVER FUNCTION TEST: ICD-10-CM

## 2021-03-23 DIAGNOSIS — K21.9 GASTROESOPHAGEAL REFLUX DISEASE, UNSPECIFIED WHETHER ESOPHAGITIS PRESENT: ICD-10-CM

## 2021-03-23 DIAGNOSIS — I35.8 AORTIC VALVE SCLEROSIS: Primary | ICD-10-CM

## 2021-03-23 DIAGNOSIS — F17.200 CURRENT SMOKER: ICD-10-CM

## 2021-03-23 DIAGNOSIS — R01.1 CARDIAC MURMUR: ICD-10-CM

## 2021-03-23 DIAGNOSIS — E08.65 DIABETES MELLITUS DUE TO UNDERLYING CONDITION WITH HYPERGLYCEMIA, WITHOUT LONG-TERM CURRENT USE OF INSULIN: ICD-10-CM

## 2021-03-23 DIAGNOSIS — I07.1 TRICUSPID VALVE INSUFFICIENCY, UNSPECIFIED ETIOLOGY: ICD-10-CM

## 2021-03-23 DIAGNOSIS — I10 ESSENTIAL HYPERTENSION: ICD-10-CM

## 2021-03-23 DIAGNOSIS — E78.2 MIXED HYPERLIPIDEMIA: ICD-10-CM

## 2021-03-23 PROCEDURE — 3008F PR BODY MASS INDEX (BMI) DOCUMENTED: ICD-10-PCS | Mod: CPTII,S$GLB,, | Performed by: NURSE PRACTITIONER

## 2021-03-23 PROCEDURE — 1126F AMNT PAIN NOTED NONE PRSNT: CPT | Mod: S$GLB,,, | Performed by: NURSE PRACTITIONER

## 2021-03-23 PROCEDURE — 3075F SYST BP GE 130 - 139MM HG: CPT | Mod: CPTII,S$GLB,, | Performed by: NURSE PRACTITIONER

## 2021-03-23 PROCEDURE — 3078F PR MOST RECENT DIASTOLIC BLOOD PRESSURE < 80 MM HG: ICD-10-PCS | Mod: CPTII,S$GLB,, | Performed by: NURSE PRACTITIONER

## 2021-03-23 PROCEDURE — 99214 OFFICE O/P EST MOD 30 MIN: CPT | Mod: S$GLB,,, | Performed by: NURSE PRACTITIONER

## 2021-03-23 PROCEDURE — 99214 PR OFFICE/OUTPT VISIT, EST, LEVL IV, 30-39 MIN: ICD-10-PCS | Mod: S$GLB,,, | Performed by: NURSE PRACTITIONER

## 2021-03-23 PROCEDURE — 3008F BODY MASS INDEX DOCD: CPT | Mod: CPTII,S$GLB,, | Performed by: NURSE PRACTITIONER

## 2021-03-23 PROCEDURE — 1126F PR PAIN SEVERITY QUANTIFIED, NO PAIN PRESENT: ICD-10-PCS | Mod: S$GLB,,, | Performed by: NURSE PRACTITIONER

## 2021-03-23 PROCEDURE — 3075F PR MOST RECENT SYSTOLIC BLOOD PRESS GE 130-139MM HG: ICD-10-PCS | Mod: CPTII,S$GLB,, | Performed by: NURSE PRACTITIONER

## 2021-03-23 PROCEDURE — 3078F DIAST BP <80 MM HG: CPT | Mod: CPTII,S$GLB,, | Performed by: NURSE PRACTITIONER

## 2021-03-23 RX ORDER — NAPROXEN SODIUM 220 MG/1
81 TABLET, FILM COATED ORAL
Qty: 13 TABLET | Refills: 0
Start: 2021-03-24

## 2021-03-25 ENCOUNTER — IMMUNIZATION (OUTPATIENT)
Dept: PHARMACY | Facility: CLINIC | Age: 61
End: 2021-03-25
Payer: COMMERCIAL

## 2021-04-20 ENCOUNTER — OFFICE VISIT (OUTPATIENT)
Dept: RHEUMATOLOGY | Facility: CLINIC | Age: 61
End: 2021-04-20
Payer: COMMERCIAL

## 2021-04-20 VITALS
WEIGHT: 149.63 LBS | BODY MASS INDEX: 27.81 KG/M2 | SYSTOLIC BLOOD PRESSURE: 123 MMHG | DIASTOLIC BLOOD PRESSURE: 78 MMHG

## 2021-04-20 DIAGNOSIS — M35.1 MIXED CONNECTIVE TISSUE DISEASE: Primary | ICD-10-CM

## 2021-04-20 PROCEDURE — 99213 OFFICE O/P EST LOW 20 MIN: CPT | Mod: S$GLB,,, | Performed by: INTERNAL MEDICINE

## 2021-04-20 PROCEDURE — 1125F PR PAIN SEVERITY QUANTIFIED, PAIN PRESENT: ICD-10-PCS | Mod: S$GLB,,, | Performed by: INTERNAL MEDICINE

## 2021-04-20 PROCEDURE — 99213 PR OFFICE/OUTPT VISIT, EST, LEVL III, 20-29 MIN: ICD-10-PCS | Mod: S$GLB,,, | Performed by: INTERNAL MEDICINE

## 2021-04-20 PROCEDURE — 1125F AMNT PAIN NOTED PAIN PRSNT: CPT | Mod: S$GLB,,, | Performed by: INTERNAL MEDICINE

## 2021-05-24 ENCOUNTER — LAB VISIT (OUTPATIENT)
Dept: LAB | Facility: HOSPITAL | Age: 61
End: 2021-05-24
Attending: INTERNAL MEDICINE
Payer: COMMERCIAL

## 2021-05-24 ENCOUNTER — OFFICE VISIT (OUTPATIENT)
Dept: RHEUMATOLOGY | Facility: CLINIC | Age: 61
End: 2021-05-24
Payer: COMMERCIAL

## 2021-05-24 ENCOUNTER — LAB VISIT (OUTPATIENT)
Dept: LAB | Facility: HOSPITAL | Age: 61
End: 2021-05-24
Attending: FAMILY MEDICINE
Payer: COMMERCIAL

## 2021-05-24 VITALS
DIASTOLIC BLOOD PRESSURE: 75 MMHG | BODY MASS INDEX: 27.75 KG/M2 | SYSTOLIC BLOOD PRESSURE: 113 MMHG | WEIGHT: 149.31 LBS

## 2021-05-24 DIAGNOSIS — E55.9 HYPOVITAMINOSIS D: ICD-10-CM

## 2021-05-24 DIAGNOSIS — R94.6 BORDERLINE ABNORMAL TFTS: ICD-10-CM

## 2021-05-24 DIAGNOSIS — M35.1 MIXED CONNECTIVE TISSUE DISEASE: ICD-10-CM

## 2021-05-24 DIAGNOSIS — D53.9 MACROCYTIC ANEMIA: ICD-10-CM

## 2021-05-24 DIAGNOSIS — M35.1 MCTD (MIXED CONNECTIVE TISSUE DISEASE): Primary | ICD-10-CM

## 2021-05-24 LAB
ALBUMIN SERPL BCP-MCNC: 4.6 G/DL (ref 3.5–5.2)
ALP SERPL-CCNC: 68 U/L (ref 55–135)
ALT SERPL W/O P-5'-P-CCNC: 22 U/L (ref 10–44)
ANION GAP SERPL CALC-SCNC: 13 MMOL/L (ref 8–16)
AST SERPL-CCNC: 29 U/L (ref 10–40)
BASOPHILS # BLD AUTO: 0.12 K/UL (ref 0–0.2)
BASOPHILS NFR BLD: 1.5 % (ref 0–1.9)
BILIRUB SERPL-MCNC: 0.9 MG/DL (ref 0.1–1)
BILIRUB UR QL STRIP: NEGATIVE
BUN SERPL-MCNC: 12 MG/DL (ref 6–20)
CALCIUM SERPL-MCNC: 9.5 MG/DL (ref 8.7–10.5)
CHLORIDE SERPL-SCNC: 100 MMOL/L (ref 95–110)
CLARITY UR: CLEAR
CO2 SERPL-SCNC: 26 MMOL/L (ref 23–29)
COLOR UR: YELLOW
CREAT SERPL-MCNC: 0.8 MG/DL (ref 0.5–1.4)
CRP SERPL-MCNC: 0.49 MG/DL
DIFFERENTIAL METHOD: ABNORMAL
EOSINOPHIL # BLD AUTO: 0.2 K/UL (ref 0–0.5)
EOSINOPHIL NFR BLD: 2.9 % (ref 0–8)
ERYTHROCYTE [DISTWIDTH] IN BLOOD BY AUTOMATED COUNT: 13.3 % (ref 11.5–14.5)
EST. GFR  (AFRICAN AMERICAN): >60 ML/MIN/1.73 M^2
EST. GFR  (NON AFRICAN AMERICAN): >60 ML/MIN/1.73 M^2
GLUCOSE SERPL-MCNC: 98 MG/DL (ref 70–110)
GLUCOSE UR QL STRIP: NEGATIVE
HCT VFR BLD AUTO: 35.5 % (ref 37–48.5)
HGB BLD-MCNC: 12.1 G/DL (ref 12–16)
HGB UR QL STRIP: NEGATIVE
IMM GRANULOCYTES # BLD AUTO: 0.03 K/UL (ref 0–0.04)
IMM GRANULOCYTES NFR BLD AUTO: 0.4 % (ref 0–0.5)
KETONES UR QL STRIP: NEGATIVE
LEUKOCYTE ESTERASE UR QL STRIP: NEGATIVE
LYMPHOCYTES # BLD AUTO: 2.7 K/UL (ref 1–4.8)
LYMPHOCYTES NFR BLD: 33.5 % (ref 18–48)
MCH RBC QN AUTO: 35.2 PG (ref 27–31)
MCHC RBC AUTO-ENTMCNC: 34.1 G/DL (ref 32–36)
MCV RBC AUTO: 103 FL (ref 82–98)
MONOCYTES # BLD AUTO: 0.8 K/UL (ref 0.3–1)
MONOCYTES NFR BLD: 9.5 % (ref 4–15)
NEUTROPHILS # BLD AUTO: 4.1 K/UL (ref 1.8–7.7)
NEUTROPHILS NFR BLD: 52.2 % (ref 38–73)
NITRITE UR QL STRIP: NEGATIVE
NRBC BLD-RTO: 0 /100 WBC
PATH REV BLD -IMP: NORMAL
PH UR STRIP: 7 [PH] (ref 5–8)
PLATELET # BLD AUTO: 340 K/UL (ref 150–450)
PMV BLD AUTO: 9 FL (ref 9.2–12.9)
POTASSIUM SERPL-SCNC: 3.7 MMOL/L (ref 3.5–5.1)
PROT SERPL-MCNC: 8.7 G/DL (ref 6–8.4)
PROT UR QL STRIP: ABNORMAL
RBC # BLD AUTO: 3.44 M/UL (ref 4–5.4)
SODIUM SERPL-SCNC: 139 MMOL/L (ref 136–145)
SP GR UR STRIP: 1.01 (ref 1–1.03)
T4 FREE SERPL-MCNC: 0.81 NG/DL (ref 0.71–1.51)
TSH SERPL DL<=0.005 MIU/L-ACNC: 6.4 UIU/ML (ref 0.34–5.6)
URN SPEC COLLECT METH UR: ABNORMAL
UROBILINOGEN UR STRIP-ACNC: NEGATIVE EU/DL
WBC # BLD AUTO: 7.93 K/UL (ref 3.9–12.7)

## 2021-05-24 PROCEDURE — 80053 COMPREHEN METABOLIC PANEL: CPT | Performed by: INTERNAL MEDICINE

## 2021-05-24 PROCEDURE — 85025 COMPLETE CBC W/AUTO DIFF WBC: CPT | Performed by: FAMILY MEDICINE

## 2021-05-24 PROCEDURE — 83090 ASSAY OF HOMOCYSTEINE: CPT | Performed by: FAMILY MEDICINE

## 2021-05-24 PROCEDURE — 36415 COLL VENOUS BLD VENIPUNCTURE: CPT | Performed by: INTERNAL MEDICINE

## 2021-05-24 PROCEDURE — 84443 ASSAY THYROID STIM HORMONE: CPT | Performed by: FAMILY MEDICINE

## 2021-05-24 PROCEDURE — 86140 C-REACTIVE PROTEIN: CPT | Performed by: INTERNAL MEDICINE

## 2021-05-24 PROCEDURE — 83921 ORGANIC ACID SINGLE QUANT: CPT | Performed by: FAMILY MEDICINE

## 2021-05-24 PROCEDURE — 84439 ASSAY OF FREE THYROXINE: CPT | Performed by: FAMILY MEDICINE

## 2021-05-24 PROCEDURE — 1125F AMNT PAIN NOTED PAIN PRSNT: CPT | Mod: S$GLB,,, | Performed by: INTERNAL MEDICINE

## 2021-05-24 PROCEDURE — 81003 URINALYSIS AUTO W/O SCOPE: CPT | Performed by: INTERNAL MEDICINE

## 2021-05-24 PROCEDURE — 99213 OFFICE O/P EST LOW 20 MIN: CPT | Mod: S$GLB,,, | Performed by: INTERNAL MEDICINE

## 2021-05-24 PROCEDURE — 1125F PR PAIN SEVERITY QUANTIFIED, PAIN PRESENT: ICD-10-PCS | Mod: S$GLB,,, | Performed by: INTERNAL MEDICINE

## 2021-05-24 PROCEDURE — 99213 PR OFFICE/OUTPT VISIT, EST, LEVL III, 20-29 MIN: ICD-10-PCS | Mod: S$GLB,,, | Performed by: INTERNAL MEDICINE

## 2021-05-26 LAB — HCYS SERPL-SCNC: 25.3 UMOL/L (ref 0–14.5)

## 2021-06-03 LAB
METHLYMALONIC ACID: 119 NMOL/L (ref 0–378)
MMA DISCLAIMER: NORMAL

## 2021-06-04 LAB
OHS CV EVENT MONITOR DAY: 1
OHS CV HOLTER LENGTH DECIMAL HOURS: 48
OHS CV HOLTER LENGTH HOURS: 24
OHS CV HOLTER LENGTH MINUTES: 0

## 2021-06-07 DIAGNOSIS — E03.9 HYPOTHYROIDISM, UNSPECIFIED TYPE: Primary | ICD-10-CM

## 2021-06-07 DIAGNOSIS — E72.11 HYPERHOMOCYSTEINEMIA: ICD-10-CM

## 2021-06-07 RX ORDER — LEVOTHYROXINE SODIUM 50 UG/1
50 TABLET ORAL
Qty: 90 TABLET | Refills: 3 | Status: SHIPPED | OUTPATIENT
Start: 2021-06-07 | End: 2022-05-24 | Stop reason: SDUPTHER

## 2021-06-11 ENCOUNTER — TELEPHONE (OUTPATIENT)
Dept: FAMILY MEDICINE | Facility: CLINIC | Age: 61
End: 2021-06-11

## 2021-06-17 ENCOUNTER — TELEPHONE (OUTPATIENT)
Dept: FAMILY MEDICINE | Facility: CLINIC | Age: 61
End: 2021-06-17

## 2021-06-23 DIAGNOSIS — E11.9 TYPE 2 DIABETES MELLITUS WITHOUT COMPLICATION: ICD-10-CM

## 2021-08-19 ENCOUNTER — OFFICE VISIT (OUTPATIENT)
Dept: FAMILY MEDICINE | Facility: CLINIC | Age: 61
End: 2021-08-19
Payer: COMMERCIAL

## 2021-08-19 ENCOUNTER — LAB VISIT (OUTPATIENT)
Dept: LAB | Facility: HOSPITAL | Age: 61
End: 2021-08-19
Attending: FAMILY MEDICINE
Payer: COMMERCIAL

## 2021-08-19 VITALS
TEMPERATURE: 99 F | OXYGEN SATURATION: 95 % | HEART RATE: 100 BPM | SYSTOLIC BLOOD PRESSURE: 110 MMHG | HEIGHT: 62 IN | RESPIRATION RATE: 14 BRPM | DIASTOLIC BLOOD PRESSURE: 70 MMHG | WEIGHT: 148.81 LBS | BODY MASS INDEX: 27.38 KG/M2

## 2021-08-19 DIAGNOSIS — E78.2 MIXED HYPERLIPIDEMIA: Primary | ICD-10-CM

## 2021-08-19 DIAGNOSIS — E11.8 CONTROLLED TYPE 2 DIABETES MELLITUS WITH COMPLICATION, WITHOUT LONG-TERM CURRENT USE OF INSULIN: ICD-10-CM

## 2021-08-19 DIAGNOSIS — D53.9 MACROCYTIC ANEMIA: ICD-10-CM

## 2021-08-19 DIAGNOSIS — E03.9 HYPOTHYROIDISM, UNSPECIFIED TYPE: ICD-10-CM

## 2021-08-19 DIAGNOSIS — E08.65 DIABETES MELLITUS DUE TO UNDERLYING CONDITION WITH HYPERGLYCEMIA, WITHOUT LONG-TERM CURRENT USE OF INSULIN: ICD-10-CM

## 2021-08-19 DIAGNOSIS — I10 ESSENTIAL HYPERTENSION: ICD-10-CM

## 2021-08-19 DIAGNOSIS — M35.1 MCTD (MIXED CONNECTIVE TISSUE DISEASE): ICD-10-CM

## 2021-08-19 LAB
T4 FREE SERPL-MCNC: 0.99 NG/DL (ref 0.71–1.51)
TSH SERPL DL<=0.005 MIU/L-ACNC: 1.16 UIU/ML (ref 0.4–4)

## 2021-08-19 PROCEDURE — 3074F SYST BP LT 130 MM HG: CPT | Mod: CPTII,S$GLB,, | Performed by: FAMILY MEDICINE

## 2021-08-19 PROCEDURE — 3074F PR MOST RECENT SYSTOLIC BLOOD PRESSURE < 130 MM HG: ICD-10-PCS | Mod: CPTII,S$GLB,, | Performed by: FAMILY MEDICINE

## 2021-08-19 PROCEDURE — 1159F PR MEDICATION LIST DOCUMENTED IN MEDICAL RECORD: ICD-10-PCS | Mod: CPTII,S$GLB,, | Performed by: FAMILY MEDICINE

## 2021-08-19 PROCEDURE — 3008F PR BODY MASS INDEX (BMI) DOCUMENTED: ICD-10-PCS | Mod: CPTII,S$GLB,, | Performed by: FAMILY MEDICINE

## 2021-08-19 PROCEDURE — 36415 COLL VENOUS BLD VENIPUNCTURE: CPT | Mod: PO | Performed by: FAMILY MEDICINE

## 2021-08-19 PROCEDURE — 99214 OFFICE O/P EST MOD 30 MIN: CPT | Mod: S$GLB,,, | Performed by: FAMILY MEDICINE

## 2021-08-19 PROCEDURE — 84443 ASSAY THYROID STIM HORMONE: CPT | Performed by: FAMILY MEDICINE

## 2021-08-19 PROCEDURE — 1159F MED LIST DOCD IN RCRD: CPT | Mod: CPTII,S$GLB,, | Performed by: FAMILY MEDICINE

## 2021-08-19 PROCEDURE — 3078F PR MOST RECENT DIASTOLIC BLOOD PRESSURE < 80 MM HG: ICD-10-PCS | Mod: CPTII,S$GLB,, | Performed by: FAMILY MEDICINE

## 2021-08-19 PROCEDURE — 3008F BODY MASS INDEX DOCD: CPT | Mod: CPTII,S$GLB,, | Performed by: FAMILY MEDICINE

## 2021-08-19 PROCEDURE — 84439 ASSAY OF FREE THYROXINE: CPT | Performed by: FAMILY MEDICINE

## 2021-08-19 PROCEDURE — 99214 PR OFFICE/OUTPT VISIT, EST, LEVL IV, 30-39 MIN: ICD-10-PCS | Mod: S$GLB,,, | Performed by: FAMILY MEDICINE

## 2021-08-19 PROCEDURE — 1126F AMNT PAIN NOTED NONE PRSNT: CPT | Mod: CPTII,S$GLB,, | Performed by: FAMILY MEDICINE

## 2021-08-19 PROCEDURE — 99999 PR PBB SHADOW E&M-EST. PATIENT-LVL IV: ICD-10-PCS | Mod: PBBFAC,,, | Performed by: FAMILY MEDICINE

## 2021-08-19 PROCEDURE — 3078F DIAST BP <80 MM HG: CPT | Mod: CPTII,S$GLB,, | Performed by: FAMILY MEDICINE

## 2021-08-19 PROCEDURE — 1126F PR PAIN SEVERITY QUANTIFIED, NO PAIN PRESENT: ICD-10-PCS | Mod: CPTII,S$GLB,, | Performed by: FAMILY MEDICINE

## 2021-08-19 PROCEDURE — 99999 PR PBB SHADOW E&M-EST. PATIENT-LVL IV: CPT | Mod: PBBFAC,,, | Performed by: FAMILY MEDICINE

## 2021-08-19 PROCEDURE — 1160F PR REVIEW ALL MEDS BY PRESCRIBER/CLIN PHARMACIST DOCUMENTED: ICD-10-PCS | Mod: CPTII,S$GLB,, | Performed by: FAMILY MEDICINE

## 2021-08-19 PROCEDURE — 1160F RVW MEDS BY RX/DR IN RCRD: CPT | Mod: CPTII,S$GLB,, | Performed by: FAMILY MEDICINE

## 2021-09-02 ENCOUNTER — OFFICE VISIT (OUTPATIENT)
Dept: URGENT CARE | Facility: CLINIC | Age: 61
End: 2021-09-02
Payer: COMMERCIAL

## 2021-09-02 ENCOUNTER — PATIENT OUTREACH (OUTPATIENT)
Dept: ADMINISTRATIVE | Facility: HOSPITAL | Age: 61
End: 2021-09-02

## 2021-09-02 VITALS
RESPIRATION RATE: 17 BRPM | TEMPERATURE: 98 F | SYSTOLIC BLOOD PRESSURE: 109 MMHG | DIASTOLIC BLOOD PRESSURE: 62 MMHG | HEART RATE: 106 BPM | OXYGEN SATURATION: 95 %

## 2021-09-02 DIAGNOSIS — M54.9 DORSALGIA, UNSPECIFIED: ICD-10-CM

## 2021-09-02 DIAGNOSIS — S39.012A LUMBAR STRAIN, INITIAL ENCOUNTER: Primary | ICD-10-CM

## 2021-09-02 PROCEDURE — 96372 THER/PROPH/DIAG INJ SC/IM: CPT | Mod: S$GLB,,, | Performed by: STUDENT IN AN ORGANIZED HEALTH CARE EDUCATION/TRAINING PROGRAM

## 2021-09-02 PROCEDURE — 96372 PR INJECTION,THERAP/PROPH/DIAG2ST, IM OR SUBCUT: ICD-10-PCS | Mod: S$GLB,,, | Performed by: STUDENT IN AN ORGANIZED HEALTH CARE EDUCATION/TRAINING PROGRAM

## 2021-09-02 PROCEDURE — 3078F PR MOST RECENT DIASTOLIC BLOOD PRESSURE < 80 MM HG: ICD-10-PCS | Mod: S$GLB,,, | Performed by: STUDENT IN AN ORGANIZED HEALTH CARE EDUCATION/TRAINING PROGRAM

## 2021-09-02 PROCEDURE — 99203 PR OFFICE/OUTPT VISIT, NEW, LEVL III, 30-44 MIN: ICD-10-PCS | Mod: 25,S$GLB,, | Performed by: STUDENT IN AN ORGANIZED HEALTH CARE EDUCATION/TRAINING PROGRAM

## 2021-09-02 PROCEDURE — 3078F DIAST BP <80 MM HG: CPT | Mod: S$GLB,,, | Performed by: STUDENT IN AN ORGANIZED HEALTH CARE EDUCATION/TRAINING PROGRAM

## 2021-09-02 PROCEDURE — 1160F PR REVIEW ALL MEDS BY PRESCRIBER/CLIN PHARMACIST DOCUMENTED: ICD-10-PCS | Mod: S$GLB,,, | Performed by: STUDENT IN AN ORGANIZED HEALTH CARE EDUCATION/TRAINING PROGRAM

## 2021-09-02 PROCEDURE — 1159F MED LIST DOCD IN RCRD: CPT | Mod: S$GLB,,, | Performed by: STUDENT IN AN ORGANIZED HEALTH CARE EDUCATION/TRAINING PROGRAM

## 2021-09-02 PROCEDURE — 3074F SYST BP LT 130 MM HG: CPT | Mod: S$GLB,,, | Performed by: STUDENT IN AN ORGANIZED HEALTH CARE EDUCATION/TRAINING PROGRAM

## 2021-09-02 PROCEDURE — 1160F RVW MEDS BY RX/DR IN RCRD: CPT | Mod: S$GLB,,, | Performed by: STUDENT IN AN ORGANIZED HEALTH CARE EDUCATION/TRAINING PROGRAM

## 2021-09-02 PROCEDURE — 1159F PR MEDICATION LIST DOCUMENTED IN MEDICAL RECORD: ICD-10-PCS | Mod: S$GLB,,, | Performed by: STUDENT IN AN ORGANIZED HEALTH CARE EDUCATION/TRAINING PROGRAM

## 2021-09-02 PROCEDURE — 99203 OFFICE O/P NEW LOW 30 MIN: CPT | Mod: 25,S$GLB,, | Performed by: STUDENT IN AN ORGANIZED HEALTH CARE EDUCATION/TRAINING PROGRAM

## 2021-09-02 PROCEDURE — 3074F PR MOST RECENT SYSTOLIC BLOOD PRESSURE < 130 MM HG: ICD-10-PCS | Mod: S$GLB,,, | Performed by: STUDENT IN AN ORGANIZED HEALTH CARE EDUCATION/TRAINING PROGRAM

## 2021-09-02 RX ORDER — KETOROLAC TROMETHAMINE 30 MG/ML
15 INJECTION, SOLUTION INTRAMUSCULAR; INTRAVENOUS
Status: COMPLETED | OUTPATIENT
Start: 2021-09-02 | End: 2021-09-02

## 2021-09-02 RX ORDER — HYDROCODONE BITARTRATE AND ACETAMINOPHEN 5; 325 MG/1; MG/1
1 TABLET ORAL EVERY 8 HOURS PRN
Qty: 10 TABLET | Refills: 0 | Status: SHIPPED | OUTPATIENT
Start: 2021-09-02 | End: 2021-09-06

## 2021-09-02 RX ORDER — NAPROXEN 500 MG/1
500 TABLET ORAL 2 TIMES DAILY
Qty: 20 TABLET | Refills: 0 | Status: SHIPPED | OUTPATIENT
Start: 2021-09-02 | End: 2021-09-12

## 2021-09-02 RX ADMIN — KETOROLAC TROMETHAMINE 15 MG: 30 INJECTION, SOLUTION INTRAMUSCULAR; INTRAVENOUS at 01:09

## 2021-09-06 ENCOUNTER — HOSPITAL ENCOUNTER (EMERGENCY)
Facility: HOSPITAL | Age: 61
Discharge: HOME | End: 2021-09-06
Attending: EMERGENCY MEDICINE
Payer: COMMERCIAL

## 2021-09-06 VITALS
HEART RATE: 88 BPM | SYSTOLIC BLOOD PRESSURE: 117 MMHG | DIASTOLIC BLOOD PRESSURE: 75 MMHG | TEMPERATURE: 98 F | OXYGEN SATURATION: 100 % | RESPIRATION RATE: 15 BRPM
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
## 2021-09-06 DIAGNOSIS — S32.000A CLOSED COMPRESSION FRACTURE OF LUMBOSACRAL SPINE, INITIAL ENCOUNTER: Primary | ICD-10-CM

## 2021-09-06 PROCEDURE — 96372 THER/PROPH/DIAG INJ SC/IM: CPT

## 2021-09-06 PROCEDURE — 25000003 PHARM REV CODE 250: Performed by: NURSE PRACTITIONER

## 2021-09-06 PROCEDURE — 63600175 PHARM REV CODE 636 W HCPCS: Performed by: NURSE PRACTITIONER

## 2021-09-06 PROCEDURE — 99284 EMERGENCY DEPT VISIT MOD MDM: CPT

## 2021-09-06 RX ORDER — OXYCODONE AND ACETAMINOPHEN 5; 325 MG/1; MG/1
1 TABLET ORAL EVERY 4 HOURS PRN
Qty: 18 TABLET | Refills: 0 | Status: SHIPPED | OUTPATIENT
Start: 2021-09-06 | End: 2021-09-15 | Stop reason: SDUPTHER

## 2021-09-06 RX ORDER — ONDANSETRON 4 MG/1
4 TABLET, ORALLY DISINTEGRATING ORAL
Status: COMPLETED | OUTPATIENT
Start: 2021-09-06 | End: 2021-09-06

## 2021-09-06 RX ORDER — HYDROMORPHONE HYDROCHLORIDE 1 MG/ML
1 INJECTION, SOLUTION INTRAMUSCULAR; INTRAVENOUS; SUBCUTANEOUS
Status: COMPLETED | OUTPATIENT
Start: 2021-09-06 | End: 2021-09-06

## 2021-09-06 RX ADMIN — HYDROMORPHONE HYDROCHLORIDE 1 MG: 1 INJECTION, SOLUTION INTRAMUSCULAR; INTRAVENOUS; SUBCUTANEOUS at 01:09

## 2021-09-06 RX ADMIN — ONDANSETRON 4 MG: 4 TABLET, ORALLY DISINTEGRATING ORAL at 01:09

## 2021-09-08 ENCOUNTER — TELEPHONE (OUTPATIENT)
Dept: FAMILY MEDICINE | Facility: CLINIC | Age: 61
End: 2021-09-08

## 2021-09-15 ENCOUNTER — OFFICE VISIT (OUTPATIENT)
Dept: FAMILY MEDICINE | Facility: CLINIC | Age: 61
End: 2021-09-15
Payer: COMMERCIAL

## 2021-09-15 ENCOUNTER — LAB VISIT (OUTPATIENT)
Dept: LAB | Facility: HOSPITAL | Age: 61
End: 2021-09-15
Attending: STUDENT IN AN ORGANIZED HEALTH CARE EDUCATION/TRAINING PROGRAM
Payer: COMMERCIAL

## 2021-09-15 VITALS
RESPIRATION RATE: 18 BRPM | BODY MASS INDEX: 26.17 KG/M2 | DIASTOLIC BLOOD PRESSURE: 69 MMHG | SYSTOLIC BLOOD PRESSURE: 118 MMHG | OXYGEN SATURATION: 95 % | HEART RATE: 95 BPM | WEIGHT: 142.19 LBS | HEIGHT: 62 IN

## 2021-09-15 DIAGNOSIS — M54.50 ACUTE BILATERAL LOW BACK PAIN WITHOUT SCIATICA: ICD-10-CM

## 2021-09-15 DIAGNOSIS — E11.9 TYPE 2 DIABETES MELLITUS WITHOUT COMPLICATION, UNSPECIFIED WHETHER LONG TERM INSULIN USE: ICD-10-CM

## 2021-09-15 DIAGNOSIS — S22.080D CLOSED WEDGE COMPRESSION FRACTURE OF T12 VERTEBRA WITH ROUTINE HEALING, SUBSEQUENT ENCOUNTER: Primary | ICD-10-CM

## 2021-09-15 DIAGNOSIS — S22.080D CLOSED WEDGE COMPRESSION FRACTURE OF T12 VERTEBRA WITH ROUTINE HEALING, SUBSEQUENT ENCOUNTER: ICD-10-CM

## 2021-09-15 LAB
25(OH)D3+25(OH)D2 SERPL-MCNC: 22 NG/ML (ref 30–96)
ANION GAP SERPL CALC-SCNC: 14 MMOL/L (ref 8–16)
BASOPHILS # BLD AUTO: 0.15 K/UL (ref 0–0.2)
BASOPHILS NFR BLD: 1.3 % (ref 0–1.9)
BUN SERPL-MCNC: 8 MG/DL (ref 8–23)
CALCIUM SERPL-MCNC: 10.4 MG/DL (ref 8.7–10.5)
CHLORIDE SERPL-SCNC: 99 MMOL/L (ref 95–110)
CO2 SERPL-SCNC: 21 MMOL/L (ref 23–29)
CREAT SERPL-MCNC: 0.9 MG/DL (ref 0.5–1.4)
DIFFERENTIAL METHOD: ABNORMAL
EOSINOPHIL # BLD AUTO: 0.1 K/UL (ref 0–0.5)
EOSINOPHIL NFR BLD: 0.8 % (ref 0–8)
ERYTHROCYTE [DISTWIDTH] IN BLOOD BY AUTOMATED COUNT: 12.6 % (ref 11.5–14.5)
EST. GFR  (AFRICAN AMERICAN): >60 ML/MIN/1.73 M^2
EST. GFR  (NON AFRICAN AMERICAN): >60 ML/MIN/1.73 M^2
GLUCOSE SERPL-MCNC: 90 MG/DL (ref 70–110)
HCT VFR BLD AUTO: 35.6 % (ref 37–48.5)
HGB BLD-MCNC: 12.2 G/DL (ref 12–16)
IMM GRANULOCYTES # BLD AUTO: 0.06 K/UL (ref 0–0.04)
IMM GRANULOCYTES NFR BLD AUTO: 0.5 % (ref 0–0.5)
LYMPHOCYTES # BLD AUTO: 2.6 K/UL (ref 1–4.8)
LYMPHOCYTES NFR BLD: 22.1 % (ref 18–48)
MCH RBC QN AUTO: 34 PG (ref 27–31)
MCHC RBC AUTO-ENTMCNC: 34.3 G/DL (ref 32–36)
MCV RBC AUTO: 99 FL (ref 82–98)
MONOCYTES # BLD AUTO: 1.1 K/UL (ref 0.3–1)
MONOCYTES NFR BLD: 9.3 % (ref 4–15)
NEUTROPHILS # BLD AUTO: 7.9 K/UL (ref 1.8–7.7)
NEUTROPHILS NFR BLD: 66 % (ref 38–73)
NRBC BLD-RTO: 0 /100 WBC
PLATELET # BLD AUTO: 620 K/UL (ref 150–450)
PMV BLD AUTO: 9 FL (ref 9.2–12.9)
POTASSIUM SERPL-SCNC: 3.9 MMOL/L (ref 3.5–5.1)
PTH-INTACT SERPL-MCNC: 71.5 PG/ML (ref 9–77)
RBC # BLD AUTO: 3.59 M/UL (ref 4–5.4)
SODIUM SERPL-SCNC: 134 MMOL/L (ref 136–145)
WBC # BLD AUTO: 11.93 K/UL (ref 3.9–12.7)

## 2021-09-15 PROCEDURE — 84165 PATHOLOGIST INTERPRETATION SPE: ICD-10-PCS | Mod: 26,,, | Performed by: PATHOLOGY

## 2021-09-15 PROCEDURE — 86334 IMMUNOFIX E-PHORESIS SERUM: CPT | Mod: 26,,, | Performed by: PATHOLOGY

## 2021-09-15 PROCEDURE — 99999 PR PBB SHADOW E&M-EST. PATIENT-LVL IV: CPT | Mod: PBBFAC,,, | Performed by: STUDENT IN AN ORGANIZED HEALTH CARE EDUCATION/TRAINING PROGRAM

## 2021-09-15 PROCEDURE — 3008F PR BODY MASS INDEX (BMI) DOCUMENTED: ICD-10-PCS | Mod: CPTII,S$GLB,, | Performed by: STUDENT IN AN ORGANIZED HEALTH CARE EDUCATION/TRAINING PROGRAM

## 2021-09-15 PROCEDURE — 3078F DIAST BP <80 MM HG: CPT | Mod: CPTII,S$GLB,, | Performed by: STUDENT IN AN ORGANIZED HEALTH CARE EDUCATION/TRAINING PROGRAM

## 2021-09-15 PROCEDURE — 3074F PR MOST RECENT SYSTOLIC BLOOD PRESSURE < 130 MM HG: ICD-10-PCS | Mod: CPTII,S$GLB,, | Performed by: STUDENT IN AN ORGANIZED HEALTH CARE EDUCATION/TRAINING PROGRAM

## 2021-09-15 PROCEDURE — 84165 PROTEIN E-PHORESIS SERUM: CPT | Performed by: STUDENT IN AN ORGANIZED HEALTH CARE EDUCATION/TRAINING PROGRAM

## 2021-09-15 PROCEDURE — 36415 COLL VENOUS BLD VENIPUNCTURE: CPT | Mod: PO | Performed by: STUDENT IN AN ORGANIZED HEALTH CARE EDUCATION/TRAINING PROGRAM

## 2021-09-15 PROCEDURE — 3078F PR MOST RECENT DIASTOLIC BLOOD PRESSURE < 80 MM HG: ICD-10-PCS | Mod: CPTII,S$GLB,, | Performed by: STUDENT IN AN ORGANIZED HEALTH CARE EDUCATION/TRAINING PROGRAM

## 2021-09-15 PROCEDURE — 99213 OFFICE O/P EST LOW 20 MIN: CPT | Mod: S$GLB,,, | Performed by: STUDENT IN AN ORGANIZED HEALTH CARE EDUCATION/TRAINING PROGRAM

## 2021-09-15 PROCEDURE — 84165 PROTEIN E-PHORESIS SERUM: CPT | Mod: 26,,, | Performed by: PATHOLOGY

## 2021-09-15 PROCEDURE — 86334 IMMUNOFIX E-PHORESIS SERUM: CPT | Performed by: STUDENT IN AN ORGANIZED HEALTH CARE EDUCATION/TRAINING PROGRAM

## 2021-09-15 PROCEDURE — 99213 PR OFFICE/OUTPT VISIT, EST, LEVL III, 20-29 MIN: ICD-10-PCS | Mod: S$GLB,,, | Performed by: STUDENT IN AN ORGANIZED HEALTH CARE EDUCATION/TRAINING PROGRAM

## 2021-09-15 PROCEDURE — 82306 VITAMIN D 25 HYDROXY: CPT | Performed by: STUDENT IN AN ORGANIZED HEALTH CARE EDUCATION/TRAINING PROGRAM

## 2021-09-15 PROCEDURE — 99999 PR PBB SHADOW E&M-EST. PATIENT-LVL IV: ICD-10-PCS | Mod: PBBFAC,,, | Performed by: STUDENT IN AN ORGANIZED HEALTH CARE EDUCATION/TRAINING PROGRAM

## 2021-09-15 PROCEDURE — 80048 BASIC METABOLIC PNL TOTAL CA: CPT | Performed by: STUDENT IN AN ORGANIZED HEALTH CARE EDUCATION/TRAINING PROGRAM

## 2021-09-15 PROCEDURE — 85025 COMPLETE CBC W/AUTO DIFF WBC: CPT | Performed by: STUDENT IN AN ORGANIZED HEALTH CARE EDUCATION/TRAINING PROGRAM

## 2021-09-15 PROCEDURE — 1159F MED LIST DOCD IN RCRD: CPT | Mod: CPTII,S$GLB,, | Performed by: STUDENT IN AN ORGANIZED HEALTH CARE EDUCATION/TRAINING PROGRAM

## 2021-09-15 PROCEDURE — 86334 PATHOLOGIST INTERPRETATION IFE: ICD-10-PCS | Mod: 26,,, | Performed by: PATHOLOGY

## 2021-09-15 PROCEDURE — 83970 ASSAY OF PARATHORMONE: CPT | Performed by: STUDENT IN AN ORGANIZED HEALTH CARE EDUCATION/TRAINING PROGRAM

## 2021-09-15 PROCEDURE — 1159F PR MEDICATION LIST DOCUMENTED IN MEDICAL RECORD: ICD-10-PCS | Mod: CPTII,S$GLB,, | Performed by: STUDENT IN AN ORGANIZED HEALTH CARE EDUCATION/TRAINING PROGRAM

## 2021-09-15 PROCEDURE — 3008F BODY MASS INDEX DOCD: CPT | Mod: CPTII,S$GLB,, | Performed by: STUDENT IN AN ORGANIZED HEALTH CARE EDUCATION/TRAINING PROGRAM

## 2021-09-15 PROCEDURE — 3074F SYST BP LT 130 MM HG: CPT | Mod: CPTII,S$GLB,, | Performed by: STUDENT IN AN ORGANIZED HEALTH CARE EDUCATION/TRAINING PROGRAM

## 2021-09-15 RX ORDER — OXYCODONE AND ACETAMINOPHEN 5; 325 MG/1; MG/1
1 TABLET ORAL EVERY 4 HOURS PRN
Qty: 28 TABLET | Refills: 0 | Status: SHIPPED | OUTPATIENT
Start: 2021-09-15 | End: 2021-09-27 | Stop reason: SDUPTHER

## 2021-09-15 RX ORDER — OXYCODONE AND ACETAMINOPHEN 5; 325 MG/1; MG/1
1 TABLET ORAL EVERY 4 HOURS PRN
Qty: 28 TABLET | Refills: 0 | Status: SHIPPED | OUTPATIENT
Start: 2021-09-15 | End: 2021-09-15

## 2021-09-16 LAB
ALBUMIN SERPL ELPH-MCNC: 4.24 G/DL (ref 3.35–5.55)
ALPHA1 GLOB SERPL ELPH-MCNC: 0.45 G/DL (ref 0.17–0.41)
ALPHA2 GLOB SERPL ELPH-MCNC: 1 G/DL (ref 0.43–0.99)
B-GLOBULIN SERPL ELPH-MCNC: 1.01 G/DL (ref 0.5–1.1)
GAMMA GLOB SERPL ELPH-MCNC: 1.39 G/DL (ref 0.67–1.58)
INTERPRETATION SERPL IFE-IMP: NORMAL
PATHOLOGIST INTERPRETATION IFE: NORMAL
PATHOLOGIST INTERPRETATION SPE: NORMAL
PROT SERPL-MCNC: 8.1 G/DL (ref 6–8.4)

## 2021-09-20 ENCOUNTER — HOSPITAL ENCOUNTER (OUTPATIENT)
Dept: CARDIOLOGY | Facility: CLINIC | Age: 61
Discharge: HOME | End: 2021-09-20
Attending: NURSE PRACTITIONER
Payer: COMMERCIAL

## 2021-09-20 VITALS — BODY MASS INDEX: 26.81 KG/M2 | HEIGHT: 61 IN | WEIGHT: 142 LBS

## 2021-09-20 DIAGNOSIS — I07.1 TRICUSPID VALVE INSUFFICIENCY, UNSPECIFIED ETIOLOGY: ICD-10-CM

## 2021-09-20 DIAGNOSIS — I35.8 AORTIC VALVE SCLEROSIS: ICD-10-CM

## 2021-09-20 DIAGNOSIS — R01.1 CARDIAC MURMUR: ICD-10-CM

## 2021-09-20 PROCEDURE — 93306 TTE W/DOPPLER COMPLETE: CPT | Mod: S$GLB,,, | Performed by: INTERNAL MEDICINE

## 2021-09-20 PROCEDURE — 93306 ECHO (CUPID ONLY): ICD-10-PCS | Mod: S$GLB,,, | Performed by: INTERNAL MEDICINE

## 2021-09-24 ENCOUNTER — TELEPHONE (OUTPATIENT)
Dept: FAMILY MEDICINE | Facility: CLINIC | Age: 61
End: 2021-09-24

## 2021-09-24 DIAGNOSIS — S22.080D CLOSED WEDGE COMPRESSION FRACTURE OF T12 VERTEBRA WITH ROUTINE HEALING, SUBSEQUENT ENCOUNTER: Primary | ICD-10-CM

## 2021-09-24 LAB
AORTIC VALVE CUSP SEPERATION: 1.1 CM
AV INDEX (PROSTH): 0.42
AV MEAN GRADIENT: 16 MMHG
AV PEAK GRADIENT: 23 MMHG
AV VALVE AREA: 1.33 CM2
AV VELOCITY RATIO: 0.25
BSA FOR ECHO PROCEDURE: 1.66 M2
CV ECHO LV RWT: 0.35 CM
DOP CALC AO PEAK VEL: 2.39 M/S
DOP CALC AO VTI: 50.1 CM
DOP CALC LVOT AREA: 3.1 CM2
DOP CALC LVOT DIAMETER: 2 CM
DOP CALC LVOT PEAK VEL: 0.6 M/S
DOP CALC LVOT STROKE VOLUME: 66.57 CM3
DOP CALCLVOT PEAK VEL VTI: 21.2 CM
E WAVE DECELERATION TIME: 95 MS
E/A RATIO: 0.54
E/E' RATIO: 8 M/S
ECHO LV POSTERIOR WALL: 0.75 CM (ref 0.6–1.1)
EJECTION FRACTION: 70 %
FRACTIONAL SHORTENING: 41 % (ref 28–44)
INTERVENTRICULAR SEPTUM: 0.92 CM (ref 0.6–1.1)
IVRT: 106 MS
LEFT ATRIUM SIZE: 3 CM
LEFT INTERNAL DIMENSION IN SYSTOLE: 2.53 CM (ref 2.1–4)
LEFT VENTRICLE MASS INDEX: 68 G/M2
LEFT VENTRICULAR INTERNAL DIMENSION IN DIASTOLE: 4.27 CM (ref 3.5–6)
LEFT VENTRICULAR MASS: 110.2 G
LV LATERAL E/E' RATIO: 6 M/S
LV SEPTAL E/E' RATIO: 12 M/S
MV PEAK A VEL: 1.11 M/S
MV PEAK E VEL: 0.6 M/S
MV STENOSIS PRESSURE HALF TIME: 33 MS
MV VALVE AREA P 1/2 METHOD: 6.67 CM2
PISA TR MAX VEL: 2.21 M/S
RIGHT VENTRICULAR END-DIASTOLIC DIMENSION: 2.57 CM
TDI LATERAL: 0.1 M/S
TDI SEPTAL: 0.05 M/S
TDI: 0.08 M/S
TR MAX PG: 20 MMHG

## 2021-09-27 ENCOUNTER — OFFICE VISIT (OUTPATIENT)
Dept: SPINE | Facility: CLINIC | Age: 61
End: 2021-09-27
Payer: COMMERCIAL

## 2021-09-27 ENCOUNTER — OFFICE VISIT (OUTPATIENT)
Dept: RHEUMATOLOGY | Facility: CLINIC | Age: 61
End: 2021-09-27
Payer: COMMERCIAL

## 2021-09-27 ENCOUNTER — LAB VISIT (OUTPATIENT)
Dept: LAB | Facility: HOSPITAL | Age: 61
End: 2021-09-27
Attending: FAMILY MEDICINE
Payer: COMMERCIAL

## 2021-09-27 ENCOUNTER — OFFICE VISIT (OUTPATIENT)
Dept: CARDIOLOGY | Facility: CLINIC | Age: 61
End: 2021-09-27
Payer: COMMERCIAL

## 2021-09-27 ENCOUNTER — LAB VISIT (OUTPATIENT)
Dept: LAB | Facility: HOSPITAL | Age: 61
End: 2021-09-27
Attending: INTERNAL MEDICINE
Payer: COMMERCIAL

## 2021-09-27 VITALS
DIASTOLIC BLOOD PRESSURE: 74 MMHG | HEART RATE: 93 BPM | WEIGHT: 144 LBS | BODY MASS INDEX: 27.19 KG/M2 | SYSTOLIC BLOOD PRESSURE: 126 MMHG | OXYGEN SATURATION: 98 % | HEIGHT: 61 IN

## 2021-09-27 VITALS — BODY MASS INDEX: 27.18 KG/M2 | HEIGHT: 61 IN | WEIGHT: 143.94 LBS

## 2021-09-27 VITALS
SYSTOLIC BLOOD PRESSURE: 113 MMHG | WEIGHT: 142.38 LBS | DIASTOLIC BLOOD PRESSURE: 74 MMHG | BODY MASS INDEX: 26.91 KG/M2

## 2021-09-27 DIAGNOSIS — Z01.84 IMMUNITY STATUS TESTING: Primary | ICD-10-CM

## 2021-09-27 DIAGNOSIS — S32.040A COMPRESSION FRACTURE OF L4 VERTEBRA, INITIAL ENCOUNTER: Primary | ICD-10-CM

## 2021-09-27 DIAGNOSIS — S32.009A TRAUMATIC FRACTURE OF LUMBAR VERTEBRA: ICD-10-CM

## 2021-09-27 DIAGNOSIS — I73.00 RAYNAUD'S DISEASE WITHOUT GANGRENE: ICD-10-CM

## 2021-09-27 DIAGNOSIS — F17.200 CURRENT SMOKER: ICD-10-CM

## 2021-09-27 DIAGNOSIS — M35.1 MIXED CONNECTIVE TISSUE DISEASE: Primary | ICD-10-CM

## 2021-09-27 DIAGNOSIS — E11.8 CONTROLLED TYPE 2 DIABETES MELLITUS WITH COMPLICATION, WITHOUT LONG-TERM CURRENT USE OF INSULIN: ICD-10-CM

## 2021-09-27 DIAGNOSIS — S22.080D CLOSED WEDGE COMPRESSION FRACTURE OF T12 VERTEBRA WITH ROUTINE HEALING, SUBSEQUENT ENCOUNTER: ICD-10-CM

## 2021-09-27 DIAGNOSIS — S39.92XS INJURY OF BACK, SEQUELA: ICD-10-CM

## 2021-09-27 DIAGNOSIS — E78.2 MIXED HYPERLIPIDEMIA: ICD-10-CM

## 2021-09-27 DIAGNOSIS — E08.65 DIABETES MELLITUS DUE TO UNDERLYING CONDITION WITH HYPERGLYCEMIA, WITHOUT LONG-TERM CURRENT USE OF INSULIN: ICD-10-CM

## 2021-09-27 DIAGNOSIS — R06.2 WHEEZING: ICD-10-CM

## 2021-09-27 DIAGNOSIS — E03.9 HYPOTHYROIDISM, UNSPECIFIED TYPE: ICD-10-CM

## 2021-09-27 DIAGNOSIS — Z01.84 ENCOUNTER FOR ANTIBODY RESPONSE EXAMINATION: ICD-10-CM

## 2021-09-27 DIAGNOSIS — K21.9 GASTROESOPHAGEAL REFLUX DISEASE, UNSPECIFIED WHETHER ESOPHAGITIS PRESENT: ICD-10-CM

## 2021-09-27 DIAGNOSIS — M35.1 MIXED CONNECTIVE TISSUE DISEASE: ICD-10-CM

## 2021-09-27 DIAGNOSIS — I10 ESSENTIAL HYPERTENSION: Primary | ICD-10-CM

## 2021-09-27 PROBLEM — S39.92XA BACK INJURY: Status: ACTIVE | Noted: 2021-09-27

## 2021-09-27 LAB
ALBUMIN SERPL BCP-MCNC: 4.2 G/DL (ref 3.5–5.2)
ALP SERPL-CCNC: 79 U/L (ref 55–135)
ALT SERPL W/O P-5'-P-CCNC: 24 U/L (ref 10–44)
ANION GAP SERPL CALC-SCNC: 12 MMOL/L (ref 8–16)
AST SERPL-CCNC: 29 U/L (ref 10–40)
BASOPHILS # BLD AUTO: 0.14 K/UL (ref 0–0.2)
BASOPHILS NFR BLD: 1.7 % (ref 0–1.9)
BILIRUB SERPL-MCNC: 0.6 MG/DL (ref 0.1–1)
BILIRUB UR QL STRIP: NEGATIVE
BUN SERPL-MCNC: 16 MG/DL (ref 8–23)
CALCIUM SERPL-MCNC: 9.9 MG/DL (ref 8.7–10.5)
CHLORIDE SERPL-SCNC: 102 MMOL/L (ref 95–110)
CHOLEST SERPL-MCNC: 218 MG/DL (ref 120–199)
CHOLEST/HDLC SERPL: 2.4 {RATIO} (ref 2–5)
CLARITY UR: CLEAR
CO2 SERPL-SCNC: 27 MMOL/L (ref 23–29)
COLOR UR: YELLOW
CREAT SERPL-MCNC: 1 MG/DL (ref 0.5–1.4)
DIFFERENTIAL METHOD: ABNORMAL
EOSINOPHIL # BLD AUTO: 0.5 K/UL (ref 0–0.5)
EOSINOPHIL NFR BLD: 5.7 % (ref 0–8)
ERYTHROCYTE [DISTWIDTH] IN BLOOD BY AUTOMATED COUNT: 13.2 % (ref 11.5–14.5)
EST. GFR  (AFRICAN AMERICAN): >60 ML/MIN/1.73 M^2
EST. GFR  (NON AFRICAN AMERICAN): >60 ML/MIN/1.73 M^2
ESTIMATED AVG GLUCOSE: 103 MG/DL (ref 68–131)
GLUCOSE SERPL-MCNC: 111 MG/DL (ref 70–110)
GLUCOSE UR QL STRIP: NEGATIVE
HBA1C MFR BLD: 5.2 % (ref 4.5–6.2)
HCT VFR BLD AUTO: 35.9 % (ref 37–48.5)
HDLC SERPL-MCNC: 91 MG/DL (ref 40–75)
HDLC SERPL: 41.7 % (ref 20–50)
HGB BLD-MCNC: 12 G/DL (ref 12–16)
HGB UR QL STRIP: NEGATIVE
IMM GRANULOCYTES # BLD AUTO: 0.02 K/UL (ref 0–0.04)
IMM GRANULOCYTES NFR BLD AUTO: 0.2 % (ref 0–0.5)
KETONES UR QL STRIP: NEGATIVE
LDLC SERPL CALC-MCNC: 112.8 MG/DL (ref 63–159)
LEUKOCYTE ESTERASE UR QL STRIP: NEGATIVE
LYMPHOCYTES # BLD AUTO: 3.5 K/UL (ref 1–4.8)
LYMPHOCYTES NFR BLD: 41.7 % (ref 18–48)
MCH RBC QN AUTO: 33.9 PG (ref 27–31)
MCHC RBC AUTO-ENTMCNC: 33.4 G/DL (ref 32–36)
MCV RBC AUTO: 101 FL (ref 82–98)
MONOCYTES # BLD AUTO: 0.8 K/UL (ref 0.3–1)
MONOCYTES NFR BLD: 9.4 % (ref 4–15)
NEUTROPHILS # BLD AUTO: 3.4 K/UL (ref 1.8–7.7)
NEUTROPHILS NFR BLD: 41.3 % (ref 38–73)
NITRITE UR QL STRIP: NEGATIVE
NONHDLC SERPL-MCNC: 127 MG/DL
NRBC BLD-RTO: 0 /100 WBC
PH UR STRIP: 5 [PH] (ref 5–8)
PLATELET # BLD AUTO: 428 K/UL (ref 150–450)
PMV BLD AUTO: 8.7 FL (ref 9.2–12.9)
POTASSIUM SERPL-SCNC: 4.2 MMOL/L (ref 3.5–5.1)
PROT SERPL-MCNC: 8.2 G/DL (ref 6–8.4)
PROT UR QL STRIP: ABNORMAL
RBC # BLD AUTO: 3.54 M/UL (ref 4–5.4)
SODIUM SERPL-SCNC: 141 MMOL/L (ref 136–145)
SP GR UR STRIP: 1.01 (ref 1–1.03)
T4 FREE SERPL-MCNC: 1.31 NG/DL (ref 0.71–1.51)
TRIGL SERPL-MCNC: 71 MG/DL (ref 30–150)
TSH SERPL DL<=0.005 MIU/L-ACNC: 2.33 UIU/ML (ref 0.34–5.6)
URN SPEC COLLECT METH UR: ABNORMAL
UROBILINOGEN UR STRIP-ACNC: NEGATIVE EU/DL
WBC # BLD AUTO: 8.28 K/UL (ref 3.9–12.7)

## 2021-09-27 PROCEDURE — 84443 ASSAY THYROID STIM HORMONE: CPT | Performed by: FAMILY MEDICINE

## 2021-09-27 PROCEDURE — 1159F MED LIST DOCD IN RCRD: CPT | Mod: CPTII,S$GLB,, | Performed by: PHYSICAL MEDICINE & REHABILITATION

## 2021-09-27 PROCEDURE — 86769 SARS-COV-2 COVID-19 ANTIBODY: CPT | Performed by: INTERNAL MEDICINE

## 2021-09-27 PROCEDURE — 81003 URINALYSIS AUTO W/O SCOPE: CPT | Performed by: INTERNAL MEDICINE

## 2021-09-27 PROCEDURE — 3074F PR MOST RECENT SYSTOLIC BLOOD PRESSURE < 130 MM HG: ICD-10-PCS | Mod: S$GLB,,, | Performed by: INTERNAL MEDICINE

## 2021-09-27 PROCEDURE — 36415 COLL VENOUS BLD VENIPUNCTURE: CPT | Performed by: INTERNAL MEDICINE

## 2021-09-27 PROCEDURE — 30000890 LABCORP MISCELLANEOUS TEST: Performed by: INTERNAL MEDICINE

## 2021-09-27 PROCEDURE — 99214 PR OFFICE/OUTPT VISIT, EST, LEVL IV, 30-39 MIN: ICD-10-PCS | Mod: S$GLB,,, | Performed by: NURSE PRACTITIONER

## 2021-09-27 PROCEDURE — 80053 COMPREHEN METABOLIC PANEL: CPT | Performed by: FAMILY MEDICINE

## 2021-09-27 PROCEDURE — 36415 COLL VENOUS BLD VENIPUNCTURE: CPT | Performed by: FAMILY MEDICINE

## 2021-09-27 PROCEDURE — 3008F PR BODY MASS INDEX (BMI) DOCUMENTED: ICD-10-PCS | Mod: CPTII,S$GLB,, | Performed by: PHYSICAL MEDICINE & REHABILITATION

## 2021-09-27 PROCEDURE — 86038 ANTINUCLEAR ANTIBODIES: CPT | Performed by: INTERNAL MEDICINE

## 2021-09-27 PROCEDURE — 80061 LIPID PANEL: CPT | Performed by: FAMILY MEDICINE

## 2021-09-27 PROCEDURE — 85025 COMPLETE CBC W/AUTO DIFF WBC: CPT | Performed by: FAMILY MEDICINE

## 2021-09-27 PROCEDURE — 1160F RVW MEDS BY RX/DR IN RCRD: CPT | Mod: CPTII,S$GLB,, | Performed by: PHYSICAL MEDICINE & REHABILITATION

## 2021-09-27 PROCEDURE — 1159F PR MEDICATION LIST DOCUMENTED IN MEDICAL RECORD: ICD-10-PCS | Mod: CPTII,S$GLB,, | Performed by: NURSE PRACTITIONER

## 2021-09-27 PROCEDURE — 3008F BODY MASS INDEX DOCD: CPT | Mod: CPTII,S$GLB,, | Performed by: PHYSICAL MEDICINE & REHABILITATION

## 2021-09-27 PROCEDURE — 1160F PR REVIEW ALL MEDS BY PRESCRIBER/CLIN PHARMACIST DOCUMENTED: ICD-10-PCS | Mod: CPTII,S$GLB,, | Performed by: PHYSICAL MEDICINE & REHABILITATION

## 2021-09-27 PROCEDURE — 99213 OFFICE O/P EST LOW 20 MIN: CPT | Mod: S$GLB,,, | Performed by: INTERNAL MEDICINE

## 2021-09-27 PROCEDURE — 1159F MED LIST DOCD IN RCRD: CPT | Mod: CPTII,S$GLB,, | Performed by: NURSE PRACTITIONER

## 2021-09-27 PROCEDURE — 84439 ASSAY OF FREE THYROXINE: CPT | Performed by: FAMILY MEDICINE

## 2021-09-27 PROCEDURE — 3044F PR MOST RECENT HEMOGLOBIN A1C LEVEL <7.0%: ICD-10-PCS | Mod: CPTII,S$GLB,, | Performed by: PHYSICAL MEDICINE & REHABILITATION

## 2021-09-27 PROCEDURE — 3078F PR MOST RECENT DIASTOLIC BLOOD PRESSURE < 80 MM HG: ICD-10-PCS | Mod: CPTII,S$GLB,, | Performed by: NURSE PRACTITIONER

## 2021-09-27 PROCEDURE — 3074F SYST BP LT 130 MM HG: CPT | Mod: CPTII,S$GLB,, | Performed by: NURSE PRACTITIONER

## 2021-09-27 PROCEDURE — 3008F BODY MASS INDEX DOCD: CPT | Mod: CPTII,S$GLB,, | Performed by: NURSE PRACTITIONER

## 2021-09-27 PROCEDURE — 3008F PR BODY MASS INDEX (BMI) DOCUMENTED: ICD-10-PCS | Mod: S$GLB,,, | Performed by: INTERNAL MEDICINE

## 2021-09-27 PROCEDURE — 3078F PR MOST RECENT DIASTOLIC BLOOD PRESSURE < 80 MM HG: ICD-10-PCS | Mod: S$GLB,,, | Performed by: INTERNAL MEDICINE

## 2021-09-27 PROCEDURE — 83036 HEMOGLOBIN GLYCOSYLATED A1C: CPT | Performed by: FAMILY MEDICINE

## 2021-09-27 PROCEDURE — 85613 RUSSELL VIPER VENOM DILUTED: CPT | Performed by: INTERNAL MEDICINE

## 2021-09-27 PROCEDURE — 99204 OFFICE O/P NEW MOD 45 MIN: CPT | Mod: S$GLB,,, | Performed by: PHYSICAL MEDICINE & REHABILITATION

## 2021-09-27 PROCEDURE — 99204 PR OFFICE/OUTPT VISIT, NEW, LEVL IV, 45-59 MIN: ICD-10-PCS | Mod: S$GLB,,, | Performed by: PHYSICAL MEDICINE & REHABILITATION

## 2021-09-27 PROCEDURE — 86147 CARDIOLIPIN ANTIBODY EA IG: CPT | Performed by: INTERNAL MEDICINE

## 2021-09-27 PROCEDURE — 3074F SYST BP LT 130 MM HG: CPT | Mod: S$GLB,,, | Performed by: INTERNAL MEDICINE

## 2021-09-27 PROCEDURE — 99214 OFFICE O/P EST MOD 30 MIN: CPT | Mod: S$GLB,,, | Performed by: NURSE PRACTITIONER

## 2021-09-27 PROCEDURE — 3078F DIAST BP <80 MM HG: CPT | Mod: CPTII,S$GLB,, | Performed by: NURSE PRACTITIONER

## 2021-09-27 PROCEDURE — 3008F PR BODY MASS INDEX (BMI) DOCUMENTED: ICD-10-PCS | Mod: CPTII,S$GLB,, | Performed by: NURSE PRACTITIONER

## 2021-09-27 PROCEDURE — 1160F RVW MEDS BY RX/DR IN RCRD: CPT | Mod: CPTII,S$GLB,, | Performed by: NURSE PRACTITIONER

## 2021-09-27 PROCEDURE — 3044F HG A1C LEVEL LT 7.0%: CPT | Mod: CPTII,S$GLB,, | Performed by: PHYSICAL MEDICINE & REHABILITATION

## 2021-09-27 PROCEDURE — 3074F PR MOST RECENT SYSTOLIC BLOOD PRESSURE < 130 MM HG: ICD-10-PCS | Mod: CPTII,S$GLB,, | Performed by: NURSE PRACTITIONER

## 2021-09-27 PROCEDURE — 86146 BETA-2 GLYCOPROTEIN ANTIBODY: CPT | Performed by: INTERNAL MEDICINE

## 2021-09-27 PROCEDURE — 1159F PR MEDICATION LIST DOCUMENTED IN MEDICAL RECORD: ICD-10-PCS | Mod: CPTII,S$GLB,, | Performed by: PHYSICAL MEDICINE & REHABILITATION

## 2021-09-27 PROCEDURE — 1160F PR REVIEW ALL MEDS BY PRESCRIBER/CLIN PHARMACIST DOCUMENTED: ICD-10-PCS | Mod: CPTII,S$GLB,, | Performed by: NURSE PRACTITIONER

## 2021-09-27 PROCEDURE — 3078F DIAST BP <80 MM HG: CPT | Mod: S$GLB,,, | Performed by: INTERNAL MEDICINE

## 2021-09-27 PROCEDURE — 3044F PR MOST RECENT HEMOGLOBIN A1C LEVEL <7.0%: ICD-10-PCS | Mod: CPTII,S$GLB,, | Performed by: NURSE PRACTITIONER

## 2021-09-27 PROCEDURE — 3008F BODY MASS INDEX DOCD: CPT | Mod: S$GLB,,, | Performed by: INTERNAL MEDICINE

## 2021-09-27 PROCEDURE — 3044F HG A1C LEVEL LT 7.0%: CPT | Mod: CPTII,S$GLB,, | Performed by: NURSE PRACTITIONER

## 2021-09-27 PROCEDURE — 99213 PR OFFICE/OUTPT VISIT, EST, LEVL III, 20-29 MIN: ICD-10-PCS | Mod: S$GLB,,, | Performed by: INTERNAL MEDICINE

## 2021-09-27 RX ORDER — LEVALBUTEROL TARTRATE 45 UG/1
1-2 AEROSOL, METERED ORAL EVERY 6 HOURS PRN
Qty: 1 INHALER | Refills: 0 | Status: SHIPPED | OUTPATIENT
Start: 2021-09-27 | End: 2021-11-11 | Stop reason: SDUPTHER

## 2021-09-27 RX ORDER — NIFEDIPINE 30 MG/1
30 TABLET, FILM COATED, EXTENDED RELEASE ORAL DAILY
Qty: 90 TABLET | Refills: 2 | Status: SHIPPED | OUTPATIENT
Start: 2021-09-27 | End: 2022-08-29

## 2021-09-27 RX ORDER — HYDROXYCHLOROQUINE SULFATE 200 MG/1
200 TABLET, FILM COATED ORAL DAILY
Qty: 90 TABLET | Refills: 1 | Status: SHIPPED | OUTPATIENT
Start: 2021-09-27 | End: 2022-04-13 | Stop reason: SDUPTHER

## 2021-09-27 RX ORDER — OXYCODONE AND ACETAMINOPHEN 5; 325 MG/1; MG/1
1 TABLET ORAL EVERY 4 HOURS PRN
Qty: 28 TABLET | Refills: 0 | Status: SHIPPED | OUTPATIENT
Start: 2021-09-27 | End: 2021-10-07 | Stop reason: SDUPTHER

## 2021-09-27 RX ORDER — CYCLOBENZAPRINE HCL 10 MG
10 TABLET ORAL DAILY
Qty: 30 TABLET | Refills: 3 | Status: SHIPPED | OUTPATIENT
Start: 2021-09-27 | End: 2022-04-13 | Stop reason: SDUPTHER

## 2021-09-28 ENCOUNTER — TELEPHONE (OUTPATIENT)
Dept: PULMONOLOGY | Facility: CLINIC | Age: 61
End: 2021-09-28

## 2021-09-28 LAB
LABCORP MISC TEST CODE: NORMAL
LABCORP MISC TEST NAME: NORMAL
LABCORP MISCELLANEOUS TEST: NORMAL
SARS-COV-2 IGG SERPL IA-ACNC: <50 AU/ML
SARS-COV-2 IGG SERPL QL IA: NEGATIVE

## 2021-09-29 LAB
B2 GLYCOPROT1 IGA SER-ACNC: <9 GPI IGA UNITS (ref 0–25)
B2 GLYCOPROT1 IGG SER-ACNC: <9 GPI IGG UNITS (ref 0–20)
B2 GLYCOPROT1 IGM SER-ACNC: <9 GPI IGM UNITS (ref 0–32)
CARDIOLIPIN IGA SER IA-ACNC: 12 MPL U/ML (ref 0–12)
CARDIOLIPIN IGG SER IA-ACNC: <9 GPL U/ML (ref 0–14)
CARDIOLIPIN IGM SER IA-ACNC: <9 APL U/ML (ref 0–11)
LA 2 SCREEN W REFLEX-IMP: NORMAL
SCREEN APTT: 30.4 SEC (ref 0–51.9)
SCREEN DRVVT: 36.4 SEC (ref 0–47)

## 2021-10-05 ENCOUNTER — PATIENT OUTREACH (OUTPATIENT)
Dept: ADMINISTRATIVE | Facility: OTHER | Age: 61
End: 2021-10-05

## 2021-10-05 ENCOUNTER — HOSPITAL ENCOUNTER (OUTPATIENT)
Dept: RADIOLOGY | Facility: HOSPITAL | Age: 61
Discharge: HOME | End: 2021-10-05
Attending: PHYSICAL MEDICINE & REHABILITATION
Payer: COMMERCIAL

## 2021-10-05 ENCOUNTER — HOSPITAL ENCOUNTER (OUTPATIENT)
Dept: RADIOLOGY | Facility: HOSPITAL | Age: 61
Discharge: HOME | End: 2021-10-05
Attending: NURSE PRACTITIONER
Payer: COMMERCIAL

## 2021-10-05 DIAGNOSIS — S32.009A TRAUMATIC FRACTURE OF LUMBAR VERTEBRA: ICD-10-CM

## 2021-10-05 DIAGNOSIS — S22.080D CLOSED WEDGE COMPRESSION FRACTURE OF T12 VERTEBRA WITH ROUTINE HEALING, SUBSEQUENT ENCOUNTER: ICD-10-CM

## 2021-10-05 DIAGNOSIS — R06.2 WHEEZING: ICD-10-CM

## 2021-10-05 PROCEDURE — 71046 X-RAY EXAM CHEST 2 VIEWS: CPT | Mod: 26,,, | Performed by: RADIOLOGY

## 2021-10-05 PROCEDURE — G1004 CDSM NDSC: HCPCS

## 2021-10-05 PROCEDURE — 71046 XR CHEST PA AND LATERAL: ICD-10-PCS | Mod: 26,,, | Performed by: RADIOLOGY

## 2021-10-05 PROCEDURE — G1004 MRI THORACIC SPINE WITHOUT CONTRAST: ICD-10-PCS | Mod: ,,, | Performed by: RADIOLOGY

## 2021-10-05 PROCEDURE — 72146 MRI THORACIC SPINE WITHOUT CONTRAST: ICD-10-PCS | Mod: 26,MG,, | Performed by: RADIOLOGY

## 2021-10-05 PROCEDURE — 71046 X-RAY EXAM CHEST 2 VIEWS: CPT | Mod: TC,FY

## 2021-10-05 PROCEDURE — 72146 MRI CHEST SPINE W/O DYE: CPT | Mod: TC,MG

## 2021-10-05 PROCEDURE — G1004 CDSM NDSC: HCPCS | Mod: ,,, | Performed by: RADIOLOGY

## 2021-10-05 PROCEDURE — 72148 MRI LUMBAR SPINE WITHOUT CONTRAST: ICD-10-PCS | Mod: 26,,, | Performed by: RADIOLOGY

## 2021-10-05 PROCEDURE — 72148 MRI LUMBAR SPINE W/O DYE: CPT | Mod: 26,,, | Performed by: RADIOLOGY

## 2021-10-05 PROCEDURE — 72146 MRI CHEST SPINE W/O DYE: CPT | Mod: 26,MG,, | Performed by: RADIOLOGY

## 2021-10-07 ENCOUNTER — OFFICE VISIT (OUTPATIENT)
Dept: SPINE | Facility: CLINIC | Age: 61
End: 2021-10-07
Payer: COMMERCIAL

## 2021-10-07 DIAGNOSIS — Z12.89 ENCOUNTER FOR SCREENING FOR MALIGNANT NEOPLASM OF OTHER SITES: ICD-10-CM

## 2021-10-07 DIAGNOSIS — S22.080D CLOSED WEDGE COMPRESSION FRACTURE OF T12 VERTEBRA WITH ROUTINE HEALING, SUBSEQUENT ENCOUNTER: Primary | ICD-10-CM

## 2021-10-07 DIAGNOSIS — S32.040A COMPRESSION FRACTURE OF L4 VERTEBRA, INITIAL ENCOUNTER: ICD-10-CM

## 2021-10-07 DIAGNOSIS — M89.8X8 OTHER SPECIFIED DISORDERS OF BONE, OTHER SITE: ICD-10-CM

## 2021-10-07 PROCEDURE — 99213 PR OFFICE/OUTPT VISIT, EST, LEVL III, 20-29 MIN: ICD-10-PCS | Mod: S$GLB,,, | Performed by: PHYSICAL MEDICINE & REHABILITATION

## 2021-10-07 PROCEDURE — 99213 OFFICE O/P EST LOW 20 MIN: CPT | Mod: S$GLB,,, | Performed by: PHYSICAL MEDICINE & REHABILITATION

## 2021-10-07 PROCEDURE — 3044F PR MOST RECENT HEMOGLOBIN A1C LEVEL <7.0%: ICD-10-PCS | Mod: CPTII,S$GLB,, | Performed by: PHYSICAL MEDICINE & REHABILITATION

## 2021-10-07 PROCEDURE — 1160F RVW MEDS BY RX/DR IN RCRD: CPT | Mod: CPTII,S$GLB,, | Performed by: PHYSICAL MEDICINE & REHABILITATION

## 2021-10-07 PROCEDURE — 1159F MED LIST DOCD IN RCRD: CPT | Mod: CPTII,S$GLB,, | Performed by: PHYSICAL MEDICINE & REHABILITATION

## 2021-10-07 PROCEDURE — 1159F PR MEDICATION LIST DOCUMENTED IN MEDICAL RECORD: ICD-10-PCS | Mod: CPTII,S$GLB,, | Performed by: PHYSICAL MEDICINE & REHABILITATION

## 2021-10-07 PROCEDURE — 1160F PR REVIEW ALL MEDS BY PRESCRIBER/CLIN PHARMACIST DOCUMENTED: ICD-10-PCS | Mod: CPTII,S$GLB,, | Performed by: PHYSICAL MEDICINE & REHABILITATION

## 2021-10-07 PROCEDURE — 3044F HG A1C LEVEL LT 7.0%: CPT | Mod: CPTII,S$GLB,, | Performed by: PHYSICAL MEDICINE & REHABILITATION

## 2021-10-07 RX ORDER — OXYCODONE AND ACETAMINOPHEN 5; 325 MG/1; MG/1
1 TABLET ORAL EVERY 4 HOURS PRN
Qty: 28 TABLET | Refills: 0 | Status: SHIPPED | OUTPATIENT
Start: 2021-10-07 | End: 2021-10-21 | Stop reason: SDUPTHER

## 2021-10-08 ENCOUNTER — PATIENT MESSAGE (OUTPATIENT)
Dept: ADMINISTRATIVE | Facility: HOSPITAL | Age: 61
End: 2021-10-08

## 2021-10-12 ENCOUNTER — HOSPITAL ENCOUNTER (OUTPATIENT)
Dept: RADIOLOGY | Facility: HOSPITAL | Age: 61
Discharge: HOME | End: 2021-10-12
Attending: PHYSICAL MEDICINE & REHABILITATION
Payer: COMMERCIAL

## 2021-10-12 DIAGNOSIS — M89.8X8 OTHER SPECIFIED DISORDERS OF BONE, OTHER SITE: ICD-10-CM

## 2021-10-12 PROCEDURE — A9503 TC99M MEDRONATE: HCPCS

## 2021-10-12 PROCEDURE — 78306 BONE IMAGING WHOLE BODY: CPT | Mod: 26,,, | Performed by: RADIOLOGY

## 2021-10-12 PROCEDURE — 78306 NM BONE SCAN WHOLE BODY: ICD-10-PCS | Mod: 26,,, | Performed by: RADIOLOGY

## 2021-10-12 PROCEDURE — 78306 BONE IMAGING WHOLE BODY: CPT | Mod: TC

## 2021-10-14 ENCOUNTER — TELEPHONE (OUTPATIENT)
Dept: SPINE | Facility: CLINIC | Age: 61
End: 2021-10-14
Payer: COMMERCIAL

## 2021-10-20 ENCOUNTER — TELEPHONE (OUTPATIENT)
Dept: SPINE | Facility: CLINIC | Age: 61
End: 2021-10-20

## 2021-10-20 DIAGNOSIS — S22.080D CLOSED WEDGE COMPRESSION FRACTURE OF T12 VERTEBRA WITH ROUTINE HEALING, SUBSEQUENT ENCOUNTER: ICD-10-CM

## 2021-10-21 RX ORDER — OXYCODONE AND ACETAMINOPHEN 5; 325 MG/1; MG/1
1 TABLET ORAL EVERY 4 HOURS PRN
Qty: 28 TABLET | Refills: 0 | Status: SHIPPED | OUTPATIENT
Start: 2021-10-21 | End: 2021-11-03 | Stop reason: SDUPTHER

## 2021-10-22 ENCOUNTER — HOSPITAL ENCOUNTER (OUTPATIENT)
Dept: RADIOLOGY | Facility: HOSPITAL | Age: 61
Discharge: HOME | End: 2021-10-22
Attending: PHYSICAL MEDICINE & REHABILITATION
Payer: COMMERCIAL

## 2021-10-22 DIAGNOSIS — Z12.89 ENCOUNTER FOR SCREENING FOR MALIGNANT NEOPLASM OF OTHER SITES: ICD-10-CM

## 2021-10-22 PROCEDURE — 25500020 PHARM REV CODE 255: Performed by: PHYSICAL MEDICINE & REHABILITATION

## 2021-10-22 PROCEDURE — 72157 MRI CHEST SPINE W/O & W/DYE: CPT | Mod: TC

## 2021-10-22 PROCEDURE — A9585 GADOBUTROL INJECTION: HCPCS | Performed by: PHYSICAL MEDICINE & REHABILITATION

## 2021-10-22 PROCEDURE — 72157 MRI CHEST SPINE W/O & W/DYE: CPT | Mod: 26,,, | Performed by: RADIOLOGY

## 2021-10-22 PROCEDURE — 72157 MRI THORACIC SPINE W WO CONTRAST: ICD-10-PCS | Mod: 26,,, | Performed by: RADIOLOGY

## 2021-10-22 RX ORDER — GADOBUTROL 604.72 MG/ML
7 INJECTION INTRAVENOUS
Status: COMPLETED | OUTPATIENT
Start: 2021-10-22 | End: 2021-10-22

## 2021-10-22 RX ADMIN — GADOBUTROL 7 ML: 604.72 INJECTION INTRAVENOUS at 10:10

## 2021-10-25 ENCOUNTER — TELEPHONE (OUTPATIENT)
Dept: PAIN MEDICINE | Facility: CLINIC | Age: 61
End: 2021-10-25
Payer: COMMERCIAL

## 2021-10-25 ENCOUNTER — OFFICE VISIT (OUTPATIENT)
Dept: SPINE | Facility: CLINIC | Age: 61
End: 2021-10-25
Payer: COMMERCIAL

## 2021-10-25 DIAGNOSIS — Z01.818 PRE-OP TESTING: Primary | ICD-10-CM

## 2021-10-25 DIAGNOSIS — S22.080D CLOSED WEDGE COMPRESSION FRACTURE OF T12 VERTEBRA WITH ROUTINE HEALING, SUBSEQUENT ENCOUNTER: Primary | ICD-10-CM

## 2021-10-25 DIAGNOSIS — S22.080A COMPRESSION FRACTURE OF T12 VERTEBRA, INITIAL ENCOUNTER: Primary | ICD-10-CM

## 2021-10-25 PROCEDURE — 1160F RVW MEDS BY RX/DR IN RCRD: CPT | Mod: CPTII,S$GLB,, | Performed by: PHYSICAL MEDICINE & REHABILITATION

## 2021-10-25 PROCEDURE — 1159F PR MEDICATION LIST DOCUMENTED IN MEDICAL RECORD: ICD-10-PCS | Mod: CPTII,S$GLB,, | Performed by: PHYSICAL MEDICINE & REHABILITATION

## 2021-10-25 PROCEDURE — 3044F PR MOST RECENT HEMOGLOBIN A1C LEVEL <7.0%: ICD-10-PCS | Mod: CPTII,S$GLB,, | Performed by: PHYSICAL MEDICINE & REHABILITATION

## 2021-10-25 PROCEDURE — 99213 PR OFFICE/OUTPT VISIT, EST, LEVL III, 20-29 MIN: ICD-10-PCS | Mod: S$GLB,,, | Performed by: PHYSICAL MEDICINE & REHABILITATION

## 2021-10-25 PROCEDURE — 99213 OFFICE O/P EST LOW 20 MIN: CPT | Mod: S$GLB,,, | Performed by: PHYSICAL MEDICINE & REHABILITATION

## 2021-10-25 PROCEDURE — 1159F MED LIST DOCD IN RCRD: CPT | Mod: CPTII,S$GLB,, | Performed by: PHYSICAL MEDICINE & REHABILITATION

## 2021-10-25 PROCEDURE — 3044F HG A1C LEVEL LT 7.0%: CPT | Mod: CPTII,S$GLB,, | Performed by: PHYSICAL MEDICINE & REHABILITATION

## 2021-10-25 PROCEDURE — 1160F PR REVIEW ALL MEDS BY PRESCRIBER/CLIN PHARMACIST DOCUMENTED: ICD-10-PCS | Mod: CPTII,S$GLB,, | Performed by: PHYSICAL MEDICINE & REHABILITATION

## 2021-11-02 ENCOUNTER — LAB VISIT (OUTPATIENT)
Dept: PRIMARY CARE CLINIC | Facility: CLINIC | Age: 61
End: 2021-11-02
Payer: COMMERCIAL

## 2021-11-02 DIAGNOSIS — Z01.818 PRE-OP TESTING: ICD-10-CM

## 2021-11-02 PROCEDURE — U0003 INFECTIOUS AGENT DETECTION BY NUCLEIC ACID (DNA OR RNA); SEVERE ACUTE RESPIRATORY SYNDROME CORONAVIRUS 2 (SARS-COV-2) (CORONAVIRUS DISEASE [COVID-19]), AMPLIFIED PROBE TECHNIQUE, MAKING USE OF HIGH THROUGHPUT TECHNOLOGIES AS DESCRIBED BY CMS-2020-01-R: HCPCS | Performed by: ANESTHESIOLOGY

## 2021-11-02 PROCEDURE — U0005 INFEC AGEN DETEC AMPLI PROBE: HCPCS | Performed by: ANESTHESIOLOGY

## 2021-11-03 ENCOUNTER — HOSPITAL ENCOUNTER (OUTPATIENT)
Dept: PREADMISSION TESTING | Facility: HOSPITAL | Age: 61
Discharge: HOME | End: 2021-11-03
Attending: ANESTHESIOLOGY
Payer: COMMERCIAL

## 2021-11-03 ENCOUNTER — TELEPHONE (OUTPATIENT)
Dept: PHYSICAL MEDICINE AND REHAB | Facility: CLINIC | Age: 61
End: 2021-11-03
Payer: COMMERCIAL

## 2021-11-03 VITALS — WEIGHT: 143 LBS | HEIGHT: 62 IN | BODY MASS INDEX: 26.31 KG/M2

## 2021-11-03 DIAGNOSIS — S22.080D CLOSED WEDGE COMPRESSION FRACTURE OF T12 VERTEBRA WITH ROUTINE HEALING, SUBSEQUENT ENCOUNTER: ICD-10-CM

## 2021-11-03 LAB
SARS-COV-2 RNA RESP QL NAA+PROBE: NOT DETECTED
SARS-COV-2- CYCLE NUMBER: NORMAL

## 2021-11-03 PROCEDURE — 99900103 DSU ONLY-NO CHARGE-INITIAL HR (STAT)

## 2021-11-03 PROCEDURE — 99900104 DSU ONLY-NO CHARGE-EA ADD'L HR (STAT)

## 2021-11-03 RX ORDER — CHOLECALCIFEROL (VITAMIN D3) 25 MCG
1000 TABLET ORAL DAILY
COMMUNITY

## 2021-11-03 RX ORDER — OXYCODONE AND ACETAMINOPHEN 5; 325 MG/1; MG/1
1 TABLET ORAL EVERY 6 HOURS PRN
Qty: 28 TABLET | Refills: 0 | Status: SHIPPED | OUTPATIENT
Start: 2021-11-03 | End: 2021-11-15 | Stop reason: SDUPTHER

## 2021-11-11 ENCOUNTER — OFFICE VISIT (OUTPATIENT)
Dept: PULMONOLOGY | Facility: CLINIC | Age: 61
End: 2021-11-11
Payer: COMMERCIAL

## 2021-11-11 ENCOUNTER — PATIENT OUTREACH (OUTPATIENT)
Dept: ADMINISTRATIVE | Facility: OTHER | Age: 61
End: 2021-11-11
Payer: COMMERCIAL

## 2021-11-11 VITALS
OXYGEN SATURATION: 98 % | DIASTOLIC BLOOD PRESSURE: 80 MMHG | HEART RATE: 90 BPM | TEMPERATURE: 99 F | HEIGHT: 62 IN | WEIGHT: 142.44 LBS | SYSTOLIC BLOOD PRESSURE: 116 MMHG | BODY MASS INDEX: 26.21 KG/M2

## 2021-11-11 DIAGNOSIS — F17.210 CIGARETTE NICOTINE DEPENDENCE WITHOUT COMPLICATION: ICD-10-CM

## 2021-11-11 DIAGNOSIS — R06.2 WHEEZING: ICD-10-CM

## 2021-11-11 DIAGNOSIS — R06.02 SHORTNESS OF BREATH: Primary | ICD-10-CM

## 2021-11-11 PROCEDURE — 99999 PR PBB SHADOW E&M-EST. PATIENT-LVL IV: CPT | Mod: PBBFAC,,, | Performed by: NURSE PRACTITIONER

## 2021-11-11 PROCEDURE — 1159F PR MEDICATION LIST DOCUMENTED IN MEDICAL RECORD: ICD-10-PCS | Mod: CPTII,S$GLB,, | Performed by: NURSE PRACTITIONER

## 2021-11-11 PROCEDURE — 99204 OFFICE O/P NEW MOD 45 MIN: CPT | Mod: S$GLB,,, | Performed by: NURSE PRACTITIONER

## 2021-11-11 PROCEDURE — 3008F PR BODY MASS INDEX (BMI) DOCUMENTED: ICD-10-PCS | Mod: CPTII,S$GLB,, | Performed by: NURSE PRACTITIONER

## 2021-11-11 PROCEDURE — 3079F DIAST BP 80-89 MM HG: CPT | Mod: CPTII,S$GLB,, | Performed by: NURSE PRACTITIONER

## 2021-11-11 PROCEDURE — 99204 PR OFFICE/OUTPT VISIT, NEW, LEVL IV, 45-59 MIN: ICD-10-PCS | Mod: S$GLB,,, | Performed by: NURSE PRACTITIONER

## 2021-11-11 PROCEDURE — 99999 PR PBB SHADOW E&M-EST. PATIENT-LVL IV: ICD-10-PCS | Mod: PBBFAC,,, | Performed by: NURSE PRACTITIONER

## 2021-11-11 PROCEDURE — 1160F RVW MEDS BY RX/DR IN RCRD: CPT | Mod: CPTII,S$GLB,, | Performed by: NURSE PRACTITIONER

## 2021-11-11 PROCEDURE — 1160F PR REVIEW ALL MEDS BY PRESCRIBER/CLIN PHARMACIST DOCUMENTED: ICD-10-PCS | Mod: CPTII,S$GLB,, | Performed by: NURSE PRACTITIONER

## 2021-11-11 PROCEDURE — 3079F PR MOST RECENT DIASTOLIC BLOOD PRESSURE 80-89 MM HG: ICD-10-PCS | Mod: CPTII,S$GLB,, | Performed by: NURSE PRACTITIONER

## 2021-11-11 PROCEDURE — 3074F PR MOST RECENT SYSTOLIC BLOOD PRESSURE < 130 MM HG: ICD-10-PCS | Mod: CPTII,S$GLB,, | Performed by: NURSE PRACTITIONER

## 2021-11-11 PROCEDURE — 1159F MED LIST DOCD IN RCRD: CPT | Mod: CPTII,S$GLB,, | Performed by: NURSE PRACTITIONER

## 2021-11-11 PROCEDURE — 3008F BODY MASS INDEX DOCD: CPT | Mod: CPTII,S$GLB,, | Performed by: NURSE PRACTITIONER

## 2021-11-11 PROCEDURE — 3044F PR MOST RECENT HEMOGLOBIN A1C LEVEL <7.0%: ICD-10-PCS | Mod: CPTII,S$GLB,, | Performed by: NURSE PRACTITIONER

## 2021-11-11 PROCEDURE — 3044F HG A1C LEVEL LT 7.0%: CPT | Mod: CPTII,S$GLB,, | Performed by: NURSE PRACTITIONER

## 2021-11-11 PROCEDURE — 3074F SYST BP LT 130 MM HG: CPT | Mod: CPTII,S$GLB,, | Performed by: NURSE PRACTITIONER

## 2021-11-11 RX ORDER — LEVALBUTEROL TARTRATE 45 UG/1
1-2 AEROSOL, METERED ORAL EVERY 4 HOURS PRN
Qty: 18 G | Refills: 11 | Status: SHIPPED | OUTPATIENT
Start: 2021-11-11 | End: 2022-02-11

## 2021-11-15 ENCOUNTER — TELEPHONE (OUTPATIENT)
Dept: SPINE | Facility: CLINIC | Age: 61
End: 2021-11-15
Payer: COMMERCIAL

## 2021-11-15 DIAGNOSIS — S22.080D CLOSED WEDGE COMPRESSION FRACTURE OF T12 VERTEBRA WITH ROUTINE HEALING, SUBSEQUENT ENCOUNTER: ICD-10-CM

## 2021-11-15 RX ORDER — OXYCODONE AND ACETAMINOPHEN 5; 325 MG/1; MG/1
1 TABLET ORAL EVERY 6 HOURS PRN
Qty: 28 TABLET | Refills: 0 | Status: SHIPPED | OUTPATIENT
Start: 2021-11-15 | End: 2021-11-29 | Stop reason: SDUPTHER

## 2021-11-18 ENCOUNTER — HOSPITAL ENCOUNTER (OUTPATIENT)
Dept: RADIOLOGY | Facility: HOSPITAL | Age: 61
Discharge: HOME | End: 2021-11-18
Attending: NURSE PRACTITIONER
Payer: COMMERCIAL

## 2021-11-18 ENCOUNTER — HOSPITAL ENCOUNTER (OUTPATIENT)
Dept: PULMONOLOGY | Facility: HOSPITAL | Age: 61
Discharge: HOME | End: 2021-11-18
Attending: NURSE PRACTITIONER
Payer: COMMERCIAL

## 2021-11-18 DIAGNOSIS — F17.210 CIGARETTE NICOTINE DEPENDENCE WITHOUT COMPLICATION: ICD-10-CM

## 2021-11-18 DIAGNOSIS — R06.02 SHORTNESS OF BREATH: ICD-10-CM

## 2021-11-18 LAB
BRPFT: NORMAL
DLCO ADJ PRE: 15.85 ML/(MIN*MMHG)
DLCO SINGLE BREATH LLN: 15.51
DLCO SINGLE BREATH PRE REF: 74.6 %
DLCO SINGLE BREATH REF: 21.24
DLCOC SBVA LLN: 3.04
DLCOC SBVA PRE REF: 92.6 %
DLCOC SBVA REF: 4.65
DLCOC SINGLE BREATH LLN: 15.51
DLCOC SINGLE BREATH PRE REF: 74.6 %
DLCOC SINGLE BREATH REF: 21.24
DLCOVA LLN: 3.04
DLCOVA PRE REF: 92.6 %
DLCOVA PRE: 4.3 ML/(MIN*MMHG*L)
DLCOVA REF: 4.65
DLVAADJ PRE: 4.3 ML/(MIN*MMHG*L)
ERVN2 LLN: -16449.24
ERVN2 PRE REF: 43.4 %
ERVN2 PRE: 0.33 L
ERVN2 REF: 0.76
FEF 25 75 CHG: 4.6 %
FEF 25 75 LLN: 1.07
FEF 25 75 POST REF: 95.1 %
FEF 25 75 PRE REF: 90.9 %
FEF 25 75 REF: 2.12
FET100 CHG: 24.7 %
FEV1 CHG: -1.4 %
FEV1 FVC CHG: 1.7 %
FEV1 FVC LLN: 67
FEV1 FVC POST REF: 102 %
FEV1 FVC PRE REF: 100.3 %
FEV1 FVC REF: 80
FEV1 LLN: 1.72
FEV1 POST REF: 86.6 %
FEV1 PRE REF: 87.8 %
FEV1 REF: 2.29
FRCN2 LLN: 1.76
FRCN2 PRE REF: 76.8 %
FRCN2 REF: 2.58
FVC CHG: -3 %
FVC LLN: 2.18
FVC POST REF: 84.5 %
FVC PRE REF: 87.1 %
FVC REF: 2.89
IVC PRE: 2.48 L
IVC SINGLE BREATH LLN: 2.18
IVC SINGLE BREATH PRE REF: 85.8 %
IVC SINGLE BREATH REF: 2.89
MVV LLN: 71
MVV PRE REF: 87.9 %
MVV REF: 83
PEF CHG: 24.1 %
PEF LLN: 4.31
PEF POST REF: 86.5 %
PEF PRE REF: 69.7 %
PEF REF: 5.91
POST FEF 25 75: 2.01 L/S
POST FET 100: 4.29 SEC
POST FEV1 FVC: 81.06 %
POST FEV1: 1.98 L
POST FVC: 2.44 L
POST PEF: 5.11 L/S
PRE DLCO: 15.85 ML/(MIN*MMHG)
PRE FEF 25 75: 1.92 L/S
PRE FET 100: 3.44 SEC
PRE FEV1 FVC: 79.72 %
PRE FEV1: 2.01 L
PRE FRC N2: 1.98 L
PRE FVC: 2.52 L
PRE MVV: 73 L/MIN
PRE PEF: 4.12 L/S
RVN2 LLN: 1.24
RVN2 PRE REF: 77 %
RVN2 PRE: 1.4 L
RVN2 REF: 1.82
RVN2TLCN2 LLN: 30.11
RVN2TLCN2 PRE REF: 87.3 %
RVN2TLCN2 PRE: 34.65 %
RVN2TLCN2 REF: 39.7
TLCN2 LLN: 3.58
TLCN2 PRE REF: 88.3 %
TLCN2 PRE: 4.04 L
TLCN2 REF: 4.57
VA PRE: 3.69 L
VA SINGLE BREATH LLN: 4.42
VA SINGLE BREATH PRE REF: 83.4 %
VA SINGLE BREATH REF: 4.42
VCMAXN2 LLN: 2.18
VCMAXN2 PRE REF: 91.2 %
VCMAXN2 PRE: 2.64 L
VCMAXN2 REF: 2.89

## 2021-11-18 PROCEDURE — 94727 PR PULM FUNCTION TEST BY GAS: ICD-10-PCS | Mod: 26,,, | Performed by: INTERNAL MEDICINE

## 2021-11-18 PROCEDURE — 94060 PR EVAL OF BRONCHOSPASM: ICD-10-PCS | Mod: 26,,, | Performed by: INTERNAL MEDICINE

## 2021-11-18 PROCEDURE — 71271 CT THORAX LUNG CANCER SCR C-: CPT | Mod: TC

## 2021-11-18 PROCEDURE — 94727 GAS DIL/WSHOT DETER LNG VOL: CPT

## 2021-11-18 PROCEDURE — 71271 CT CHEST LUNG SCREENING LOW DOSE: ICD-10-PCS | Mod: 26,,, | Performed by: RADIOLOGY

## 2021-11-18 PROCEDURE — 71271 CT THORAX LUNG CANCER SCR C-: CPT | Mod: 26,,, | Performed by: RADIOLOGY

## 2021-11-18 PROCEDURE — 94729 DIFFUSING CAPACITY: CPT

## 2021-11-18 PROCEDURE — 94727 GAS DIL/WSHOT DETER LNG VOL: CPT | Mod: 26,,, | Performed by: INTERNAL MEDICINE

## 2021-11-18 PROCEDURE — 94729 PR C02/MEMBANE DIFFUSE CAPACITY: ICD-10-PCS | Mod: 26,,, | Performed by: INTERNAL MEDICINE

## 2021-11-18 PROCEDURE — 94060 EVALUATION OF WHEEZING: CPT | Mod: 26,,, | Performed by: INTERNAL MEDICINE

## 2021-11-18 PROCEDURE — 94060 EVALUATION OF WHEEZING: CPT

## 2021-11-18 PROCEDURE — 94729 DIFFUSING CAPACITY: CPT | Mod: 26,,, | Performed by: INTERNAL MEDICINE

## 2021-11-19 ENCOUNTER — OFFICE VISIT (OUTPATIENT)
Dept: SPINE | Facility: CLINIC | Age: 61
End: 2021-11-19
Payer: COMMERCIAL

## 2021-11-19 ENCOUNTER — PATIENT MESSAGE (OUTPATIENT)
Dept: PULMONOLOGY | Facility: CLINIC | Age: 61
End: 2021-11-19
Payer: COMMERCIAL

## 2021-11-19 ENCOUNTER — TELEPHONE (OUTPATIENT)
Dept: PULMONOLOGY | Facility: CLINIC | Age: 61
End: 2021-11-19
Payer: COMMERCIAL

## 2021-11-19 VITALS — BODY MASS INDEX: 26.13 KG/M2 | HEIGHT: 62 IN | WEIGHT: 142 LBS

## 2021-11-19 DIAGNOSIS — S22.080D CLOSED WEDGE COMPRESSION FRACTURE OF T12 VERTEBRA WITH ROUTINE HEALING, SUBSEQUENT ENCOUNTER: Primary | ICD-10-CM

## 2021-11-19 DIAGNOSIS — R91.8 LUNG NODULES: Primary | ICD-10-CM

## 2021-11-19 PROCEDURE — 1159F PR MEDICATION LIST DOCUMENTED IN MEDICAL RECORD: ICD-10-PCS | Mod: CPTII,S$GLB,, | Performed by: PHYSICAL MEDICINE & REHABILITATION

## 2021-11-19 PROCEDURE — 3008F BODY MASS INDEX DOCD: CPT | Mod: CPTII,S$GLB,, | Performed by: PHYSICAL MEDICINE & REHABILITATION

## 2021-11-19 PROCEDURE — 99213 PR OFFICE/OUTPT VISIT, EST, LEVL III, 20-29 MIN: ICD-10-PCS | Mod: S$GLB,,, | Performed by: PHYSICAL MEDICINE & REHABILITATION

## 2021-11-19 PROCEDURE — 1160F PR REVIEW ALL MEDS BY PRESCRIBER/CLIN PHARMACIST DOCUMENTED: ICD-10-PCS | Mod: CPTII,S$GLB,, | Performed by: PHYSICAL MEDICINE & REHABILITATION

## 2021-11-19 PROCEDURE — 1159F MED LIST DOCD IN RCRD: CPT | Mod: CPTII,S$GLB,, | Performed by: PHYSICAL MEDICINE & REHABILITATION

## 2021-11-19 PROCEDURE — 3044F HG A1C LEVEL LT 7.0%: CPT | Mod: CPTII,S$GLB,, | Performed by: PHYSICAL MEDICINE & REHABILITATION

## 2021-11-19 PROCEDURE — 1160F RVW MEDS BY RX/DR IN RCRD: CPT | Mod: CPTII,S$GLB,, | Performed by: PHYSICAL MEDICINE & REHABILITATION

## 2021-11-19 PROCEDURE — 3008F PR BODY MASS INDEX (BMI) DOCUMENTED: ICD-10-PCS | Mod: CPTII,S$GLB,, | Performed by: PHYSICAL MEDICINE & REHABILITATION

## 2021-11-19 PROCEDURE — 3044F PR MOST RECENT HEMOGLOBIN A1C LEVEL <7.0%: ICD-10-PCS | Mod: CPTII,S$GLB,, | Performed by: PHYSICAL MEDICINE & REHABILITATION

## 2021-11-19 PROCEDURE — 99213 OFFICE O/P EST LOW 20 MIN: CPT | Mod: S$GLB,,, | Performed by: PHYSICAL MEDICINE & REHABILITATION

## 2021-11-22 ENCOUNTER — PATIENT OUTREACH (OUTPATIENT)
Dept: ADMINISTRATIVE | Facility: HOSPITAL | Age: 61
End: 2021-11-22
Payer: COMMERCIAL

## 2021-11-22 ENCOUNTER — PATIENT MESSAGE (OUTPATIENT)
Dept: ADMINISTRATIVE | Facility: HOSPITAL | Age: 61
End: 2021-11-22
Payer: COMMERCIAL

## 2021-11-24 ENCOUNTER — TELEPHONE (OUTPATIENT)
Dept: PAIN MEDICINE | Facility: CLINIC | Age: 61
End: 2021-11-24
Payer: COMMERCIAL

## 2021-11-24 ENCOUNTER — OFFICE VISIT (OUTPATIENT)
Dept: PAIN MEDICINE | Facility: CLINIC | Age: 61
End: 2021-11-24
Payer: COMMERCIAL

## 2021-11-24 VITALS
HEIGHT: 62 IN | WEIGHT: 142 LBS | DIASTOLIC BLOOD PRESSURE: 68 MMHG | HEART RATE: 83 BPM | BODY MASS INDEX: 26.13 KG/M2 | SYSTOLIC BLOOD PRESSURE: 110 MMHG

## 2021-11-24 DIAGNOSIS — S22.080D CLOSED WEDGE COMPRESSION FRACTURE OF T12 VERTEBRA WITH ROUTINE HEALING, SUBSEQUENT ENCOUNTER: ICD-10-CM

## 2021-11-24 DIAGNOSIS — S22.080A COMPRESSION FRACTURE OF T12 VERTEBRA, INITIAL ENCOUNTER: ICD-10-CM

## 2021-11-24 DIAGNOSIS — Z01.818 PRE-OP TESTING: ICD-10-CM

## 2021-11-24 DIAGNOSIS — M54.9 MID BACK PAIN: Primary | ICD-10-CM

## 2021-11-24 DIAGNOSIS — R53.81 PHYSICAL DECONDITIONING: ICD-10-CM

## 2021-11-24 PROCEDURE — 99999 PR PBB SHADOW E&M-EST. PATIENT-LVL V: CPT | Mod: PBBFAC,,, | Performed by: ANESTHESIOLOGY

## 2021-11-24 PROCEDURE — 99999 PR PBB SHADOW E&M-EST. PATIENT-LVL V: ICD-10-PCS | Mod: PBBFAC,,, | Performed by: ANESTHESIOLOGY

## 2021-11-24 PROCEDURE — 99204 OFFICE O/P NEW MOD 45 MIN: CPT | Mod: S$GLB,CS,, | Performed by: ANESTHESIOLOGY

## 2021-11-24 PROCEDURE — 99204 PR OFFICE/OUTPT VISIT, NEW, LEVL IV, 45-59 MIN: ICD-10-PCS | Mod: S$GLB,CS,, | Performed by: ANESTHESIOLOGY

## 2021-11-29 ENCOUNTER — TELEPHONE (OUTPATIENT)
Dept: SPINE | Facility: CLINIC | Age: 61
End: 2021-11-29
Payer: COMMERCIAL

## 2021-11-29 ENCOUNTER — PATIENT OUTREACH (OUTPATIENT)
Dept: ADMINISTRATIVE | Facility: HOSPITAL | Age: 61
End: 2021-11-29
Payer: COMMERCIAL

## 2021-11-29 DIAGNOSIS — S22.080D CLOSED WEDGE COMPRESSION FRACTURE OF T12 VERTEBRA WITH ROUTINE HEALING, SUBSEQUENT ENCOUNTER: ICD-10-CM

## 2021-11-29 RX ORDER — OXYCODONE AND ACETAMINOPHEN 5; 325 MG/1; MG/1
1 TABLET ORAL EVERY 6 HOURS PRN
Qty: 28 TABLET | Refills: 0 | Status: SHIPPED | OUTPATIENT
Start: 2021-11-29 | End: 2021-12-16 | Stop reason: SDUPTHER

## 2021-12-02 ENCOUNTER — TELEPHONE (OUTPATIENT)
Dept: PAIN MEDICINE | Facility: CLINIC | Age: 61
End: 2021-12-02
Payer: COMMERCIAL

## 2021-12-06 ENCOUNTER — TELEPHONE (OUTPATIENT)
Dept: PAIN MEDICINE | Facility: CLINIC | Age: 61
End: 2021-12-06
Payer: COMMERCIAL

## 2021-12-07 ENCOUNTER — HOSPITAL ENCOUNTER (OUTPATIENT)
Dept: PREADMISSION TESTING | Facility: HOSPITAL | Age: 61
Discharge: HOME | End: 2021-12-07
Attending: ANESTHESIOLOGY
Payer: COMMERCIAL

## 2021-12-07 ENCOUNTER — LAB VISIT (OUTPATIENT)
Dept: PRIMARY CARE CLINIC | Facility: CLINIC | Age: 61
End: 2021-12-07
Payer: COMMERCIAL

## 2021-12-07 DIAGNOSIS — Z01.818 PREOP TESTING: Primary | ICD-10-CM

## 2021-12-07 DIAGNOSIS — Z01.818 PRE-OP TESTING: ICD-10-CM

## 2021-12-07 DIAGNOSIS — Z01.818 PRE-OP EXAM: ICD-10-CM

## 2021-12-07 PROCEDURE — U0003 INFECTIOUS AGENT DETECTION BY NUCLEIC ACID (DNA OR RNA); SEVERE ACUTE RESPIRATORY SYNDROME CORONAVIRUS 2 (SARS-COV-2) (CORONAVIRUS DISEASE [COVID-19]), AMPLIFIED PROBE TECHNIQUE, MAKING USE OF HIGH THROUGHPUT TECHNOLOGIES AS DESCRIBED BY CMS-2020-01-R: HCPCS | Performed by: ANESTHESIOLOGY

## 2021-12-07 PROCEDURE — 93010 EKG 12-LEAD: ICD-10-PCS | Mod: ,,, | Performed by: INTERNAL MEDICINE

## 2021-12-07 PROCEDURE — 93010 ELECTROCARDIOGRAM REPORT: CPT | Mod: ,,, | Performed by: INTERNAL MEDICINE

## 2021-12-07 PROCEDURE — U0005 INFEC AGEN DETEC AMPLI PROBE: HCPCS | Performed by: ANESTHESIOLOGY

## 2021-12-07 PROCEDURE — 93005 ELECTROCARDIOGRAM TRACING: CPT

## 2021-12-07 PROCEDURE — 99900103 DSU ONLY-NO CHARGE-INITIAL HR (STAT)

## 2021-12-07 PROCEDURE — 99900104 DSU ONLY-NO CHARGE-EA ADD'L HR (STAT)

## 2021-12-08 LAB
SARS-COV-2 RNA RESP QL NAA+PROBE: NOT DETECTED
SARS-COV-2- CYCLE NUMBER: NORMAL

## 2021-12-09 ENCOUNTER — ANESTHESIA EVENT (OUTPATIENT)
Dept: SURGERY | Facility: HOSPITAL | Age: 61
End: 2021-12-09
Payer: COMMERCIAL

## 2021-12-10 ENCOUNTER — PATIENT OUTREACH (OUTPATIENT)
Dept: ADMINISTRATIVE | Facility: HOSPITAL | Age: 61
End: 2021-12-10
Payer: COMMERCIAL

## 2021-12-10 ENCOUNTER — ANESTHESIA (OUTPATIENT)
Dept: SURGERY | Facility: HOSPITAL | Age: 61
End: 2021-12-10
Payer: COMMERCIAL

## 2021-12-10 ENCOUNTER — HOSPITAL ENCOUNTER (OUTPATIENT)
Facility: HOSPITAL | Age: 61
Discharge: HOME | End: 2021-12-10
Attending: ANESTHESIOLOGY | Admitting: ANESTHESIOLOGY
Payer: COMMERCIAL

## 2021-12-10 ENCOUNTER — PATIENT MESSAGE (OUTPATIENT)
Dept: ADMINISTRATIVE | Facility: HOSPITAL | Age: 61
End: 2021-12-10
Payer: COMMERCIAL

## 2021-12-10 DIAGNOSIS — M80.08XA AGE-RELATED OSTEOPOROSIS WITH CURRENT PATHOLOGICAL FRACTURE, VERTEBRA(E), INITIAL ENCOUNTER FOR FRACTURE: Primary | ICD-10-CM

## 2021-12-10 DIAGNOSIS — M80.08XS AGE-RELATED OSTEOPOROSIS WITH CURRENT PATHOLOGICAL FRACTURE, VERTEBRA(E), SEQUELA: ICD-10-CM

## 2021-12-10 PROCEDURE — D9220A PRA ANESTHESIA: Mod: ,,, | Performed by: NURSE ANESTHETIST, CERTIFIED REGISTERED

## 2021-12-10 PROCEDURE — 27201423 OPTIME MED/SURG SUP & DEVICES STERILE SUPPLY: Performed by: ANESTHESIOLOGY

## 2021-12-10 PROCEDURE — 25000003 PHARM REV CODE 250: Performed by: ANESTHESIOLOGY

## 2021-12-10 PROCEDURE — 99900103 DSU ONLY-NO CHARGE-INITIAL HR (STAT): Performed by: ANESTHESIOLOGY

## 2021-12-10 PROCEDURE — 71000033 HC RECOVERY, INTIAL HOUR: Performed by: ANESTHESIOLOGY

## 2021-12-10 PROCEDURE — 22513 PR PERQ VERTEBRAL AUGMENTATION UNI/BILAT THORACIC: ICD-10-PCS | Mod: ,,, | Performed by: ANESTHESIOLOGY

## 2021-12-10 PROCEDURE — 36000706: Performed by: ANESTHESIOLOGY

## 2021-12-10 PROCEDURE — D9220A PRA ANESTHESIA: ICD-10-PCS | Mod: ,,, | Performed by: ANESTHESIOLOGY

## 2021-12-10 PROCEDURE — 99900104 DSU ONLY-NO CHARGE-EA ADD'L HR (STAT): Performed by: ANESTHESIOLOGY

## 2021-12-10 PROCEDURE — 25500020 PHARM REV CODE 255: Performed by: ANESTHESIOLOGY

## 2021-12-10 PROCEDURE — 63600175 PHARM REV CODE 636 W HCPCS: Performed by: NURSE ANESTHETIST, CERTIFIED REGISTERED

## 2021-12-10 PROCEDURE — 22513 PERQ VERTEBRAL AUGMENTATION: CPT | Mod: ,,, | Performed by: ANESTHESIOLOGY

## 2021-12-10 PROCEDURE — 37000009 HC ANESTHESIA EA ADD 15 MINS: Performed by: ANESTHESIOLOGY

## 2021-12-10 PROCEDURE — 37000008 HC ANESTHESIA 1ST 15 MINUTES: Performed by: ANESTHESIOLOGY

## 2021-12-10 PROCEDURE — C1713 ANCHOR/SCREW BN/BN,TIS/BN: HCPCS | Performed by: ANESTHESIOLOGY

## 2021-12-10 PROCEDURE — C1894 INTRO/SHEATH, NON-LASER: HCPCS | Performed by: ANESTHESIOLOGY

## 2021-12-10 PROCEDURE — C1726 CATH, BAL DIL, NON-VASCULAR: HCPCS | Performed by: ANESTHESIOLOGY

## 2021-12-10 PROCEDURE — D9220A PRA ANESTHESIA: Mod: ,,, | Performed by: ANESTHESIOLOGY

## 2021-12-10 PROCEDURE — 71000039 HC RECOVERY, EACH ADD'L HOUR: Performed by: ANESTHESIOLOGY

## 2021-12-10 PROCEDURE — 71000015 HC POSTOP RECOV 1ST HR: Performed by: ANESTHESIOLOGY

## 2021-12-10 PROCEDURE — 36000707: Performed by: ANESTHESIOLOGY

## 2021-12-10 PROCEDURE — D9220A PRA ANESTHESIA: ICD-10-PCS | Mod: ,,, | Performed by: NURSE ANESTHETIST, CERTIFIED REGISTERED

## 2021-12-10 DEVICE — IMPLANTABLE DEVICE: Type: IMPLANTABLE DEVICE | Site: SPINE THORACIC | Status: FUNCTIONAL

## 2021-12-10 RX ORDER — SODIUM CHLORIDE 0.9 % (FLUSH) 0.9 %
10 SYRINGE (ML) INJECTION
Status: DISCONTINUED | OUTPATIENT
Start: 2021-12-10 | End: 2021-12-10 | Stop reason: HOSPADM

## 2021-12-10 RX ORDER — ACETAMINOPHEN 10 MG/ML
INJECTION, SOLUTION INTRAVENOUS
Status: DISCONTINUED | OUTPATIENT
Start: 2021-12-10 | End: 2021-12-10

## 2021-12-10 RX ORDER — BUPIVACAINE HYDROCHLORIDE AND EPINEPHRINE 2.5; 5 MG/ML; UG/ML
INJECTION, SOLUTION EPIDURAL; INFILTRATION; INTRACAUDAL; PERINEURAL
Status: DISCONTINUED | OUTPATIENT
Start: 2021-12-10 | End: 2021-12-10 | Stop reason: HOSPADM

## 2021-12-10 RX ORDER — METOCLOPRAMIDE HYDROCHLORIDE 5 MG/ML
10 INJECTION INTRAMUSCULAR; INTRAVENOUS EVERY 10 MIN PRN
Status: DISCONTINUED | OUTPATIENT
Start: 2021-12-10 | End: 2021-12-10 | Stop reason: HOSPADM

## 2021-12-10 RX ORDER — FENTANYL CITRATE 50 UG/ML
25 INJECTION, SOLUTION INTRAMUSCULAR; INTRAVENOUS EVERY 5 MIN PRN
Status: DISCONTINUED | OUTPATIENT
Start: 2021-12-10 | End: 2021-12-10 | Stop reason: HOSPADM

## 2021-12-10 RX ORDER — LIDOCAINE HYDROCHLORIDE 10 MG/ML
1 INJECTION, SOLUTION EPIDURAL; INFILTRATION; INTRACAUDAL; PERINEURAL ONCE
Status: COMPLETED | OUTPATIENT
Start: 2021-12-10 | End: 2021-12-10

## 2021-12-10 RX ORDER — LIDOCAINE HYDROCHLORIDE 10 MG/ML
INJECTION, SOLUTION EPIDURAL; INFILTRATION; INTRACAUDAL; PERINEURAL
Status: DISCONTINUED | OUTPATIENT
Start: 2021-12-10 | End: 2021-12-10 | Stop reason: HOSPADM

## 2021-12-10 RX ORDER — PROPOFOL 10 MG/ML
VIAL (ML) INTRAVENOUS CONTINUOUS PRN
Status: DISCONTINUED | OUTPATIENT
Start: 2021-12-10 | End: 2021-12-10

## 2021-12-10 RX ORDER — SODIUM CHLORIDE, SODIUM LACTATE, POTASSIUM CHLORIDE, CALCIUM CHLORIDE 600; 310; 30; 20 MG/100ML; MG/100ML; MG/100ML; MG/100ML
INJECTION, SOLUTION INTRAVENOUS CONTINUOUS
Status: ACTIVE | OUTPATIENT
Start: 2021-12-10

## 2021-12-10 RX ORDER — ONDANSETRON 2 MG/ML
INJECTION INTRAMUSCULAR; INTRAVENOUS
Status: DISCONTINUED | OUTPATIENT
Start: 2021-12-10 | End: 2021-12-10

## 2021-12-10 RX ORDER — CLINDAMYCIN PHOSPHATE 900 MG/50ML
900 INJECTION, SOLUTION INTRAVENOUS
Status: COMPLETED | OUTPATIENT
Start: 2021-12-10 | End: 2021-12-10

## 2021-12-10 RX ORDER — OXYCODONE HYDROCHLORIDE 5 MG/1
5 TABLET ORAL
Status: DISCONTINUED | OUTPATIENT
Start: 2021-12-10 | End: 2021-12-10 | Stop reason: HOSPADM

## 2021-12-10 RX ORDER — FENTANYL CITRATE 50 UG/ML
INJECTION, SOLUTION INTRAMUSCULAR; INTRAVENOUS
Status: DISCONTINUED | OUTPATIENT
Start: 2021-12-10 | End: 2021-12-10

## 2021-12-10 RX ORDER — OXYCODONE AND ACETAMINOPHEN 5; 325 MG/1; MG/1
1 TABLET ORAL EVERY 6 HOURS PRN
Qty: 28 TABLET | Refills: 0 | Status: SHIPPED | OUTPATIENT
Start: 2021-12-10 | End: 2022-01-09

## 2021-12-10 RX ORDER — MIDAZOLAM HYDROCHLORIDE 1 MG/ML
INJECTION, SOLUTION INTRAMUSCULAR; INTRAVENOUS
Status: DISCONTINUED | OUTPATIENT
Start: 2021-12-10 | End: 2021-12-10

## 2021-12-10 RX ADMIN — FENTANYL CITRATE 50 MCG: 50 INJECTION, SOLUTION INTRAMUSCULAR; INTRAVENOUS at 08:12

## 2021-12-10 RX ADMIN — LIDOCAINE HYDROCHLORIDE 10 MG: 10 INJECTION, SOLUTION EPIDURAL; INFILTRATION; INTRACAUDAL; PERINEURAL at 06:12

## 2021-12-10 RX ADMIN — ACETAMINOPHEN 1000 MG: 10 INJECTION, SOLUTION INTRAVENOUS at 08:12

## 2021-12-10 RX ADMIN — CLINDAMYCIN PHOSPHATE 900 MG: 18 INJECTION, SOLUTION INTRAVENOUS at 08:12

## 2021-12-10 RX ADMIN — ONDANSETRON 4 MG: 2 INJECTION, SOLUTION INTRAMUSCULAR; INTRAVENOUS at 08:12

## 2021-12-10 RX ADMIN — SODIUM CHLORIDE, SODIUM GLUCONATE, SODIUM ACETATE, POTASSIUM CHLORIDE, MAGNESIUM CHLORIDE, SODIUM PHOSPHATE, DIBASIC, AND POTASSIUM PHOSPHATE: .53; .5; .37; .037; .03; .012; .00082 INJECTION, SOLUTION INTRAVENOUS at 06:12

## 2021-12-10 RX ADMIN — PROPOFOL 50 MCG/KG/MIN: 10 INJECTION, EMULSION INTRAVENOUS at 08:12

## 2021-12-10 RX ADMIN — MIDAZOLAM 2 MG: 1 INJECTION INTRAMUSCULAR; INTRAVENOUS at 08:12

## 2021-12-13 ENCOUNTER — TELEPHONE (OUTPATIENT)
Dept: SPINE | Facility: CLINIC | Age: 61
End: 2021-12-13
Payer: COMMERCIAL

## 2021-12-13 ENCOUNTER — LAB VISIT (OUTPATIENT)
Dept: LAB | Facility: HOSPITAL | Age: 61
End: 2021-12-13
Attending: FAMILY MEDICINE
Payer: COMMERCIAL

## 2021-12-13 VITALS
BODY MASS INDEX: 26.13 KG/M2 | HEART RATE: 78 BPM | OXYGEN SATURATION: 99 % | RESPIRATION RATE: 18 BRPM | SYSTOLIC BLOOD PRESSURE: 99 MMHG | DIASTOLIC BLOOD PRESSURE: 56 MMHG | HEIGHT: 62 IN | WEIGHT: 142 LBS | TEMPERATURE: 98 F

## 2021-12-13 DIAGNOSIS — Z12.11 ENCOUNTER FOR FIT (FECAL IMMUNOCHEMICAL TEST) SCREENING: ICD-10-CM

## 2021-12-13 PROCEDURE — 82274 ASSAY TEST FOR BLOOD FECAL: CPT | Performed by: FAMILY MEDICINE

## 2021-12-15 DIAGNOSIS — Z12.31 OTHER SCREENING MAMMOGRAM: ICD-10-CM

## 2021-12-16 ENCOUNTER — OFFICE VISIT (OUTPATIENT)
Dept: SPINE | Facility: CLINIC | Age: 61
End: 2021-12-16
Payer: COMMERCIAL

## 2021-12-16 ENCOUNTER — PATIENT OUTREACH (OUTPATIENT)
Dept: ADMINISTRATIVE | Facility: OTHER | Age: 61
End: 2021-12-16
Payer: COMMERCIAL

## 2021-12-16 VITALS — HEIGHT: 62 IN | WEIGHT: 142 LBS | RESPIRATION RATE: 18 BRPM | BODY MASS INDEX: 26.13 KG/M2

## 2021-12-16 DIAGNOSIS — M54.50 BILATERAL LOW BACK PAIN WITHOUT SCIATICA, UNSPECIFIED CHRONICITY: Primary | ICD-10-CM

## 2021-12-16 DIAGNOSIS — S22.080D CLOSED WEDGE COMPRESSION FRACTURE OF T12 VERTEBRA WITH ROUTINE HEALING, SUBSEQUENT ENCOUNTER: ICD-10-CM

## 2021-12-16 PROCEDURE — 99213 OFFICE O/P EST LOW 20 MIN: CPT | Mod: S$GLB,,, | Performed by: PHYSICAL MEDICINE & REHABILITATION

## 2021-12-16 PROCEDURE — 99213 PR OFFICE/OUTPT VISIT, EST, LEVL III, 20-29 MIN: ICD-10-PCS | Mod: S$GLB,,, | Performed by: PHYSICAL MEDICINE & REHABILITATION

## 2021-12-16 RX ORDER — OXYCODONE AND ACETAMINOPHEN 5; 325 MG/1; MG/1
1 TABLET ORAL EVERY 6 HOURS PRN
Qty: 28 TABLET | Refills: 0 | Status: SHIPPED | OUTPATIENT
Start: 2021-12-16 | End: 2022-01-20 | Stop reason: SDUPTHER

## 2021-12-28 DIAGNOSIS — Z12.11 ENCOUNTER FOR FIT (FECAL IMMUNOCHEMICAL TEST) SCREENING: Primary | ICD-10-CM

## 2021-12-30 DIAGNOSIS — R19.5 POSITIVE FIT (FECAL IMMUNOCHEMICAL TEST): Primary | ICD-10-CM

## 2021-12-30 LAB — HEMOCCULT STL QL IA: POSITIVE

## 2022-01-20 ENCOUNTER — TELEPHONE (OUTPATIENT)
Dept: SPINE | Facility: CLINIC | Age: 62
End: 2022-01-20
Payer: COMMERCIAL

## 2022-01-20 DIAGNOSIS — S22.080D CLOSED WEDGE COMPRESSION FRACTURE OF T12 VERTEBRA WITH ROUTINE HEALING, SUBSEQUENT ENCOUNTER: ICD-10-CM

## 2022-01-20 RX ORDER — OXYCODONE AND ACETAMINOPHEN 5; 325 MG/1; MG/1
1 TABLET ORAL EVERY 6 HOURS PRN
Qty: 28 TABLET | Refills: 0 | Status: SHIPPED | OUTPATIENT
Start: 2022-01-20 | End: 2022-02-03 | Stop reason: SDUPTHER

## 2022-01-20 NOTE — TELEPHONE ENCOUNTER
----- Message from Stacey M Lefort sent at 1/20/2022  8:42 AM CST -----  Pt is requesting a refill on her pain medication. Thank you.

## 2022-01-21 ENCOUNTER — PATIENT MESSAGE (OUTPATIENT)
Dept: GASTROENTEROLOGY | Facility: CLINIC | Age: 62
End: 2022-01-21
Payer: COMMERCIAL

## 2022-01-30 ENCOUNTER — PATIENT OUTREACH (OUTPATIENT)
Dept: ADMINISTRATIVE | Facility: OTHER | Age: 62
End: 2022-01-30
Payer: COMMERCIAL

## 2022-02-03 ENCOUNTER — OFFICE VISIT (OUTPATIENT)
Dept: SPINE | Facility: CLINIC | Age: 62
End: 2022-02-03
Payer: COMMERCIAL

## 2022-02-03 VITALS — BODY MASS INDEX: 26.13 KG/M2 | WEIGHT: 142 LBS | HEIGHT: 62 IN

## 2022-02-03 DIAGNOSIS — M54.50 BILATERAL LOW BACK PAIN WITHOUT SCIATICA, UNSPECIFIED CHRONICITY: Primary | ICD-10-CM

## 2022-02-03 DIAGNOSIS — S22.080D CLOSED WEDGE COMPRESSION FRACTURE OF T12 VERTEBRA WITH ROUTINE HEALING, SUBSEQUENT ENCOUNTER: ICD-10-CM

## 2022-02-03 PROCEDURE — 1159F PR MEDICATION LIST DOCUMENTED IN MEDICAL RECORD: ICD-10-PCS | Mod: CPTII,S$GLB,, | Performed by: PHYSICAL MEDICINE & REHABILITATION

## 2022-02-03 PROCEDURE — 99213 PR OFFICE/OUTPT VISIT, EST, LEVL III, 20-29 MIN: ICD-10-PCS | Mod: S$GLB,,, | Performed by: PHYSICAL MEDICINE & REHABILITATION

## 2022-02-03 PROCEDURE — 99213 OFFICE O/P EST LOW 20 MIN: CPT | Mod: S$GLB,,, | Performed by: PHYSICAL MEDICINE & REHABILITATION

## 2022-02-03 PROCEDURE — 1159F MED LIST DOCD IN RCRD: CPT | Mod: CPTII,S$GLB,, | Performed by: PHYSICAL MEDICINE & REHABILITATION

## 2022-02-03 PROCEDURE — 3008F PR BODY MASS INDEX (BMI) DOCUMENTED: ICD-10-PCS | Mod: CPTII,S$GLB,, | Performed by: PHYSICAL MEDICINE & REHABILITATION

## 2022-02-03 PROCEDURE — 1160F RVW MEDS BY RX/DR IN RCRD: CPT | Mod: CPTII,S$GLB,, | Performed by: PHYSICAL MEDICINE & REHABILITATION

## 2022-02-03 PROCEDURE — 1160F PR REVIEW ALL MEDS BY PRESCRIBER/CLIN PHARMACIST DOCUMENTED: ICD-10-PCS | Mod: CPTII,S$GLB,, | Performed by: PHYSICAL MEDICINE & REHABILITATION

## 2022-02-03 PROCEDURE — 3008F BODY MASS INDEX DOCD: CPT | Mod: CPTII,S$GLB,, | Performed by: PHYSICAL MEDICINE & REHABILITATION

## 2022-02-03 RX ORDER — OXYCODONE AND ACETAMINOPHEN 5; 325 MG/1; MG/1
1 TABLET ORAL EVERY 6 HOURS PRN
Qty: 28 TABLET | Refills: 0 | Status: SHIPPED | OUTPATIENT
Start: 2022-02-03 | End: 2022-03-21 | Stop reason: SDUPTHER

## 2022-02-03 NOTE — PROGRESS NOTES
"  SUBJECTIVE:    Patient ID: Cristin Segal is a 61 y.o. female.    Chief Complaint: Follow-up (PT) and Low-back Pain    She is here for 6 weeks follow-up.  She completed her initial 6 weeks course of physical therapy.  She is making excellent progress with that.  Still complaining of pain at the lumbosacral junction.  Requiring less narcotic medication.  Current pain level is 3/10.  No new or progressive problems        Past Medical History:   Diagnosis Date    Arthritis     Cataract     Diabetes mellitus type II     diet controlled    Fracture     left 4th finger  - splinted    Hyperlipidemia     MCTD (mixed connective tissue disease)     Raynaud's disease     Thyroid disease     Wears glasses     contacs     Social History     Socioeconomic History    Marital status:    Occupational History     Employer: [employer]   Tobacco Use    Smoking status: Current Every Day Smoker     Packs/day: 0.50    Smokeless tobacco: Never Used   Substance and Sexual Activity    Alcohol use: Yes     Comment: occasional    Drug use: No    Sexual activity: Not Currently     Past Surgical History:   Procedure Laterality Date    CARPAL TUNNEL RELEASE      right    CATARACT EXTRACTION W/ INTRAOCULAR LENS  IMPLANT, BILATERAL      EXCISION OF PAROTID GLAND Right 8/1/2019    Procedure: PAROTIDECTOMY;  Surgeon: Abner Maki MD;  [facility];  Service: ENT;  Laterality: Right;    HIP SURGERY Left 6/18/15    JANA    JOINT REPLACEMENT Bilateral     HIP    KNEE CARTILAGE SURGERY      right    TOTAL HIP ARTHROPLASTY Right 05/19/2016    JANA     Family History   Problem Relation Age of Onset    Diabetes Mother     Kidney disease Mother     Aneurysm Mother 73        brain    Hyperlipidemia Mother     Hypertension Sister     Breast cancer Sister     Diabetes Sister      Vitals:    02/03/22 0957   Weight: 64.4 kg (142 lb)   Height: 5' 2" (1.575 m)       Review of Systems "   Constitutional: Negative for chills, diaphoresis, fatigue, fever and unexpected weight change.   HENT: Negative for trouble swallowing.    Eyes: Negative for visual disturbance.   Respiratory: Negative for shortness of breath.    Cardiovascular: Negative for chest pain.   Gastrointestinal: Negative for abdominal pain, constipation, nausea and vomiting.   Genitourinary: Negative for difficulty urinating.   Musculoskeletal: Negative for arthralgias, back pain, gait problem, joint swelling, myalgias, neck pain and neck stiffness.   Neurological: Negative for dizziness, speech difficulty, weakness, light-headedness, numbness and headaches.          Objective:      Physical Exam  Neurological:      Mental Status: She is alert and oriented to person, place, and time.      Comments: No point tenderness or palpable masses about the lumbar spine             Assessment:       1. Bilateral low back pain without sciatica, unspecified chronicity           Plan:     improving with physical therapy.  She would like to continue with therapy.  At this point I think she can follow-up on an as-needed basis.      Bilateral low back pain without sciatica, unspecified chronicity  -     Ambulatory referral/consult to Physical/Occupational Therapy; Future; Expected date: 02/10/2022

## 2022-02-11 ENCOUNTER — HOSPITAL ENCOUNTER (OUTPATIENT)
Dept: RADIOLOGY | Facility: HOSPITAL | Age: 62
Discharge: HOME | End: 2022-02-11
Attending: NURSE PRACTITIONER
Payer: COMMERCIAL

## 2022-02-11 ENCOUNTER — OFFICE VISIT (OUTPATIENT)
Dept: PULMONOLOGY | Facility: CLINIC | Age: 62
End: 2022-02-11
Payer: COMMERCIAL

## 2022-02-11 VITALS
WEIGHT: 144.06 LBS | SYSTOLIC BLOOD PRESSURE: 118 MMHG | HEART RATE: 96 BPM | OXYGEN SATURATION: 99 % | DIASTOLIC BLOOD PRESSURE: 74 MMHG | BODY MASS INDEX: 26.51 KG/M2 | HEIGHT: 62 IN

## 2022-02-11 DIAGNOSIS — J45.40 MODERATE PERSISTENT ASTHMA WITHOUT COMPLICATION: ICD-10-CM

## 2022-02-11 DIAGNOSIS — R91.8 MULTIPLE NODULES OF LUNG: ICD-10-CM

## 2022-02-11 DIAGNOSIS — R91.8 LUNG NODULES: ICD-10-CM

## 2022-02-11 DIAGNOSIS — R91.1 SOLID NODULE OF LUNG GREATER THAN 8 MM IN DIAMETER: Primary | ICD-10-CM

## 2022-02-11 PROCEDURE — 71250 CT THORAX DX C-: CPT | Mod: TC

## 2022-02-11 PROCEDURE — 3078F PR MOST RECENT DIASTOLIC BLOOD PRESSURE < 80 MM HG: ICD-10-PCS | Mod: CPTII,S$GLB,, | Performed by: NURSE PRACTITIONER

## 2022-02-11 PROCEDURE — 3074F SYST BP LT 130 MM HG: CPT | Mod: CPTII,S$GLB,, | Performed by: NURSE PRACTITIONER

## 2022-02-11 PROCEDURE — 1159F MED LIST DOCD IN RCRD: CPT | Mod: CPTII,S$GLB,, | Performed by: NURSE PRACTITIONER

## 2022-02-11 PROCEDURE — 3008F BODY MASS INDEX DOCD: CPT | Mod: CPTII,S$GLB,, | Performed by: NURSE PRACTITIONER

## 2022-02-11 PROCEDURE — 99999 PR PBB SHADOW E&M-EST. PATIENT-LVL IV: CPT | Mod: PBBFAC,,, | Performed by: NURSE PRACTITIONER

## 2022-02-11 PROCEDURE — 99214 OFFICE O/P EST MOD 30 MIN: CPT | Mod: S$GLB,,, | Performed by: NURSE PRACTITIONER

## 2022-02-11 PROCEDURE — 71250 CT CHEST WITHOUT CONTRAST: ICD-10-PCS | Mod: 26,,, | Performed by: RADIOLOGY

## 2022-02-11 PROCEDURE — 71250 CT THORAX DX C-: CPT | Mod: 26,,, | Performed by: RADIOLOGY

## 2022-02-11 PROCEDURE — 99214 PR OFFICE/OUTPT VISIT, EST, LEVL IV, 30-39 MIN: ICD-10-PCS | Mod: S$GLB,,, | Performed by: NURSE PRACTITIONER

## 2022-02-11 PROCEDURE — 1159F PR MEDICATION LIST DOCUMENTED IN MEDICAL RECORD: ICD-10-PCS | Mod: CPTII,S$GLB,, | Performed by: NURSE PRACTITIONER

## 2022-02-11 PROCEDURE — 3078F DIAST BP <80 MM HG: CPT | Mod: CPTII,S$GLB,, | Performed by: NURSE PRACTITIONER

## 2022-02-11 PROCEDURE — 99999 PR PBB SHADOW E&M-EST. PATIENT-LVL IV: ICD-10-PCS | Mod: PBBFAC,,, | Performed by: NURSE PRACTITIONER

## 2022-02-11 PROCEDURE — 3008F PR BODY MASS INDEX (BMI) DOCUMENTED: ICD-10-PCS | Mod: CPTII,S$GLB,, | Performed by: NURSE PRACTITIONER

## 2022-02-11 PROCEDURE — 3074F PR MOST RECENT SYSTOLIC BLOOD PRESSURE < 130 MM HG: ICD-10-PCS | Mod: CPTII,S$GLB,, | Performed by: NURSE PRACTITIONER

## 2022-02-11 RX ORDER — ALBUTEROL SULFATE 90 UG/1
2 AEROSOL, METERED RESPIRATORY (INHALATION) EVERY 4 HOURS PRN
Qty: 18 G | Refills: 11 | Status: SHIPPED | OUTPATIENT
Start: 2022-02-11 | End: 2022-03-21

## 2022-02-11 RX ORDER — BUDESONIDE AND FORMOTEROL FUMARATE DIHYDRATE 160; 4.5 UG/1; UG/1
2 AEROSOL RESPIRATORY (INHALATION) EVERY 12 HOURS
Qty: 10.2 G | Refills: 11 | Status: SHIPPED | OUTPATIENT
Start: 2022-02-11 | End: 2022-05-11

## 2022-02-11 NOTE — PATIENT INSTRUCTIONS
Stop Trelegy and start Symbicort 2 puffs twice daily    Albuterol Inhaler 1-2 puffs every 4 hours, for cough or shortness of breath    Bring sputum sample to any [facility] lab with in 4 hours of collecting      CT of chest shows increase on one lung nodule. Ordering PET scan at [facility]

## 2022-02-11 NOTE — PROGRESS NOTES
2/11/2022    Cristin Marerrbrad Segal  Office Note    Chief Complaint   Patient presents with    3m f/u    Cough       HPI:  2/11/22- cough- recurrent complaint, less frequent in past 3 months. Insurance denied Xopenex. Took Trelegy for 2 weeks with no benefit.   Currently smoking under 1 pack daily goal to cut down to 1/2 pack daily.    SOB- only when hot wearing face mask. Improves with breathing cold air. 1x weekly      11/11/2021- Cough- onset years, no change with time, worse in early mornings, improves after expelling mucous, productive clear mucous, no chest tightness or wheeze,   No complaint of shortness of breath. Followed by cardiologist for MV prolapse and chest pain, on crestor for cholesterol. Followed by Dr. Guzman for auto immune disease.   Social Hx: lives with , 3 dogs, and one chockatoo for 34 years, on medical leave, working as medical technologist [employer], no known Asbestosis exposure, Smoking Hx: currently smoking 1/2-1 pack daily. 45 pack years. Tried smoking cessation with no benefit. Tried Chantix.   Family Hx: no Lung Cancer, no COPD, no Asthma  Medical Hx: no previous pneumonia ; no previous shoulder/chest surgery        The chief compliant  problem varies with instablilty at time    PFSH:  Past Medical History:   Diagnosis Date    Arthritis     Cataract     Diabetes mellitus type II     diet controlled    Fracture     left 4th finger  - splinted    Hyperlipidemia     MCTD (mixed connective tissue disease)     Raynaud's disease     Thyroid disease     Wears glasses     contacs         Past Surgical History:   Procedure Laterality Date    CARPAL TUNNEL RELEASE      right    CATARACT EXTRACTION W/ INTRAOCULAR LENS  IMPLANT, BILATERAL      EXCISION OF PAROTID GLAND Right 8/1/2019    Procedure: PAROTIDECTOMY;  Surgeon: Abner Maki MD;  Location: [facility];  Service: ENT;  Laterality: Right;    HIP SURGERY Left 6/18/15    JANA    JOINT REPLACEMENT Bilateral     HIP    KNEE  "CARTILAGE SURGERY      right    TOTAL HIP ARTHROPLASTY Right 05/19/2016    JANA     Social History     Tobacco Use    Smoking status: Current Every Day Smoker     Packs/day: 0.50    Smokeless tobacco: Never Used   Substance Use Topics    Alcohol use: Yes     Comment: occasional    Drug use: No     Family History   Problem Relation Age of Onset    Diabetes Mother     Kidney disease Mother     Aneurysm Mother 73        brain    Hyperlipidemia Mother     Hypertension Sister     Breast cancer Sister     Diabetes Sister      Review of patient's allergies indicates:   Allergen Reactions    Pcn [penicillins] Anaphylaxis     Unknown for her- family history with anaphylaxis    Lipitor [atorvastatin]      I have reviewed past medical, family, and social history. I have reviewed previous nurse notes.    Performance Status:The patient's activity level is no limits with regular activity.      Review of Systems:  a review of eleven systems covering constitutional, Eye, HEENT, Psych, Respiratory, Cardiac, GI, , Musculoskeletal, Endocrine, Dermatologic was negative except for pertinent findings as listed ABOVE and below: pertinent positive as above, rest is good  Cough, shortness of breath         Exam:Comprehensive exam done. /74 (BP Location: Left arm, Patient Position: Sitting, BP Method: Medium (Automatic))   Pulse 96   Ht 5' 2" (1.575 m)   Wt 65.4 kg (144 lb 1.1 oz)   SpO2 99% Comment: on room air at rest  BMI 26.35 kg/m²   Exam included Vitals as listed, and patient's appearance and affect and alertness and mood, oral exam for yeast and hygiene and pharynx lesions and Mallapatti (M) score, neck with inspection for jvd and masses and thyroid abnormalities and lymph nodes (supraclavicular and infraclavicular nodes and axillary also examined and noted if abn), chest exam included symmetry and effort and fremitus and percussion and auscultation, cardiac exam included rhythm and gallops and murmur and " rubs and jvd and edema, abdominal exam for mass and hepatosplenomegaly and tenderness and hernias and bowel sounds, Musculoskeletal exam with muscle tone and posture and mobility/gait and  strength, and skin for rashes and cyanosis and pallor and turgor, extremity for clubbing.  Findings were normal except for pertinent findings listed below: M2.         Radiographs (ct chest and cxr) reviewed: view by direct vision   CT CHEST WITHOUT CONTRAST 02/11/2022   12 mm partially solid and partially ground-glass nodule identified in the left upper lobe peripherally is slightly increased in size from the prior study.  Neoplasm is suspected.  Recommend PET CT or biopsy.     Transthoracic echo (TTE) complete 9/20/21 normal    X-Ray Chest PA And Lateral  10/05/2021 Lungs clear      Labs reviewed      Lab Results   Component Value Date    WBC 10.11 11/03/2021    RBC 3.40 (L) 11/03/2021    HGB 11.8 (L) 11/03/2021    HCT 35.2 (L) 11/03/2021     (H) 11/03/2021    MCH 34.7 (H) 11/03/2021    MCHC 33.5 11/03/2021    RDW 13.9 11/03/2021     11/03/2021    MPV 8.9 (L) 11/03/2021    GRAN 4.4 11/03/2021    GRAN 43.8 11/03/2021    LYMPH 4.3 11/03/2021    LYMPH 42.5 11/03/2021    MONO 0.9 11/03/2021    MONO 8.5 11/03/2021    EOS 0.4 11/03/2021    BASO 0.11 11/03/2021    EOSINOPHIL 3.9 11/03/2021    BASOPHIL 1.1 11/03/2021       PFT reviewed  Pulmonary Functions Testing Results:  Spirometry bronchodilator, lung volumes by gas dilution, diffusion capacity measured November 18, 2021. the FEV1 FVC ratio was 80%- there is no airflow obstruction measured by spirometry. The FEV1 measures 88% of predicted at 2 L. There was no   significant improvement in spirometry following bronchodilator. Lung volumes are normal with total lung capacity of 80% of predicted. Diffusion was minimally decreased to 75% of predicted.   Â    Spirometry and lung volumes are both normal. There was no bronchodilator response of significance. Diffusion was  only minimally decreased. Clinical correlation would be recommended.     I spent over 30 mins of time with the patient. Reviewed results of the recently ordered labs, tests and studies; made directives with regards to the results. Over half of this time was spent couseling and coordinating care.     I have explained all of the above in detail and the patient understands all of the current recommendation(s). I have answered all of their questions to the best of my ability and to their complete satisfaction.   The patient is to continue with the current management plan.      Plan:  Clinical impression is apparently straight forward and impression with management as below.    Cristin was seen today for 3m f/u and cough.    Diagnoses and all orders for this visit:    Solid nodule of lung greater than 8 mm in diameter  -     NM PET CT Routine Skull to Mid Thigh; Future    Moderate persistent asthma without complication  -     budesonide-formoterol 160-4.5 mcg (SYMBICORT) 160-4.5 mcg/actuation HFAA; Inhale 2 puffs into the lungs every 12 (twelve) hours. Controller  -     albuterol (VENTOLIN HFA) 90 mcg/actuation inhaler; Inhale 2 puffs into the lungs every 4 (four) hours as needed for Wheezing or Shortness of Breath. Rescue    Multiple nodules of lung  -     AFB Culture & Smear; Future  -     Culture, Respiratory with Gram Stain; Future        Follow up in about 3 months (around 5/11/2022), or if symptoms worsen or fail to improve.    Discussed with patient above for education the following:      Patient Instructions   Stop Trelegy and start Symbicort 2 puffs twice daily    Albuterol Inhaler 1-2 puffs every 4 hours, for cough or shortness of breath    Bring sputum sample to any [facility] lab with in 4 hours of collecting      CT of chest shows increase on one lung nodule. Ordering PET scan at [facility]

## 2022-02-16 DIAGNOSIS — E11.9 TYPE 2 DIABETES MELLITUS WITHOUT COMPLICATION: ICD-10-CM

## 2022-02-17 ENCOUNTER — PATIENT OUTREACH (OUTPATIENT)
Dept: ADMINISTRATIVE | Facility: HOSPITAL | Age: 62
End: 2022-02-17
Payer: COMMERCIAL

## 2022-02-17 NOTE — LETTER
AUTHORIZATION FOR RELEASE OF   CONFIDENTIAL INFORMATION    Dear Dr. Malone,    We are seeing Cristin Segal, date of birth 1960, in the clinic at [facility]. Angela tGz MD is the patient's PCP. Cristin Segal has an outstanding lab/procedure at the time we reviewed her chart. In order to help keep her health information updated, she has authorized us to request the following medical record(s):       ( X )  PAP SMEAR - most recent                                        Please fax records to [facility], Amy L Hammons, MD, [phone]     Thanks, Pura  [phone]           Patient Name: Cristin Segal  : 1960  Patient Phone #: [phone]

## 2022-02-17 NOTE — LETTER
February 17, 2022    Cristin Ramires Segal  [address]  [place] [address]             [facility]  [address]  [place] [address]  Phone: [phone] Hi,  I hope you are well. According to our records you are due for a PAP SMEAR. If you have had this done elsewhere, will you please let us know so that we may request a copy of your report and update your record here at [facility]. If you have not, will you please give us a call at [phone] and we will be happy to help you get this scheduled.     Thanks and have a good day!    Pura Ragsdale LPN Clinical Care Coordinator  [facility] [address] [facility] [place] [address]  [phone] (phone)  [phone] (fax)

## 2022-02-17 NOTE — PROGRESS NOTES
[employer] reviewed. No new  items found.    Letter mailed to patient regarding pap smear due.     Pap smear requested from Dr. Malone in case done.

## 2022-02-21 ENCOUNTER — OFFICE VISIT (OUTPATIENT)
Dept: FAMILY MEDICINE | Facility: CLINIC | Age: 62
End: 2022-02-21
Payer: COMMERCIAL

## 2022-02-21 ENCOUNTER — PATIENT MESSAGE (OUTPATIENT)
Dept: ADMINISTRATIVE | Facility: HOSPITAL | Age: 62
End: 2022-02-21
Payer: COMMERCIAL

## 2022-02-21 VITALS
SYSTOLIC BLOOD PRESSURE: 120 MMHG | WEIGHT: 145.06 LBS | HEIGHT: 62 IN | TEMPERATURE: 98 F | BODY MASS INDEX: 26.69 KG/M2 | OXYGEN SATURATION: 98 % | RESPIRATION RATE: 16 BRPM | HEART RATE: 95 BPM | DIASTOLIC BLOOD PRESSURE: 80 MMHG

## 2022-02-21 DIAGNOSIS — Z12.31 ENCOUNTER FOR SCREENING MAMMOGRAM FOR MALIGNANT NEOPLASM OF BREAST: ICD-10-CM

## 2022-02-21 DIAGNOSIS — E11.8 CONTROLLED TYPE 2 DIABETES MELLITUS WITH COMPLICATION, WITHOUT LONG-TERM CURRENT USE OF INSULIN: ICD-10-CM

## 2022-02-21 DIAGNOSIS — E03.9 HYPOTHYROIDISM, UNSPECIFIED TYPE: ICD-10-CM

## 2022-02-21 DIAGNOSIS — I10 ESSENTIAL HYPERTENSION: ICD-10-CM

## 2022-02-21 DIAGNOSIS — E72.11 HYPERHOMOCYSTEINEMIA: ICD-10-CM

## 2022-02-21 DIAGNOSIS — S22.080D CLOSED WEDGE COMPRESSION FRACTURE OF T12 VERTEBRA WITH ROUTINE HEALING, SUBSEQUENT ENCOUNTER: ICD-10-CM

## 2022-02-21 DIAGNOSIS — R91.1 PULMONARY NODULE: ICD-10-CM

## 2022-02-21 DIAGNOSIS — R19.5 POSITIVE FIT (FECAL IMMUNOCHEMICAL TEST): Primary | ICD-10-CM

## 2022-02-21 DIAGNOSIS — E78.2 MIXED HYPERLIPIDEMIA: ICD-10-CM

## 2022-02-21 PROCEDURE — 99214 OFFICE O/P EST MOD 30 MIN: CPT | Mod: S$GLB,,, | Performed by: FAMILY MEDICINE

## 2022-02-21 PROCEDURE — 3079F DIAST BP 80-89 MM HG: CPT | Mod: CPTII,S$GLB,, | Performed by: FAMILY MEDICINE

## 2022-02-21 PROCEDURE — 99214 PR OFFICE/OUTPT VISIT, EST, LEVL IV, 30-39 MIN: ICD-10-PCS | Mod: S$GLB,,, | Performed by: FAMILY MEDICINE

## 2022-02-21 PROCEDURE — 1159F MED LIST DOCD IN RCRD: CPT | Mod: CPTII,S$GLB,, | Performed by: FAMILY MEDICINE

## 2022-02-21 PROCEDURE — 3008F BODY MASS INDEX DOCD: CPT | Mod: CPTII,S$GLB,, | Performed by: FAMILY MEDICINE

## 2022-02-21 PROCEDURE — 3008F PR BODY MASS INDEX (BMI) DOCUMENTED: ICD-10-PCS | Mod: CPTII,S$GLB,, | Performed by: FAMILY MEDICINE

## 2022-02-21 PROCEDURE — 99999 PR PBB SHADOW E&M-EST. PATIENT-LVL IV: ICD-10-PCS | Mod: PBBFAC,,, | Performed by: FAMILY MEDICINE

## 2022-02-21 PROCEDURE — 3074F SYST BP LT 130 MM HG: CPT | Mod: CPTII,S$GLB,, | Performed by: FAMILY MEDICINE

## 2022-02-21 PROCEDURE — 1160F RVW MEDS BY RX/DR IN RCRD: CPT | Mod: CPTII,S$GLB,, | Performed by: FAMILY MEDICINE

## 2022-02-21 PROCEDURE — 99999 PR PBB SHADOW E&M-EST. PATIENT-LVL IV: CPT | Mod: PBBFAC,,, | Performed by: FAMILY MEDICINE

## 2022-02-21 PROCEDURE — 1159F PR MEDICATION LIST DOCUMENTED IN MEDICAL RECORD: ICD-10-PCS | Mod: CPTII,S$GLB,, | Performed by: FAMILY MEDICINE

## 2022-02-21 PROCEDURE — 1160F PR REVIEW ALL MEDS BY PRESCRIBER/CLIN PHARMACIST DOCUMENTED: ICD-10-PCS | Mod: CPTII,S$GLB,, | Performed by: FAMILY MEDICINE

## 2022-02-21 PROCEDURE — 3074F PR MOST RECENT SYSTOLIC BLOOD PRESSURE < 130 MM HG: ICD-10-PCS | Mod: CPTII,S$GLB,, | Performed by: FAMILY MEDICINE

## 2022-02-21 PROCEDURE — 3079F PR MOST RECENT DIASTOLIC BLOOD PRESSURE 80-89 MM HG: ICD-10-PCS | Mod: CPTII,S$GLB,, | Performed by: FAMILY MEDICINE

## 2022-02-21 NOTE — PROGRESS NOTES
Subjective:       Patient ID: Cristin Segal is a 61 y.o. female.    Chief Complaint: Follow-up (6mth f/u)    HPI  Review of Systems   Constitutional: Negative for fatigue and unexpected weight change.   Respiratory: Negative for chest tightness and shortness of breath.    Cardiovascular: Negative for chest pain, palpitations and leg swelling.   Gastrointestinal: Negative for abdominal pain.   Musculoskeletal: Negative for arthralgias.   Neurological: Negative for dizziness, syncope, light-headedness and headaches.       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    FIT 12/2021 +     Pulm Dr. Espino lung nodule. CT chest 2/2022 showed slightly enlarging nodule left upper lobe.  Has PET scan scheduled 3/2/2022    Spine Dr. Victor low back pain/Dr. Bai pain. H/o closed vertebral compression T12 s/p kyphoplasty 12/2021. Doing pt and taking pain    TFTs wnl 9/2021  Card Dr. brooks aortic stenosis     Dr. Cat eye exam     Had ED visit 3/20 [facility] for fall  resulting in left ankle fracture. Dr. Randal isaacs     Had ED visit 9/2/20 [facility] for hitting head on freezer door then fell backwards striking back of head     DEXA 8/20 -nl BMD     History:   Ortho Dr. 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 Maki 8/19        Rheum Dr. GRACE Guzman MCTD on plaquenil.  Takes adalat for raynauds.      HTN controlled with diet       Elevated LFTs - NL LFTS 12/20.no fatty liver or enlargement on either CT ab 5/15 nor u/s and 1/19. Hepatitis panel normal     Type 2 DM- controlled without meds.  Last a1c 5.2.  Eye Dr Cat 1/19.  On crestor. Not on ace or ARB-urine ma nl.       GYn PAP Dr. Elmore < 3 years     Macrocytosis- b12 and folate normal 1/19 and 12/20   Objective:      Physical Exam  Vitals and nursing note reviewed.   Constitutional:       Appearance: She is well-developed.   Cardiovascular:      Rate and Rhythm: Normal rate and regular rhythm.      Heart sounds: Murmur heard.   Pulmonary:      Effort: Pulmonary effort is normal.      Breath sounds: Normal breath sounds.   Skin:     General: Skin is warm and dry.   Neurological:      Mental Status: She is alert and oriented to person, place, and time.         Assessment:       1. Positive FIT (fecal immunochemical test)    2. Mixed hyperlipidemia    3. Essential hypertension    4. Controlled type 2 diabetes mellitus with complication, without long-term current use of insulin    5. Hypothyroidism, unspecified type    6. Encounter for screening mammogram for malignant neoplasm of breast    7. Hyperhomocysteinemia    8. Pulmonary nodule    9. Closed wedge compression fracture of T12 vertebra with routine healing, subsequent encounter        Plan:         1. Positive FIT (fecal immunochemical test)  Refer for endoscopy   - Ambulatory referral/consult to Gastroenterology; Future    2. Mixed hyperlipidemia  I  recommend a heart healthy diet rich in fiber, fresh vegetables and fruit and low in saturated fats (fried foods, red meat, etc.).  I also recommend regular exercise including a minimum of 150 minutes of cardio exercise per week and 2-30 minute workouts of strength training like light weights, yoga, pilates, etc.  You should work toward a body mass index of < 25.        - Comprehensive Metabolic Panel; Future  - Lipid Panel; Future    3. Essential hypertension  Controlled on current medications.  Continue current medications.      4. Controlled type 2 diabetes mellitus with complication, without long-term current use of insulin  Controlled on current medications.  Continue current medications.    - Hemoglobin A1C; Future    5. Hypothyroidism, unspecified type  Controlled on current medications.  Continue current medications.    - CBC Auto Differential; Future  - TSH; Future  - T4, Free; Future    6. Encounter for screening mammogram for malignant neoplasm of breast  Screen and treat as indicated:    - Mammo Digital Screening Bilat; Future    7. Hyperhomocysteinemia  Cont folbic and monitor  - Homocysteine, Serum; Future    8. Pulmonary nodule  Proceed with Pet scan and pulm consult as planned    9. Closed wedge compression fracture of T12 vertebra with routine healing, subsequent encounter  Cont current mgmt , pt and pain specialty f/u         Time spent with patient: 20 minutes    Patient with be reevaluated in 3 months or sooner prn    Greater than 50% of this visit was spent counseling as described in above documentation:Yes

## 2022-02-22 ENCOUNTER — TELEPHONE (OUTPATIENT)
Dept: FAMILY MEDICINE | Facility: CLINIC | Age: 62
End: 2022-02-22
Payer: COMMERCIAL

## 2022-03-02 ENCOUNTER — HOSPITAL ENCOUNTER (OUTPATIENT)
Dept: RADIOLOGY | Facility: HOSPITAL | Age: 62
Discharge: HOME | End: 2022-03-02
Attending: NURSE PRACTITIONER
Payer: COMMERCIAL

## 2022-03-02 VITALS — HEIGHT: 62 IN | WEIGHT: 140 LBS | BODY MASS INDEX: 25.76 KG/M2

## 2022-03-02 DIAGNOSIS — R91.1 SOLID NODULE OF LUNG GREATER THAN 8 MM IN DIAMETER: ICD-10-CM

## 2022-03-02 LAB — GLUCOSE SERPL-MCNC: 93 MG/DL (ref 70–110)

## 2022-03-02 PROCEDURE — 78815 PET IMAGE W/CT SKULL-THIGH: CPT | Mod: TC,PO

## 2022-03-03 ENCOUNTER — PATIENT MESSAGE (OUTPATIENT)
Dept: PULMONOLOGY | Facility: CLINIC | Age: 62
End: 2022-03-03
Payer: COMMERCIAL

## 2022-03-03 ENCOUNTER — TELEPHONE (OUTPATIENT)
Dept: FAMILY MEDICINE | Facility: CLINIC | Age: 62
End: 2022-03-03
Payer: COMMERCIAL

## 2022-03-03 DIAGNOSIS — K11.8 PAROTID NODULE: Primary | ICD-10-CM

## 2022-03-07 ENCOUNTER — PATIENT MESSAGE (OUTPATIENT)
Dept: FAMILY MEDICINE | Facility: CLINIC | Age: 62
End: 2022-03-07
Payer: COMMERCIAL

## 2022-03-08 ENCOUNTER — PATIENT MESSAGE (OUTPATIENT)
Dept: FAMILY MEDICINE | Facility: CLINIC | Age: 62
End: 2022-03-08
Payer: COMMERCIAL

## 2022-03-10 ENCOUNTER — HOSPITAL ENCOUNTER (OUTPATIENT)
Dept: RADIOLOGY | Facility: CLINIC | Age: 62
Discharge: HOME | End: 2022-03-10
Attending: FAMILY MEDICINE
Payer: COMMERCIAL

## 2022-03-10 DIAGNOSIS — Z12.31 ENCOUNTER FOR SCREENING MAMMOGRAM FOR MALIGNANT NEOPLASM OF BREAST: ICD-10-CM

## 2022-03-10 PROCEDURE — 77067 SCR MAMMO BI INCL CAD: CPT | Mod: TC,PO

## 2022-03-10 PROCEDURE — 77067 SCR MAMMO BI INCL CAD: CPT | Mod: 26,,, | Performed by: RADIOLOGY

## 2022-03-10 PROCEDURE — 77063 MAMMO DIGITAL SCREENING BILAT WITH TOMO: ICD-10-PCS | Mod: 26,,, | Performed by: RADIOLOGY

## 2022-03-10 PROCEDURE — 77063 BREAST TOMOSYNTHESIS BI: CPT | Mod: 26,,, | Performed by: RADIOLOGY

## 2022-03-10 PROCEDURE — 77067 MAMMO DIGITAL SCREENING BILAT WITH TOMO: ICD-10-PCS | Mod: 26,,, | Performed by: RADIOLOGY

## 2022-03-21 ENCOUNTER — OFFICE VISIT (OUTPATIENT)
Dept: CARDIOLOGY | Facility: CLINIC | Age: 62
End: 2022-03-21
Payer: COMMERCIAL

## 2022-03-21 ENCOUNTER — TELEPHONE (OUTPATIENT)
Dept: PAIN MEDICINE | Facility: CLINIC | Age: 62
End: 2022-03-21
Payer: COMMERCIAL

## 2022-03-21 VITALS
OXYGEN SATURATION: 98 % | BODY MASS INDEX: 27.05 KG/M2 | SYSTOLIC BLOOD PRESSURE: 104 MMHG | HEIGHT: 62 IN | HEART RATE: 92 BPM | WEIGHT: 147 LBS | DIASTOLIC BLOOD PRESSURE: 62 MMHG

## 2022-03-21 DIAGNOSIS — S22.080D CLOSED WEDGE COMPRESSION FRACTURE OF T12 VERTEBRA WITH ROUTINE HEALING, SUBSEQUENT ENCOUNTER: ICD-10-CM

## 2022-03-21 DIAGNOSIS — S39.92XS INJURY OF BACK, SEQUELA: ICD-10-CM

## 2022-03-21 DIAGNOSIS — E08.41 DIABETIC MONONEUROPATHY ASSOCIATED WITH DIABETES MELLITUS DUE TO UNDERLYING CONDITION: ICD-10-CM

## 2022-03-21 DIAGNOSIS — I73.00 RAYNAUD'S DISEASE WITHOUT GANGRENE: ICD-10-CM

## 2022-03-21 DIAGNOSIS — I10 ESSENTIAL HYPERTENSION: Primary | ICD-10-CM

## 2022-03-21 DIAGNOSIS — I35.0 AORTIC STENOSIS, MODERATE: ICD-10-CM

## 2022-03-21 DIAGNOSIS — E78.2 MIXED HYPERLIPIDEMIA: ICD-10-CM

## 2022-03-21 DIAGNOSIS — E08.65 DIABETES MELLITUS DUE TO UNDERLYING CONDITION WITH HYPERGLYCEMIA, WITHOUT LONG-TERM CURRENT USE OF INSULIN: ICD-10-CM

## 2022-03-21 PROCEDURE — 3074F SYST BP LT 130 MM HG: CPT | Mod: CPTII,S$GLB,, | Performed by: INTERNAL MEDICINE

## 2022-03-21 PROCEDURE — 93005 EKG 12-LEAD: ICD-10-PCS | Mod: S$GLB,,, | Performed by: INTERNAL MEDICINE

## 2022-03-21 PROCEDURE — 99214 OFFICE O/P EST MOD 30 MIN: CPT | Mod: S$GLB,,, | Performed by: INTERNAL MEDICINE

## 2022-03-21 PROCEDURE — 3078F DIAST BP <80 MM HG: CPT | Mod: CPTII,S$GLB,, | Performed by: INTERNAL MEDICINE

## 2022-03-21 PROCEDURE — 1160F RVW MEDS BY RX/DR IN RCRD: CPT | Mod: CPTII,S$GLB,, | Performed by: INTERNAL MEDICINE

## 2022-03-21 PROCEDURE — 93005 ELECTROCARDIOGRAM TRACING: CPT | Mod: S$GLB,,, | Performed by: INTERNAL MEDICINE

## 2022-03-21 PROCEDURE — 99214 PR OFFICE/OUTPT VISIT, EST, LEVL IV, 30-39 MIN: ICD-10-PCS | Mod: S$GLB,,, | Performed by: INTERNAL MEDICINE

## 2022-03-21 PROCEDURE — 1159F MED LIST DOCD IN RCRD: CPT | Mod: CPTII,S$GLB,, | Performed by: INTERNAL MEDICINE

## 2022-03-21 PROCEDURE — 1159F PR MEDICATION LIST DOCUMENTED IN MEDICAL RECORD: ICD-10-PCS | Mod: CPTII,S$GLB,, | Performed by: INTERNAL MEDICINE

## 2022-03-21 PROCEDURE — 3008F BODY MASS INDEX DOCD: CPT | Mod: CPTII,S$GLB,, | Performed by: INTERNAL MEDICINE

## 2022-03-21 PROCEDURE — 3008F PR BODY MASS INDEX (BMI) DOCUMENTED: ICD-10-PCS | Mod: CPTII,S$GLB,, | Performed by: INTERNAL MEDICINE

## 2022-03-21 PROCEDURE — 3074F PR MOST RECENT SYSTOLIC BLOOD PRESSURE < 130 MM HG: ICD-10-PCS | Mod: CPTII,S$GLB,, | Performed by: INTERNAL MEDICINE

## 2022-03-21 PROCEDURE — 3078F PR MOST RECENT DIASTOLIC BLOOD PRESSURE < 80 MM HG: ICD-10-PCS | Mod: CPTII,S$GLB,, | Performed by: INTERNAL MEDICINE

## 2022-03-21 PROCEDURE — 1160F PR REVIEW ALL MEDS BY PRESCRIBER/CLIN PHARMACIST DOCUMENTED: ICD-10-PCS | Mod: CPTII,S$GLB,, | Performed by: INTERNAL MEDICINE

## 2022-03-21 RX ORDER — OXYCODONE AND ACETAMINOPHEN 5; 325 MG/1; MG/1
1 TABLET ORAL EVERY 6 HOURS PRN
Qty: 28 TABLET | Refills: 0 | Status: SHIPPED | OUTPATIENT
Start: 2022-03-21 | End: 2022-12-08

## 2022-03-21 NOTE — PROGRESS NOTES
Subjective:    Patient ID:  Cristin Segal is a 61 y.o. female     Chief Complaint   Patient presents with    Follow-up     6 month     Hyperlipidemia    Hypertension       HPI:  Ms Cristin Segal is a 61 y.o. female is here for follow-up.  Patient has been doing well no specific complaints at the present time.  Her breathing has been good denies any shortness of breath or difficulty in breathing denies any chest pain or tightness or heaviness denies any dizziness or lightheadedness or loss of consciousness.  Patient had back injury way back when she slid and fell of the bed and ended up with compression fracture which was treated with kyphoplasty and patient was have finishing her rehab.  And yesterday patient's dog pulled her forward and she fell forwards on a knees and elbows.  She does hurt her back.  She has been taking all her medications regularly    Review of patient's allergies indicates:   Allergen Reactions    Pcn [penicillins] Anaphylaxis     Unknown for her- family history with anaphylaxis    Lipitor [atorvastatin]        Past Medical History:   Diagnosis Date    Arthritis     Cataract     Diabetes mellitus type II     diet controlled    Fracture     left 4th finger  - splinted    Hyperlipidemia     MCTD (mixed connective tissue disease)     Raynaud's disease     Thyroid disease     Wears glasses     contacs     Past Surgical History:   Procedure Laterality Date    CARPAL TUNNEL RELEASE      right    CATARACT EXTRACTION W/ INTRAOCULAR LENS  IMPLANT, BILATERAL      EXCISION OF PAROTID GLAND Right 8/1/2019    Procedure: PAROTIDECTOMY;  Surgeon: Abner Maki MD;  Location: [facility];  Service: ENT;  Laterality: Right;    HIP SURGERY Left 6/18/15    JANA    JOINT REPLACEMENT Bilateral     HIP    KNEE CARTILAGE SURGERY      right    TOTAL HIP ARTHROPLASTY Right 05/19/2016    JANA     Social History     Tobacco Use    Smoking status: Current Every Day Smoker     Packs/day:  0.50    Smokeless tobacco: Never Used   Substance Use Topics    Alcohol use: Yes     Comment: occasional    Drug use: No     Family History   Problem Relation Age of Onset    Diabetes Mother     Kidney disease Mother     Aneurysm Mother 73        brain    Hyperlipidemia Mother     Hypertension Sister     Breast cancer Sister     Diabetes Sister         Review of Systems:   Constitution: Negative for diaphoresis and fever.   HEENT: Negative for nosebleeds.    Cardiovascular: Negative for chest pain       No dyspnea on exertion       No leg swelling        No palpitations  Respiratory: Negative for shortness of breath and wheezing.    Hematologic/Lymphatic: Negative for bleeding problem. Does not bruise/bleed easily.   Skin: Negative for color change and rash.   Musculoskeletal: Negative for falls and myalgias.  Back pain.  Gastrointestinal: Negative for hematemesis and hematochezia.   Genitourinary: Negative for hematuria.   Neurological: Negative for dizziness and light-headedness.   Psychiatric/Behavioral: Negative for altered mental status and memory loss.          Objective:        Vitals:    03/21/22 1305   BP: 104/62   Pulse: 92       Lab Results   Component Value Date    WBC 10.11 11/03/2021    HGB 11.8 (L) 11/03/2021    HCT 35.2 (L) 11/03/2021     11/03/2021    CHOL 218 (H) 09/27/2021    TRIG 71 09/27/2021    HDL 91 (H) 09/27/2021    ALT 20 11/03/2021    AST 25 11/03/2021     11/03/2021    K 4.0 11/03/2021     11/03/2021    CREATININE 0.8 11/03/2021    BUN 14 11/03/2021    CO2 22 (L) 11/03/2021    TSH 2.330 09/27/2021    INR 1.1 11/08/2020    HGBA1C 5.2 09/27/2021        ECHOCARDIOGRAM RESULTS  Results for orders placed during the hospital encounter of 09/20/21    Echo Color Flow Doppler? Yes    Interpretation Summary  · The left ventricle is normal in size with normal systolic function.  · The estimated ejection fraction is 70%.  · Normal right ventricular size with normal right  ventricular systolic function.  · Mild to moderate tricuspid regurgitation.  · Mild mitral regurgitation.  · There is mild-to-moderate aortic valve stenosis.  · Aortic valve area is 1.33 cm2; peak velocity is 2.39 m/s; mean gradient is 16 mmHg.  · Normal left ventricular diastolic function.        CURRENT/PREVIOUS VISIT EKG  Results for orders placed or performed during the hospital encounter of 12/07/21   EKG 12-lead    Collection Time: 12/07/21  9:17 AM    Narrative    Test Reason : Z01.818,Z01.818,    Vent. Rate : 089 BPM     Atrial Rate : 089 BPM     P-R Int : 202 ms          QRS Dur : 084 ms      QT Int : 378 ms       P-R-T Axes : 076 094 061 degrees     QTc Int : 459 ms    Normal sinus rhythm  Right atrial enlargement  Rightward axis  Borderline Abnormal ECG  When compared with ECG of 22-FEB-2021 14:11,  No significant change was found  Confirmed by Dereck Monroe MD ([id number]) on 12/7/2021 2:44:20 PM    Referred By:             Confirmed By:Dereck Monroe MD     No valid procedures specified.   No results found for this or any previous visit.      Physical Exam:  CONSTITUTIONAL: No fever, no chills  HEENT: Normocephalic, atraumatic,pupils reactive to light                 NECK:  No JVD no carotid bruit  CVS: S1S2+, RRR, no murmurs,   LUNGS: Clear  ABDOMEN: Soft, NT, BS+  EXTREMITIES: No cyanosis, edema  : No milton catheter  NEURO: AAO X 3  PSY: Normal affect      Medication List with Changes/Refills   Current Medications    ASPIRIN 81 MG CHEW    Take 1 tablet (81 mg total) by mouth 3 (three) times a week.    BUDESONIDE-FORMOTEROL 160-4.5 MCG (SYMBICORT) 160-4.5 MCG/ACTUATION HFAA    Inhale 2 puffs into the lungs every 12 (twelve) hours. Controller    CYCLOBENZAPRINE (FLEXERIL) 10 MG TABLET    Take 1 tablet (10 mg total) by mouth once daily.    FOLIC ACID-VIT B6-VIT B12 2.5-25-2 MG (FOLBIC OR EQUIV) 2.5-25-2 MG TAB    Take 1 tablet by mouth once daily.    HYDROXYCHLOROQUINE (PLAQUENIL) 200 MG TABLET    Take  1 tablet (200 mg total) by mouth once daily.    KLOR-CON M20 20 MEQ TABLET    TAKE 1 TABLET (20 MEQ TOTAL) BY MOUTH ONCE DAILY.    LEVOTHYROXINE (SYNTHROID) 50 MCG TABLET    Take 1 tablet (50 mcg total) by mouth before breakfast.    MV-MIN/FA/C/GLUT/LYSINE/UPX194 (AIRBORNE, WITH FOLIC ACID, ORAL)    Take 1 tablet by mouth 4 (four) times daily.    NIFEDIPINE (ADALAT CC) 30 MG TBSR    Take 1 tablet (30 mg total) by mouth once daily.    OXYCODONE-ACETAMINOPHEN (PERCOCET) 5-325 MG PER TABLET    Take 1 tablet by mouth every 6 (six) hours as needed for Pain.    PANTOPRAZOLE (PROTONIX) 40 MG TABLET    TAKE 1 TABLET BY MOUTH EVERY DAY    ROSUVASTATIN (CRESTOR) 5 MG TABLET    TAKE 1 TABLET BY MOUTH EVERYDAY AT BEDTIME    VITAMIN D (VITAMIN D3) 1000 UNITS TAB    Take 1,000 Units by mouth once daily.   Discontinued Medications    ALBUTEROL (VENTOLIN HFA) 90 MCG/ACTUATION INHALER    Inhale 2 puffs into the lungs every 4 (four) hours as needed for Wheezing or Shortness of Breath. Rescue             Assessment:       1. Essential hypertension    2. Raynaud's disease without gangrene    3. Aortic stenosis, moderate    4. Diabetes mellitus due to underlying condition with hyperglycemia, without long-term current use of insulin    5. Injury of back, sequela    6. Diabetic mononeuropathy associated with diabetes mellitus due to underlying condition    7. Mixed hyperlipidemia         Plan:   1. Patient is doing in spite of her back pain.  She recently re-injured her back and she is at the present time applying warm compresses to the back.  She is scheduled to see her primary physician in regards with.  2. Her blood pressure has been stable is 104/62 and she is currently on nifedipine 30 mg p.o. q.day continue the same.  3. Patient has Raynaud's disease and has been doing well and the nifedipine has kept her from having severe episodes.  4. Reviewed EKG independently patient is in normal sinus rhythm with first-degree AV block with  occasional premature ventricular complexes and she has a rightward axis no acute ST T-wave changes.  Evidence of early repolarization changes.  No significant change from the previous EKG.    5. Patient has mild-to-moderate aortic stenosis at the present time she is stable continue current management.  6. She is currently on rosuvastatin 5 mg p.o. q.day continue the same and a primary physician is checking her cholesterol and liver function test.  7. She is also on levothyroxine 50 mcg p.o. q.day continue the same and a primary physician is checking her thyroid function test.  8. Patient to continue current management I will see her back in the office in 6 months time at which time will repeat her echocardiogram.        Problem List Items Addressed This Visit        Neuro    Diabetic neuropathy       Cardiac/Vascular    Raynaud disease    Essential hypertension - Primary    Relevant Orders    IN OFFICE EKG 12-LEAD (to [place])    Hyperlipidemia       Endocrine    Diabetes mellitus due to underlying condition with hyperglycemia, without long-term current use of insulin       Orthopedic    Back injury      Other Visit Diagnoses     Aortic stenosis, moderate              No follow-ups on file.

## 2022-03-21 NOTE — TELEPHONE ENCOUNTER
----- Message from Stacey M Lefort sent at 3/21/2022 10:54 AM CDT -----  Pt is requesting a refill on her pain medication. Thank you.

## 2022-03-25 ENCOUNTER — PATIENT MESSAGE (OUTPATIENT)
Dept: PULMONOLOGY | Facility: CLINIC | Age: 62
End: 2022-03-25
Payer: COMMERCIAL

## 2022-03-30 ENCOUNTER — PATIENT MESSAGE (OUTPATIENT)
Dept: ADMINISTRATIVE | Facility: HOSPITAL | Age: 62
End: 2022-03-30
Payer: COMMERCIAL

## 2022-03-30 ENCOUNTER — PATIENT OUTREACH (OUTPATIENT)
Dept: ADMINISTRATIVE | Facility: HOSPITAL | Age: 62
End: 2022-03-30
Payer: COMMERCIAL

## 2022-03-30 NOTE — PROGRESS NOTES
Called patient. No answer. Left message along with contact information to please call back regarding scheduling/ overdue HM.    Health Maintenance Due   Topic Date Due    Cervical Cancer Screening  Never done    COVID-19 Vaccine (1) Never done    HIV Screening  Never done    Sign Pain Contract  Never done    Eye Exam  06/30/2021    Diabetes Urine Screening  12/09/2021    Foot Exam  02/18/2022    Hemoglobin A1c  03/27/2022

## 2022-04-01 ENCOUNTER — TELEPHONE (OUTPATIENT)
Dept: PAIN MEDICINE | Facility: CLINIC | Age: 62
End: 2022-04-01
Payer: COMMERCIAL

## 2022-04-01 NOTE — TELEPHONE ENCOUNTER
----- Message from Stacey M Lefort sent at 4/1/2022  2:13 PM CDT -----  Pt wants to discuss moving forward with epi injection.  She can be reached at [phone].  Thank you.

## 2022-04-06 ENCOUNTER — TELEPHONE (OUTPATIENT)
Dept: PAIN MEDICINE | Facility: CLINIC | Age: 62
End: 2022-04-06
Payer: COMMERCIAL

## 2022-04-06 NOTE — TELEPHONE ENCOUNTER
----- Message from Kasi Victor MD sent at 4/6/2022  8:18 AM CDT -----  Please schedule for interlaminar injection L5-S1

## 2022-04-13 ENCOUNTER — OFFICE VISIT (OUTPATIENT)
Dept: RHEUMATOLOGY | Facility: CLINIC | Age: 62
End: 2022-04-13
Payer: COMMERCIAL

## 2022-04-13 VITALS
WEIGHT: 141.38 LBS | DIASTOLIC BLOOD PRESSURE: 87 MMHG | BODY MASS INDEX: 25.86 KG/M2 | SYSTOLIC BLOOD PRESSURE: 142 MMHG

## 2022-04-13 DIAGNOSIS — M35.1 MIXED CONNECTIVE TISSUE DISEASE: Primary | ICD-10-CM

## 2022-04-13 PROCEDURE — 3008F BODY MASS INDEX DOCD: CPT | Mod: S$GLB,,, | Performed by: INTERNAL MEDICINE

## 2022-04-13 PROCEDURE — 99213 PR OFFICE/OUTPT VISIT, EST, LEVL III, 20-29 MIN: ICD-10-PCS | Mod: S$GLB,,, | Performed by: INTERNAL MEDICINE

## 2022-04-13 PROCEDURE — 3008F PR BODY MASS INDEX (BMI) DOCUMENTED: ICD-10-PCS | Mod: S$GLB,,, | Performed by: INTERNAL MEDICINE

## 2022-04-13 PROCEDURE — 3079F DIAST BP 80-89 MM HG: CPT | Mod: S$GLB,,, | Performed by: INTERNAL MEDICINE

## 2022-04-13 PROCEDURE — 3077F PR MOST RECENT SYSTOLIC BLOOD PRESSURE >= 140 MM HG: ICD-10-PCS | Mod: S$GLB,,, | Performed by: INTERNAL MEDICINE

## 2022-04-13 PROCEDURE — 3079F PR MOST RECENT DIASTOLIC BLOOD PRESSURE 80-89 MM HG: ICD-10-PCS | Mod: S$GLB,,, | Performed by: INTERNAL MEDICINE

## 2022-04-13 PROCEDURE — 3077F SYST BP >= 140 MM HG: CPT | Mod: S$GLB,,, | Performed by: INTERNAL MEDICINE

## 2022-04-13 PROCEDURE — 99213 OFFICE O/P EST LOW 20 MIN: CPT | Mod: S$GLB,,, | Performed by: INTERNAL MEDICINE

## 2022-04-13 RX ORDER — HYDROXYCHLOROQUINE SULFATE 200 MG/1
200 TABLET, FILM COATED ORAL DAILY
Qty: 90 TABLET | Refills: 1 | Status: SHIPPED | OUTPATIENT
Start: 2022-04-13 | End: 2022-08-18 | Stop reason: SDUPTHER

## 2022-04-13 RX ORDER — CYCLOBENZAPRINE HCL 10 MG
10 TABLET ORAL DAILY
Qty: 30 TABLET | Refills: 3 | Status: SHIPPED | OUTPATIENT
Start: 2022-04-13 | End: 2022-08-29

## 2022-04-13 RX ORDER — CEPHALEXIN 500 MG/1
500 CAPSULE ORAL 2 TIMES DAILY
COMMUNITY
Start: 2022-04-09 | End: 2022-05-24 | Stop reason: ALTCHOICE

## 2022-04-13 NOTE — PROGRESS NOTES
[facility] RHEUMATOLOGY            PROGRESS NOTE      Subjective:       Patient ID:   NAME: Cristin Segal : 1960     61 y.o. female    Referring Doc: No ref. provider found  Other Physicians:    Chief Complaint:  Mixed connective tissue disease      History of Present Illness:     Patient returns today for a regularly scheduled follow-up visit for    history of mixed connective tissue disease   The patient is doing okay overall.  Occasional cough.  No chest pains.  No rashes.  No dysphagia muscle weakness.  Has had thoracic compression fracture after a fall.            ROS:   GEN:  No  fever, night sweats . weight is stable   + some fatigue  SKIN: no rashes, no bruising, no ulcerations, no Raynaud's  HEENT: no HA's, No visual changes, no mucosal ulcers, no sicca symptoms,  CV:   no CP, SOB, PND,+ occ SANTILLAN, no orthopnea, no palpitations  PULM: normal with no SOB, cough, hemoptysis, sputum or pleuritic pain  GI:  no abdominal pain, nausea, vomiting, constipation, diarrhea, melanotic stools, BRBPR, hematemesis, no dysphagia  :   no dysuria  NEURO: no paresthesias, headaches, visual disturbances, muscle weakness  MUSCULOSKELETAL:no joint swelling, prolonged AM stiffness, + back pain, + occ  muscle pain  Allergies:  Review of patient's allergies indicates:   Allergen Reactions    Pcn [penicillins] Anaphylaxis     Unknown for her- family history with anaphylaxis    Lipitor [atorvastatin]        Medications:    Current Outpatient Medications:     aspirin 81 MG Chew, Take 1 tablet (81 mg total) by mouth 3 (three) times a week., Disp: 13 tablet, Rfl: 0    budesonide-formoterol 160-4.5 mcg (SYMBICORT) 160-4.5 mcg/actuation HFAA, Inhale 2 puffs into the lungs every 12 (twelve) hours. Controller, Disp: 10.2 g, Rfl: 11    cephALEXin (KEFLEX) 500 MG capsule, Take 500 mg by mouth 2 (two) times daily., Disp: , Rfl:     folic acid-vit B6-vit B12 2.5-25-2 mg (FOLBIC OR EQUIV) 2.5-25-2 mg Tab, Take 1 tablet by  mouth once daily., Disp: 90 tablet, Rfl: 3    KLOR-CON M20 20 mEq tablet, TAKE 1 TABLET (20 MEQ TOTAL) BY MOUTH ONCE DAILY., Disp: 90 tablet, Rfl: 3    levothyroxine (SYNTHROID) 50 MCG tablet, Take 1 tablet (50 mcg total) by mouth before breakfast., Disp: 90 tablet, Rfl: 3    mv-min/FA/C/glut/lysine/tnk015 (AIRBORNE, WITH FOLIC ACID, ORAL), Take 1 tablet by mouth 4 (four) times daily., Disp: , Rfl:     NIFEdipine (ADALAT CC) 30 MG TbSR, Take 1 tablet (30 mg total) by mouth once daily., Disp: 90 tablet, Rfl: 2    oxyCODONE-acetaminophen (PERCOCET) 5-325 mg per tablet, Take 1 tablet by mouth every 6 (six) hours as needed for Pain., Disp: 28 tablet, Rfl: 0    pantoprazole (PROTONIX) 40 MG tablet, TAKE 1 TABLET BY MOUTH EVERY DAY, Disp: 90 tablet, Rfl: 3    rosuvastatin (CRESTOR) 5 MG tablet, TAKE 1 TABLET BY MOUTH EVERYDAY AT BEDTIME, Disp: 90 tablet, Rfl: 2    vitamin D (VITAMIN D3) 1000 units Tab, Take 1,000 Units by mouth once daily., Disp: , Rfl:     cyclobenzaprine (FLEXERIL) 10 MG tablet, Take 1 tablet (10 mg total) by mouth once daily., Disp: 30 tablet, Rfl: 3    hydrOXYchloroQUINE (PLAQUENIL) 200 mg tablet, Take 1 tablet (200 mg total) by mouth once daily., Disp: 90 tablet, Rfl: 1  No current facility-administered medications for this visit.    Facility-Administered Medications Ordered in Other Visits:     lactated ringers infusion, , Intravenous, Continuous, Kev Bai MD    PMHx/PSHx Updates:          Objective:     Vitals:  Blood pressure (!) 142/87, weight 64.1 kg (141 lb 6.4 oz).    Physical Examination:   GEN: no apparent distress, comfortable; AAOx3  SKIN: no rashes,no ulceration, no Raynaud's, no petechiae, no SQ nodules,  HEAD: normal  EYES: no pallor, no icterus  NECK: no masses, thyroid normal, trachea midline, no LAD/LN's, supple  CV: RRR with no murmur; l S1 and S2 reg. ,no gallop no rubs,   CHEST: Normal respiratory effort; CTAB; normal breath sounds; no wheeze or  crackles  MUSC/Skeletal: ROM normal; no crepitus; joints without synovitis,  no deformities  No joint swelling or tenderness of PIP, MCP, wrist, elbow, shoulder, or knee joints  EXTREM: no clubbing, cyanosis, no edema,normal  pulses   NEURO: grossly intact; motor WNL; AAOx3; ,  PSYCH: normal mood, affect and behavior  LYMPH: normal cervical, supraclavicular          Labs:   Lab Results   Component Value Date    WBC 10.11 11/03/2021    HGB 11.8 (L) 11/03/2021    HCT 35.2 (L) 11/03/2021     (H) 11/03/2021     11/03/2021    CMP  @LASTLAB(NA,K,CL,CO2,GLU,BUN,Creatinine,Calcium,PROT,Albumin,Bilitot,Alkphos,AST,ALT,CRP,ESR,RF,CCP,BROOK,SSA,CPK,uric acid) )@  I have reviewed all available lab results and radiology reports.    Radiology/Diagnostic Studies:        Assessment/Plan:   (1) 61 y.o. female with diagnosis of mixed connective tissue disease.  This is stable  Labs      Follow-up in 4 months or before if need        Discussion:     I have explained all of the above in detail and the patient understands all of the current recommendation(s). I have answered all questions to the best of my ability and to their complete satisfaction.       The patient is to continue with the current management plan         RTC in         Electronically signed by Erica Guzman MD

## 2022-05-02 ENCOUNTER — PATIENT OUTREACH (OUTPATIENT)
Dept: ADMINISTRATIVE | Facility: OTHER | Age: 62
End: 2022-05-02
Payer: COMMERCIAL

## 2022-05-03 NOTE — PROGRESS NOTES
Health Maintenance Due   Topic Date Due    Cervical Cancer Screening  Never done    COVID-19 Vaccine (1) Never done    HIV Screening  Never done    Sign Pain Contract  Never done    Eye Exam  06/30/2021    Pneumococcal Vaccines (Age 0-64) (2 - PCV) 11/17/2021    Diabetes Urine Screening  12/09/2021    Foot Exam  02/18/2022    Hemoglobin A1c  03/27/2022     Updates were requested from care everywhere.  Chart was reviewed for overdue Proactive [facility] Encounters (REA) topics (CRS, Breast Cancer Screening, Eye exam)  Health Maintenance has been updated.  LINKS immunization registry triggered.  Immunizations were reconciled.

## 2022-05-04 ENCOUNTER — OFFICE VISIT (OUTPATIENT)
Dept: OTOLARYNGOLOGY | Facility: CLINIC | Age: 62
End: 2022-05-04
Payer: COMMERCIAL

## 2022-05-04 VITALS — BODY MASS INDEX: 25.89 KG/M2 | WEIGHT: 141.56 LBS

## 2022-05-04 DIAGNOSIS — Z90.49 HISTORY OF PAROTIDECTOMY: Primary | ICD-10-CM

## 2022-05-04 DIAGNOSIS — D11.9 WARTHIN TUMOR: ICD-10-CM

## 2022-05-04 PROCEDURE — 99999 PR PBB SHADOW E&M-EST. PATIENT-LVL III: CPT | Mod: PBBFAC,,, | Performed by: OTOLARYNGOLOGY

## 2022-05-04 PROCEDURE — 3008F BODY MASS INDEX DOCD: CPT | Mod: CPTII,S$GLB,, | Performed by: OTOLARYNGOLOGY

## 2022-05-04 PROCEDURE — 1159F MED LIST DOCD IN RCRD: CPT | Mod: CPTII,S$GLB,, | Performed by: OTOLARYNGOLOGY

## 2022-05-04 PROCEDURE — 99999 PR PBB SHADOW E&M-EST. PATIENT-LVL III: ICD-10-PCS | Mod: PBBFAC,,, | Performed by: OTOLARYNGOLOGY

## 2022-05-04 PROCEDURE — 1159F PR MEDICATION LIST DOCUMENTED IN MEDICAL RECORD: ICD-10-PCS | Mod: CPTII,S$GLB,, | Performed by: OTOLARYNGOLOGY

## 2022-05-04 PROCEDURE — 99214 OFFICE O/P EST MOD 30 MIN: CPT | Mod: S$GLB,,, | Performed by: OTOLARYNGOLOGY

## 2022-05-04 PROCEDURE — 3008F PR BODY MASS INDEX (BMI) DOCUMENTED: ICD-10-PCS | Mod: CPTII,S$GLB,, | Performed by: OTOLARYNGOLOGY

## 2022-05-04 PROCEDURE — 1160F PR REVIEW ALL MEDS BY PRESCRIBER/CLIN PHARMACIST DOCUMENTED: ICD-10-PCS | Mod: CPTII,S$GLB,, | Performed by: OTOLARYNGOLOGY

## 2022-05-04 PROCEDURE — 99214 PR OFFICE/OUTPT VISIT, EST, LEVL IV, 30-39 MIN: ICD-10-PCS | Mod: S$GLB,,, | Performed by: OTOLARYNGOLOGY

## 2022-05-04 PROCEDURE — 1160F RVW MEDS BY RX/DR IN RCRD: CPT | Mod: CPTII,S$GLB,, | Performed by: OTOLARYNGOLOGY

## 2022-05-04 NOTE — PROGRESS NOTES
"  Subjective:       Patient ID: Cristin Segal is a 61 y.o. female.    Chief Complaint: Mass (Nodules on left side of neck from PET scan)    Cristin is here for follow-up of Right superficial parotidectomy for Warthin tumor 8/2019. Here for follow-up after PET scan showed left sided avidity in multiple locations of parotid gland.   In 2019, imaging showed multiple enlarged areas, presumed at the time to be lymph nodes on this side. She denies complaints. No facial weakness.     Tobacco Use: High Risk    Smoking Tobacco Use: Current Every Day Smoker    Smokeless Tobacco Use: Never Used         Patient validated questionnaires (if applicable):      %       No flowsheet data found.  No flowsheet data found.  No flowsheet data found.         Review of Systems   Constitutional: Negative for activity change and appetite change.   Respiratory: Negative for difficulty breathing and wheezing   Cardiovascular: Negative for chest pain.      Objective:        Constitutional:   Vital signs are normal. She appears well-developed and well-nourished.     Head:  Normocephalic and atraumatic.     Ears:  Hearing normal to normal and whispered voice; external ear normal without scars, lesions, or masses; ear canal, tympanic membrane, and middle ear normal..     Nose:  Nose normal including turbinates, nasal mucosa, sinuses and nasal septum.     Mouth/Throat  Oropharynx clear and moist without lesions or asymmetry.     Neck:  Neck normal without thyromegaly masses, asymmetry, normal tracheal structure, crepitus, and tenderness.         Tests / Results:  CT neck reviewed from 2019:  Multiple small nodes in left gland at the time, small and thought to be lymph nodes.    PET scan:  Highly avid areas possibly corresponding to these "lymph nodes"    Assessment:       1. History of parotidectomy    2. Warthin tumor          Plan:         I suspect these are longstanding and consistent with bilateral Warthin tumors. Asymptomatic. " Recommend CT neck to evaluate and compared with 2019.  Will call with results

## 2022-05-11 ENCOUNTER — OFFICE VISIT (OUTPATIENT)
Dept: PULMONOLOGY | Facility: CLINIC | Age: 62
End: 2022-05-11
Payer: COMMERCIAL

## 2022-05-11 ENCOUNTER — LAB VISIT (OUTPATIENT)
Dept: LAB | Facility: HOSPITAL | Age: 62
End: 2022-05-11
Attending: FAMILY MEDICINE
Payer: COMMERCIAL

## 2022-05-11 VITALS
BODY MASS INDEX: 25.85 KG/M2 | OXYGEN SATURATION: 99 % | WEIGHT: 141.31 LBS | HEART RATE: 89 BPM | DIASTOLIC BLOOD PRESSURE: 69 MMHG | SYSTOLIC BLOOD PRESSURE: 104 MMHG

## 2022-05-11 DIAGNOSIS — D11.9 WARTHIN TUMOR: ICD-10-CM

## 2022-05-11 DIAGNOSIS — E72.11 HYPERHOMOCYSTEINEMIA: ICD-10-CM

## 2022-05-11 DIAGNOSIS — R91.8 LUNG NODULE, MULTIPLE: Primary | ICD-10-CM

## 2022-05-11 DIAGNOSIS — E11.8 CONTROLLED TYPE 2 DIABETES MELLITUS WITH COMPLICATION, WITHOUT LONG-TERM CURRENT USE OF INSULIN: ICD-10-CM

## 2022-05-11 DIAGNOSIS — E03.9 HYPOTHYROIDISM, UNSPECIFIED TYPE: ICD-10-CM

## 2022-05-11 DIAGNOSIS — R09.89 CHRONIC SINUS COMPLAINTS: ICD-10-CM

## 2022-05-11 DIAGNOSIS — E78.2 MIXED HYPERLIPIDEMIA: ICD-10-CM

## 2022-05-11 DIAGNOSIS — Z90.49 HISTORY OF PAROTIDECTOMY: ICD-10-CM

## 2022-05-11 DIAGNOSIS — J43.2 CENTRILOBULAR EMPHYSEMA: ICD-10-CM

## 2022-05-11 LAB
BASOPHILS # BLD AUTO: 0.14 K/UL (ref 0–0.2)
BASOPHILS NFR BLD: 1.7 % (ref 0–1.9)
DIFFERENTIAL METHOD: ABNORMAL
EOSINOPHIL # BLD AUTO: 0.5 K/UL (ref 0–0.5)
EOSINOPHIL NFR BLD: 5.6 % (ref 0–8)
ERYTHROCYTE [DISTWIDTH] IN BLOOD BY AUTOMATED COUNT: 12.8 % (ref 11.5–14.5)
ESTIMATED AVG GLUCOSE: 97 MG/DL (ref 68–131)
HBA1C MFR BLD: 5 % (ref 4–5.6)
HCT VFR BLD AUTO: 35.7 % (ref 37–48.5)
HGB BLD-MCNC: 11.7 G/DL (ref 12–16)
IMM GRANULOCYTES # BLD AUTO: 0.03 K/UL (ref 0–0.04)
IMM GRANULOCYTES NFR BLD AUTO: 0.4 % (ref 0–0.5)
LYMPHOCYTES # BLD AUTO: 3.1 K/UL (ref 1–4.8)
LYMPHOCYTES NFR BLD: 38.2 % (ref 18–48)
MCH RBC QN AUTO: 33.7 PG (ref 27–31)
MCHC RBC AUTO-ENTMCNC: 32.8 G/DL (ref 32–36)
MCV RBC AUTO: 103 FL (ref 82–98)
MONOCYTES # BLD AUTO: 0.7 K/UL (ref 0.3–1)
MONOCYTES NFR BLD: 8.6 % (ref 4–15)
NEUTROPHILS # BLD AUTO: 3.7 K/UL (ref 1.8–7.7)
NEUTROPHILS NFR BLD: 45.5 % (ref 38–73)
NRBC BLD-RTO: 0 /100 WBC
PLATELET # BLD AUTO: 405 K/UL (ref 150–450)
PMV BLD AUTO: 9.1 FL (ref 9.2–12.9)
RBC # BLD AUTO: 3.47 M/UL (ref 4–5.4)
WBC # BLD AUTO: 8.02 K/UL (ref 3.9–12.7)

## 2022-05-11 PROCEDURE — 80053 COMPREHEN METABOLIC PANEL: CPT | Performed by: FAMILY MEDICINE

## 2022-05-11 PROCEDURE — 80061 LIPID PANEL: CPT | Performed by: FAMILY MEDICINE

## 2022-05-11 PROCEDURE — 99999 PR PBB SHADOW E&M-EST. PATIENT-LVL III: CPT | Mod: PBBFAC,,, | Performed by: NURSE PRACTITIONER

## 2022-05-11 PROCEDURE — 83036 HEMOGLOBIN GLYCOSYLATED A1C: CPT | Performed by: FAMILY MEDICINE

## 2022-05-11 PROCEDURE — 85025 COMPLETE CBC W/AUTO DIFF WBC: CPT | Performed by: FAMILY MEDICINE

## 2022-05-11 PROCEDURE — 84439 ASSAY OF FREE THYROXINE: CPT | Performed by: FAMILY MEDICINE

## 2022-05-11 PROCEDURE — 3078F DIAST BP <80 MM HG: CPT | Mod: CPTII,S$GLB,, | Performed by: NURSE PRACTITIONER

## 2022-05-11 PROCEDURE — 83090 ASSAY OF HOMOCYSTEINE: CPT | Performed by: FAMILY MEDICINE

## 2022-05-11 PROCEDURE — 99213 PR OFFICE/OUTPT VISIT, EST, LEVL III, 20-29 MIN: ICD-10-PCS | Mod: S$GLB,,, | Performed by: NURSE PRACTITIONER

## 2022-05-11 PROCEDURE — 36415 COLL VENOUS BLD VENIPUNCTURE: CPT | Mod: PO | Performed by: FAMILY MEDICINE

## 2022-05-11 PROCEDURE — 84443 ASSAY THYROID STIM HORMONE: CPT | Performed by: FAMILY MEDICINE

## 2022-05-11 PROCEDURE — 3074F PR MOST RECENT SYSTOLIC BLOOD PRESSURE < 130 MM HG: ICD-10-PCS | Mod: CPTII,S$GLB,, | Performed by: NURSE PRACTITIONER

## 2022-05-11 PROCEDURE — 3074F SYST BP LT 130 MM HG: CPT | Mod: CPTII,S$GLB,, | Performed by: NURSE PRACTITIONER

## 2022-05-11 PROCEDURE — 99999 PR PBB SHADOW E&M-EST. PATIENT-LVL III: ICD-10-PCS | Mod: PBBFAC,,, | Performed by: NURSE PRACTITIONER

## 2022-05-11 PROCEDURE — 99213 OFFICE O/P EST LOW 20 MIN: CPT | Mod: S$GLB,,, | Performed by: NURSE PRACTITIONER

## 2022-05-11 PROCEDURE — 3008F PR BODY MASS INDEX (BMI) DOCUMENTED: ICD-10-PCS | Mod: CPTII,S$GLB,, | Performed by: NURSE PRACTITIONER

## 2022-05-11 PROCEDURE — 1159F MED LIST DOCD IN RCRD: CPT | Mod: CPTII,S$GLB,, | Performed by: NURSE PRACTITIONER

## 2022-05-11 PROCEDURE — 3078F PR MOST RECENT DIASTOLIC BLOOD PRESSURE < 80 MM HG: ICD-10-PCS | Mod: CPTII,S$GLB,, | Performed by: NURSE PRACTITIONER

## 2022-05-11 PROCEDURE — 3008F BODY MASS INDEX DOCD: CPT | Mod: CPTII,S$GLB,, | Performed by: NURSE PRACTITIONER

## 2022-05-11 PROCEDURE — 1159F PR MEDICATION LIST DOCUMENTED IN MEDICAL RECORD: ICD-10-PCS | Mod: CPTII,S$GLB,, | Performed by: NURSE PRACTITIONER

## 2022-05-11 RX ORDER — FLUTICASONE PROPIONATE 50 MCG
1 SPRAY, SUSPENSION (ML) NASAL DAILY
Qty: 16 G | Refills: 11 | Status: ON HOLD | OUTPATIENT
Start: 2022-05-11 | End: 2023-02-10 | Stop reason: HOSPADM

## 2022-05-11 NOTE — PROGRESS NOTES
5/11/2022    Cristin Segal  Office Note    Chief Complaint   Patient presents with    COPD    3 mth follow up       HPI:  5/11/2022- no current complaint of cough or shortness of breath, did not take symbicort due to cost of medication.   Currently smoking 1/2 pack daily.     Complaint of sinus pressure and post nasal drip, onset years, followed by ENT   Seen by ENT for PET active left cervical lymph nodes.     2/11/22- cough- recurrent complaint, less frequent in past 3 months. Insurance denied Xopenex. Took Trelegy for 2 weeks with no benefit.   Currently smoking under 1 pack daily goal to cut down to 1/2 pack daily.    SOB- only when hot wearing face mask. Improves with breathing cold air. 1x weekly      11/11/2021- Cough- onset years, no change with time, worse in early mornings, improves after expelling mucous, productive clear mucous, no chest tightness or wheeze,   No complaint of shortness of breath. Followed by cardiologist for MV prolapse and chest pain, on crestor for cholesterol. Followed by Dr. Guzman for auto immune disease.   Social Hx: lives with , 3 dogs, and one chockatoo for 34 years, on medical leave, working as medical technologist [employer], no known Asbestosis exposure, Smoking Hx: currently smoking 1/2-1 pack daily. 45 pack years. Tried smoking cessation with no benefit. Tried Chantix.   Family Hx: no Lung Cancer, no COPD, no Asthma  Medical Hx: no previous pneumonia ; no previous shoulder/chest surgery        The chief compliant  problem varies with instablilty at time    PFSH:  Past Medical History:   Diagnosis Date    Arthritis     Cataract     Diabetes mellitus type II     diet controlled    Fracture     left 4th finger  - splinted    Hyperlipidemia     MCTD (mixed connective tissue disease)     Raynaud's disease     Thyroid disease     Wears glasses     contacs         Past Surgical History:   Procedure Laterality Date    CARPAL TUNNEL RELEASE      right    CATARACT  EXTRACTION W/ INTRAOCULAR LENS  IMPLANT, BILATERAL      EXCISION OF PAROTID GLAND Right 8/1/2019    Procedure: PAROTIDECTOMY;  Surgeon: Abner Maki MD;  [facility];  Service: ENT;  Laterality: Right;    HIP SURGERY Left 6/18/15    JANA    JOINT REPLACEMENT Bilateral     HIP    KNEE CARTILAGE SURGERY      right    TOTAL HIP ARTHROPLASTY Right 05/19/2016    JANA     Social History     Tobacco Use    Smoking status: Current Every Day Smoker     Packs/day: 0.50    Smokeless tobacco: Never Used   Substance Use Topics    Alcohol use: Yes     Comment: occasional    Drug use: No     Family History   Problem Relation Age of Onset    Diabetes Mother     Kidney disease Mother     Aneurysm Mother 73        brain    Hyperlipidemia Mother     Hypertension Sister     Breast cancer Sister     Diabetes Sister      Review of patient's allergies indicates:   Allergen Reactions    Pcn [penicillins] Anaphylaxis     Unknown for her- family history with anaphylaxis    Lipitor [atorvastatin]      I have reviewed past medical, family, and social history. I have reviewed previous nurse notes.    Performance Status:The patient's activity level is no limits with regular activity.      Review of Systems:  a review of eleven systems covering constitutional, Eye, HEENT, Psych, Respiratory, Cardiac, GI, , Musculoskeletal, Endocrine, Dermatologic was negative except for pertinent findings as listed ABOVE and below: pertinent positive as above, rest is good           Exam:Comprehensive exam done. /69 (BP Location: Left arm, Patient Position: Sitting)   Pulse 89   Wt 64.1 kg (141 lb 5 oz)   SpO2 99%   BMI 25.85 kg/m²   Exam included Vitals as listed, and patient's appearance and affect and alertness and mood, oral exam for yeast and hygiene and pharynx lesions and Mallapatti (M) score, neck with inspection for jvd and masses and thyroid abnormalities and lymph nodes (supraclavicular and infraclavicular nodes  and axillary also examined and noted if abn), chest exam included symmetry and effort and fremitus and percussion and auscultation, cardiac exam included rhythm and gallops and murmur and rubs and jvd and edema, abdominal exam for mass and hepatosplenomegaly and tenderness and hernias and bowel sounds, Musculoskeletal exam with muscle tone and posture and mobility/gait and  strength, and skin for rashes and cyanosis and pallor and turgor, extremity for clubbing.  Findings were normal except for pertinent findings listed below: M2. BS clear        Radiographs (ct chest and cxr) reviewed: view by direct vision   NM PET CT Routine Skull to Mid Thigh 3/2/2022   1. Pulmonary nodule in the juxtapleural left upper lobe with slight increased FDG activity from background, consider correlation with the outside/prior exams and either short-term interval follow-up or biopsy.     2. 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    CT CHEST WITHOUT CONTRAST 02/11/2022   12 mm partially solid and partially ground-glass nodule identified in the left upper lobe peripherally is slightly increased in size from the prior study.  Neoplasm is suspected.  Recommend PET CT or biopsy.     Transthoracic echo (TTE) complete 9/20/21 normal    X-Ray Chest PA And Lateral  10/05/2021 Lungs clear      Labs reviewed      Lab Results   Component Value Date    WBC 10.11 11/03/2021    RBC 3.40 (L) 11/03/2021    HGB 11.8 (L) 11/03/2021    HCT 35.2 (L) 11/03/2021     (H) 11/03/2021    MCH 34.7 (H) 11/03/2021    MCHC 33.5 11/03/2021    RDW 13.9 11/03/2021     11/03/2021    MPV 8.9 (L) 11/03/2021    GRAN 4.4  11/03/2021    GRAN 43.8 11/03/2021    LYMPH 4.3 11/03/2021    LYMPH 42.5 11/03/2021    MONO 0.9 11/03/2021    MONO 8.5 11/03/2021    EOS 0.4 11/03/2021    BASO 0.11 11/03/2021    EOSINOPHIL 3.9 11/03/2021    BASOPHIL 1.1 11/03/2021       PFT reviewed  Pulmonary Functions Testing Results:  Spirometry bronchodilator, lung volumes by gas dilution, diffusion capacity measured November 18, 2021. the FEV1 FVC ratio was 80%- there is no airflow obstruction measured by spirometry. The FEV1 measures 88% of predicted at 2 L. There was no   significant improvement in spirometry following bronchodilator. Lung volumes are normal with total lung capacity of 80% of predicted. Diffusion was minimally decreased to 75% of predicted.   Â    Spirometry and lung volumes are both normal. There was no bronchodilator response of significance. Diffusion was only minimally decreased. Clinical correlation would be recommended.       Plan:  Clinical impression is apparently straight forward and impression with management as below.    Cristin was seen today for copd and 3 mth follow up.    Diagnoses and all orders for this visit:    Lung nodule, multiple  -     CT Chest Without Contrast; Future    Chronic sinus complaints  -     fluticasone propionate (FLONASE) 50 mcg/actuation nasal spray; 1 spray (50 mcg total) by Each Nostril route once daily.    Centrilobular emphysema     - continue to monitor    Follow up in about 3 months (around 8/11/2022), or if symptoms worsen or fail to improve.    Discussed with patient above for education the following:      Patient Instructions   Start Flonase for sinus drainage    Use albuterol inhaler when needed for cough    PET scan shows nodule to not be active and no change in size or shape. Will repeat CT for up to 2 years then will consider it benign.

## 2022-05-11 NOTE — PATIENT INSTRUCTIONS
Start Flonase for sinus drainage    Use albuterol inhaler when needed for cough    PET scan shows nodule to not be active and no change in size or shape. Will repeat CT for up to 2 years then will consider it benign.

## 2022-05-12 ENCOUNTER — PATIENT MESSAGE (OUTPATIENT)
Dept: SMOKING CESSATION | Facility: CLINIC | Age: 62
End: 2022-05-12
Payer: COMMERCIAL

## 2022-05-12 LAB
ALBUMIN SERPL BCP-MCNC: 4.1 G/DL (ref 3.5–5.2)
ALP SERPL-CCNC: 83 U/L (ref 55–135)
ALT SERPL W/O P-5'-P-CCNC: 18 U/L (ref 10–44)
ANION GAP SERPL CALC-SCNC: 14 MMOL/L (ref 8–16)
AST SERPL-CCNC: 26 U/L (ref 10–40)
BILIRUB SERPL-MCNC: 0.5 MG/DL (ref 0.1–1)
BUN SERPL-MCNC: 14 MG/DL (ref 8–23)
CALCIUM SERPL-MCNC: 10.1 MG/DL (ref 8.7–10.5)
CHLORIDE SERPL-SCNC: 104 MMOL/L (ref 95–110)
CHOLEST SERPL-MCNC: 219 MG/DL (ref 120–199)
CHOLEST/HDLC SERPL: 1.9 {RATIO} (ref 2–5)
CO2 SERPL-SCNC: 22 MMOL/L (ref 23–29)
CREAT SERPL-MCNC: 1.2 MG/DL (ref 0.5–1.4)
CREAT SERPL-MCNC: 1.2 MG/DL (ref 0.5–1.4)
EST. GFR  (AFRICAN AMERICAN): 56.4 ML/MIN/1.73 M^2
EST. GFR  (AFRICAN AMERICAN): 56.4 ML/MIN/1.73 M^2
EST. GFR  (NON AFRICAN AMERICAN): 48.9 ML/MIN/1.73 M^2
EST. GFR  (NON AFRICAN AMERICAN): 48.9 ML/MIN/1.73 M^2
GLUCOSE SERPL-MCNC: 90 MG/DL (ref 70–110)
HCYS SERPL-SCNC: 17.2 UMOL/L (ref 4–15.5)
HDLC SERPL-MCNC: 116 MG/DL (ref 40–75)
HDLC SERPL: 53 % (ref 20–50)
LDLC SERPL CALC-MCNC: 82.8 MG/DL (ref 63–159)
NONHDLC SERPL-MCNC: 103 MG/DL
POTASSIUM SERPL-SCNC: 4.4 MMOL/L (ref 3.5–5.1)
PROT SERPL-MCNC: 8.3 G/DL (ref 6–8.4)
SODIUM SERPL-SCNC: 140 MMOL/L (ref 136–145)
T4 FREE SERPL-MCNC: 0.98 NG/DL (ref 0.71–1.51)
TRIGL SERPL-MCNC: 101 MG/DL (ref 30–150)
TSH SERPL DL<=0.005 MIU/L-ACNC: 1.63 UIU/ML (ref 0.4–4)

## 2022-05-13 ENCOUNTER — HOSPITAL ENCOUNTER (OUTPATIENT)
Dept: RADIOLOGY | Facility: HOSPITAL | Age: 62
Discharge: HOME | End: 2022-05-13
Attending: OTOLARYNGOLOGY
Payer: COMMERCIAL

## 2022-05-13 DIAGNOSIS — D11.9 WARTHIN TUMOR: ICD-10-CM

## 2022-05-13 DIAGNOSIS — Z90.49 HISTORY OF PAROTIDECTOMY: ICD-10-CM

## 2022-05-13 DIAGNOSIS — N28.9 KIDNEY INSUFFICIENCY: Primary | ICD-10-CM

## 2022-05-13 PROCEDURE — 70490 CT SOFT TISSUE NECK W/O DYE: CPT | Mod: 26,,, | Performed by: RADIOLOGY

## 2022-05-13 PROCEDURE — 70490 CT SOFT TISSUE NECK WITHOUT CONTRAST: ICD-10-PCS | Mod: 26,,, | Performed by: RADIOLOGY

## 2022-05-13 PROCEDURE — 70490 CT SOFT TISSUE NECK W/O DYE: CPT | Mod: TC

## 2022-05-16 ENCOUNTER — TELEPHONE (OUTPATIENT)
Dept: OTOLARYNGOLOGY | Facility: CLINIC | Age: 62
End: 2022-05-16
Payer: COMMERCIAL

## 2022-05-16 NOTE — TELEPHONE ENCOUNTER
----- Message from Abner Maki MD sent at 5/16/2022  6:57 AM CDT -----  Cristin,  Your CT scan is STABLE. When compared to the image you had in 2019, there has been no significant change to the nodules present in the left parotid gland. These most likely represent the same benign tumor you had on the right side. These are not changing. If they begin to grow and you can feel a change, I'd like to see you again. Otherwise, no further imaging is needed to follow-up if things remain stable.    Abner Maki MD  Otolaryngology - Head and Neck Surgery  Office: [phone]  Cell: [phone]  Fax: [phone]    This message was generated using voice dictation. Please excuse any errors that may have been created by the transcription software.

## 2022-05-24 ENCOUNTER — OFFICE VISIT (OUTPATIENT)
Dept: FAMILY MEDICINE | Facility: CLINIC | Age: 62
End: 2022-05-24
Payer: COMMERCIAL

## 2022-05-24 VITALS
SYSTOLIC BLOOD PRESSURE: 120 MMHG | OXYGEN SATURATION: 97 % | BODY MASS INDEX: 26.25 KG/M2 | DIASTOLIC BLOOD PRESSURE: 60 MMHG | WEIGHT: 142.63 LBS | TEMPERATURE: 98 F | HEIGHT: 62 IN | HEART RATE: 100 BPM | RESPIRATION RATE: 18 BRPM

## 2022-05-24 DIAGNOSIS — E11.8 CONTROLLED TYPE 2 DIABETES MELLITUS WITH COMPLICATION, WITHOUT LONG-TERM CURRENT USE OF INSULIN: ICD-10-CM

## 2022-05-24 DIAGNOSIS — E78.2 MIXED HYPERLIPIDEMIA: ICD-10-CM

## 2022-05-24 DIAGNOSIS — R19.5 POSITIVE FIT (FECAL IMMUNOCHEMICAL TEST): Primary | ICD-10-CM

## 2022-05-24 DIAGNOSIS — I10 ESSENTIAL HYPERTENSION: ICD-10-CM

## 2022-05-24 DIAGNOSIS — E03.9 HYPOTHYROIDISM, UNSPECIFIED TYPE: ICD-10-CM

## 2022-05-24 DIAGNOSIS — E72.11 HYPERHOMOCYSTEINEMIA: ICD-10-CM

## 2022-05-24 DIAGNOSIS — R80.9 URINE TEST POSITIVE FOR MICROALBUMINURIA: ICD-10-CM

## 2022-05-24 PROCEDURE — 99999 PR PBB SHADOW E&M-EST. PATIENT-LVL IV: ICD-10-PCS | Mod: PBBFAC,,, | Performed by: FAMILY MEDICINE

## 2022-05-24 PROCEDURE — 3060F PR POS MICROALBUMINURIA RESULT DOCUMENTED/REVIEW: ICD-10-PCS | Mod: CPTII,S$GLB,, | Performed by: FAMILY MEDICINE

## 2022-05-24 PROCEDURE — 3008F PR BODY MASS INDEX (BMI) DOCUMENTED: ICD-10-PCS | Mod: CPTII,S$GLB,, | Performed by: FAMILY MEDICINE

## 2022-05-24 PROCEDURE — 3066F NEPHROPATHY DOC TX: CPT | Mod: CPTII,S$GLB,, | Performed by: FAMILY MEDICINE

## 2022-05-24 PROCEDURE — 1159F MED LIST DOCD IN RCRD: CPT | Mod: CPTII,S$GLB,, | Performed by: FAMILY MEDICINE

## 2022-05-24 PROCEDURE — 1160F RVW MEDS BY RX/DR IN RCRD: CPT | Mod: CPTII,S$GLB,, | Performed by: FAMILY MEDICINE

## 2022-05-24 PROCEDURE — 3074F SYST BP LT 130 MM HG: CPT | Mod: CPTII,S$GLB,, | Performed by: FAMILY MEDICINE

## 2022-05-24 PROCEDURE — 99999 PR PBB SHADOW E&M-EST. PATIENT-LVL IV: CPT | Mod: PBBFAC,,, | Performed by: FAMILY MEDICINE

## 2022-05-24 PROCEDURE — 99214 OFFICE O/P EST MOD 30 MIN: CPT | Mod: S$GLB,,, | Performed by: FAMILY MEDICINE

## 2022-05-24 PROCEDURE — 3008F BODY MASS INDEX DOCD: CPT | Mod: CPTII,S$GLB,, | Performed by: FAMILY MEDICINE

## 2022-05-24 PROCEDURE — 4010F ACE/ARB THERAPY RXD/TAKEN: CPT | Mod: CPTII,S$GLB,, | Performed by: FAMILY MEDICINE

## 2022-05-24 PROCEDURE — 4010F PR ACE/ARB THEARPY RXD/TAKEN: ICD-10-PCS | Mod: CPTII,S$GLB,, | Performed by: FAMILY MEDICINE

## 2022-05-24 PROCEDURE — 1160F PR REVIEW ALL MEDS BY PRESCRIBER/CLIN PHARMACIST DOCUMENTED: ICD-10-PCS | Mod: CPTII,S$GLB,, | Performed by: FAMILY MEDICINE

## 2022-05-24 PROCEDURE — 3074F PR MOST RECENT SYSTOLIC BLOOD PRESSURE < 130 MM HG: ICD-10-PCS | Mod: CPTII,S$GLB,, | Performed by: FAMILY MEDICINE

## 2022-05-24 PROCEDURE — 3060F POS MICROALBUMINURIA REV: CPT | Mod: CPTII,S$GLB,, | Performed by: FAMILY MEDICINE

## 2022-05-24 PROCEDURE — 3066F PR DOCUMENTATION OF TREATMENT FOR NEPHROPATHY: ICD-10-PCS | Mod: CPTII,S$GLB,, | Performed by: FAMILY MEDICINE

## 2022-05-24 PROCEDURE — 3044F HG A1C LEVEL LT 7.0%: CPT | Mod: CPTII,S$GLB,, | Performed by: FAMILY MEDICINE

## 2022-05-24 PROCEDURE — 1159F PR MEDICATION LIST DOCUMENTED IN MEDICAL RECORD: ICD-10-PCS | Mod: CPTII,S$GLB,, | Performed by: FAMILY MEDICINE

## 2022-05-24 PROCEDURE — 99214 PR OFFICE/OUTPT VISIT, EST, LEVL IV, 30-39 MIN: ICD-10-PCS | Mod: S$GLB,,, | Performed by: FAMILY MEDICINE

## 2022-05-24 PROCEDURE — 3044F PR MOST RECENT HEMOGLOBIN A1C LEVEL <7.0%: ICD-10-PCS | Mod: CPTII,S$GLB,, | Performed by: FAMILY MEDICINE

## 2022-05-24 PROCEDURE — 3078F PR MOST RECENT DIASTOLIC BLOOD PRESSURE < 80 MM HG: ICD-10-PCS | Mod: CPTII,S$GLB,, | Performed by: FAMILY MEDICINE

## 2022-05-24 PROCEDURE — 3078F DIAST BP <80 MM HG: CPT | Mod: CPTII,S$GLB,, | Performed by: FAMILY MEDICINE

## 2022-05-24 RX ORDER — LOSARTAN POTASSIUM 25 MG/1
25 TABLET ORAL DAILY
Qty: 90 TABLET | Refills: 3 | Status: SHIPPED | OUTPATIENT
Start: 2022-05-24 | End: 2023-04-17

## 2022-05-24 RX ORDER — ROSUVASTATIN CALCIUM 10 MG/1
10 TABLET, COATED ORAL DAILY
Qty: 90 TABLET | Refills: 3 | Status: CANCELLED | OUTPATIENT
Start: 2022-05-24

## 2022-05-24 RX ORDER — LEVOTHYROXINE SODIUM 50 UG/1
50 TABLET ORAL
Qty: 90 TABLET | Refills: 3 | Status: SHIPPED | OUTPATIENT
Start: 2022-05-24 | End: 2023-04-17

## 2022-05-24 NOTE — PROGRESS NOTES
Subjective:       Patient ID: Cristin Segal is a 61 y.o. female.    Chief Complaint: Follow-up (3mth f/u )    HPI  Review of Systems   Constitutional: Negative for fatigue and unexpected weight change.   Respiratory: Negative for chest tightness and shortness of breath.    Cardiovascular: Negative for chest pain, palpitations and leg swelling.   Gastrointestinal: Negative for abdominal pain and blood in stool.   Musculoskeletal: Negative for arthralgias.   Neurological: Negative for dizziness, syncope, light-headedness and headaches.       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    Reviewed labs 5/22-high homocysteine- on folbic-yes  HPL-   On crestor? Yes 5mg     FIT 12/2021 + . GI referral placed. Has not had time to schedule it     Pulm Dr. Espino lung nodule. CT chest 2/2022 showed slightly enlarging nodule left upper lobe.  Has PET scan scheduled 3/2/2022     Spine Dr. Victor low back pain/Dr. Bai pain. H/o closed vertebral compression T12 s/p  kyphoplasty 12/2021. Doing pt and taking pain     TFTs wnl 9/2021  Card Dr. brooks aortic stenosis     Dr. Cat eye exam     Had ED visit 3/20 [facility] for fall resulting in left ankle fracture. Dr. Tee treateed     Had ED visit 9/2/20 [facility] for hitting head on freezer door then fell backwards striking back of head     DEXA 8/20 -nl BMD     History:   Ortho Dr. 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 Maki 8/19        Rheum Dr. GRACE Guzman MCTD on plaquenil.  Takes adalat for raynauds.      HTN controlled with diet       Elevated LFTs - NL LFTS 12/20.no fatty liver or enlargement on either CT ab 5/15 nor u/s and 1/19. Hepatitis panel normal     Type 2 DM- controlled without meds.  Last a1c 5.0. Eye Dr Cat 1/19.  On crestor. Not on ace or ARB-urine ma slightly elevated     GYn PAP Dr. Elmore < 3 years     Macrocytosis- b12 and folate normal 1/19 and 12/20  Objective:      Physical Exam  Vitals and nursing note reviewed.   Constitutional:       Appearance: She is well-developed.   Cardiovascular:      Rate and Rhythm: Normal rate and regular rhythm.      Heart sounds: Normal heart sounds.   Pulmonary:      Effort: Pulmonary effort is normal.      Breath sounds: Normal breath sounds.   Skin:     General: Skin is warm and dry.   Neurological:      Mental Status: She is alert and oriented to person, place, and time.         Assessment:       1. Positive FIT (fecal immunochemical test)    2. Hyperhomocysteinemia    3. Urine test positive for microalbuminuria    4. Mixed hyperlipidemia    5. Essential hypertension    6. Controlled type 2 diabetes mellitus with complication, without long-term current use of insulin    7. Hypothyroidism, unspecified type        Plan:         1. Positive FIT (fecal immunochemical test)  Refer for eval and treat  - Ambulatory referral/consult to Gastroenterology; Future    2.  Hyperhomocysteinemia  Cont folbic    3. Urine test positive for microalbuminuria  Add  - losartan (COZAAR) 25 MG tablet; Take 1 tablet (25 mg total) by mouth once daily.  Dispense: 90 tablet; Refill: 3    4. Mixed hyperlipidemia  Controlled on current medications.  Continue current medications.  Your total cholesterol is elevated but so is the good cholesterol, HDL, which is a factor.  Your LDL is at goal.  Continue to work on a heart healthy diet, be active, get regular exercise and omega 3 in the diet.      - Lipid Panel; Future    5. Essential hypertension  Controlled on current medications.  Continue current medications.      6. Controlled type 2 diabetes mellitus with complication, without long-term current use of insulin    Controlled with diet  - CBC Auto Differential; Future  - Comprehensive Metabolic Panel; Future  - Hemoglobin A1C; Future    7. Hypothyroidism, unspecified type  Controlled on current medications.  Continue current medications.    - levothyroxine (SYNTHROID) 50 MCG tablet; Take 1 tablet (50 mcg total) by mouth before breakfast.  Dispense: 90 tablet; Refill: 3        Time spent with patient: 20 minutes    Patient with be reevaluated in 6 months or sooner prn    Greater than 50% of this visit was spent counseling as described in above documentation:Yes

## 2022-05-25 ENCOUNTER — TELEPHONE (OUTPATIENT)
Dept: FAMILY MEDICINE | Facility: CLINIC | Age: 62
End: 2022-05-25
Payer: COMMERCIAL

## 2022-05-26 ENCOUNTER — TELEPHONE (OUTPATIENT)
Dept: FAMILY MEDICINE | Facility: CLINIC | Age: 62
End: 2022-05-26
Payer: COMMERCIAL

## 2022-05-31 ENCOUNTER — PATIENT MESSAGE (OUTPATIENT)
Dept: ADMINISTRATIVE | Facility: HOSPITAL | Age: 62
End: 2022-05-31
Payer: COMMERCIAL

## 2022-06-20 ENCOUNTER — PATIENT OUTREACH (OUTPATIENT)
Dept: ADMINISTRATIVE | Facility: HOSPITAL | Age: 62
End: 2022-06-20
Payer: COMMERCIAL

## 2022-06-20 ENCOUNTER — PATIENT MESSAGE (OUTPATIENT)
Dept: ADMINISTRATIVE | Facility: HOSPITAL | Age: 62
End: 2022-06-20
Payer: COMMERCIAL

## 2022-06-28 ENCOUNTER — PATIENT OUTREACH (OUTPATIENT)
Dept: ADMINISTRATIVE | Facility: HOSPITAL | Age: 62
End: 2022-06-28
Payer: COMMERCIAL

## 2022-06-28 NOTE — PROGRESS NOTES
"Attempted to outreach patient regarding colonoscopy via "[email or web address]". No response after a week. Now sending outreach via mail out letter.    "

## 2022-06-28 NOTE — LETTER
June 28, 2022    Cristin Segal  [address] [place] [address]  [place] [address]             [facility]  [address]  [facility] [address]  Phone: [phone] Hi,  I hope you are well. According to our records you had a Fit Kit test result which need follow up. I would like to assist you in getting scheduled with the Gastroenterology department and scheduling a colonoscopy. If you have had this done elsewhere, please let us know and we will request a copy of your report to update your [facility] record. If you have not, please give us a call at [phone]. We are happy to help you get this scheduled.      Thanks and have a good day!     Pura Ragsdale LPN Clinical Care Coordinator  [facility] [address] [place] [address]  [phone] (phone)  [phone] (fax)

## 2022-07-07 ENCOUNTER — HOSPITAL ENCOUNTER (OUTPATIENT)
Dept: RADIOLOGY | Facility: HOSPITAL | Age: 62
Discharge: HOME | End: 2022-07-07
Attending: NURSE PRACTITIONER
Payer: COMMERCIAL

## 2022-07-07 DIAGNOSIS — R91.8 LUNG NODULE, MULTIPLE: ICD-10-CM

## 2022-07-07 PROCEDURE — 71250 CT THORAX DX C-: CPT | Mod: 26,,, | Performed by: RADIOLOGY

## 2022-07-07 PROCEDURE — 71250 CT CHEST WITHOUT CONTRAST: ICD-10-PCS | Mod: 26,,, | Performed by: RADIOLOGY

## 2022-07-07 PROCEDURE — 71250 CT THORAX DX C-: CPT | Mod: TC

## 2022-07-22 ENCOUNTER — TELEPHONE (OUTPATIENT)
Dept: PULMONOLOGY | Facility: CLINIC | Age: 62
End: 2022-07-22
Payer: COMMERCIAL

## 2022-07-25 ENCOUNTER — TELEPHONE (OUTPATIENT)
Dept: PULMONOLOGY | Facility: CLINIC | Age: 62
End: 2022-07-25
Payer: COMMERCIAL

## 2022-07-25 ENCOUNTER — PATIENT MESSAGE (OUTPATIENT)
Dept: PULMONOLOGY | Facility: CLINIC | Age: 62
End: 2022-07-25
Payer: COMMERCIAL

## 2022-07-25 NOTE — TELEPHONE ENCOUNTER
Sent portal message     ----- Message from Gabby Fay NP sent at 7/22/2022  4:34 PM CDT -----  Ct of chest shows nodule to be slightly increased in size. Ordering special blood test to look for tumor markers. Expect phone call from company to set up blood test.

## 2022-08-16 ENCOUNTER — OFFICE VISIT (OUTPATIENT)
Dept: PULMONOLOGY | Facility: CLINIC | Age: 62
End: 2022-08-16
Payer: COMMERCIAL

## 2022-08-16 ENCOUNTER — TELEPHONE (OUTPATIENT)
Dept: PULMONOLOGY | Facility: CLINIC | Age: 62
End: 2022-08-16

## 2022-08-16 VITALS
HEIGHT: 62 IN | DIASTOLIC BLOOD PRESSURE: 68 MMHG | SYSTOLIC BLOOD PRESSURE: 101 MMHG | WEIGHT: 148.13 LBS | OXYGEN SATURATION: 99 % | BODY MASS INDEX: 27.26 KG/M2 | HEART RATE: 93 BPM

## 2022-08-16 DIAGNOSIS — J45.909 EXTRINSIC ASTHMA WITHOUT COMPLICATION, UNSPECIFIED ASTHMA SEVERITY, UNSPECIFIED WHETHER PERSISTENT: ICD-10-CM

## 2022-08-16 DIAGNOSIS — R91.1 LUNG NODULE: Primary | ICD-10-CM

## 2022-08-16 PROCEDURE — 3060F PR POS MICROALBUMINURIA RESULT DOCUMENTED/REVIEW: ICD-10-PCS | Mod: CPTII,S$GLB,, | Performed by: NURSE PRACTITIONER

## 2022-08-16 PROCEDURE — 3074F PR MOST RECENT SYSTOLIC BLOOD PRESSURE < 130 MM HG: ICD-10-PCS | Mod: CPTII,S$GLB,, | Performed by: NURSE PRACTITIONER

## 2022-08-16 PROCEDURE — 3066F NEPHROPATHY DOC TX: CPT | Mod: CPTII,S$GLB,, | Performed by: NURSE PRACTITIONER

## 2022-08-16 PROCEDURE — 3060F POS MICROALBUMINURIA REV: CPT | Mod: CPTII,S$GLB,, | Performed by: NURSE PRACTITIONER

## 2022-08-16 PROCEDURE — 4010F ACE/ARB THERAPY RXD/TAKEN: CPT | Mod: CPTII,S$GLB,, | Performed by: NURSE PRACTITIONER

## 2022-08-16 PROCEDURE — 99999 PR PBB SHADOW E&M-EST. PATIENT-LVL IV: ICD-10-PCS | Mod: PBBFAC,,, | Performed by: NURSE PRACTITIONER

## 2022-08-16 PROCEDURE — 99213 PR OFFICE/OUTPT VISIT, EST, LEVL III, 20-29 MIN: ICD-10-PCS | Mod: S$GLB,,, | Performed by: NURSE PRACTITIONER

## 2022-08-16 PROCEDURE — 3078F DIAST BP <80 MM HG: CPT | Mod: CPTII,S$GLB,, | Performed by: NURSE PRACTITIONER

## 2022-08-16 PROCEDURE — 1159F MED LIST DOCD IN RCRD: CPT | Mod: CPTII,S$GLB,, | Performed by: NURSE PRACTITIONER

## 2022-08-16 PROCEDURE — 99213 OFFICE O/P EST LOW 20 MIN: CPT | Mod: S$GLB,,, | Performed by: NURSE PRACTITIONER

## 2022-08-16 PROCEDURE — 4010F PR ACE/ARB THEARPY RXD/TAKEN: ICD-10-PCS | Mod: CPTII,S$GLB,, | Performed by: NURSE PRACTITIONER

## 2022-08-16 PROCEDURE — 3008F PR BODY MASS INDEX (BMI) DOCUMENTED: ICD-10-PCS | Mod: CPTII,S$GLB,, | Performed by: NURSE PRACTITIONER

## 2022-08-16 PROCEDURE — 3044F HG A1C LEVEL LT 7.0%: CPT | Mod: CPTII,S$GLB,, | Performed by: NURSE PRACTITIONER

## 2022-08-16 PROCEDURE — 3074F SYST BP LT 130 MM HG: CPT | Mod: CPTII,S$GLB,, | Performed by: NURSE PRACTITIONER

## 2022-08-16 PROCEDURE — 3066F PR DOCUMENTATION OF TREATMENT FOR NEPHROPATHY: ICD-10-PCS | Mod: CPTII,S$GLB,, | Performed by: NURSE PRACTITIONER

## 2022-08-16 PROCEDURE — 3008F BODY MASS INDEX DOCD: CPT | Mod: CPTII,S$GLB,, | Performed by: NURSE PRACTITIONER

## 2022-08-16 PROCEDURE — 3044F PR MOST RECENT HEMOGLOBIN A1C LEVEL <7.0%: ICD-10-PCS | Mod: CPTII,S$GLB,, | Performed by: NURSE PRACTITIONER

## 2022-08-16 PROCEDURE — 3078F PR MOST RECENT DIASTOLIC BLOOD PRESSURE < 80 MM HG: ICD-10-PCS | Mod: CPTII,S$GLB,, | Performed by: NURSE PRACTITIONER

## 2022-08-16 PROCEDURE — 1159F PR MEDICATION LIST DOCUMENTED IN MEDICAL RECORD: ICD-10-PCS | Mod: CPTII,S$GLB,, | Performed by: NURSE PRACTITIONER

## 2022-08-16 PROCEDURE — 99999 PR PBB SHADOW E&M-EST. PATIENT-LVL IV: CPT | Mod: PBBFAC,,, | Performed by: NURSE PRACTITIONER

## 2022-08-16 RX ORDER — ALBUTEROL SULFATE 90 UG/1
2 AEROSOL, METERED RESPIRATORY (INHALATION) EVERY 6 HOURS PRN
Qty: 8 G | Refills: 5 | Status: SHIPPED | OUTPATIENT
Start: 2022-08-16 | End: 2023-08-16

## 2022-08-16 NOTE — TELEPHONE ENCOUNTER
Notified pt via portal    ----- Message from Gabby Fay NP sent at 8/16/2022  8:37 AM CDT -----  Blood test shows a 2% probability of nodules being cancer. This is good. Will continue to monitor them.

## 2022-08-16 NOTE — PATIENT INSTRUCTIONS
Blood test shows low probability lung nodule is cancer  Will repeat Ct in 6 months to further monitor    Ordered smaller Albuterol order to pharmacy. Use in morning to help clear mucous

## 2022-08-16 NOTE — PROGRESS NOTES
8/16/2022    Cristin Segal  Office Note    Chief Complaint   Patient presents with    Cough     Patient has a cough that has mucus. nothing comes out, feels like its stuck in the back of pt throat.       HPI:  8/16/22- Complaint of chronic nasal drainage, has recurrent morning cough, onset 3 weeks, clear productive mucous, thick, can feel mucous in chest difficult to clear. Insurance denied albuterol inhaler.    5/11/2022- no current complaint of cough or shortness of breath, did not take symbicort due to cost of medication.   Currently smoking 1/2 pack daily.     Complaint of sinus pressure and post nasal drip, onset years, followed by ENT   Seen by ENT for PET active left cervical lymph nodes.     2/11/22- cough- recurrent complaint, less frequent in past 3 months. Insurance denied Xopenex. Took Trelegy for 2 weeks with no benefit.   Currently smoking under 1 pack daily goal to cut down to 1/2 pack daily.    SOB- only when hot wearing face mask. Improves with breathing cold air. 1x weekly      11/11/2021- Cough- onset years, no change with time, worse in early mornings, improves after expelling mucous, productive clear mucous, no chest tightness or wheeze,   No complaint of shortness of breath. Followed by cardiologist for MV prolapse and chest pain, on crestor for cholesterol. Followed by Dr. Guzman for auto immune disease.   Social Hx: lives with , 3 dogs, and one chockatoo for 34 years, on medical leave, working as medical technologist [employer], no known Asbestosis exposure, Smoking Hx: currently smoking 1/2-1 pack daily. 45 pack years. Tried smoking cessation with no benefit. Tried Chantix.   Family Hx: no Lung Cancer, no COPD, no Asthma  Medical Hx: no previous pneumonia ; no previous shoulder/chest surgery        The chief compliant  problem varies with instablilty at time    PFSH:  Past Medical History:   Diagnosis Date    Arthritis     Cataract     Diabetes mellitus type II     diet  "controlled    Fracture     left 4th finger  - splinted    Hyperlipidemia     MCTD (mixed connective tissue disease)     Raynaud's disease     Thyroid disease     Wears glasses     contacs         Past Surgical History:   Procedure Laterality Date    CARPAL TUNNEL RELEASE      right    CATARACT EXTRACTION W/ INTRAOCULAR LENS  IMPLANT, BILATERAL      EXCISION OF PAROTID GLAND Right 8/1/2019    Procedure: PAROTIDECTOMY;  Surgeon: Abner Maki MD;  [facility];  Service: ENT;  Laterality: Right;    HIP SURGERY Left 6/18/15    JANA    JOINT REPLACEMENT Bilateral     HIP    KNEE CARTILAGE SURGERY      right    TOTAL HIP ARTHROPLASTY Right 05/19/2016    JANA     Social History     Tobacco Use    Smoking status: Current Every Day Smoker     Packs/day: 0.50    Smokeless tobacco: Never Used   Substance Use Topics    Alcohol use: Yes     Comment: occasional    Drug use: No     Family History   Problem Relation Age of Onset    Diabetes Mother     Kidney disease Mother     Aneurysm Mother 73        brain    Hyperlipidemia Mother     Hypertension Sister     Breast cancer Sister     Diabetes Sister      Review of patient's allergies indicates:   Allergen Reactions    Pcn [penicillins] Anaphylaxis     Unknown for her- family history with anaphylaxis    Lipitor [atorvastatin]      I have reviewed past medical, family, and social history. I have reviewed previous nurse notes.    Performance Status:The patient's activity level is no limits with regular activity.      Review of Systems:  a review of eleven systems covering constitutional, Eye, HEENT, Psych, Respiratory, Cardiac, GI, , Musculoskeletal, Endocrine, Dermatologic was negative except for pertinent findings as listed ABOVE and below: pertinent positive as above, rest is good  cough         Exam:Comprehensive exam done. /68 (BP Location: Left arm, Patient Position: Sitting, BP Method: Medium (Automatic))   Pulse 93   Ht 5' 2" (1.575 " m)   Wt 67.2 kg (148 lb 2.4 oz)   SpO2 99% Comment: room air at rest  BMI 27.10 kg/m²   Exam included Vitals as listed, and patient's appearance and affect and alertness and mood, oral exam for yeast and hygiene and pharynx lesions and Mallapatti (M) score, neck with inspection for jvd and masses and thyroid abnormalities and lymph nodes (supraclavicular and infraclavicular nodes and axillary also examined and noted if abn), chest exam included symmetry and effort and fremitus and percussion and auscultation, cardiac exam included rhythm and gallops and murmur and rubs and jvd and edema, abdominal exam for mass and hepatosplenomegaly and tenderness and hernias and bowel sounds, Musculoskeletal exam with muscle tone and posture and mobility/gait and  strength, and skin for rashes and cyanosis and pallor and turgor, extremity for clubbing.  Findings were normal except for pertinent findings listed below: M2. BS clear        Radiographs (ct chest and cxr) reviewed: view by direct vision   CT Chest Without Contrast  07/07/2022   Continued 8 mm solid nodule identified in the left upper lobe along the lateral pleural surface.  The ground-glass component of this density has resolved.  Recommend continued follow-up by CT or attempt at percutaneous CT directed needle biopsy.  Coronary artery calcification noted.     NM PET CT Routine Skull to Mid Thigh 3/2/2022   1. Pulmonary nodule in the juxtapleural left upper lobe with slight increased FDG activity from background, consider correlation with the outside/prior exams and either short-term interval follow-up or biopsy.     2. 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    CT CHEST WITHOUT CONTRAST 02/11/2022   12 mm partially solid and partially ground-glass nodule identified in the left upper lobe peripherally is slightly increased in size from the prior study.  Neoplasm is suspected.  Recommend PET CT or biopsy.     Transthoracic echo (TTE) complete 9/20/21 normal    X-Ray Chest PA And Lateral  10/05/2021 Lungs clear      Labs reviewed      Lab Results   Component Value Date    WBC 8.02 05/11/2022    RBC 3.47 (L) 05/11/2022    HGB 11.7 (L) 05/11/2022    HCT 35.7 (L) 05/11/2022     (H) 05/11/2022    MCH 33.7 (H) 05/11/2022    MCHC 32.8 05/11/2022    RDW 12.8 05/11/2022     05/11/2022    MPV 9.1 (L) 05/11/2022    GRAN 3.7 05/11/2022    GRAN 45.5 05/11/2022    LYMPH 3.1 05/11/2022    LYMPH 38.2 05/11/2022    MONO 0.7 05/11/2022    MONO 8.6 05/11/2022    EOS 0.5 05/11/2022    BASO 0.14 05/11/2022    EOSINOPHIL 5.6 05/11/2022    BASOPHIL 1.7 05/11/2022     NOdify lung 8/3/22 no significant autoantibodies 2% risk malignancy    PFT reviewed  Pulmonary Functions Testing Results:  Spirometry bronchodilator, lung volumes by gas dilution, diffusion capacity measured November 18, 2021. the FEV1 FVC ratio was 80%- there is no airflow obstruction measured by spirometry. The FEV1 measures 88% of predicted at 2 L. There was no   significant improvement in spirometry following bronchodilator. Lung volumes are normal with total lung capacity of 80% of predicted. Diffusion was minimally decreased to 75% of predicted.   Â    Spirometry and lung volumes are both normal. There was no bronchodilator response of significance. Diffusion was only minimally decreased. Clinical correlation would be recommended.         Plan:  Clinical impression is apparently straight forward and impression with management as below.    Cristin was seen today for cough.    Diagnoses and all orders for this visit:    Lung nodule  -     CT Chest Without Contrast; Future    Extrinsic  asthma without complication, unspecified asthma severity, unspecified whether persistent  -     albuterol (VENTOLIN HFA) 90 mcg/actuation inhaler; Inhale 2 puffs into the lungs every 6 (six) hours as needed for Wheezing or Shortness of Breath. Rescue     Follow up in about 5 months (around 1/16/2023), or if symptoms worsen or fail to improve.    Discussed with patient above for education the following:      Patient Instructions   Blood test shows low probability lung nodule is cancer  Will repeat Ct in 6 months to further monitor    Ordered smaller Albuterol order to pharmacy. Use in morning to help clear mucous

## 2022-08-18 ENCOUNTER — OFFICE VISIT (OUTPATIENT)
Dept: RHEUMATOLOGY | Facility: CLINIC | Age: 62
End: 2022-08-18
Payer: COMMERCIAL

## 2022-08-18 VITALS — SYSTOLIC BLOOD PRESSURE: 127 MMHG | BODY MASS INDEX: 26.89 KG/M2 | DIASTOLIC BLOOD PRESSURE: 76 MMHG | WEIGHT: 147 LBS

## 2022-08-18 DIAGNOSIS — M35.1 MIXED CONNECTIVE TISSUE DISEASE: Primary | ICD-10-CM

## 2022-08-18 PROCEDURE — 4010F PR ACE/ARB THEARPY RXD/TAKEN: ICD-10-PCS | Mod: CPTII,S$GLB,, | Performed by: INTERNAL MEDICINE

## 2022-08-18 PROCEDURE — 3078F PR MOST RECENT DIASTOLIC BLOOD PRESSURE < 80 MM HG: ICD-10-PCS | Mod: CPTII,S$GLB,, | Performed by: INTERNAL MEDICINE

## 2022-08-18 PROCEDURE — 99213 OFFICE O/P EST LOW 20 MIN: CPT | Mod: S$GLB,,, | Performed by: INTERNAL MEDICINE

## 2022-08-18 PROCEDURE — 3066F NEPHROPATHY DOC TX: CPT | Mod: CPTII,S$GLB,, | Performed by: INTERNAL MEDICINE

## 2022-08-18 PROCEDURE — 3066F PR DOCUMENTATION OF TREATMENT FOR NEPHROPATHY: ICD-10-PCS | Mod: CPTII,S$GLB,, | Performed by: INTERNAL MEDICINE

## 2022-08-18 PROCEDURE — 3044F PR MOST RECENT HEMOGLOBIN A1C LEVEL <7.0%: ICD-10-PCS | Mod: CPTII,S$GLB,, | Performed by: INTERNAL MEDICINE

## 2022-08-18 PROCEDURE — 3060F POS MICROALBUMINURIA REV: CPT | Mod: CPTII,S$GLB,, | Performed by: INTERNAL MEDICINE

## 2022-08-18 PROCEDURE — 3044F HG A1C LEVEL LT 7.0%: CPT | Mod: CPTII,S$GLB,, | Performed by: INTERNAL MEDICINE

## 2022-08-18 PROCEDURE — 3008F BODY MASS INDEX DOCD: CPT | Mod: CPTII,S$GLB,, | Performed by: INTERNAL MEDICINE

## 2022-08-18 PROCEDURE — 3074F PR MOST RECENT SYSTOLIC BLOOD PRESSURE < 130 MM HG: ICD-10-PCS | Mod: CPTII,S$GLB,, | Performed by: INTERNAL MEDICINE

## 2022-08-18 PROCEDURE — 1159F MED LIST DOCD IN RCRD: CPT | Mod: CPTII,S$GLB,, | Performed by: INTERNAL MEDICINE

## 2022-08-18 PROCEDURE — 99213 PR OFFICE/OUTPT VISIT, EST, LEVL III, 20-29 MIN: ICD-10-PCS | Mod: S$GLB,,, | Performed by: INTERNAL MEDICINE

## 2022-08-18 PROCEDURE — 1159F PR MEDICATION LIST DOCUMENTED IN MEDICAL RECORD: ICD-10-PCS | Mod: CPTII,S$GLB,, | Performed by: INTERNAL MEDICINE

## 2022-08-18 PROCEDURE — 3074F SYST BP LT 130 MM HG: CPT | Mod: CPTII,S$GLB,, | Performed by: INTERNAL MEDICINE

## 2022-08-18 PROCEDURE — 3008F PR BODY MASS INDEX (BMI) DOCUMENTED: ICD-10-PCS | Mod: CPTII,S$GLB,, | Performed by: INTERNAL MEDICINE

## 2022-08-18 PROCEDURE — 3060F PR POS MICROALBUMINURIA RESULT DOCUMENTED/REVIEW: ICD-10-PCS | Mod: CPTII,S$GLB,, | Performed by: INTERNAL MEDICINE

## 2022-08-18 PROCEDURE — 4010F ACE/ARB THERAPY RXD/TAKEN: CPT | Mod: CPTII,S$GLB,, | Performed by: INTERNAL MEDICINE

## 2022-08-18 PROCEDURE — 3078F DIAST BP <80 MM HG: CPT | Mod: CPTII,S$GLB,, | Performed by: INTERNAL MEDICINE

## 2022-08-18 RX ORDER — HYDROXYCHLOROQUINE SULFATE 200 MG/1
200 TABLET, FILM COATED ORAL DAILY
Qty: 90 TABLET | Refills: 1 | Status: SHIPPED | OUTPATIENT
Start: 2022-08-18 | End: 2022-12-08 | Stop reason: SDUPTHER

## 2022-08-18 NOTE — PROGRESS NOTES
[facility] RHEUMATOLOGY            PROGRESS NOTE      Subjective:       Patient ID:   NAME: Cristin Segal : 1960     61 y.o. female    Referring Doc: No ref. provider found  Other Physicians:    Chief Complaint:  Mixed connective tissue disease      History of Present Illness:     Patient returns today for a regularly scheduled follow-up visit for history of mixed connective tissue did    The patient is doing fairly well except occasional episodes of shortness of breath relief with inhalers.  No chest pains.  No rashes.  No mucosal ulcerations or sicca symptoms.  No Raynaud's  Chronic back pain, since kyphoplasty    ROS:   GEN:  No  fever, night sweats . weight is stable +  fatigue  SKIN: no rashes, no bruising, no ulcerations, no Raynaud's  HEENT: + occ  SOB, PND,+ occ SANTILLAN, no orthopnea, no palpitations  PULM: normal with no SOB, cough, hemoptysis, sputum or pleuritic pain  GI:  no abdominal pain, nausea, vomiting, constipation, diarrhea, melanotic stools, BRBPR, hematemesis, no dysphagia  NEURO: no paresthesias, headaches, visual disturbances, muscle weakness  MUSCULOSKELETAL:no joint swelling, prolonged AM stiffness, + occ back pain, + occ muscle pain  Allergies:  Review of patient's allergies indicates:   Allergen Reactions    Pcn [penicillins] Anaphylaxis     Unknown for her- family history with anaphylaxis    Lipitor [atorvastatin]        Medications:    Current Outpatient Medications:     albuterol (VENTOLIN HFA) 90 mcg/actuation inhaler, Inhale 2 puffs into the lungs every 6 (six) hours as needed for Wheezing or Shortness of Breath. Rescue, Disp: 8 g, Rfl: 5    aspirin 81 MG Chew, Take 1 tablet (81 mg total) by mouth 3 (three) times a week., Disp: 13 tablet, Rfl: 0    cyclobenzaprine (FLEXERIL) 10 MG tablet, Take 1 tablet (10 mg total) by mouth once daily., Disp: 30 tablet, Rfl: 3    fluticasone propionate (FLONASE) 50 mcg/actuation nasal spray, 1 spray (50 mcg total) by Each Nostril  route once daily., Disp: 16 g, Rfl: 11    KLOR-CON M20 20 mEq tablet, TAKE 1 TABLET (20 MEQ TOTAL) BY MOUTH ONCE DAILY., Disp: 90 tablet, Rfl: 3    levothyroxine (SYNTHROID) 50 MCG tablet, Take 1 tablet (50 mcg total) by mouth before breakfast., Disp: 90 tablet, Rfl: 3    losartan (COZAAR) 25 MG tablet, Take 1 tablet (25 mg total) by mouth once daily., Disp: 90 tablet, Rfl: 3    mv-min/FA/C/glut/lysine/adm798 (AIRBORNE, WITH FOLIC ACID, ORAL), Take 1 tablet by mouth 4 (four) times daily., Disp: , Rfl:     NIFEdipine (ADALAT CC) 30 MG TbSR, Take 1 tablet (30 mg total) by mouth once daily., Disp: 90 tablet, Rfl: 2    oxyCODONE-acetaminophen (PERCOCET) 5-325 mg per tablet, Take 1 tablet by mouth every 6 (six) hours as needed for Pain., Disp: 28 tablet, Rfl: 0    pantoprazole (PROTONIX) 40 MG tablet, TAKE 1 TABLET BY MOUTH EVERY DAY, Disp: 90 tablet, Rfl: 3    rosuvastatin (CRESTOR) 5 MG tablet, TAKE 1 TABLET BY MOUTH EVERYDAY AT BEDTIME, Disp: 90 tablet, Rfl: 2    vitamin D (VITAMIN D3) 1000 units Tab, Take 1,000 Units by mouth once daily., Disp: , Rfl:     WESTAB MAX 2.5-25-2 mg Tab, TAKE 1 TABLET BY MOUTH EVERY DAY, Disp: 90 tablet, Rfl: 3    hydrOXYchloroQUINE (PLAQUENIL) 200 mg tablet, Take 1 tablet (200 mg total) by mouth once daily., Disp: 90 tablet, Rfl: 1  No current facility-administered medications for this visit.    Facility-Administered Medications Ordered in Other Visits:     lactated ringers infusion, , Intravenous, Continuous, Kev Bai MD    PMHx/PSHx Updates:          Objective:     Vitals:  Blood pressure 127/76, weight 66.7 kg (147 lb).    Physical Examination:   GEN: no apparent distress, comfortable; AAOx3  SKIN: no rashes,no ulceration, no Raynaud's, no petechiae, no SQ nodules,  HEAD: normal  EYES: no pallor, no icterus,  NECK: no masses, thyroid normal, trachea midline, no LAD/LN's, supple  CV: RRR with no murmur; l S1 and S2 reg. ,no gallop no rubs,   CHEST: Normal respiratory  effort; CTAB; normal breath sounds; no wheeze or crackles  MUSC/Skeletal: ROM normal; no crepitus; joints without synovitis,  no deformities  No joint swelling or tenderness of PIP, MCP, wrist, elbow, shoulder, or knee joints  EXTREM: no clubbing, cyanosis, no edema,normal  pulses   NEURO: grossly intact; motor WNL; AAOx3; ,   PSYCH: normal mood, affect and behavior  LYMPH: normal cervical, supraclavicular          Labs:   Lab Results   Component Value Date    WBC 8.02 05/11/2022    HGB 11.7 (L) 05/11/2022    HCT 35.7 (L) 05/11/2022     (H) 05/11/2022     05/11/2022    CMP  @LASTLAB(NA,K,CL,CO2,GLU,BUN,Creatinine,Calcium,PROT,Albumin,Bilitot,Alkphos,AST,ALT,CRP,ESR,RF,CCP,BROOK,SSA,CPK,uric acid) )@  I have reviewed all available lab results and radiology reports.    Radiology/Diagnostic Studies:        Assessment/Plan:   (1) 61 y.o. female with diagnosis of history of Mixed Connective tissue Disease appears stable    Labs including BROKO with profile  CBC CMP urinalysis        Discussion:     I have explained all of the above in detail and the patient understands all of the current recommendation(s). I have answered all questions to the best of my ability and to their complete satisfaction.       The patient is to continue with the current management plan         RTC in   4 months if unable to get appointment with rheumatologist in      Electronically signed by Erica Guzman MD    Patient notified that this office will be closing December 22, 2022. They should begin looking for another rheumatologist as soon as possible.  A list with the names and contact details of rheumatologists in the surrounding area was provided.

## 2022-09-12 ENCOUNTER — PATIENT MESSAGE (OUTPATIENT)
Dept: ADMINISTRATIVE | Facility: HOSPITAL | Age: 62
End: 2022-09-12
Payer: COMMERCIAL

## 2022-09-12 ENCOUNTER — PATIENT OUTREACH (OUTPATIENT)
Dept: ADMINISTRATIVE | Facility: HOSPITAL | Age: 62
End: 2022-09-12
Payer: COMMERCIAL

## 2022-09-12 NOTE — PROGRESS NOTES
Pap smear gap report with patient outreach for overdue HM items:    Health Maintenance Due   Topic Date Due    Cervical Cancer Screening  Never done    COVID-19 Vaccine (1) Never done    HIV Screening  Never done    Pneumococcal Vaccines (Age 0-64) (2 - PCV) 11/17/2021    Foot Exam  02/18/2022    Influenza Vaccine (1) 09/01/2022

## 2022-09-15 ENCOUNTER — TELEPHONE (OUTPATIENT)
Dept: CARDIOLOGY | Facility: CLINIC | Age: 62
End: 2022-09-15
Payer: COMMERCIAL

## 2022-09-15 NOTE — TELEPHONE ENCOUNTER
----- Message from Lisa Farrell sent at 9/15/2022 11:45 AM CDT -----  Regarding: rescheduling appointment  Contact: Patient  Patient want to speak with a nurse regarding rescheduling appointment, please call back at [phone] (home)     Case number [id number]

## 2022-09-19 ENCOUNTER — PATIENT OUTREACH (OUTPATIENT)
Dept: ADMINISTRATIVE | Facility: HOSPITAL | Age: 62
End: 2022-09-19
Payer: COMMERCIAL

## 2022-09-19 NOTE — PROGRESS NOTES
"Attempted to outreach patient regarding overdue/ due HM via "[employer]". No response after a week. Now sending outreach via mail out letter.  Health Maintenance Due   Topic Date Due    Cervical Cancer Screening  Never done    COVID-19 Vaccine (1) Never done    HIV Screening  Never done    Pneumococcal Vaccines (Age 0-64) (2 - PCV) 11/17/2021    Foot Exam  02/18/2022    Influenza Vaccine (1) 09/01/2022          "

## 2022-09-19 NOTE — LETTER
September 19, 2022    Cristin Segal  [address]  [place] [address]             [facility]  [address]  [place] [address]  Phone: [phone] Dear [facility] is committed [facility].  To help you get the most out of each of your visits, we will review your information to make sure you are up to date on all of your recommended tests and/or procedures.       Dr. Angela Gtz MD  has found that your chart shows you may be due for:          Health Maintenance Due   Topic Date Due    Cervical Cancer Screening  Never done    COVID-19 Vaccine (1) Never done    HIV Screening  Never done    Pneumococcal Vaccines (Age 0-64) (2 - PCV) 11/17/2021    Foot Exam  02/18/2022    Influenza Vaccine (1) 09/01/2022       If you have had any of the above done at another facility, please bring the records or information with you so that your record at [facility] will be complete.  If you would like to schedule any of these, please contact the clinic at [phone].     Thank You,     Your [facility] Team,  MD Pura Ambrocio LPN,[facility]  [facility] [address] [place] [address]  Phone [phone]  Fax [phone]

## 2022-11-04 ENCOUNTER — TELEPHONE (OUTPATIENT)
Dept: CARDIOLOGY | Facility: CLINIC | Age: 62
End: 2022-11-04
Payer: COMMERCIAL

## 2022-11-04 NOTE — TELEPHONE ENCOUNTER
----- Message from Lashell Durán sent at 11/4/2022 12:04 PM CDT -----  Type:  Sooner Appointment Request    Caller is requesting a sooner appointment.  Caller declined first available appointment listed below.  Caller will not accept being placed on the waitlist and is requesting a message be sent to doctor.    Name of Caller:  pt  When is the first available appointment?  none  Symptoms:  6 month f/u--please call and advise--  Best Call Back Number:  [phone] (home)     Additional Information:  thank you

## 2022-11-21 ENCOUNTER — LAB VISIT (OUTPATIENT)
Dept: LAB | Facility: HOSPITAL | Age: 62
End: 2022-11-21
Attending: FAMILY MEDICINE
Payer: COMMERCIAL

## 2022-11-21 DIAGNOSIS — E11.8 CONTROLLED TYPE 2 DIABETES MELLITUS WITH COMPLICATION, WITHOUT LONG-TERM CURRENT USE OF INSULIN: ICD-10-CM

## 2022-11-21 DIAGNOSIS — E78.2 MIXED HYPERLIPIDEMIA: ICD-10-CM

## 2022-11-21 DIAGNOSIS — M35.1 MIXED CONNECTIVE TISSUE DISEASE: ICD-10-CM

## 2022-11-21 LAB
ALBUMIN SERPL BCP-MCNC: 4.5 G/DL (ref 3.5–5.2)
ALP SERPL-CCNC: 76 U/L (ref 55–135)
ALT SERPL W/O P-5'-P-CCNC: 37 U/L (ref 10–44)
ANION GAP SERPL CALC-SCNC: 15 MMOL/L (ref 8–16)
AST SERPL-CCNC: 51 U/L (ref 10–40)
BASOPHILS # BLD AUTO: 0.13 K/UL (ref 0–0.2)
BASOPHILS NFR BLD: 2 % (ref 0–1.9)
BILIRUB SERPL-MCNC: 0.5 MG/DL (ref 0.1–1)
BUN SERPL-MCNC: 18 MG/DL (ref 8–23)
CALCIUM SERPL-MCNC: 10.4 MG/DL (ref 8.7–10.5)
CHLORIDE SERPL-SCNC: 102 MMOL/L (ref 95–110)
CHOLEST SERPL-MCNC: 242 MG/DL (ref 120–199)
CHOLEST/HDLC SERPL: 1.9 {RATIO} (ref 2–5)
CO2 SERPL-SCNC: 20 MMOL/L (ref 23–29)
CREAT SERPL-MCNC: 1.2 MG/DL (ref 0.5–1.4)
DIFFERENTIAL METHOD: ABNORMAL
EOSINOPHIL # BLD AUTO: 0.3 K/UL (ref 0–0.5)
EOSINOPHIL NFR BLD: 4.3 % (ref 0–8)
ERYTHROCYTE [DISTWIDTH] IN BLOOD BY AUTOMATED COUNT: 13.6 % (ref 11.5–14.5)
EST. GFR  (NO RACE VARIABLE): 51.2 ML/MIN/1.73 M^2
ESTIMATED AVG GLUCOSE: 88 MG/DL (ref 68–131)
GLUCOSE SERPL-MCNC: 61 MG/DL (ref 70–110)
HBA1C MFR BLD: 4.7 % (ref 4–5.6)
HCT VFR BLD AUTO: 33.1 % (ref 37–48.5)
HDLC SERPL-MCNC: 127 MG/DL (ref 40–75)
HDLC SERPL: 52.5 % (ref 20–50)
HGB BLD-MCNC: 11 G/DL (ref 12–16)
IMM GRANULOCYTES # BLD AUTO: 0.01 K/UL (ref 0–0.04)
IMM GRANULOCYTES NFR BLD AUTO: 0.2 % (ref 0–0.5)
LDLC SERPL CALC-MCNC: 100 MG/DL (ref 63–159)
LYMPHOCYTES # BLD AUTO: 2.4 K/UL (ref 1–4.8)
LYMPHOCYTES NFR BLD: 36.3 % (ref 18–48)
MCH RBC QN AUTO: 34.8 PG (ref 27–31)
MCHC RBC AUTO-ENTMCNC: 33.2 G/DL (ref 32–36)
MCV RBC AUTO: 105 FL (ref 82–98)
MONOCYTES # BLD AUTO: 0.6 K/UL (ref 0.3–1)
MONOCYTES NFR BLD: 8.5 % (ref 4–15)
NEUTROPHILS # BLD AUTO: 3.2 K/UL (ref 1.8–7.7)
NEUTROPHILS NFR BLD: 48.7 % (ref 38–73)
NONHDLC SERPL-MCNC: 115 MG/DL
NRBC BLD-RTO: 0 /100 WBC
PLATELET # BLD AUTO: 354 K/UL (ref 150–450)
PMV BLD AUTO: 9 FL (ref 9.2–12.9)
POTASSIUM SERPL-SCNC: 4.8 MMOL/L (ref 3.5–5.1)
PROT SERPL-MCNC: 8.8 G/DL (ref 6–8.4)
RBC # BLD AUTO: 3.16 M/UL (ref 4–5.4)
SODIUM SERPL-SCNC: 137 MMOL/L (ref 136–145)
TRIGL SERPL-MCNC: 75 MG/DL (ref 30–150)
WBC # BLD AUTO: 6.5 K/UL (ref 3.9–12.7)

## 2022-11-21 PROCEDURE — 86038 ANTINUCLEAR ANTIBODIES: CPT | Performed by: INTERNAL MEDICINE

## 2022-11-21 PROCEDURE — 86235 NUCLEAR ANTIGEN ANTIBODY: CPT | Mod: 59 | Performed by: INTERNAL MEDICINE

## 2022-11-21 PROCEDURE — 86256 FLUORESCENT ANTIBODY TITER: CPT | Performed by: INTERNAL MEDICINE

## 2022-11-21 PROCEDURE — 80061 LIPID PANEL: CPT | Performed by: FAMILY MEDICINE

## 2022-11-21 PROCEDURE — 80053 COMPREHEN METABOLIC PANEL: CPT | Performed by: FAMILY MEDICINE

## 2022-11-21 PROCEDURE — 86235 NUCLEAR ANTIGEN ANTIBODY: CPT | Performed by: INTERNAL MEDICINE

## 2022-11-21 PROCEDURE — 83036 HEMOGLOBIN GLYCOSYLATED A1C: CPT | Performed by: FAMILY MEDICINE

## 2022-11-21 PROCEDURE — 85025 COMPLETE CBC W/AUTO DIFF WBC: CPT | Performed by: FAMILY MEDICINE

## 2022-11-21 PROCEDURE — 86039 ANTINUCLEAR ANTIBODIES (ANA): CPT | Performed by: FAMILY MEDICINE

## 2022-11-21 PROCEDURE — 36415 COLL VENOUS BLD VENIPUNCTURE: CPT | Mod: PO | Performed by: INTERNAL MEDICINE

## 2022-11-22 LAB
ANA PATTERN 1: NORMAL
ANA SER QL IF: POSITIVE
ANA TITR SER IF: NORMAL {TITER}

## 2022-11-25 LAB
ANTI-CENTROMERE ANTIBODY: NEGATIVE
CENTROMERE AB TITR SER IF: NORMAL TITER

## 2022-11-26 LAB
ANTI SM ANTIBODY: 0.07 RATIO (ref 0–0.99)
ANTI SM ANTIBODY: 0.07 RATIO (ref 0–0.99)
ANTI SM/RNP ANTIBODY: 0.76 RATIO (ref 0–0.99)
ANTI-SM INTERPRETATION: NEGATIVE
ANTI-SM INTERPRETATION: NEGATIVE
ANTI-SM/RNP INTERPRETATION: NEGATIVE
ANTI-SSA ANTIBODY: 0.52 RATIO (ref 0–0.99)
ANTI-SSA ANTIBODY: 0.52 RATIO (ref 0–0.99)
ANTI-SSA INTERPRETATION: NEGATIVE
ANTI-SSA INTERPRETATION: NEGATIVE
ANTI-SSB ANTIBODY: 0.11 RATIO (ref 0–0.99)
ANTI-SSB ANTIBODY: 0.11 RATIO (ref 0–0.99)
ANTI-SSB INTERPRETATION: NEGATIVE
ANTI-SSB INTERPRETATION: NEGATIVE
DSDNA AB SER-ACNC: NORMAL [IU]/ML

## 2022-11-28 ENCOUNTER — OFFICE VISIT (OUTPATIENT)
Dept: FAMILY MEDICINE | Facility: CLINIC | Age: 62
End: 2022-11-28
Payer: COMMERCIAL

## 2022-11-28 VITALS
BODY MASS INDEX: 27.7 KG/M2 | RESPIRATION RATE: 16 BRPM | TEMPERATURE: 99 F | OXYGEN SATURATION: 96 % | WEIGHT: 150.56 LBS | HEIGHT: 62 IN | SYSTOLIC BLOOD PRESSURE: 110 MMHG | DIASTOLIC BLOOD PRESSURE: 60 MMHG | HEART RATE: 100 BPM

## 2022-11-28 DIAGNOSIS — E11.8 CONTROLLED TYPE 2 DIABETES MELLITUS WITH COMPLICATION, WITHOUT LONG-TERM CURRENT USE OF INSULIN: ICD-10-CM

## 2022-11-28 DIAGNOSIS — I10 ESSENTIAL HYPERTENSION: Primary | ICD-10-CM

## 2022-11-28 DIAGNOSIS — E78.2 MIXED HYPERLIPIDEMIA: ICD-10-CM

## 2022-11-28 DIAGNOSIS — K21.9 GASTROESOPHAGEAL REFLUX DISEASE, UNSPECIFIED WHETHER ESOPHAGITIS PRESENT: ICD-10-CM

## 2022-11-28 DIAGNOSIS — E03.9 HYPOTHYROIDISM, UNSPECIFIED TYPE: ICD-10-CM

## 2022-11-28 DIAGNOSIS — Z01.419 GYNECOLOGIC EXAM NORMAL: ICD-10-CM

## 2022-11-28 DIAGNOSIS — Z23 FLU VACCINE NEED: ICD-10-CM

## 2022-11-28 DIAGNOSIS — M35.1 MCTD (MIXED CONNECTIVE TISSUE DISEASE): ICD-10-CM

## 2022-11-28 DIAGNOSIS — R19.5 POSITIVE FIT (FECAL IMMUNOCHEMICAL TEST): ICD-10-CM

## 2022-11-28 PROCEDURE — 3060F PR POS MICROALBUMINURIA RESULT DOCUMENTED/REVIEW: ICD-10-PCS | Mod: CPTII,S$GLB,, | Performed by: FAMILY MEDICINE

## 2022-11-28 PROCEDURE — 3066F PR DOCUMENTATION OF TREATMENT FOR NEPHROPATHY: ICD-10-PCS | Mod: CPTII,S$GLB,, | Performed by: FAMILY MEDICINE

## 2022-11-28 PROCEDURE — 3008F BODY MASS INDEX DOCD: CPT | Mod: CPTII,S$GLB,, | Performed by: FAMILY MEDICINE

## 2022-11-28 PROCEDURE — 99999 PR PBB SHADOW E&M-EST. PATIENT-LVL IV: ICD-10-PCS | Mod: PBBFAC,,, | Performed by: FAMILY MEDICINE

## 2022-11-28 PROCEDURE — 3066F NEPHROPATHY DOC TX: CPT | Mod: CPTII,S$GLB,, | Performed by: FAMILY MEDICINE

## 2022-11-28 PROCEDURE — 99999 PR PBB SHADOW E&M-EST. PATIENT-LVL IV: CPT | Mod: PBBFAC,,, | Performed by: FAMILY MEDICINE

## 2022-11-28 PROCEDURE — 3008F PR BODY MASS INDEX (BMI) DOCUMENTED: ICD-10-PCS | Mod: CPTII,S$GLB,, | Performed by: FAMILY MEDICINE

## 2022-11-28 PROCEDURE — 3078F DIAST BP <80 MM HG: CPT | Mod: CPTII,S$GLB,, | Performed by: FAMILY MEDICINE

## 2022-11-28 PROCEDURE — 3060F POS MICROALBUMINURIA REV: CPT | Mod: CPTII,S$GLB,, | Performed by: FAMILY MEDICINE

## 2022-11-28 PROCEDURE — 3074F PR MOST RECENT SYSTOLIC BLOOD PRESSURE < 130 MM HG: ICD-10-PCS | Mod: CPTII,S$GLB,, | Performed by: FAMILY MEDICINE

## 2022-11-28 PROCEDURE — 90686 FLU VACCINE (QUAD) GREATER THAN OR EQUAL TO 3YO PRESERVATIVE FREE IM: ICD-10-PCS | Mod: S$GLB,,, | Performed by: FAMILY MEDICINE

## 2022-11-28 PROCEDURE — 3044F PR MOST RECENT HEMOGLOBIN A1C LEVEL <7.0%: ICD-10-PCS | Mod: CPTII,S$GLB,, | Performed by: FAMILY MEDICINE

## 2022-11-28 PROCEDURE — 3044F HG A1C LEVEL LT 7.0%: CPT | Mod: CPTII,S$GLB,, | Performed by: FAMILY MEDICINE

## 2022-11-28 PROCEDURE — 99214 PR OFFICE/OUTPT VISIT, EST, LEVL IV, 30-39 MIN: ICD-10-PCS | Mod: 25,S$GLB,, | Performed by: FAMILY MEDICINE

## 2022-11-28 PROCEDURE — 4010F ACE/ARB THERAPY RXD/TAKEN: CPT | Mod: CPTII,S$GLB,, | Performed by: FAMILY MEDICINE

## 2022-11-28 PROCEDURE — 1159F PR MEDICATION LIST DOCUMENTED IN MEDICAL RECORD: ICD-10-PCS | Mod: CPTII,S$GLB,, | Performed by: FAMILY MEDICINE

## 2022-11-28 PROCEDURE — 99214 OFFICE O/P EST MOD 30 MIN: CPT | Mod: 25,S$GLB,, | Performed by: FAMILY MEDICINE

## 2022-11-28 PROCEDURE — 1160F RVW MEDS BY RX/DR IN RCRD: CPT | Mod: CPTII,S$GLB,, | Performed by: FAMILY MEDICINE

## 2022-11-28 PROCEDURE — 90686 IIV4 VACC NO PRSV 0.5 ML IM: CPT | Mod: S$GLB,,, | Performed by: FAMILY MEDICINE

## 2022-11-28 PROCEDURE — 90471 IMMUNIZATION ADMIN: CPT | Mod: S$GLB,,, | Performed by: FAMILY MEDICINE

## 2022-11-28 PROCEDURE — 3074F SYST BP LT 130 MM HG: CPT | Mod: CPTII,S$GLB,, | Performed by: FAMILY MEDICINE

## 2022-11-28 PROCEDURE — 3078F PR MOST RECENT DIASTOLIC BLOOD PRESSURE < 80 MM HG: ICD-10-PCS | Mod: CPTII,S$GLB,, | Performed by: FAMILY MEDICINE

## 2022-11-28 PROCEDURE — 1159F MED LIST DOCD IN RCRD: CPT | Mod: CPTII,S$GLB,, | Performed by: FAMILY MEDICINE

## 2022-11-28 PROCEDURE — 90471 FLU VACCINE (QUAD) GREATER THAN OR EQUAL TO 3YO PRESERVATIVE FREE IM: ICD-10-PCS | Mod: S$GLB,,, | Performed by: FAMILY MEDICINE

## 2022-11-28 PROCEDURE — 4010F PR ACE/ARB THEARPY RXD/TAKEN: ICD-10-PCS | Mod: CPTII,S$GLB,, | Performed by: FAMILY MEDICINE

## 2022-11-28 PROCEDURE — 1160F PR REVIEW ALL MEDS BY PRESCRIBER/CLIN PHARMACIST DOCUMENTED: ICD-10-PCS | Mod: CPTII,S$GLB,, | Performed by: FAMILY MEDICINE

## 2022-11-28 NOTE — PROGRESS NOTES
Subjective:       Patient ID: Cristin Segal is a 62 y.o. female.    Chief Complaint: Follow-up (6mth f/u)    HPI  Review of Systems   Constitutional:  Negative for fatigue and unexpected weight change.   Respiratory:  Negative for chest tightness and shortness of breath.    Cardiovascular:  Negative for chest pain, palpitations and leg swelling.   Gastrointestinal:  Negative for abdominal pain.   Musculoskeletal:  Negative for arthralgias.   Neurological:  Negative for dizziness, syncope, light-headedness and headaches.     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    Reviewed labs 11/22-CKD stage 3, AST high  A1c 4.7  high homocysteine- on folbic-yes  HPL-   On crestor? Yes 5mg   Mammo 3/22 neg      FIT 12/2021 + . GI referral placed. Has not had time to schedule it     Pulm Dr. Espino lung nodule. CT chest 2/2022 showed slightly enlarging nodule left upper lobe.  Has PET scan scheduled 3/2/2022     Spine Dr. Victor low back pain/Dr. Bai pain. H/o closed vertebral compression  T12 s/p kyphoplasty 12/2021. Doing pt and taking pain     TFTs wnl 9/2021  Card Dr. brooks aortic stenosis     Dr. Cat eye exam     Had ED visit 3/20 [facility] for fall resulting in left ankle fracture. Dr. Tee treateed     Had ED visit 9/2/20 [place] for hitting head on freezer door then fell backwards striking back of head     DEXA 8/20 -nl BMD     History:   Ortho Dr. 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 Maki 8/19        Rheum Dr. GRACE Guzman MCTD on plaquenil.  Takes adalat for raynauds.      HTN controlled with diet       Elevated LFTs - NL LFTS 12/20.no fatty liver or enlargement on either CT ab 5/15 nor u/s and 1/19. Hepatitis panel normal     Type 2 DM- controlled without meds.  Last a1c 5.0. Eye Dr Cat 1/19.  On crestor. Not on ace or ARB-urine ma slightly elevated     GYn PAP Dr. Elmore < 3 years     Macrocytosis- b12 and folate normal 1/19 and 12/20  Objective:      Physical Exam  Vitals and nursing note reviewed.   Constitutional:       Appearance: She is well-developed.   Cardiovascular:      Rate and Rhythm: Normal rate and regular rhythm.      Heart sounds: Normal heart sounds.   Pulmonary:      Effort: Pulmonary effort is normal.      Breath sounds: Normal breath sounds.   Skin:     General: Skin is warm and dry.   Neurological:      Mental Status: She is alert and oriented to person, place, and time.       Assessment:       1. Essential hypertension    2. Flu vaccine need    3. Positive FIT (fecal immunochemical test)    4. Gynecologic exam normal    5. Mixed hyperlipidemia    6. Controlled type 2 diabetes mellitus with complication, without long-term current use of insulin    7. Hypothyroidism, unspecified type    8. MCTD (mixed connective tissue disease)    9. Gastroesophageal reflux disease, unspecified whether esophagitis present        Plan:         1. Essential  hypertension  Controlled on current medications.  Continue current medications.      2. Flu vaccine need  Immunize today.  Counseled patient on risks, benefits and side effects.  Patient elected to proceed with vaccination.    - Influenza - Quadrivalent *Preferred* (6 months+) (PF)    3. Positive FIT (fecal immunochemical test)  Refer for diagnostic colonoscopy  - Ambulatory referral/consult to Gastroenterology; Future    4. Gynecologic exam normal  Refer for WWE and pap  - Ambulatory referral/consult to Obstetrics / Gynecology; Future    5. Mixed hyperlipidemia  I recommend a heart healthy diet rich in fiber, fresh vegetables and fruit and low in saturated fats (fried foods, red meat, etc.).  I also recommend regular exercise including a minimum of 150 minutes of cardio exercise per week and 2-30 minute workouts of strength training like light weights, yoga, pilates, etc.  You should work toward a body mass index of < 25.      - CBC Auto Differential; Future  - Comprehensive Metabolic Panel; Future  - Lipid Panel; Future    6. Controlled type 2 diabetes mellitus with complication, without long-term current use of insulin  Controlled on current medications.  Continue current medications.    - Hemoglobin A1C; Future    7. Hypothyroidism, unspecified type  Stable condition.  Continue current medications.  Will adjust based on lab findings or if condition changes.    - TSH; Future  - T4, Free; Future    8. MCTD (mixed connective tissue disease)  Cont current mgmt    9. Gastroesophageal reflux disease, unspecified whether esophagitis present  Counseled patient on prevention of reflux with changes in diet and behavior.  I recommended avoidance of greasy and spicy foods, caffeine and eating within 3 hours of bedtime.  I counseled the patient to avoid eating large meals and instead eating more frequent small meals.  I also recommended weight loss and elevation of the head of the bed by 6 inches.  If symptoms persist after  these changes medication may be needed to control GERD.          Time spent with patient: 20 minutes    Patient with be reevaluated in 6 months or sooner prn    Greater than 50% of this visit was spent counseling as described in above documentation:Yes

## 2022-11-30 ENCOUNTER — PATIENT OUTREACH (OUTPATIENT)
Dept: ADMINISTRATIVE | Facility: HOSPITAL | Age: 62
End: 2022-11-30
Payer: COMMERCIAL

## 2022-11-30 NOTE — PROGRESS NOTES
Non-compliant report chart audits for COLON CANCER SCREENING. Chart review completed for HM test overdue (mammograms, Colonoscopies, pap smears, DM labs, and/or EYE EXAMs)      Care Everywhere and media, updates requested and reviewed.      Outreach to patient in reference to colon cancer screening.    RE:  Patient needs colon cancer screening    Pt awaiting scheduled for diagnostic colonoscopy    Outreach:  Colon cancer screening

## 2022-12-08 ENCOUNTER — TELEPHONE (OUTPATIENT)
Dept: GASTROENTEROLOGY | Facility: CLINIC | Age: 62
End: 2022-12-08
Payer: COMMERCIAL

## 2022-12-08 ENCOUNTER — OFFICE VISIT (OUTPATIENT)
Dept: OBSTETRICS AND GYNECOLOGY | Facility: CLINIC | Age: 62
End: 2022-12-08
Payer: COMMERCIAL

## 2022-12-08 ENCOUNTER — OFFICE VISIT (OUTPATIENT)
Dept: RHEUMATOLOGY | Facility: CLINIC | Age: 62
End: 2022-12-08
Payer: COMMERCIAL

## 2022-12-08 VITALS
WEIGHT: 150.38 LBS | BODY MASS INDEX: 27.51 KG/M2 | SYSTOLIC BLOOD PRESSURE: 140 MMHG | DIASTOLIC BLOOD PRESSURE: 86 MMHG

## 2022-12-08 VITALS
HEIGHT: 62 IN | WEIGHT: 151.69 LBS | DIASTOLIC BLOOD PRESSURE: 60 MMHG | SYSTOLIC BLOOD PRESSURE: 116 MMHG | BODY MASS INDEX: 27.92 KG/M2

## 2022-12-08 DIAGNOSIS — Z12.4 SCREENING FOR MALIGNANT NEOPLASM OF CERVIX: ICD-10-CM

## 2022-12-08 DIAGNOSIS — Z12.31 ENCOUNTER FOR SCREENING MAMMOGRAM FOR MALIGNANT NEOPLASM OF BREAST: ICD-10-CM

## 2022-12-08 DIAGNOSIS — Z01.419 WELL WOMAN EXAM WITH ROUTINE GYNECOLOGICAL EXAM: Primary | ICD-10-CM

## 2022-12-08 DIAGNOSIS — Z01.419 GYNECOLOGIC EXAM NORMAL: ICD-10-CM

## 2022-12-08 DIAGNOSIS — Z13.820 SCREENING FOR OSTEOPOROSIS: ICD-10-CM

## 2022-12-08 DIAGNOSIS — M35.1 MCTD (MIXED CONNECTIVE TISSUE DISEASE): Primary | ICD-10-CM

## 2022-12-08 PROCEDURE — 3044F HG A1C LEVEL LT 7.0%: CPT | Mod: CPTII,S$GLB,, | Performed by: OBSTETRICS & GYNECOLOGY

## 2022-12-08 PROCEDURE — 87624 HPV HI-RISK TYP POOLED RSLT: CPT | Performed by: OBSTETRICS & GYNECOLOGY

## 2022-12-08 PROCEDURE — 4010F ACE/ARB THERAPY RXD/TAKEN: CPT | Mod: CPTII,S$GLB,, | Performed by: INTERNAL MEDICINE

## 2022-12-08 PROCEDURE — 3077F SYST BP >= 140 MM HG: CPT | Mod: CPTII,S$GLB,, | Performed by: INTERNAL MEDICINE

## 2022-12-08 PROCEDURE — 99999 PR PBB SHADOW E&M-EST. PATIENT-LVL IV: ICD-10-PCS | Mod: PBBFAC,,, | Performed by: OBSTETRICS & GYNECOLOGY

## 2022-12-08 PROCEDURE — 3066F PR DOCUMENTATION OF TREATMENT FOR NEPHROPATHY: ICD-10-PCS | Mod: CPTII,S$GLB,, | Performed by: OBSTETRICS & GYNECOLOGY

## 2022-12-08 PROCEDURE — 3060F PR POS MICROALBUMINURIA RESULT DOCUMENTED/REVIEW: ICD-10-PCS | Mod: CPTII,S$GLB,, | Performed by: INTERNAL MEDICINE

## 2022-12-08 PROCEDURE — 1159F PR MEDICATION LIST DOCUMENTED IN MEDICAL RECORD: ICD-10-PCS | Mod: CPTII,S$GLB,, | Performed by: INTERNAL MEDICINE

## 2022-12-08 PROCEDURE — 3060F POS MICROALBUMINURIA REV: CPT | Mod: CPTII,S$GLB,, | Performed by: INTERNAL MEDICINE

## 2022-12-08 PROCEDURE — 99999 PR PBB SHADOW E&M-EST. PATIENT-LVL IV: CPT | Mod: PBBFAC,,, | Performed by: OBSTETRICS & GYNECOLOGY

## 2022-12-08 PROCEDURE — 3074F PR MOST RECENT SYSTOLIC BLOOD PRESSURE < 130 MM HG: ICD-10-PCS | Mod: CPTII,S$GLB,, | Performed by: OBSTETRICS & GYNECOLOGY

## 2022-12-08 PROCEDURE — 3008F BODY MASS INDEX DOCD: CPT | Mod: CPTII,S$GLB,, | Performed by: OBSTETRICS & GYNECOLOGY

## 2022-12-08 PROCEDURE — 3079F DIAST BP 80-89 MM HG: CPT | Mod: CPTII,S$GLB,, | Performed by: INTERNAL MEDICINE

## 2022-12-08 PROCEDURE — 1159F MED LIST DOCD IN RCRD: CPT | Mod: CPTII,S$GLB,, | Performed by: INTERNAL MEDICINE

## 2022-12-08 PROCEDURE — 99213 OFFICE O/P EST LOW 20 MIN: CPT | Mod: S$GLB,,, | Performed by: INTERNAL MEDICINE

## 2022-12-08 PROCEDURE — 3044F PR MOST RECENT HEMOGLOBIN A1C LEVEL <7.0%: ICD-10-PCS | Mod: CPTII,S$GLB,, | Performed by: INTERNAL MEDICINE

## 2022-12-08 PROCEDURE — 3060F POS MICROALBUMINURIA REV: CPT | Mod: CPTII,S$GLB,, | Performed by: OBSTETRICS & GYNECOLOGY

## 2022-12-08 PROCEDURE — 4010F ACE/ARB THERAPY RXD/TAKEN: CPT | Mod: CPTII,S$GLB,, | Performed by: OBSTETRICS & GYNECOLOGY

## 2022-12-08 PROCEDURE — 3060F PR POS MICROALBUMINURIA RESULT DOCUMENTED/REVIEW: ICD-10-PCS | Mod: CPTII,S$GLB,, | Performed by: OBSTETRICS & GYNECOLOGY

## 2022-12-08 PROCEDURE — 3078F PR MOST RECENT DIASTOLIC BLOOD PRESSURE < 80 MM HG: ICD-10-PCS | Mod: CPTII,S$GLB,, | Performed by: OBSTETRICS & GYNECOLOGY

## 2022-12-08 PROCEDURE — 3074F SYST BP LT 130 MM HG: CPT | Mod: CPTII,S$GLB,, | Performed by: OBSTETRICS & GYNECOLOGY

## 2022-12-08 PROCEDURE — 3078F DIAST BP <80 MM HG: CPT | Mod: CPTII,S$GLB,, | Performed by: OBSTETRICS & GYNECOLOGY

## 2022-12-08 PROCEDURE — 99386 PR PREVENTIVE VISIT,NEW,40-64: ICD-10-PCS | Mod: S$GLB,,, | Performed by: OBSTETRICS & GYNECOLOGY

## 2022-12-08 PROCEDURE — 99213 PR OFFICE/OUTPT VISIT, EST, LEVL III, 20-29 MIN: ICD-10-PCS | Mod: S$GLB,,, | Performed by: INTERNAL MEDICINE

## 2022-12-08 PROCEDURE — 3044F HG A1C LEVEL LT 7.0%: CPT | Mod: CPTII,S$GLB,, | Performed by: INTERNAL MEDICINE

## 2022-12-08 PROCEDURE — 3066F NEPHROPATHY DOC TX: CPT | Mod: CPTII,S$GLB,, | Performed by: OBSTETRICS & GYNECOLOGY

## 2022-12-08 PROCEDURE — 3066F NEPHROPATHY DOC TX: CPT | Mod: CPTII,S$GLB,, | Performed by: INTERNAL MEDICINE

## 2022-12-08 PROCEDURE — 3008F PR BODY MASS INDEX (BMI) DOCUMENTED: ICD-10-PCS | Mod: CPTII,S$GLB,, | Performed by: INTERNAL MEDICINE

## 2022-12-08 PROCEDURE — 4010F PR ACE/ARB THEARPY RXD/TAKEN: ICD-10-PCS | Mod: CPTII,S$GLB,, | Performed by: OBSTETRICS & GYNECOLOGY

## 2022-12-08 PROCEDURE — 3066F PR DOCUMENTATION OF TREATMENT FOR NEPHROPATHY: ICD-10-PCS | Mod: CPTII,S$GLB,, | Performed by: INTERNAL MEDICINE

## 2022-12-08 PROCEDURE — 4010F PR ACE/ARB THEARPY RXD/TAKEN: ICD-10-PCS | Mod: CPTII,S$GLB,, | Performed by: INTERNAL MEDICINE

## 2022-12-08 PROCEDURE — 1159F PR MEDICATION LIST DOCUMENTED IN MEDICAL RECORD: ICD-10-PCS | Mod: CPTII,S$GLB,, | Performed by: OBSTETRICS & GYNECOLOGY

## 2022-12-08 PROCEDURE — 99386 PREV VISIT NEW AGE 40-64: CPT | Mod: S$GLB,,, | Performed by: OBSTETRICS & GYNECOLOGY

## 2022-12-08 PROCEDURE — 3008F BODY MASS INDEX DOCD: CPT | Mod: CPTII,S$GLB,, | Performed by: INTERNAL MEDICINE

## 2022-12-08 PROCEDURE — 3079F PR MOST RECENT DIASTOLIC BLOOD PRESSURE 80-89 MM HG: ICD-10-PCS | Mod: CPTII,S$GLB,, | Performed by: INTERNAL MEDICINE

## 2022-12-08 PROCEDURE — 3044F PR MOST RECENT HEMOGLOBIN A1C LEVEL <7.0%: ICD-10-PCS | Mod: CPTII,S$GLB,, | Performed by: OBSTETRICS & GYNECOLOGY

## 2022-12-08 PROCEDURE — 88142 CYTOPATH C/V THIN LAYER: CPT | Performed by: OBSTETRICS & GYNECOLOGY

## 2022-12-08 PROCEDURE — 3008F PR BODY MASS INDEX (BMI) DOCUMENTED: ICD-10-PCS | Mod: CPTII,S$GLB,, | Performed by: OBSTETRICS & GYNECOLOGY

## 2022-12-08 PROCEDURE — 3077F PR MOST RECENT SYSTOLIC BLOOD PRESSURE >= 140 MM HG: ICD-10-PCS | Mod: CPTII,S$GLB,, | Performed by: INTERNAL MEDICINE

## 2022-12-08 PROCEDURE — 1159F MED LIST DOCD IN RCRD: CPT | Mod: CPTII,S$GLB,, | Performed by: OBSTETRICS & GYNECOLOGY

## 2022-12-08 RX ORDER — HYDROXYCHLOROQUINE SULFATE 200 MG/1
200 TABLET, FILM COATED ORAL DAILY
Qty: 90 TABLET | Refills: 1 | Status: SHIPPED | OUTPATIENT
Start: 2022-12-08

## 2022-12-08 RX ORDER — CEPHALEXIN 500 MG/1
500 CAPSULE ORAL 2 TIMES DAILY
Status: ON HOLD | COMMUNITY
Start: 2022-12-02 | End: 2023-02-10 | Stop reason: HOSPADM

## 2022-12-08 NOTE — PROGRESS NOTES
Chief Complaint   Patient presents with    Well Woman     WWE       History and Physical:  No LMP recorded. Patient is postmenopausal.    HRT:  None    Cristin Segal is a 62 y.o.  who presents today for her routine annual GYN exam.   The patient has no Gynecology complaints today.     She has not had a Pap smear for 4-5 years but denies any history of abnormal Pap smears.    She is up-to-date on her mammogram and scheduled for colonoscopy in the near future.    No pelvic pain or postmenopausal bleeding.      Allergies:   Review of patient's allergies indicates:   Allergen Reactions    Pcn [penicillins] Anaphylaxis     Unknown for her- family history with anaphylaxis    Lipitor [atorvastatin]        Past Medical History:   Diagnosis Date    Arthritis     Cataract     Diabetes mellitus type II     diet controlled    Fracture     left 4th finger  - splinted    Hyperlipidemia     MCTD (mixed connective tissue disease)     Raynaud's disease     Thyroid disease     Hypothyroidism    Wears glasses     contacs       Past Surgical History:   Procedure Laterality Date    CARPAL TUNNEL RELEASE      right    CATARACT EXTRACTION W/ INTRAOCULAR LENS  IMPLANT, BILATERAL      EXCISION OF PAROTID GLAND Right 2019    Procedure: PAROTIDECTOMY;  Surgeon: Abner Maki MD;  Location: [facility];  Service: ENT;  Laterality: Right;    HIP SURGERY Left 6/18/15    JANA    JOINT REPLACEMENT Bilateral     HIP    KNEE CARTILAGE SURGERY      right    TOTAL HIP ARTHROPLASTY Right 2016    JANA       MEDS:   Current Outpatient Medications:     albuterol (VENTOLIN HFA) 90 mcg/actuation inhaler, Inhale 2 puffs into the lungs every 6 (six) hours as needed for Wheezing or Shortness of Breath. Rescue, Disp: 8 g, Rfl: 5    aspirin 81 MG Chew, Take 1 tablet (81 mg total) by mouth 3 (three) times a week., Disp: 13 tablet, Rfl: 0    cephALEXin (KEFLEX) 500 MG capsule, Take 500 mg by mouth 2 (two) times daily., Disp: , Rfl:      cyclobenzaprine (FLEXERIL) 10 MG tablet, TAKE 1 TABLET BY MOUTH EVERY DAY, Disp: 30 tablet, Rfl: 3    fluticasone propionate (FLONASE) 50 mcg/actuation nasal spray, 1 spray (50 mcg total) by Each Nostril route once daily., Disp: 16 g, Rfl: 11    hydrOXYchloroQUINE (PLAQUENIL) 200 mg tablet, Take 1 tablet (200 mg total) by mouth once daily., Disp: 90 tablet, Rfl: 1    KLOR-CON M20 20 mEq tablet, TAKE 1 TABLET (20 MEQ TOTAL) BY MOUTH ONCE DAILY., Disp: 90 tablet, Rfl: 3    levothyroxine (SYNTHROID) 50 MCG tablet, Take 1 tablet (50 mcg total) by mouth before breakfast., Disp: 90 tablet, Rfl: 3    losartan (COZAAR) 25 MG tablet, Take 1 tablet (25 mg total) by mouth once daily., Disp: 90 tablet, Rfl: 3    mv-min/FA/C/glut/lysine/zwd415 (AIRBORNE, WITH FOLIC ACID, ORAL), Take 1 tablet by mouth 4 (four) times daily., Disp: , Rfl:     NIFEdipine (ADALAT CC) 30 MG TbSR, TAKE 1 TABLET BY MOUTH EVERY DAY, Disp: 90 tablet, Rfl: 2    oxyCODONE-acetaminophen (PERCOCET) 5-325 mg per tablet, Take 1 tablet by mouth every 6 (six) hours as needed for Pain., Disp: 28 tablet, Rfl: 0    pantoprazole (PROTONIX) 40 MG tablet, TAKE 1 TABLET BY MOUTH EVERY DAY, Disp: 90 tablet, Rfl: 3    rosuvastatin (CRESTOR) 5 MG tablet, TAKE 1 TABLET BY MOUTH EVERYDAY AT BEDTIME, Disp: 90 tablet, Rfl: 2    vitamin D (VITAMIN D3) 1000 units Tab, Take 1,000 Units by mouth once daily., Disp: , Rfl:     WESTAB MAX 2.5-25-2 mg Tab, TAKE 1 TABLET BY MOUTH EVERY DAY, Disp: 90 tablet, Rfl: 3  No current facility-administered medications for this visit.    Facility-Administered Medications Ordered in Other Visits:     lactated ringers infusion, , Intravenous, Continuous, Kev Bai MD     OB History          0    Para   0    Term   0       0    AB   0    Living   0         SAB   0    IAB   0    Ectopic   0    Multiple   0    Live Births   0                 Social History     Socioeconomic History    Marital status:    Occupational History      "Employer: [employer]   Tobacco Use    Smoking status: Every Day     Packs/day: 0.50     Types: Cigarettes    Smokeless tobacco: Never   Substance and Sexual Activity    Alcohol use: Yes     Comment: occasional    Drug use: No    Sexual activity: Not Currently     Birth control/protection: Post-menopausal       Family History   Problem Relation Age of Onset    Diabetes Mother     Kidney disease Mother     Aneurysm Mother 73        brain    Hyperlipidemia Mother     Hypertension Sister     Breast cancer Sister     Diabetes Sister     Ovarian cancer Neg Hx          Past medical and surgical history reviewed.   I have reviewed the patient's medical history in detail and updated the computerized patient record.      Review of Systems (at today's evaluation)  Review of Systems   Constitutional:  Negative for chills, fever and unexpected weight change.   HENT:  Negative for congestion, ear pain, sinus pain and sore throat.    Eyes: Negative.    Respiratory:  Negative for cough and shortness of breath.    Cardiovascular:  Negative for chest pain.   Gastrointestinal:  Negative for abdominal pain, constipation, diarrhea, nausea and vomiting.   Endocrine: Negative.    Genitourinary:  Negative for dysuria, frequency, hematuria and urgency.        Gyn as per HPI   Musculoskeletal:  Negative for arthralgias.   Skin:  Negative for rash.   Neurological:  Negative for syncope, weakness and headaches.   Psychiatric/Behavioral:  The patient is not nervous/anxious.       Physical Exam:   /60 (BP Location: Right arm, Patient Position: Sitting, BP Method: Medium (Manual))   Ht 5' 2" (1.575 m)   Wt 68.8 kg (151 lb 10.8 oz)   BMI 27.74 kg/m²       Physical Exam:   Constitutional: She appears well-developed and well-nourished.    HENT:   Head: Normocephalic.     Neck: No thyroid mass present.    Cardiovascular:  Normal rate, regular rhythm and normal heart sounds.             Pulmonary/Chest: Effort normal and breath " sounds normal. Right breast exhibits no mass, no nipple discharge and no skin change. Left breast exhibits no mass, no nipple discharge and no skin change.        Abdominal: Soft. There is no abdominal tenderness.     Genitourinary:    Inguinal canal, vagina, uterus, right adnexa and left adnexa normal.      Pelvic exam was performed with patient supine.   The external female genitalia was normal.   No external genitalia lesions identified,Cervix is normal. Right adnexum displays no mass and no tenderness. Left adnexum displays no mass and no tenderness. No tenderness or bleeding in the vagina. Vaginal atrophy noted. Uterus is not tender. Normal urethral meatus.Urethra findings: no tendernessBladder findings: no bladder tenderness          Musculoskeletal:      Right lower leg: No edema.      Left lower leg: No edema.      Lymphadenopathy:     She has no cervical adenopathy. No inguinal adenopathy noted on the right or left side.    Neurological: She is alert.   No gross defects noted    Skin: Skin is warm and dry.    Psychiatric: Mood normal.        Assessment:        1. Well woman exam with routine gynecological exam    2. Gynecologic exam normal    3. Screening for malignant neoplasm of cervix    4. Encounter for screening mammogram for malignant neoplasm of breast    5. Screening for osteoporosis      Plan:     Well woman exam with routine gynecological exam    Gynecologic exam normal  -     Ambulatory referral/consult to Obstetrics / Gynecology    Screening for malignant neoplasm of cervix  -     HPV High Risk Genotypes, PCR  -     Liquid-Based Pap Smear, Screening    Encounter for screening mammogram for malignant neoplasm of breast    Screening for osteoporosis  -     DXA Bone Density Spine And Hip; Future; Expected date: 12/08/2022     No follow-ups on file.     The above was reviewed and discussed with the patient.    Annual exam and screening issues based on the patient's age, medical history and family  history were reviewed / discussed.    The patient's questions were answered, and she is in agreement with the current plan.

## 2022-12-08 NOTE — TELEPHONE ENCOUNTER
Call palced to Ms. Segal in regards to referral received. Her  stated she was not home at this time, but give her the message to call us back. No further issues noted.

## 2022-12-08 NOTE — PROGRESS NOTES
[facility] RHEUMATOLOGY            PROGRESS NOTE      Subjective:       Patient ID:   NAME: Cristin Segal : 1960     62 y.o. female    Referring Doc: No ref. provider found  Other Physicians:    Chief Complaint:  Follow-up      History of Present Illness:     Patient returns today for a regularly scheduled follow-up visit for history of mixed connective tissue disease    The patient is doing fairly well at this time.  Has chronic cough and dyspnea on exertion.  She smokes half a pack a day  No chest pains.  No hemoptysis.  Occasional fatigue.  No rashes ;no sicca or Raynaud's.  No mucosal ulcerations.  No severe arthralgias or joint swelling      ROS:   GEN:  No  fever, night sweats . weight is stable   + occ fatigue  SKIN: no rashes, no bruising, no ulcerations, no Raynaud's  HEENT: no HA's, No visual changes, no mucosal ulcers, no sicca symptoms,  CV:   no CP, SOB, PND,+ SANTILLAN, no orthopnea, no palpitations  PULM: normal with no SOB,+ occ NP cough, hemoptysis, sputum or pleuritic pain  GI:  no abdominal pain, nausea, vomiting, constipation, diarrhea, melanotic stools, BRBPR, hematemesis, no dysphagia  NEURO: no paresthesias, headaches, visual disturbances, muscle weakness  MUSCULOSKELETAL:no joint swelling, prolonged AM stiffness, + occ back pain, + occ muscle pain  Allergies:  Review of patient's allergies indicates:   Allergen Reactions    Pcn [penicillins] Anaphylaxis     Unknown for her- family history with anaphylaxis    Lipitor [atorvastatin]        Medications:    Current Outpatient Medications:     albuterol (VENTOLIN HFA) 90 mcg/actuation inhaler, Inhale 2 puffs into the lungs every 6 (six) hours as needed for Wheezing or Shortness of Breath. Rescue, Disp: 8 g, Rfl: 5    cyclobenzaprine (FLEXERIL) 10 MG tablet, TAKE 1 TABLET BY MOUTH EVERY DAY, Disp: 30 tablet, Rfl: 3    fluticasone propionate (FLONASE) 50 mcg/actuation nasal spray, 1 spray (50 mcg total) by Each Nostril route once daily.,  Disp: 16 g, Rfl: 11    KLOR-CON M20 20 mEq tablet, TAKE 1 TABLET (20 MEQ TOTAL) BY MOUTH ONCE DAILY., Disp: 90 tablet, Rfl: 3    levothyroxine (SYNTHROID) 50 MCG tablet, Take 1 tablet (50 mcg total) by mouth before breakfast., Disp: 90 tablet, Rfl: 3    losartan (COZAAR) 25 MG tablet, Take 1 tablet (25 mg total) by mouth once daily., Disp: 90 tablet, Rfl: 3    mv-min/FA/C/glut/lysine/ggt306 (AIRBORNE, WITH FOLIC ACID, ORAL), Take 1 tablet by mouth 4 (four) times daily., Disp: , Rfl:     NIFEdipine (ADALAT CC) 30 MG TbSR, TAKE 1 TABLET BY MOUTH EVERY DAY, Disp: 90 tablet, Rfl: 2    oxyCODONE-acetaminophen (PERCOCET) 5-325 mg per tablet, Take 1 tablet by mouth every 6 (six) hours as needed for Pain., Disp: 28 tablet, Rfl: 0    pantoprazole (PROTONIX) 40 MG tablet, TAKE 1 TABLET BY MOUTH EVERY DAY, Disp: 90 tablet, Rfl: 3    rosuvastatin (CRESTOR) 5 MG tablet, TAKE 1 TABLET BY MOUTH EVERYDAY AT BEDTIME, Disp: 90 tablet, Rfl: 2    vitamin D (VITAMIN D3) 1000 units Tab, Take 1,000 Units by mouth once daily., Disp: , Rfl:     WESTAB MAX 2.5-25-2 mg Tab, TAKE 1 TABLET BY MOUTH EVERY DAY, Disp: 90 tablet, Rfl: 3    aspirin 81 MG Chew, Take 1 tablet (81 mg total) by mouth 3 (three) times a week., Disp: 13 tablet, Rfl: 0    hydrOXYchloroQUINE (PLAQUENIL) 200 mg tablet, Take 1 tablet (200 mg total) by mouth once daily., Disp: 90 tablet, Rfl: 1  No current facility-administered medications for this visit.    Facility-Administered Medications Ordered in Other Visits:     lactated ringers infusion, , Intravenous, Continuous, Kev Bai MD    PMHx/PSHx Updates:      Last Eye Exam utd      Objective:     Vitals:  Blood pressure (!) 140/86, weight 68.2 kg (150 lb 6.4 oz).    Physical Examination:   GEN: no apparent distress, comfortable; AAOx3  SKIN: no rashes,no ulceration, no Raynaud's, no petechiae, no SQ nodules,  HEAD: normal  EYES: no pallor, no icterus  CV: RRR with no murmur; l S1 and S2 reg. ,no gallop no rubs,   CHEST:  Normal respiratory effort; CTAB; normal breath sounds; no wheeze + scattered rthonchi  MUSC/Skeletal: ; no crepitus; joints without synovitis,  no deformities  No joint swelling or tenderness of PIP, MCP, wrist, elbow, shoulder, or knee joints  EXTREM: no clubbing, cyanosis, no edema,  NEURO: grossly intact; motor WNL; AAOx3;  PSYCH: normal mood, affect and behavior  LYMPH: normal cervical, supraclavicular          Labs:   Lab Results   Component Value Date    WBC 6.50 11/21/2022    HGB 11.0 (L) 11/21/2022    HCT 33.1 (L) 11/21/2022     (H) 11/21/2022     11/21/2022    CMP  @LASTLAB(NA,K,CL,CO2,GLU,BUN,Creatinine,Calcium,PROT,Albumin,Bilitot,Alkphos,AST,ALT,CRP,ESR,RF,CCP,BROOK,SSA,CPK,uric acid) )@  I have reviewed all available lab results and radiology reports.    Radiology/Diagnostic Studies:        Assessment/Plan:   (1) 62 y.o. female with diagnosis of hx of mixed connective tissue disease.stable.  Most recent BROOK 1 in 160 with negative profile.  CBC CMP okay urinalysis with slight proteinuria +1 she will follow-up with primary care and rheumatology in [place]  On plaquenil 200 mg/day  Eye exam UTD  Reviewed recent CBC CMP urinalysis.  Has slight proteinuria.  Patient will make appointment to see Rheumatology in [place]            Discussion:     I have explained all of the above in detail and the patient understands all of the current recommendation(s). I have answered all questions to the best of my ability and to their complete satisfaction.       The patient is to continue with the current management plan         Electronically signed by Erica Guzman MD    Patient notified that this office will be closing December 22, 2022. They should begin looking for another rheumatologist as soon as possible.  A list with the names and contact details of rheumatologists in the surrounding area was provided.

## 2022-12-20 LAB
FINAL PATHOLOGIC DIAGNOSIS: NORMAL
Lab: NORMAL

## 2022-12-27 ENCOUNTER — HOSPITAL ENCOUNTER (OUTPATIENT)
Dept: RADIOLOGY | Facility: HOSPITAL | Age: 62
Discharge: HOME | End: 2022-12-27
Attending: NURSE PRACTITIONER
Payer: COMMERCIAL

## 2022-12-27 DIAGNOSIS — R91.1 LUNG NODULE: Primary | ICD-10-CM

## 2022-12-27 DIAGNOSIS — R91.1 LUNG NODULE: ICD-10-CM

## 2022-12-27 LAB
HPV HR 12 DNA SPEC QL NAA+PROBE: NEGATIVE
HPV16 AG SPEC QL: NEGATIVE
HPV18 DNA SPEC QL NAA+PROBE: NEGATIVE

## 2022-12-27 PROCEDURE — 71250 CT THORAX DX C-: CPT | Mod: 26,,, | Performed by: RADIOLOGY

## 2022-12-27 PROCEDURE — 71250 CT CHEST WITHOUT CONTRAST: ICD-10-PCS | Mod: 26,,, | Performed by: RADIOLOGY

## 2022-12-27 PROCEDURE — 71250 CT THORAX DX C-: CPT | Mod: TC

## 2022-12-29 ENCOUNTER — OFFICE VISIT (OUTPATIENT)
Dept: CARDIOLOGY | Facility: CLINIC | Age: 62
End: 2022-12-29
Payer: COMMERCIAL

## 2022-12-29 VITALS
BODY MASS INDEX: 27.6 KG/M2 | HEIGHT: 62 IN | WEIGHT: 150 LBS | SYSTOLIC BLOOD PRESSURE: 128 MMHG | HEART RATE: 92 BPM | DIASTOLIC BLOOD PRESSURE: 80 MMHG | OXYGEN SATURATION: 98 % | RESPIRATION RATE: 16 BRPM

## 2022-12-29 DIAGNOSIS — I35.0 NONRHEUMATIC AORTIC VALVE STENOSIS: Primary | ICD-10-CM

## 2022-12-29 DIAGNOSIS — R00.0 TACHYCARDIA: ICD-10-CM

## 2022-12-29 DIAGNOSIS — F17.200 CURRENT SMOKER: ICD-10-CM

## 2022-12-29 DIAGNOSIS — I35.8 AORTIC VALVE SCLEROSIS: ICD-10-CM

## 2022-12-29 DIAGNOSIS — E08.65 DIABETES MELLITUS DUE TO UNDERLYING CONDITION WITH HYPERGLYCEMIA, WITHOUT LONG-TERM CURRENT USE OF INSULIN: ICD-10-CM

## 2022-12-29 DIAGNOSIS — E78.2 MIXED HYPERLIPIDEMIA: ICD-10-CM

## 2022-12-29 DIAGNOSIS — I10 ESSENTIAL HYPERTENSION: ICD-10-CM

## 2022-12-29 DIAGNOSIS — I73.00 RAYNAUD'S DISEASE WITHOUT GANGRENE: ICD-10-CM

## 2022-12-29 PROCEDURE — 3008F BODY MASS INDEX DOCD: CPT | Mod: CPTII,S$GLB,, | Performed by: INTERNAL MEDICINE

## 2022-12-29 PROCEDURE — 1160F PR REVIEW ALL MEDS BY PRESCRIBER/CLIN PHARMACIST DOCUMENTED: ICD-10-PCS | Mod: CPTII,S$GLB,, | Performed by: INTERNAL MEDICINE

## 2022-12-29 PROCEDURE — 99214 OFFICE O/P EST MOD 30 MIN: CPT | Mod: S$GLB,,, | Performed by: INTERNAL MEDICINE

## 2022-12-29 PROCEDURE — 1159F PR MEDICATION LIST DOCUMENTED IN MEDICAL RECORD: ICD-10-PCS | Mod: CPTII,S$GLB,, | Performed by: INTERNAL MEDICINE

## 2022-12-29 PROCEDURE — 99999 PR PBB SHADOW E&M-EST. PATIENT-LVL IV: CPT | Mod: PBBFAC,,, | Performed by: INTERNAL MEDICINE

## 2022-12-29 PROCEDURE — 3079F PR MOST RECENT DIASTOLIC BLOOD PRESSURE 80-89 MM HG: ICD-10-PCS | Mod: CPTII,S$GLB,, | Performed by: INTERNAL MEDICINE

## 2022-12-29 PROCEDURE — 1160F RVW MEDS BY RX/DR IN RCRD: CPT | Mod: CPTII,S$GLB,, | Performed by: INTERNAL MEDICINE

## 2022-12-29 PROCEDURE — 3060F PR POS MICROALBUMINURIA RESULT DOCUMENTED/REVIEW: ICD-10-PCS | Mod: CPTII,S$GLB,, | Performed by: INTERNAL MEDICINE

## 2022-12-29 PROCEDURE — 3074F PR MOST RECENT SYSTOLIC BLOOD PRESSURE < 130 MM HG: ICD-10-PCS | Mod: CPTII,S$GLB,, | Performed by: INTERNAL MEDICINE

## 2022-12-29 PROCEDURE — 3066F PR DOCUMENTATION OF TREATMENT FOR NEPHROPATHY: ICD-10-PCS | Mod: CPTII,S$GLB,, | Performed by: INTERNAL MEDICINE

## 2022-12-29 PROCEDURE — 4010F ACE/ARB THERAPY RXD/TAKEN: CPT | Mod: CPTII,S$GLB,, | Performed by: INTERNAL MEDICINE

## 2022-12-29 PROCEDURE — 99214 PR OFFICE/OUTPT VISIT, EST, LEVL IV, 30-39 MIN: ICD-10-PCS | Mod: S$GLB,,, | Performed by: INTERNAL MEDICINE

## 2022-12-29 PROCEDURE — 3060F POS MICROALBUMINURIA REV: CPT | Mod: CPTII,S$GLB,, | Performed by: INTERNAL MEDICINE

## 2022-12-29 PROCEDURE — 3008F PR BODY MASS INDEX (BMI) DOCUMENTED: ICD-10-PCS | Mod: CPTII,S$GLB,, | Performed by: INTERNAL MEDICINE

## 2022-12-29 PROCEDURE — 93000 EKG 12-LEAD: ICD-10-PCS | Mod: S$GLB,,, | Performed by: INTERNAL MEDICINE

## 2022-12-29 PROCEDURE — 3079F DIAST BP 80-89 MM HG: CPT | Mod: CPTII,S$GLB,, | Performed by: INTERNAL MEDICINE

## 2022-12-29 PROCEDURE — 3074F SYST BP LT 130 MM HG: CPT | Mod: CPTII,S$GLB,, | Performed by: INTERNAL MEDICINE

## 2022-12-29 PROCEDURE — 93000 ELECTROCARDIOGRAM COMPLETE: CPT | Mod: S$GLB,,, | Performed by: INTERNAL MEDICINE

## 2022-12-29 PROCEDURE — 99999 PR PBB SHADOW E&M-EST. PATIENT-LVL IV: ICD-10-PCS | Mod: PBBFAC,,, | Performed by: INTERNAL MEDICINE

## 2022-12-29 PROCEDURE — 3066F NEPHROPATHY DOC TX: CPT | Mod: CPTII,S$GLB,, | Performed by: INTERNAL MEDICINE

## 2022-12-29 PROCEDURE — 1159F MED LIST DOCD IN RCRD: CPT | Mod: CPTII,S$GLB,, | Performed by: INTERNAL MEDICINE

## 2022-12-29 PROCEDURE — 4010F PR ACE/ARB THEARPY RXD/TAKEN: ICD-10-PCS | Mod: CPTII,S$GLB,, | Performed by: INTERNAL MEDICINE

## 2022-12-29 PROCEDURE — 3044F HG A1C LEVEL LT 7.0%: CPT | Mod: CPTII,S$GLB,, | Performed by: INTERNAL MEDICINE

## 2022-12-29 PROCEDURE — 3044F PR MOST RECENT HEMOGLOBIN A1C LEVEL <7.0%: ICD-10-PCS | Mod: CPTII,S$GLB,, | Performed by: INTERNAL MEDICINE

## 2022-12-29 NOTE — PROGRESS NOTES
Subjective:    Patient ID:  Cristin Segal is a 62 y.o. female     Chief Complaint   Patient presents with    Hyperlipidemia    Hypertension       HPI:  Ms Cristin Segal is a 62 y.o. female is here for follow-up.    Patient has been doing well no specific complaints at the present time.  Her breathing has been good denies any shortness of breath or difficulty breathing denies any chest pain or tightness or heaviness denies any dizziness or lightheadedness or loss of consciousness falls or head injury.    She is been taking all her medications regularly.        Review of patient's allergies indicates:   Allergen Reactions    Pcn [penicillins] Anaphylaxis     Unknown for her- family history with anaphylaxis    Lipitor [atorvastatin]        Past Medical History:   Diagnosis Date    Arthritis     Cataract     Diabetes mellitus type II     diet controlled    Fracture     left 4th finger  - splinted    Hyperlipidemia     MCTD (mixed connective tissue disease)     Raynaud's disease     Thyroid disease     Hypothyroidism    Wears glasses     contacs     Past Surgical History:   Procedure Laterality Date    CARPAL TUNNEL RELEASE      right    CATARACT EXTRACTION W/ INTRAOCULAR LENS  IMPLANT, BILATERAL      EXCISION OF PAROTID GLAND Right 8/1/2019    Procedure: PAROTIDECTOMY;  Surgeon: Abner Maki MD;  Location: [facility];  Service: ENT;  Laterality: Right;    HIP SURGERY Left 6/18/15    JANA    JOINT REPLACEMENT Bilateral     HIP    KNEE CARTILAGE SURGERY      right    TOTAL HIP ARTHROPLASTY Right 05/19/2016    JANA     Social History     Tobacco Use    Smoking status: Every Day     Packs/day: 0.50     Types: Cigarettes    Smokeless tobacco: Never   Substance Use Topics    Alcohol use: Yes     Comment: occasional    Drug use: No     Family History   Problem Relation Age of Onset    Diabetes Mother     Kidney disease Mother     Aneurysm Mother 73        brain    Hyperlipidemia Mother     Hypertension Sister      Breast cancer Sister     Diabetes Sister     Ovarian cancer Neg Hx         Review of Systems:   Constitution: Negative for diaphoresis and fever.   HEENT: Negative for nosebleeds.    Cardiovascular: Negative for chest pain       No dyspnea on exertion       No leg swelling        No palpitations  Respiratory: Negative for shortness of breath and wheezing.    Hematologic/Lymphatic: Negative for bleeding problem. Does not bruise/bleed easily.   Skin: Negative for color change and rash.   Musculoskeletal: Negative for falls and myalgias.   Gastrointestinal: Negative for hematemesis and hematochezia.   Genitourinary: Negative for hematuria.   Neurological: Negative for dizziness and light-headedness.   Psychiatric/Behavioral: Negative for altered mental status and memory loss.          Objective:        Vitals:    12/29/22 1546   BP: 128/80   Pulse: 92   Resp: 16       Lab Results   Component Value Date    WBC 6.50 11/21/2022    HGB 11.0 (L) 11/21/2022    HCT 33.1 (L) 11/21/2022     11/21/2022    CHOL 242 (H) 11/21/2022    TRIG 75 11/21/2022     (H) 11/21/2022    ALT 37 11/21/2022    AST 51 (H) 11/21/2022     11/21/2022    K 4.8 11/21/2022     11/21/2022    CREATININE 1.2 11/21/2022    BUN 18 11/21/2022    CO2 20 (L) 11/21/2022    TSH 1.628 05/11/2022    INR 1.1 11/08/2020    HGBA1C 4.7 11/21/2022        ECHOCARDIOGRAM RESULTS  Results for orders placed during the hospital encounter of 09/20/21    Echo Color Flow Doppler? Yes    Interpretation Summary  · The left ventricle is normal in size with normal systolic function.  · The estimated ejection fraction is 70%.  · Normal right ventricular size with normal right ventricular systolic function.  · Mild to moderate tricuspid regurgitation.  · Mild mitral regurgitation.  · There is mild-to-moderate aortic valve stenosis.  · Aortic valve area is 1.33 cm2; peak velocity is 2.39 m/s; mean gradient is 16 mmHg.  · Normal left ventricular diastolic  function.        CURRENT/PREVIOUS VISIT EKG  Results for orders placed or performed in visit on 03/21/22   IN OFFICE EKG 12-LEAD (to [place])    Collection Time: 03/21/22  1:12 PM    Narrative    Test Reason : I10,    Vent. Rate : 091 BPM     Atrial Rate : 091 BPM     P-R Int : 210 ms          QRS Dur : 090 ms      QT Int : 376 ms       P-R-T Axes : 064 090 058 degrees     QTc Int : 462 ms    Sinus rhythm with 1st degree A-V block with occasional Premature  ventricular complexes  Rightward axis  Borderline Abnormal ECG  When compared with ECG of 07-DEC-2021 09:17,  Premature ventricular complexes are now Present  Confirmed by Stephanie MARTINEZ, Jonathan DOMINGO ([id number]) on 4/17/2022 4:47:44 PM    Referred By:  ROSEANN           Confirmed By:Jonathan Brown MD     No valid procedures specified.   No results found for this or any previous visit.      Physical Exam:  CONSTITUTIONAL: No fever, no chills  HEENT: Normocephalic, atraumatic,pupils reactive to light                 NECK:  No JVD no carotid bruit  CVS: S1S2+, RRR, ejection systolic murmurs,   LUNGS: Clear  ABDOMEN: Soft, NT, BS+  EXTREMITIES: No cyanosis, edema  : No milton catheter  NEURO: AAO X 3  PSY: Normal affect      Medication List with Changes/Refills   Current Medications    ALBUTEROL (VENTOLIN HFA) 90 MCG/ACTUATION INHALER    Inhale 2 puffs into the lungs every 6 (six) hours as needed for Wheezing or Shortness of Breath. Rescue    ASPIRIN 81 MG CHEW    Take 1 tablet (81 mg total) by mouth 3 (three) times a week.    CEPHALEXIN (KEFLEX) 500 MG CAPSULE    Take 500 mg by mouth 2 (two) times daily.    CYCLOBENZAPRINE (FLEXERIL) 10 MG TABLET    TAKE 1 TABLET BY MOUTH EVERY DAY    FLUTICASONE PROPIONATE (FLONASE) 50 MCG/ACTUATION NASAL SPRAY    1 spray (50 mcg total) by Each Nostril route once daily.    HYDROXYCHLOROQUINE (PLAQUENIL) 200 MG TABLET    Take 1 tablet (200 mg total) by mouth once daily.    KLOR-CON M20 20 MEQ TABLET    TAKE 1 TABLET (20 MEQ TOTAL) BY MOUTH  ONCE DAILY.    LEVOTHYROXINE (SYNTHROID) 50 MCG TABLET    Take 1 tablet (50 mcg total) by mouth before breakfast.    LOSARTAN (COZAAR) 25 MG TABLET    Take 1 tablet (25 mg total) by mouth once daily.    MV-MIN/FA/C/GLUT/LYSINE/PVM250 (AIRBORNE, WITH FOLIC ACID, ORAL)    Take 1 tablet by mouth 4 (four) times daily.    NIFEDIPINE (ADALAT CC) 30 MG TBSR    TAKE 1 TABLET BY MOUTH EVERY DAY    PANTOPRAZOLE (PROTONIX) 40 MG TABLET    TAKE 1 TABLET BY MOUTH EVERY DAY    ROSUVASTATIN (CRESTOR) 5 MG TABLET    TAKE 1 TABLET BY MOUTH EVERYDAY AT BEDTIME    VITAMIN D (VITAMIN D3) 1000 UNITS TAB    Take 1,000 Units by mouth once daily.    WESTAB MAX 2.5-25-2 MG TAB    TAKE 1 TABLET BY MOUTH EVERY DAY             Assessment:       1. Nonrheumatic aortic valve stenosis    2. Raynaud's disease without gangrene    3. Diabetes mellitus due to underlying condition with hyperglycemia, without long-term current use of insulin    4. Essential hypertension    5. Mixed hyperlipidemia    6. Tachycardia    7. Current smoker    8. Aortic valve sclerosis         Plan:   1. Nonrheumatic aortic valve stenosis   Patient's aortic valve area is 1.3 centimeter squared with a mean gradient of 16 mmHg.  And she has normal LV function with ejection fraction of 70%.    2.Raynaud's disease   Patient has underlying Raynaud's disease.  And she is currently on nifedipine 30 mg p.o. daily and she is been stable no issues with.    3. Essential hypertension   Her blood pressure has been stable 128/80 and she is currently on nifedipine 30 mg p.o. daily losartan 25 mg p.o. daily continue the same.  4. Mixed hyperlipidemia   She is currently on rosuvastatin 5 mg p.o. daily.  And on her last blood work a total cholesterol was 242 HDL was 127 LDL was 100 and triglycerides were 78.  Patient follows up with the primary physician.  5. EKG  Reviewed EKG independently patient is in sinus rhythm with first-degree AV block with heart rate of 93 beats per minute and  low-voltage QRS no acute ST T-wave changes.  Early repolarization changes.    6. Hypothyroidism   She is currently on levothyroxine 50 mcg p.o. daily continue the same and a primary physician is checking her thyroid function test.  7. Patient is current smoker patient to stop smoking and to look for smoking cessation classes.  8. Continue current management I will see her back in the office in 6 months time.          Problem List Items Addressed This Visit          Cardiac/Vascular    Raynaud disease    Essential hypertension    Hyperlipidemia    Tachycardia       Endocrine    Diabetes mellitus due to underlying condition with hyperglycemia, without long-term current use of insulin       Other    Current smoker     Other Visit Diagnoses       Nonrheumatic aortic valve stenosis    -  Primary    Aortic valve sclerosis                No follow-ups on file.

## 2022-12-30 ENCOUNTER — TELEPHONE (OUTPATIENT)
Dept: CARDIOLOGY | Facility: CLINIC | Age: 62
End: 2022-12-30
Payer: COMMERCIAL

## 2022-12-30 DIAGNOSIS — R00.0 TACHYCARDIA: Primary | ICD-10-CM

## 2022-12-30 NOTE — TELEPHONE ENCOUNTER
----- Message from Vannessa Gomez RN sent at 2022  1:56 PM CST -----  Regardin/29 EKG Order  The EKG performed on 22 is missing an order.  Please place an order so that the EKG can be read.    Thank You,  Vannessa Gomez RN  [facility]/ [place]   [phone]

## 2023-01-03 ENCOUNTER — PATIENT MESSAGE (OUTPATIENT)
Dept: PULMONOLOGY | Facility: CLINIC | Age: 63
End: 2023-01-03
Payer: COMMERCIAL

## 2023-01-04 ENCOUNTER — TELEPHONE (OUTPATIENT)
Dept: INTERVENTIONAL RADIOLOGY/VASCULAR | Facility: HOSPITAL | Age: 63
End: 2023-01-04
Payer: COMMERCIAL

## 2023-01-04 NOTE — NURSING
Radiology Pre-Procedural Instructions- [facility]    Radiology Nurse contacted patient to provide instructions for scheduled CT LUNG BIOPSY Friday Jan 6 23 at 1000 .   Patient instructed to arrive no later than 830 am to outpatient registration.   Registration will direct patient to outpatient lab for pre-op lab work if required.  Following labs, patient needs to check in at the surgery waiting room desk located on the second floor.   Patient is not currently taking anticoagulants at this time.  Patient instructed to remain NPO after midnight with the exception of approved essential meds taken with a small sip of water.  Instructed patient to bathe the night before and the morning of their procedure with an anti bacterial soap or Hibiclens.   Patient is aware of their responsibility to make post transportation arrangements home by a responsible adult.   Patient educated on all additional pre-op instructions per policy and verbalized understanding.

## 2023-01-05 ENCOUNTER — OFFICE VISIT (OUTPATIENT)
Dept: PULMONOLOGY | Facility: CLINIC | Age: 63
End: 2023-01-05
Payer: COMMERCIAL

## 2023-01-05 VITALS
DIASTOLIC BLOOD PRESSURE: 78 MMHG | SYSTOLIC BLOOD PRESSURE: 117 MMHG | HEIGHT: 62 IN | HEART RATE: 104 BPM | WEIGHT: 149.69 LBS | BODY MASS INDEX: 27.55 KG/M2 | OXYGEN SATURATION: 99 %

## 2023-01-05 DIAGNOSIS — R91.1 PULMONARY NODULE: Primary | ICD-10-CM

## 2023-01-05 PROCEDURE — 3008F BODY MASS INDEX DOCD: CPT | Mod: CPTII,S$GLB,, | Performed by: NURSE PRACTITIONER

## 2023-01-05 PROCEDURE — 1159F PR MEDICATION LIST DOCUMENTED IN MEDICAL RECORD: ICD-10-PCS | Mod: CPTII,S$GLB,, | Performed by: NURSE PRACTITIONER

## 2023-01-05 PROCEDURE — 1159F MED LIST DOCD IN RCRD: CPT | Mod: CPTII,S$GLB,, | Performed by: NURSE PRACTITIONER

## 2023-01-05 PROCEDURE — 3074F PR MOST RECENT SYSTOLIC BLOOD PRESSURE < 130 MM HG: ICD-10-PCS | Mod: CPTII,S$GLB,, | Performed by: NURSE PRACTITIONER

## 2023-01-05 PROCEDURE — 3008F PR BODY MASS INDEX (BMI) DOCUMENTED: ICD-10-PCS | Mod: CPTII,S$GLB,, | Performed by: NURSE PRACTITIONER

## 2023-01-05 PROCEDURE — 99999 PR PBB SHADOW E&M-EST. PATIENT-LVL IV: ICD-10-PCS | Mod: PBBFAC,,, | Performed by: NURSE PRACTITIONER

## 2023-01-05 PROCEDURE — 99213 OFFICE O/P EST LOW 20 MIN: CPT | Mod: S$GLB,,, | Performed by: NURSE PRACTITIONER

## 2023-01-05 PROCEDURE — 99213 PR OFFICE/OUTPT VISIT, EST, LEVL III, 20-29 MIN: ICD-10-PCS | Mod: S$GLB,,, | Performed by: NURSE PRACTITIONER

## 2023-01-05 PROCEDURE — 3078F DIAST BP <80 MM HG: CPT | Mod: CPTII,S$GLB,, | Performed by: NURSE PRACTITIONER

## 2023-01-05 PROCEDURE — 3078F PR MOST RECENT DIASTOLIC BLOOD PRESSURE < 80 MM HG: ICD-10-PCS | Mod: CPTII,S$GLB,, | Performed by: NURSE PRACTITIONER

## 2023-01-05 PROCEDURE — 3074F SYST BP LT 130 MM HG: CPT | Mod: CPTII,S$GLB,, | Performed by: NURSE PRACTITIONER

## 2023-01-05 PROCEDURE — 99999 PR PBB SHADOW E&M-EST. PATIENT-LVL IV: CPT | Mod: PBBFAC,,, | Performed by: NURSE PRACTITIONER

## 2023-01-05 NOTE — H&P (VIEW-ONLY)
1/5/2023    Cristin Segal  Office Note    Chief Complaint   Patient presents with    Abnormal Ct Scan    Pulmonary Nodules       HPI:  1/5/23- most recent CT shows increase in left upper lung nodule. Scheduled for CT lead needle biopsy tomorrow.   Discussed treatment option based on biopsy outcome.   No current complaint of cough, shortness of breath, wheeze, or chest tightness  Currently smoking 1/2 pack daily, not interested in smoking cessation.       8/16/22- Complaint of chronic nasal drainage, has recurrent morning cough, onset 3 weeks, clear productive mucous, thick, can feel mucous in chest difficult to clear. Insurance denied albuterol inhaler.    5/11/2022- no current complaint of cough or shortness of breath, did not take symbicort due to cost of medication.   Currently smoking 1/2 pack daily.     Complaint of sinus pressure and post nasal drip, onset years, followed by ENT   Seen by ENT for PET active left cervical lymph nodes.     2/11/22- cough- recurrent complaint, less frequent in past 3 months. Insurance denied Xopenex. Took Trelegy for 2 weeks with no benefit.   Currently smoking under 1 pack daily goal to cut down to 1/2 pack daily.    SOB- only when hot wearing face mask. Improves with breathing cold air. 1x weekly      11/11/2021- Cough- onset years, no change with time, worse in early mornings, improves after expelling mucous, productive clear mucous, no chest tightness or wheeze,   No complaint of shortness of breath. Followed by cardiologist for MV prolapse and chest pain, on crestor for cholesterol. Followed by Dr. Guzman for auto immune disease.   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 45 pack years. Tried smoking cessation with no benefit. Tried Chantix.   Family Hx: no Lung Cancer, no COPD, no Asthma  Medical Hx: no  previous pneumonia ; no previous shoulder/chest surgery        The chief compliant  problem varies with instablilty at time    PFSH:  Past Medical History:   Diagnosis Date    Arthritis     Cataract     Diabetes mellitus type II     diet controlled    Fracture     left 4th finger  - splinted    Hyperlipidemia     MCTD (mixed connective tissue disease)     Raynaud's disease     Thyroid disease     Hypothyroidism    Wears glasses     contacs         Past Surgical History:   Procedure Laterality Date    CARPAL TUNNEL RELEASE      right    CATARACT EXTRACTION W/ INTRAOCULAR LENS  IMPLANT, BILATERAL      EXCISION OF PAROTID GLAND Right 8/1/2019    Procedure: PAROTIDECTOMY;  Surgeon: Abner Maki MD;  Location: [facility];  Service: ENT;  Laterality: Right;    HIP SURGERY Left 6/18/15    [facility]    JOINT REPLACEMENT Bilateral     HIP    KNEE CARTILAGE SURGERY      right    TOTAL HIP ARTHROPLASTY Right 05/19/2016    [facility]     Social History     Tobacco Use    Smoking status: Every Day     Packs/day: 0.50     Types: Cigarettes    Smokeless tobacco: Never   Substance Use Topics    Alcohol use: Yes     Comment: occasional    Drug use: No     Family History   Problem Relation Age of Onset    Diabetes Mother     Kidney disease Mother     Aneurysm Mother 73        brain    Hyperlipidemia Mother     Hypertension Sister     Breast cancer Sister     Diabetes Sister     Ovarian cancer Neg Hx      Review of patient's allergies indicates:   Allergen Reactions    Pcn [penicillins] Anaphylaxis     Unknown for her- family history with anaphylaxis    Lipitor [atorvastatin]      I have reviewed past medical, family, and social history. I have reviewed previous nurse notes.    Performance Status:The patient's activity level is no limits with regular activity.      Review of Systems:  a review of eleven systems covering constitutional, Eye, HEENT, Psych, Respiratory, Cardiac, GI, , Musculoskeletal, Endocrine, Dermatologic was negative except  "for pertinent findings as listed ABOVE and below: pertinent positive as above, rest is good  cough         Exam:Comprehensive exam done. /78 (BP Location: Right arm, Patient Position: Sitting, BP Method: Medium (Automatic))   Pulse 104   Ht 5' 2" (1.575 m)   Wt 67.9 kg (149 lb 11.1 oz)   SpO2 99% Comment: on room air at rest  BMI 27.38 kg/m²   Exam included Vitals as listed, and patient's appearance and affect and alertness and mood, oral exam for yeast and hygiene and pharynx lesions and Mallapatti (M) score, neck with inspection for jvd and masses and thyroid abnormalities and lymph nodes (supraclavicular and infraclavicular nodes and axillary also examined and noted if abn), chest exam included symmetry and effort and fremitus and percussion and auscultation, cardiac exam included rhythm and gallops and murmur and rubs and jvd and edema, abdominal exam for mass and hepatosplenomegaly and tenderness and hernias and bowel sounds, Musculoskeletal exam with muscle tone and posture and mobility/gait and  strength, and skin for rashes and cyanosis and pallor and turgor, extremity for clubbing.  Findings were normal except for pertinent findings listed below: M2. BS clear        Radiographs (ct chest and cxr) reviewed: view by direct vision  CT Chest Without Contrast 12/27/2022 Slowly enlarging soft tissue density nodule of the left upper lobe now measuring 11 mm.  Neoplasm is suspected and percutaneous biopsy is recommended.  Otherwise continued follow-up by CT scan is recommended with consideration of a repeat PET CT      CT Chest Without Contrast  07/07/2022   Continued 8 mm solid nodule identified in the left upper lobe along the lateral pleural surface.  The ground-glass component of this density has resolved.  Recommend continued follow-up by CT or attempt at percutaneous CT directed needle biopsy.  Coronary artery calcification noted.     NM PET CT Routine Skull to Mid Thigh 3/2/2022   1. Pulmonary " nodule in the juxtapleural left upper lobe with slight increased FDG activity from background, consider correlation with the outside/prior exams and either short-term interval follow-up or biopsy.     2. 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    CT CHEST WITHOUT CONTRAST 02/11/2022   12 mm partially solid and partially ground-glass nodule identified in the left upper lobe peripherally is slightly increased in size from the prior study.  Neoplasm is suspected.  Recommend PET CT or biopsy.     Transthoracic echo (TTE) complete 9/20/21 normal    X-Ray Chest PA And Lateral  10/05/2021 Lungs clear      Labs reviewed      Lab Results   Component Value Date    WBC 6.50 11/21/2022    RBC 3.16 (L) 11/21/2022    HGB 11.0 (L) 11/21/2022    HCT 33.1 (L) 11/21/2022     (H) 11/21/2022    MCH 34.8 (H) 11/21/2022    MCHC 33.2 11/21/2022    RDW 13.6 11/21/2022     11/21/2022    MPV 9.0 (L) 11/21/2022    GRAN 3.2 11/21/2022    GRAN 48.7 11/21/2022    LYMPH 2.4 11/21/2022    LYMPH 36.3 11/21/2022    MONO 0.6 11/21/2022    MONO 8.5 11/21/2022    EOS 0.3 11/21/2022    BASO 0.13 11/21/2022    EOSINOPHIL 4.3 11/21/2022    BASOPHIL 2.0 (H) 11/21/2022     NOdify lung 8/3/22 no significant autoantibodies 2% risk malignancy    PFT reviewed  Pulmonary Functions Testing Results:  Spirometry bronchodilator, lung volumes by gas dilution, diffusion capacity measured November 18, 2021. the FEV1 FVC ratio was 80%- there is no airflow obstruction measured by spirometry. The FEV1 measures 88% of predicted at 2 L. There was no   significant improvement in spirometry following bronchodilator. Lung volumes  are normal with total lung capacity of 80% of predicted. Diffusion was minimally decreased to 75% of predicted.   Â    Spirometry and lung volumes are both normal. There was no bronchodilator response of significance. Diffusion was only minimally decreased. Clinical correlation would be recommended.         Plan:  Clinical impression is apparently straight forward and impression with management as below.    Cristin was seen today for abnormal ct scan and pulmonary nodules.    Diagnoses and all orders for this visit:    Pulmonary nodule     - pending CT biopsy   - discussed treatment options     Follow up in about 6 months (around 7/5/2023), or if symptoms worsen or fail to improve.    Discussed with patient above for education the following:      Patient Instructions   Will call with results from CT biopsy.

## 2023-01-05 NOTE — PROGRESS NOTES
1/5/2023    Cristin Segal  Office Note    Chief Complaint   Patient presents with    Abnormal Ct Scan    Pulmonary Nodules       HPI:  1/5/23- most recent CT shows increase in left upper lung nodule. Scheduled for CT lead needle biopsy tomorrow.   Discussed treatment option based on biopsy outcome.   No current complaint of cough, shortness of breath, wheeze, or chest tightness  Currently smoking 1/2 pack daily, not interested in smoking cessation.       8/16/22- Complaint of chronic nasal drainage, has recurrent morning cough, onset 3 weeks, clear productive mucous, thick, can feel mucous in chest difficult to clear. Insurance denied albuterol inhaler.    5/11/2022- no current complaint of cough or shortness of breath, did not take symbicort due to cost of medication.   Currently smoking 1/2 pack daily.     Complaint of sinus pressure and post nasal drip, onset years, followed by ENT   Seen by ENT for PET active left cervical lymph nodes.     2/11/22- cough- recurrent complaint, less frequent in past 3 months. Insurance denied Xopenex. Took Trelegy for 2 weeks with no benefit.   Currently smoking under 1 pack daily goal to cut down to 1/2 pack daily.    SOB- only when hot wearing face mask. Improves with breathing cold air. 1x weekly      11/11/2021- Cough- onset years, no change with time, worse in early mornings, improves after expelling mucous, productive clear mucous, no chest tightness or wheeze,   No complaint of shortness of breath. Followed by cardiologist for MV prolapse and chest pain, on crestor for cholesterol. Followed by Dr. Guzman for auto immune disease.   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 45 pack years. Tried smoking cessation with no benefit. Tried Chantix.   Family Hx: no Lung Cancer, no COPD, no Asthma  Medical Hx: no  previous pneumonia ; no previous shoulder/chest surgery        The chief compliant  problem varies with instablilty at time    PFSH:  Past Medical History:   Diagnosis Date    Arthritis     Cataract     Diabetes mellitus type II     diet controlled    Fracture     left 4th finger  - splinted    Hyperlipidemia     MCTD (mixed connective tissue disease)     Raynaud's disease     Thyroid disease     Hypothyroidism    Wears glasses     contacs         Past Surgical History:   Procedure Laterality Date    CARPAL TUNNEL RELEASE      right    CATARACT EXTRACTION W/ INTRAOCULAR LENS  IMPLANT, BILATERAL      EXCISION OF PAROTID GLAND Right 8/1/2019    Procedure: PAROTIDECTOMY;  Surgeon: Abner Maki MD;  Location: [facility];  Service: ENT;  Laterality: Right;    HIP SURGERY Left 6/18/15    [facility]    JOINT REPLACEMENT Bilateral     HIP    KNEE CARTILAGE SURGERY      right    TOTAL HIP ARTHROPLASTY Right 05/19/2016    [facility]     Social History     Tobacco Use    Smoking status: Every Day     Packs/day: 0.50     Types: Cigarettes    Smokeless tobacco: Never   Substance Use Topics    Alcohol use: Yes     Comment: occasional    Drug use: No     Family History   Problem Relation Age of Onset    Diabetes Mother     Kidney disease Mother     Aneurysm Mother 73        brain    Hyperlipidemia Mother     Hypertension Sister     Breast cancer Sister     Diabetes Sister     Ovarian cancer Neg Hx      Review of patient's allergies indicates:   Allergen Reactions    Pcn [penicillins] Anaphylaxis     Unknown for her- family history with anaphylaxis    Lipitor [atorvastatin]      I have reviewed past medical, family, and social history. I have reviewed previous nurse notes.    Performance Status:The patient's activity level is no limits with regular activity.      Review of Systems:  a review of eleven systems covering constitutional, Eye, HEENT, Psych, Respiratory, Cardiac, GI, , Musculoskeletal, Endocrine, Dermatologic was negative except  "for pertinent findings as listed ABOVE and below: pertinent positive as above, rest is good  cough         Exam:Comprehensive exam done. /78 (BP Location: Right arm, Patient Position: Sitting, BP Method: Medium (Automatic))   Pulse 104   Ht 5' 2" (1.575 m)   Wt 67.9 kg (149 lb 11.1 oz)   SpO2 99% Comment: on room air at rest  BMI 27.38 kg/m²   Exam included Vitals as listed, and patient's appearance and affect and alertness and mood, oral exam for yeast and hygiene and pharynx lesions and Mallapatti (M) score, neck with inspection for jvd and masses and thyroid abnormalities and lymph nodes (supraclavicular and infraclavicular nodes and axillary also examined and noted if abn), chest exam included symmetry and effort and fremitus and percussion and auscultation, cardiac exam included rhythm and gallops and murmur and rubs and jvd and edema, abdominal exam for mass and hepatosplenomegaly and tenderness and hernias and bowel sounds, Musculoskeletal exam with muscle tone and posture and mobility/gait and  strength, and skin for rashes and cyanosis and pallor and turgor, extremity for clubbing.  Findings were normal except for pertinent findings listed below: M2. BS clear        Radiographs (ct chest and cxr) reviewed: view by direct vision  CT Chest Without Contrast 12/27/2022 Slowly enlarging soft tissue density nodule of the left upper lobe now measuring 11 mm.  Neoplasm is suspected and percutaneous biopsy is recommended.  Otherwise continued follow-up by CT scan is recommended with consideration of a repeat PET CT      CT Chest Without Contrast  07/07/2022   Continued 8 mm solid nodule identified in the left upper lobe along the lateral pleural surface.  The ground-glass component of this density has resolved.  Recommend continued follow-up by CT or attempt at percutaneous CT directed needle biopsy.  Coronary artery calcification noted.     NM PET CT Routine Skull to Mid Thigh 3/2/2022   1. Pulmonary " nodule in the juxtapleural left upper lobe with slight increased FDG activity from background, consider correlation with the outside/prior exams and either short-term interval follow-up or biopsy.     2. 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    CT CHEST WITHOUT CONTRAST 02/11/2022   12 mm partially solid and partially ground-glass nodule identified in the left upper lobe peripherally is slightly increased in size from the prior study.  Neoplasm is suspected.  Recommend PET CT or biopsy.     Transthoracic echo (TTE) complete 9/20/21 normal    X-Ray Chest PA And Lateral  10/05/2021 Lungs clear      Labs reviewed      Lab Results   Component Value Date    WBC 6.50 11/21/2022    RBC 3.16 (L) 11/21/2022    HGB 11.0 (L) 11/21/2022    HCT 33.1 (L) 11/21/2022     (H) 11/21/2022    MCH 34.8 (H) 11/21/2022    MCHC 33.2 11/21/2022    RDW 13.6 11/21/2022     11/21/2022    MPV 9.0 (L) 11/21/2022    GRAN 3.2 11/21/2022    GRAN 48.7 11/21/2022    LYMPH 2.4 11/21/2022    LYMPH 36.3 11/21/2022    MONO 0.6 11/21/2022    MONO 8.5 11/21/2022    EOS 0.3 11/21/2022    BASO 0.13 11/21/2022    EOSINOPHIL 4.3 11/21/2022    BASOPHIL 2.0 (H) 11/21/2022     NOdify lung 8/3/22 no significant autoantibodies 2% risk malignancy    PFT reviewed  Pulmonary Functions Testing Results:  Spirometry bronchodilator, lung volumes by gas dilution, diffusion capacity measured November 18, 2021. the FEV1 FVC ratio was 80%- there is no airflow obstruction measured by spirometry. The FEV1 measures 88% of predicted at 2 L. There was no   significant improvement in spirometry following bronchodilator. Lung volumes  are normal with total lung capacity of 80% of predicted. Diffusion was minimally decreased to 75% of predicted.   Â    Spirometry and lung volumes are both normal. There was no bronchodilator response of significance. Diffusion was only minimally decreased. Clinical correlation would be recommended.         Plan:  Clinical impression is apparently straight forward and impression with management as below.    Cristin was seen today for abnormal ct scan and pulmonary nodules.    Diagnoses and all orders for this visit:    Pulmonary nodule     - pending CT biopsy   - discussed treatment options     Follow up in about 6 months (around 7/5/2023), or if symptoms worsen or fail to improve.    Discussed with patient above for education the following:      Patient Instructions   Will call with results from CT biopsy.

## 2023-01-06 ENCOUNTER — HOSPITAL ENCOUNTER (OUTPATIENT)
Dept: RADIOLOGY | Facility: HOSPITAL | Age: 63
Discharge: HOME | End: 2023-01-06
Attending: NURSE PRACTITIONER
Payer: COMMERCIAL

## 2023-01-06 ENCOUNTER — HOSPITAL ENCOUNTER (OUTPATIENT)
Dept: RADIOLOGY | Facility: HOSPITAL | Age: 63
Discharge: HOME | End: 2023-01-06
Attending: RADIOLOGY
Payer: COMMERCIAL

## 2023-01-06 VITALS
RESPIRATION RATE: 16 BRPM | DIASTOLIC BLOOD PRESSURE: 74 MMHG | TEMPERATURE: 98 F | SYSTOLIC BLOOD PRESSURE: 130 MMHG | HEART RATE: 79 BPM | OXYGEN SATURATION: 100 %

## 2023-01-06 DIAGNOSIS — R91.8 MULTIPLE NODULES OF LUNG: ICD-10-CM

## 2023-01-06 DIAGNOSIS — R91.1 LUNG NODULE: ICD-10-CM

## 2023-01-06 DIAGNOSIS — R91.1 PULMONARY NODULE: Primary | ICD-10-CM

## 2023-01-06 PROCEDURE — 99900104 DSU ONLY-NO CHARGE-EA ADD'L HR (STAT)

## 2023-01-06 PROCEDURE — 71045 X-RAY EXAM CHEST 1 VIEW: CPT | Mod: TC,FY

## 2023-01-06 PROCEDURE — 63600175 PHARM REV CODE 636 W HCPCS: Performed by: RADIOLOGY

## 2023-01-06 PROCEDURE — 32408 CORE NDL BX LNG/MED PERQ: CPT | Mod: ,,, | Performed by: RADIOLOGY

## 2023-01-06 PROCEDURE — 87102 FUNGUS ISOLATION CULTURE: CPT | Performed by: NURSE PRACTITIONER

## 2023-01-06 PROCEDURE — 87116 MYCOBACTERIA CULTURE: CPT | Performed by: NURSE PRACTITIONER

## 2023-01-06 PROCEDURE — 27000826 CT BIOPSY LUNG W/ GUIDANCE

## 2023-01-06 PROCEDURE — 71045 XR CHEST 1 VIEW: ICD-10-PCS | Mod: 26,76,, | Performed by: RADIOLOGY

## 2023-01-06 PROCEDURE — 71045 XR CHEST 1 VIEW: ICD-10-PCS | Mod: 26,,, | Performed by: RADIOLOGY

## 2023-01-06 PROCEDURE — 71045 X-RAY EXAM CHEST 1 VIEW: CPT | Mod: 26,,, | Performed by: RADIOLOGY

## 2023-01-06 PROCEDURE — 32408 CT BIOPSY LUNG W/ GUIDANCE: ICD-10-PCS | Mod: ,,, | Performed by: RADIOLOGY

## 2023-01-06 PROCEDURE — A4550 SURGICAL TRAYS: HCPCS

## 2023-01-06 PROCEDURE — 87206 SMEAR FLUORESCENT/ACID STAI: CPT | Performed by: NURSE PRACTITIONER

## 2023-01-06 PROCEDURE — 71045 X-RAY EXAM CHEST 1 VIEW: CPT | Mod: 26,76,, | Performed by: RADIOLOGY

## 2023-01-06 PROCEDURE — 99900103 DSU ONLY-NO CHARGE-INITIAL HR (STAT)

## 2023-01-06 RX ORDER — FENTANYL CITRATE 50 UG/ML
INJECTION, SOLUTION INTRAMUSCULAR; INTRAVENOUS
Status: COMPLETED | OUTPATIENT
Start: 2023-01-06 | End: 2023-01-06

## 2023-01-06 RX ORDER — LIDOCAINE HYDROCHLORIDE 10 MG/ML
1 INJECTION, SOLUTION EPIDURAL; INFILTRATION; INTRACAUDAL; PERINEURAL ONCE AS NEEDED
Status: DISCONTINUED | OUTPATIENT
Start: 2023-01-06 | End: 2023-01-07 | Stop reason: HOSPADM

## 2023-01-06 RX ORDER — HYDROMORPHONE HYDROCHLORIDE 2 MG/ML
INJECTION, SOLUTION INTRAMUSCULAR; INTRAVENOUS; SUBCUTANEOUS
Status: COMPLETED | OUTPATIENT
Start: 2023-01-06 | End: 2023-01-06

## 2023-01-06 RX ORDER — SODIUM CHLORIDE 9 MG/ML
INJECTION, SOLUTION INTRAVENOUS CONTINUOUS
Status: DISCONTINUED | OUTPATIENT
Start: 2023-01-06 | End: 2023-01-07 | Stop reason: HOSPADM

## 2023-01-06 RX ORDER — MIDAZOLAM HYDROCHLORIDE 1 MG/ML
INJECTION INTRAMUSCULAR; INTRAVENOUS
Status: COMPLETED | OUTPATIENT
Start: 2023-01-06 | End: 2023-01-06

## 2023-01-06 RX ADMIN — FENTANYL CITRATE 25 MCG: 50 INJECTION, SOLUTION INTRAMUSCULAR; INTRAVENOUS at 12:01

## 2023-01-06 RX ADMIN — FENTANYL CITRATE 12.5 MCG: 50 INJECTION, SOLUTION INTRAMUSCULAR; INTRAVENOUS at 11:01

## 2023-01-06 RX ADMIN — FENTANYL CITRATE 25 MCG: 50 INJECTION, SOLUTION INTRAMUSCULAR; INTRAVENOUS at 11:01

## 2023-01-06 RX ADMIN — MIDAZOLAM HYDROCHLORIDE 0.5 MG: 1 INJECTION, SOLUTION INTRAMUSCULAR; INTRAVENOUS at 11:01

## 2023-01-06 RX ADMIN — MIDAZOLAM HYDROCHLORIDE 1 MG: 1 INJECTION, SOLUTION INTRAMUSCULAR; INTRAVENOUS at 11:01

## 2023-01-06 RX ADMIN — MIDAZOLAM HYDROCHLORIDE 1.5 MG: 1 INJECTION, SOLUTION INTRAMUSCULAR; INTRAVENOUS at 11:01

## 2023-01-06 NOTE — INTERVAL H&P NOTE
The patient has been examined and the H&P has been reviewed:    I concur with the findings and no changes have occurred since H&P was written.    No new c/o on ROS.  Off ASA x 3 days.  RRR with systolic murmur and normal BS on exam.  Plan for CT guided lung biopsy with moderate sedation.    There are no hospital problems to display for this patient.

## 2023-01-06 NOTE — Clinical Note
A pre-sedation assessment was completed by the physician immediately prior to sedation start. VERBAL asa-2 MALLAMPATI-3 obtained from Dr Lock.

## 2023-01-06 NOTE — DISCHARGE SUMMARY
Radiology Post-Procedure Note    Pre Op Diagnosis: Lung nodule  Post Op Diagnosis: Same    Procedure: CT guided lung biopsy    Procedure performed by: MD Ashvin    Written Informed Consent Obtained: Yes  Specimen Removed: YES 3 adequate cores  Estimated Blood Loss: less than 50     Findings:   Technically successful biopsy.  Trace pneumothorax on postprocedure CT (5%).  Patient asymptomatic aside from mild chest wall tenderness at the biopsy site.    Patient tolerated procedure well.    Lars Lock MD  Department of Radiology

## 2023-01-06 NOTE — NURSING
Procedure explained and pt consented in DSU by Radiologist.   Pt arrived via stretcher to CT for CT Guided Lung Biopsy.   AAOx4,- Time out performed.     Pt received Fentanyl 100 mcg and Versed 4 mg IV. Vital signs monitored and remained stable for duration of procedure. Biopsies collected By Radiologist.   Specimens submitted to lab for Pathology.   Bandage applied to pts left lateral occlusive dressing  . CDI- Bedside report to Sharon FARRARU-RN  Pt transported to DSU  STAT Post- Chest CT - minimal pneumo  STAT Post- Chest  Xray - completed and awaiting dictation.  Timed chest Xray ordered and due at 1515  and must be cleared by radiologist prior to pts discharge

## 2023-01-09 DIAGNOSIS — C34.90 SQUAMOUS CELL CARCINOMA OF LUNG, UNSPECIFIED LATERALITY: Primary | ICD-10-CM

## 2023-01-09 DIAGNOSIS — C34.90 MALIGNANT NEOPLASM OF UNSPECIFIED PART OF UNSPECIFIED BRONCHUS OR LUNG: ICD-10-CM

## 2023-01-09 LAB
FINAL PATHOLOGIC DIAGNOSIS: ABNORMAL
GROSS: ABNORMAL
Lab: ABNORMAL

## 2023-01-10 ENCOUNTER — TELEPHONE (OUTPATIENT)
Dept: PULMONOLOGY | Facility: CLINIC | Age: 63
End: 2023-01-10
Payer: COMMERCIAL

## 2023-01-10 DIAGNOSIS — R91.1 PULMONARY NODULE: Primary | ICD-10-CM

## 2023-01-10 NOTE — TELEPHONE ENCOUNTER
Spoke to patient   ----- Message from Gabby Fay NP sent at 1/9/2023  4:54 PM CST -----  Please call and offer telephone or office appointment next week if patient is interested.

## 2023-01-11 DIAGNOSIS — R91.1 SOLITARY LUNG NODULE: Primary | ICD-10-CM

## 2023-01-13 DIAGNOSIS — R91.8 MASS OF UPPER LOBE OF LEFT LUNG: Primary | ICD-10-CM

## 2023-01-13 NOTE — H&P (VIEW-ONLY)
History & Physical    SUBJECTIVE:     History of Present Illness:  Patient is a 62 y.o. female smoker with DM, HTN, HLD, MVP, MCTD, left parotid gland nodule and CASSIDY NSCLC. LDCT in Nov 2021 showed partly solid CASSIDY nodule. Followed with serial scans showing interval growth 11mm. Percutaneous biopsy positive for well differentiated squamous cell carcinoma.     Current 1/2ppd smoker.   Bilateral hip replacements. Carpal tunnel release. Parotidectomy.     No chief complaint on file.      Review of patient's allergies indicates:   Allergen Reactions    Pcn [penicillins] Anaphylaxis     Unknown for her- family history with anaphylaxis    Lipitor [atorvastatin]        Current Outpatient Medications   Medication Sig Dispense Refill    albuterol (VENTOLIN HFA) 90 mcg/actuation inhaler Inhale 2 puffs into the lungs every 6 (six) hours as needed for Wheezing or Shortness of Breath. Rescue 8 g 5    aspirin 81 MG Chew Take 1 tablet (81 mg total) by mouth 3 (three) times a week. 13 tablet 0    cephALEXin (KEFLEX) 500 MG capsule Take 500 mg by mouth 2 (two) times daily.      cyclobenzaprine (FLEXERIL) 10 MG tablet TAKE 1 TABLET BY MOUTH EVERY DAY 30 tablet 3    fluticasone propionate (FLONASE) 50 mcg/actuation nasal spray 1 spray (50 mcg total) by Each Nostril route once daily. 16 g 11    hydrOXYchloroQUINE (PLAQUENIL) 200 mg tablet Take 1 tablet (200 mg total) by mouth once daily. 90 tablet 1    KLOR-CON M20 20 mEq tablet TAKE 1 TABLET (20 MEQ TOTAL) BY MOUTH ONCE DAILY. 90 tablet 3    levothyroxine (SYNTHROID) 50 MCG tablet Take 1 tablet (50 mcg total) by mouth before breakfast. 90 tablet 3    losartan (COZAAR) 25 MG tablet Take 1 tablet (25 mg total) by mouth once daily. 90 tablet 3    mv-min/FA/C/glut/lysine/vjj488 (AIRBORNE, WITH FOLIC ACID, ORAL) Take 1 tablet by mouth 4 (four) times daily.      NIFEdipine (ADALAT CC) 30 MG TbSR TAKE 1 TABLET BY MOUTH EVERY DAY 90 tablet 2    pantoprazole (PROTONIX) 40 MG tablet TAKE 1  TABLET BY MOUTH EVERY DAY 90 tablet 3    rosuvastatin (CRESTOR) 5 MG tablet TAKE 1 TABLET BY MOUTH EVERYDAY AT BEDTIME 90 tablet 3    vitamin D (VITAMIN D3) 1000 units Tab Take 1,000 Units by mouth once daily.      WESTAB MAX 2.5-25-2 mg Tab TAKE 1 TABLET BY MOUTH EVERY DAY 90 tablet 3     No current facility-administered medications for this visit.     Facility-Administered Medications Ordered in Other Visits   Medication Dose Route Frequency Provider Last Rate Last Admin    lactated ringers infusion   Intravenous Continuous Kev Bai MD           Past Medical History:   Diagnosis Date    Arthritis     Cataract     Diabetes mellitus type II     diet controlled    Fracture     left 4th finger  - splinted    Hyperlipidemia     MCTD (mixed connective tissue disease)     Raynaud's disease     Thyroid disease     Hypothyroidism    Wears glasses     contacs     Past Surgical History:   Procedure Laterality Date    CARPAL TUNNEL RELEASE      right    CATARACT EXTRACTION W/ INTRAOCULAR LENS  IMPLANT, BILATERAL      EXCISION OF PAROTID GLAND Right 8/1/2019    Procedure: PAROTIDECTOMY;  Surgeon: Abner Maki MD;  Location: [facility];  Service: ENT;  Laterality: Right;    HIP SURGERY Left 6/18/15    JANA    JOINT REPLACEMENT Bilateral     HIP    KNEE CARTILAGE SURGERY      right    TOTAL HIP ARTHROPLASTY Right 05/19/2016    JANA     Family History   Problem Relation Age of Onset    Diabetes Mother     Kidney disease Mother     Aneurysm Mother 73        brain    Hyperlipidemia Mother     Hypertension Sister     Breast cancer Sister     Diabetes Sister     Ovarian cancer Neg Hx      Social History     Tobacco Use    Smoking status: Every Day     Packs/day: 0.50     Types: Cigarettes    Smokeless tobacco: Never   Substance Use Topics    Alcohol use: Yes     Comment: occasional    Drug use: No        Review of Systems:  Review of Systems   Constitutional: Negative.    HENT:  Positive for congestion and sinus pressure.     Respiratory: Negative.     Cardiovascular: Negative.    Hematological: Negative.    Psychiatric/Behavioral: Negative.     All other systems reviewed and are negative.    OBJECTIVE:     Vital Signs (Most Recent)  There were no vitals filed for this visit.    Physical Exam:  Physical Exam  Constitutional:       Appearance: Normal appearance.   HENT:      Head: Normocephalic and atraumatic.   Eyes:      Extraocular Movements: Extraocular movements intact.      Conjunctiva/sclera: Conjunctivae normal.      Pupils: Pupils are equal, round, and reactive to light.   Cardiovascular:      Rate and Rhythm: Normal rate and regular rhythm.      Pulses: Normal pulses.      Heart sounds: Murmur heard.      Comments: Harsh JAYMIE  Pulmonary:      Effort: Pulmonary effort is normal.      Breath sounds: Normal breath sounds.   Abdominal:      General: Abdomen is flat.      Palpations: Abdomen is soft.   Musculoskeletal:         General: Normal range of motion.      Cervical back: Normal range of motion.   Skin:     Capillary Refill: Capillary refill takes less than 2 seconds.   Neurological:      General: No focal deficit present.      Mental Status: She is alert and oriented to person, place, and time. Mental status is at baseline.   Psychiatric:         Mood and Affect: Mood normal.         Behavior: Behavior normal.         Thought Content: Thought content normal.       PFTs:  Fev1: 2.01L 87.8%  DLCO: 15.85 74%    Chest CT 2/11/22: I reviewed images  12 mm partially solid and partially ground-glass nodule identified in the left upper lobe peripherally is slightly increased in size from the prior study.  Neoplasm is suspected.  Recommend PET CT or biopsy.     Chest CT 7/7/22: I reviewed images  Continued 8 mm solid nodule identified in the left upper lobe along the lateral pleural surface.  The ground-glass component of this density has resolved.  Recommend continued follow-up by CT or attempt at percutaneous CT directed needle  biopsy.  Coronary artery calcification noted.    Chest CT 12/27/22: I reviewed images  Slowly enlarging soft tissue density nodule of the left upper lobe now measuring 11 mm.  Neoplasm is suspected and percutaneous biopsy is recommended.  Otherwise continued follow-up by CT scan is recommended with consideration of a repeat PET CT.  Brain MRI scheduled    ASSESSMENT/PLAN:     Patient is a 62 y.o. female smoker with DM, HTN, HLD, MVP, MCTD, left parotid gland nodule and CASSIDY NSCLC.  Active smoker  Lesion location, diagnosis are indications for LULobectomy  Cardiac clearance via stress test from 2021  PLAN:Plan     She is scheduled for a bone scan and brain MRI.   Await PFTs   Discuss at tumor board  Plan robotic LULobectomy with MLND  Smoking cessation encouraged

## 2023-01-13 NOTE — PROGRESS NOTES
History & Physical    SUBJECTIVE:     History of Present Illness:  Patient is a 62 y.o. female smoker with DM, HTN, HLD, MVP, MCTD, left parotid gland nodule and CASSIDY NSCLC. LDCT in Nov 2021 showed partly solid CASSIDY nodule. Followed with serial scans showing interval growth 11mm. Percutaneous biopsy positive for well differentiated squamous cell carcinoma.     Current 1/2ppd smoker.   Bilateral hip replacements. Carpal tunnel release. Parotidectomy.     No chief complaint on file.      Review of patient's allergies indicates:   Allergen Reactions    Pcn [penicillins] Anaphylaxis     Unknown for her- family history with anaphylaxis    Lipitor [atorvastatin]        Current Outpatient Medications   Medication Sig Dispense Refill    albuterol (VENTOLIN HFA) 90 mcg/actuation inhaler Inhale 2 puffs into the lungs every 6 (six) hours as needed for Wheezing or Shortness of Breath. Rescue 8 g 5    aspirin 81 MG Chew Take 1 tablet (81 mg total) by mouth 3 (three) times a week. 13 tablet 0    cephALEXin (KEFLEX) 500 MG capsule Take 500 mg by mouth 2 (two) times daily.      cyclobenzaprine (FLEXERIL) 10 MG tablet TAKE 1 TABLET BY MOUTH EVERY DAY 30 tablet 3    fluticasone propionate (FLONASE) 50 mcg/actuation nasal spray 1 spray (50 mcg total) by Each Nostril route once daily. 16 g 11    hydrOXYchloroQUINE (PLAQUENIL) 200 mg tablet Take 1 tablet (200 mg total) by mouth once daily. 90 tablet 1    KLOR-CON M20 20 mEq tablet TAKE 1 TABLET (20 MEQ TOTAL) BY MOUTH ONCE DAILY. 90 tablet 3    levothyroxine (SYNTHROID) 50 MCG tablet Take 1 tablet (50 mcg total) by mouth before breakfast. 90 tablet 3    losartan (COZAAR) 25 MG tablet Take 1 tablet (25 mg total) by mouth once daily. 90 tablet 3    mv-min/FA/C/glut/lysine/pjw206 (AIRBORNE, WITH FOLIC ACID, ORAL) Take 1 tablet by mouth 4 (four) times daily.      NIFEdipine (ADALAT CC) 30 MG TbSR TAKE 1 TABLET BY MOUTH EVERY DAY 90 tablet 2    pantoprazole (PROTONIX) 40 MG tablet TAKE 1  TABLET BY MOUTH EVERY DAY 90 tablet 3    rosuvastatin (CRESTOR) 5 MG tablet TAKE 1 TABLET BY MOUTH EVERYDAY AT BEDTIME 90 tablet 3    vitamin D (VITAMIN D3) 1000 units Tab Take 1,000 Units by mouth once daily.      WESTAB MAX 2.5-25-2 mg Tab TAKE 1 TABLET BY MOUTH EVERY DAY 90 tablet 3     No current facility-administered medications for this visit.     Facility-Administered Medications Ordered in Other Visits   Medication Dose Route Frequency Provider Last Rate Last Admin    lactated ringers infusion   Intravenous Continuous Kev Bai MD           Past Medical History:   Diagnosis Date    Arthritis     Cataract     Diabetes mellitus type II     diet controlled    Fracture     left 4th finger  - splinted    Hyperlipidemia     MCTD (mixed connective tissue disease)     Raynaud's disease     Thyroid disease     Hypothyroidism    Wears glasses     contacs     Past Surgical History:   Procedure Laterality Date    CARPAL TUNNEL RELEASE      right    CATARACT EXTRACTION W/ INTRAOCULAR LENS  IMPLANT, BILATERAL      EXCISION OF PAROTID GLAND Right 8/1/2019    Procedure: PAROTIDECTOMY;  Surgeon: Abner Maki MD;  Location: [facility];  Service: ENT;  Laterality: Right;    HIP SURGERY Left 6/18/15    JANA    JOINT REPLACEMENT Bilateral     HIP    KNEE CARTILAGE SURGERY      right    TOTAL HIP ARTHROPLASTY Right 05/19/2016    JANA     Family History   Problem Relation Age of Onset    Diabetes Mother     Kidney disease Mother     Aneurysm Mother 73        brain    Hyperlipidemia Mother     Hypertension Sister     Breast cancer Sister     Diabetes Sister     Ovarian cancer Neg Hx      Social History     Tobacco Use    Smoking status: Every Day     Packs/day: 0.50     Types: Cigarettes    Smokeless tobacco: Never   Substance Use Topics    Alcohol use: Yes     Comment: occasional    Drug use: No        Review of Systems:  Review of Systems   Constitutional: Negative.    HENT:  Positive for congestion and sinus pressure.     Respiratory: Negative.     Cardiovascular: Negative.    Hematological: Negative.    Psychiatric/Behavioral: Negative.     All other systems reviewed and are negative.    OBJECTIVE:     Vital Signs (Most Recent)  There were no vitals filed for this visit.    Physical Exam:  Physical Exam  Constitutional:       Appearance: Normal appearance.   HENT:      Head: Normocephalic and atraumatic.   Eyes:      Extraocular Movements: Extraocular movements intact.      Conjunctiva/sclera: Conjunctivae normal.      Pupils: Pupils are equal, round, and reactive to light.   Cardiovascular:      Rate and Rhythm: Normal rate and regular rhythm.      Pulses: Normal pulses.      Heart sounds: Murmur heard.      Comments: Harsh JAYMIE  Pulmonary:      Effort: Pulmonary effort is normal.      Breath sounds: Normal breath sounds.   Abdominal:      General: Abdomen is flat.      Palpations: Abdomen is soft.   Musculoskeletal:         General: Normal range of motion.      Cervical back: Normal range of motion.   Skin:     Capillary Refill: Capillary refill takes less than 2 seconds.   Neurological:      General: No focal deficit present.      Mental Status: She is alert and oriented to person, place, and time. Mental status is at baseline.   Psychiatric:         Mood and Affect: Mood normal.         Behavior: Behavior normal.         Thought Content: Thought content normal.       PFTs:  Fev1: 2.01L 87.8%  DLCO: 15.85 74%    Chest CT 2/11/22: I reviewed images  12 mm partially solid and partially ground-glass nodule identified in the left upper lobe peripherally is slightly increased in size from the prior study.  Neoplasm is suspected.  Recommend PET CT or biopsy.     Chest CT 7/7/22: I reviewed images  Continued 8 mm solid nodule identified in the left upper lobe along the lateral pleural surface.  The ground-glass component of this density has resolved.  Recommend continued follow-up by CT or attempt at percutaneous CT directed needle  biopsy.  Coronary artery calcification noted.    Chest CT 12/27/22: I reviewed images  Slowly enlarging soft tissue density nodule of the left upper lobe now measuring 11 mm.  Neoplasm is suspected and percutaneous biopsy is recommended.  Otherwise continued follow-up by CT scan is recommended with consideration of a repeat PET CT.  Brain MRI scheduled    ASSESSMENT/PLAN:     Patient is a 62 y.o. female smoker with DM, HTN, HLD, MVP, MCTD, left parotid gland nodule and CASSIDY NSCLC.  Active smoker  Lesion location, diagnosis are indications for LULobectomy  Cardiac clearance via stress test from 2021  PLAN:Plan     She is scheduled for a bone scan and brain MRI.   Await PFTs   Discuss at tumor board  Plan robotic LULobectomy with MLND  Smoking cessation encouraged

## 2023-01-17 ENCOUNTER — TUMOR BOARD CONFERENCE (OUTPATIENT)
Dept: HEMATOLOGY/ONCOLOGY | Facility: CLINIC | Age: 63
End: 2023-01-17
Payer: COMMERCIAL

## 2023-01-17 ENCOUNTER — OFFICE VISIT (OUTPATIENT)
Dept: PULMONOLOGY | Facility: CLINIC | Age: 63
End: 2023-01-17
Payer: COMMERCIAL

## 2023-01-17 ENCOUNTER — TELEPHONE (OUTPATIENT)
Dept: HEMATOLOGY/ONCOLOGY | Facility: CLINIC | Age: 63
End: 2023-01-17
Payer: COMMERCIAL

## 2023-01-17 VITALS
DIASTOLIC BLOOD PRESSURE: 79 MMHG | RESPIRATION RATE: 18 BRPM | SYSTOLIC BLOOD PRESSURE: 145 MMHG | TEMPERATURE: 98 F | HEIGHT: 62 IN | WEIGHT: 151 LBS | BODY MASS INDEX: 27.79 KG/M2 | HEART RATE: 99 BPM | OXYGEN SATURATION: 99 %

## 2023-01-17 DIAGNOSIS — C34.12 MALIGNANT NEOPLASM OF UPPER LOBE OF LEFT LUNG: Primary | ICD-10-CM

## 2023-01-17 DIAGNOSIS — F17.200 SMOKER: ICD-10-CM

## 2023-01-17 DIAGNOSIS — C34.90 SQUAMOUS CELL CARCINOMA OF LUNG, UNSPECIFIED LATERALITY: ICD-10-CM

## 2023-01-17 PROCEDURE — 3008F PR BODY MASS INDEX (BMI) DOCUMENTED: ICD-10-PCS | Mod: CPTII,S$GLB,, | Performed by: THORACIC SURGERY (CARDIOTHORACIC VASCULAR SURGERY)

## 2023-01-17 PROCEDURE — 3077F PR MOST RECENT SYSTOLIC BLOOD PRESSURE >= 140 MM HG: ICD-10-PCS | Mod: CPTII,S$GLB,, | Performed by: THORACIC SURGERY (CARDIOTHORACIC VASCULAR SURGERY)

## 2023-01-17 PROCEDURE — 99205 OFFICE O/P NEW HI 60 MIN: CPT | Mod: S$GLB,,, | Performed by: THORACIC SURGERY (CARDIOTHORACIC VASCULAR SURGERY)

## 2023-01-17 PROCEDURE — 3077F SYST BP >= 140 MM HG: CPT | Mod: CPTII,S$GLB,, | Performed by: THORACIC SURGERY (CARDIOTHORACIC VASCULAR SURGERY)

## 2023-01-17 PROCEDURE — 1159F MED LIST DOCD IN RCRD: CPT | Mod: CPTII,S$GLB,, | Performed by: THORACIC SURGERY (CARDIOTHORACIC VASCULAR SURGERY)

## 2023-01-17 PROCEDURE — 3078F DIAST BP <80 MM HG: CPT | Mod: CPTII,S$GLB,, | Performed by: THORACIC SURGERY (CARDIOTHORACIC VASCULAR SURGERY)

## 2023-01-17 PROCEDURE — 3008F BODY MASS INDEX DOCD: CPT | Mod: CPTII,S$GLB,, | Performed by: THORACIC SURGERY (CARDIOTHORACIC VASCULAR SURGERY)

## 2023-01-17 PROCEDURE — 99205 PR OFFICE/OUTPT VISIT, NEW, LEVL V, 60-74 MIN: ICD-10-PCS | Mod: S$GLB,,, | Performed by: THORACIC SURGERY (CARDIOTHORACIC VASCULAR SURGERY)

## 2023-01-17 PROCEDURE — 99999 PR PBB SHADOW E&M-EST. PATIENT-LVL V: ICD-10-PCS | Mod: PBBFAC,,, | Performed by: THORACIC SURGERY (CARDIOTHORACIC VASCULAR SURGERY)

## 2023-01-17 PROCEDURE — 3078F PR MOST RECENT DIASTOLIC BLOOD PRESSURE < 80 MM HG: ICD-10-PCS | Mod: CPTII,S$GLB,, | Performed by: THORACIC SURGERY (CARDIOTHORACIC VASCULAR SURGERY)

## 2023-01-17 PROCEDURE — 99999 PR PBB SHADOW E&M-EST. PATIENT-LVL V: CPT | Mod: PBBFAC,,, | Performed by: THORACIC SURGERY (CARDIOTHORACIC VASCULAR SURGERY)

## 2023-01-17 PROCEDURE — 1159F PR MEDICATION LIST DOCUMENTED IN MEDICAL RECORD: ICD-10-PCS | Mod: CPTII,S$GLB,, | Performed by: THORACIC SURGERY (CARDIOTHORACIC VASCULAR SURGERY)

## 2023-01-17 NOTE — NURSING
Met with patient  in multi disciplinary clinic today.  Explained my role as navigator.  Plan is for Robotic Lobectomy. PFT's scheduled for 23. Dr. Allan has reviewed Stress test from  so no need for new stress test. Dr. Allan's office to contact patient to schedule surgery. Reviewed plan of care and answered questions.  My contact information was provided and pt was encouraged to call with any questions or concerns.  Pt verbalized an understanding.    Oncology Navigation   Intake  Date Worked: 23     Treatment  Current Status: Active    Surgical Oncologist: Dr. Isaiah Allan ([facility])  Type of Surgery: Left Robotic Lobectomy  Consult Date: 23          Procedures: Other (PFT's)  Other Schedule Date: 23                 Acuity  Surgical Procedure Complexity: 3  ECO  Comorbidities in Medical History: 2  Navigation Acuity: 5     Follow Up  No follow-ups on file.

## 2023-01-17 NOTE — PROGRESS NOTES
[facility]      THORACIC MULTIDISCIPLINARY TUMOR BOARD  PATIENT REVIEW FORM  ___________________________________________________________________    CLINIC #: [id number]  DATE: 01/17/2023    TUMOR SITE:   Cancer Type: Lung cancer (squamous cell)     Cancer Site: upper lobe     Tumor Laterality: Left    Cancer Staging Complete: No       Presenting Hospital / Clinic: [facility]     Virtual Tumor Board Conference: In person       PRESENTER:   Presenter: Dr. Allan     Specialties Present: Medical Oncology; Radiation Oncology; Surgical Oncology; Pathology; Navigation; Palliative Care; Radiology; Pulmonology       PATIENT SUMMARY:   62 y.o. female smoker with DM, HTN, HLD, MVP, MCTD, left parotid gland nodule and CASSIDY NSCLC. LDCT in Nov 2021 showed partly solid CASSIDY nodule. Followed with serial scans showing interval growth 11mm. Percutaneous biopsy positive for well differentiated squamous cell carcinoma.      Current 1/2ppd smoker.   Bilateral hip replacements. Carpal tunnel release. Parotidectomy.     Background Information  Patient Status: a new patient  Reason for Consultation: Initial Presentation  Biopsy Date: 01/06/23  Biopsy Results: Cancer  Treatment to Date: None         BOARD RECOMMENDATIONS:   Recommended Plan (General)  Recommended Plan: Surgery  Recommended Plan Note: robotic lobectomy with mediastinal lymph node dissection         CONSULT NEEDED:     [] Surgery    [] Hem/Onc    [] Rad/Onc    [] Dietary                 [] Social Service    [] Psychology       [] Pulmonology    Cancer Staging   No matching staging information was found for the patient.     GROUP STAGE:       [] O    [] 1A    [] IB    [] IIA    [] IIB     [] IIIA     [] IIIB     [] IIIC [] IV     [] Local recurrence     [] Regional recurrence     [] Distant recurrence   [] NSCLC     [x] SCLC            [x] Anaya'l Treatment Guidelines reviewed  and care planned is consistent with guidelines.         (i.e., NCCN, NCI, PD, ACO, AUA, etc.)    PRESENTATION AT CANCER CONFERENCE:   Presentation at Cancer Conference: Prospective       CLINICAL TRIAL ELIGIBILITY:   Clinical Trial Eligibility  Clinical Trial Eligibility: Not discussed         Margarita Hill

## 2023-01-18 ENCOUNTER — TELEPHONE (OUTPATIENT)
Dept: CARDIOTHORACIC SURGERY | Facility: CLINIC | Age: 63
End: 2023-01-18
Payer: COMMERCIAL

## 2023-01-18 DIAGNOSIS — C34.92 MALIGNANT NEOPLASM OF LEFT LUNG, UNSPECIFIED PART OF LUNG: ICD-10-CM

## 2023-01-18 DIAGNOSIS — R91.1 PULMONARY NODULE: Primary | ICD-10-CM

## 2023-01-18 NOTE — TELEPHONE ENCOUNTER
Called and spoke to patient regarding scheduling surgery from her appointment yesterday with Dr Allan. Patient scheduled for surgery 2/9. Pre op directions and location discussed with patient and emailed, along with post op instructions for after surgery. Patient to call with any questions/concerns. Patient verbalized understanding.

## 2023-01-20 ENCOUNTER — TELEPHONE (OUTPATIENT)
Dept: PULMONOLOGY | Facility: CLINIC | Age: 63
End: 2023-01-20

## 2023-01-20 ENCOUNTER — HOSPITAL ENCOUNTER (OUTPATIENT)
Dept: RADIOLOGY | Facility: HOSPITAL | Age: 63
Discharge: HOME | End: 2023-01-20
Attending: NURSE PRACTITIONER
Payer: COMMERCIAL

## 2023-01-20 ENCOUNTER — HOSPITAL ENCOUNTER (OUTPATIENT)
Dept: PULMONOLOGY | Facility: HOSPITAL | Age: 63
Discharge: HOME | End: 2023-01-20
Attending: NURSE PRACTITIONER
Payer: COMMERCIAL

## 2023-01-20 DIAGNOSIS — C34.90 SQUAMOUS CELL CARCINOMA OF LUNG, UNSPECIFIED LATERALITY: ICD-10-CM

## 2023-01-20 DIAGNOSIS — R91.1 PULMONARY NODULE: ICD-10-CM

## 2023-01-20 LAB
CREAT SERPL-MCNC: 1.2 MG/DL (ref 0.5–1.4)
SAMPLE: NORMAL

## 2023-01-20 PROCEDURE — 94060 EVALUATION OF WHEEZING: CPT

## 2023-01-20 PROCEDURE — 94727 GAS DIL/WSHOT DETER LNG VOL: CPT | Mod: 26,,, | Performed by: INTERNAL MEDICINE

## 2023-01-20 PROCEDURE — A9585 GADOBUTROL INJECTION: HCPCS | Performed by: NURSE PRACTITIONER

## 2023-01-20 PROCEDURE — 94060 EVALUATION OF WHEEZING: CPT | Mod: 26,,, | Performed by: INTERNAL MEDICINE

## 2023-01-20 PROCEDURE — 94060 PR EVAL OF BRONCHOSPASM: ICD-10-PCS | Mod: 26,,, | Performed by: INTERNAL MEDICINE

## 2023-01-20 PROCEDURE — 70553 MRI BRAIN W WO CONTRAST: ICD-10-PCS | Mod: 26,,, | Performed by: RADIOLOGY

## 2023-01-20 PROCEDURE — 94727 PR PULM FUNCTION TEST BY GAS: ICD-10-PCS | Mod: 26,,, | Performed by: INTERNAL MEDICINE

## 2023-01-20 PROCEDURE — 70553 MRI BRAIN STEM W/O & W/DYE: CPT | Mod: 26,,, | Performed by: RADIOLOGY

## 2023-01-20 PROCEDURE — 70553 MRI BRAIN STEM W/O & W/DYE: CPT | Mod: TC

## 2023-01-20 PROCEDURE — 25500020 PHARM REV CODE 255: Performed by: NURSE PRACTITIONER

## 2023-01-20 PROCEDURE — 94729 PR C02/MEMBANE DIFFUSE CAPACITY: ICD-10-PCS | Mod: 26,,, | Performed by: INTERNAL MEDICINE

## 2023-01-20 PROCEDURE — 94727 GAS DIL/WSHOT DETER LNG VOL: CPT

## 2023-01-20 PROCEDURE — 94729 DIFFUSING CAPACITY: CPT | Mod: 26,,, | Performed by: INTERNAL MEDICINE

## 2023-01-20 PROCEDURE — 94729 DIFFUSING CAPACITY: CPT

## 2023-01-20 RX ORDER — ALBUTEROL SULFATE 2.5 MG/.5ML
2.5 SOLUTION RESPIRATORY (INHALATION)
Status: COMPLETED | OUTPATIENT
Start: 2023-01-20 | End: 2023-01-20

## 2023-01-20 RX ORDER — GADOBUTROL 604.72 MG/ML
6 INJECTION INTRAVENOUS
Status: COMPLETED | OUTPATIENT
Start: 2023-01-20 | End: 2023-01-20

## 2023-01-20 RX ADMIN — ALBUTEROL SULFATE 2.5 MG: 2.5 SOLUTION RESPIRATORY (INHALATION) at 10:01

## 2023-01-20 RX ADMIN — GADOBUTROL 6 ML: 604.72 INJECTION INTRAVENOUS at 12:01

## 2023-01-20 NOTE — TELEPHONE ENCOUNTER
Discuss Pft and brain MRI results. Waiting for PET to be scheduled. On phone with  and patient.     Has pending appointment with Oncologist for next week.     Discussed treatment plan.     All questions answered.      ----- Message from Michell Shane sent at 1/20/2023  2:57 PM CST -----  Patient returning call.     ----- Message -----  From: Janie Verduzco  Sent: 1/20/2023   2:47 PM CST  To: Sumeet Pierre Staff    Type: Patient Call Back         Who called: Patient         What is the request in detail: states she is returning a missed call from [facility]; possibly regarding additional test results; please advise         Best call back number: [phone]         Additional Information:            Thank You

## 2023-01-20 NOTE — TELEPHONE ENCOUNTER
Provider called patient. Sent message to provider.     ----- Message from Janie Verduzco sent at 1/20/2023  2:45 PM CST -----  Type: Patient Call Back         Who called: Patient         What is the request in detail: states she is returning a missed call from [facility]; possibly regarding additional test results; please advise         Best call back number: [phone]         Additional Information:            Thank You

## 2023-01-23 ENCOUNTER — PATIENT MESSAGE (OUTPATIENT)
Dept: PULMONOLOGY | Facility: CLINIC | Age: 63
End: 2023-01-23
Payer: COMMERCIAL

## 2023-01-23 ENCOUNTER — HOSPITAL ENCOUNTER (OUTPATIENT)
Dept: RADIOLOGY | Facility: HOSPITAL | Age: 63
Discharge: HOME | End: 2023-01-23
Attending: NURSE PRACTITIONER
Payer: COMMERCIAL

## 2023-01-23 DIAGNOSIS — C34.90 SQUAMOUS CELL CARCINOMA OF LUNG, UNSPECIFIED LATERALITY: ICD-10-CM

## 2023-01-23 LAB
DNA RANGE(S) EXAMINED NAR: NORMAL
GENE DIS ANL INTERP-IMP: POSITIVE
GENE DIS ASSESSED: NORMAL
MSI CA SPEC-IMP: NOT DETECTED
REASON FOR STUDY: NORMAL
TEMPUS LCA: NORMAL
TEMPUS PORTAL: NORMAL

## 2023-01-23 PROCEDURE — 78306 BONE IMAGING WHOLE BODY: CPT | Mod: TC

## 2023-01-23 NOTE — PROGRESS NOTES
INITIAL [facility] HEM/ONC CONSULTATION      Subjective:       Patient ID: Cristin Segal is a 62 y.o. female.    12/27/2022:  CT chest:  Slowly enlarging soft tissue density nodule of the left upper lobe now measuring 11 mm.  Neoplasm is suspected and percutaneous biopsy is recommended.  Otherwise continued follow-up by CT scan is recommended with consideration of a repeat PET CT    1/6/2023:  CASSIDY Lung Biopsy:  LUNG, LEFT UPPER LOBE MASS, CT-GUIDED BIOPSY: Invasive squamous cell carcinoma, well differentiated.    1/20/2023:  MRI Brain-no mets    1/23/2023:  Bone scan, uptake at T11, history of vertebroplasty    PLAN:  PET scan-1/31/2023,  CASSIDY lobectomy 2/9/2022-Dr. Allan  F/U with me 4 weeks after surgery to decide on chemo need    Chief Complaint: No chief complaint on file.  Lung cancer    Ms. Segal is a 63yo female who presents today with a new diagnosis of squamous cell lung cancer.  She was seeing pulmonary for chronic congestion issues and had a CT scan about 1.4 yrs ago which showed small lesion in the CASSIDY.  Inititally this was followed but when it showed growth to 11mm in December 2022 she underwent CT guided needle biopsy.  She has seen Dr. Allan at [facility] and is scheduled for resection 2/9/2023.      Patient has no PMH of cancer but her sister, Katerine Bearden has breast cancer(my patient).     PMH: Mixed connective tissue disease-manifested with joint aches, pain, swelling, raynaud's.          Past Medical History:   Diagnosis Date    Arthritis     Cataract     Diabetes mellitus type II     diet controlled    Fracture     left 4th finger  - splinted    Hyperlipidemia     MCTD (mixed connective tissue disease)     Raynaud's disease     Thyroid disease     Hypothyroidism    Wears glasses     contacs       Past Surgical History:   Procedure Laterality Date    CARPAL TUNNEL RELEASE      right    CATARACT EXTRACTION W/ INTRAOCULAR LENS  IMPLANT, BILATERAL      EXCISION OF PAROTID GLAND Right 8/1/2019     Procedure: PAROTIDECTOMY;  Surgeon: Abner Maki MD;  Location: [facility];  Service: ENT;  Laterality: Right;    HIP SURGERY Left 6/18/15    JANA    JOINT REPLACEMENT Bilateral     HIP    KNEE CARTILAGE SURGERY      right    TOTAL HIP ARTHROPLASTY Right 05/19/2016    JANA       Social History     Socioeconomic History    Marital status:    Occupational History     Employer: On license of [employer]   Tobacco Use    Smoking status: Every Day     Packs/day: 0.50     Types: Cigarettes    Smokeless tobacco: Never   Substance and Sexual Activity    Alcohol use: Yes     Comment: occasional    Drug use: No    Sexual activity: Not Currently     Birth control/protection: Post-menopausal       Family History   Problem Relation Age of Onset    Diabetes Mother     Kidney disease Mother     Aneurysm Mother 73        brain    Hyperlipidemia Mother     Hypertension Sister     Breast cancer Sister     Diabetes Sister     Ovarian cancer Neg Hx        Review of patient's allergies indicates:   Allergen Reactions    Pcn [penicillins] Anaphylaxis     Unknown for her- family history with anaphylaxis    Lipitor [atorvastatin]        Current Outpatient Medications:     albuterol (VENTOLIN HFA) 90 mcg/actuation inhaler, Inhale 2 puffs into the lungs every 6 (six) hours as needed for Wheezing or Shortness of Breath. Rescue, Disp: 8 g, Rfl: 5    cephALEXin (KEFLEX) 500 MG capsule, Take 500 mg by mouth 2 (two) times daily., Disp: , Rfl:     fluticasone propionate (FLONASE) 50 mcg/actuation nasal spray, 1 spray (50 mcg total) by Each Nostril route once daily., Disp: 16 g, Rfl: 11    hydrOXYchloroQUINE (PLAQUENIL) 200 mg tablet, Take 1 tablet (200 mg total) by mouth once daily., Disp: 90 tablet, Rfl: 1    KLOR-CON M20 20 mEq tablet, TAKE 1 TABLET (20 MEQ TOTAL) BY MOUTH ONCE DAILY., Disp: 90 tablet, Rfl: 3    levothyroxine (SYNTHROID) 50 MCG tablet, Take 1 tablet (50 mcg total) by mouth before breakfast., Disp: 90 tablet, Rfl: 3     losartan (COZAAR) 25 MG tablet, Take 1 tablet (25 mg total) by mouth once daily., Disp: 90 tablet, Rfl: 3    mv-min/FA/C/glut/lysine/djx907 (AIRBORNE, WITH FOLIC ACID, ORAL), Take 1 tablet by mouth 4 (four) times daily., Disp: , Rfl:     NIFEdipine (ADALAT CC) 30 MG TbSR, TAKE 1 TABLET BY MOUTH EVERY DAY, Disp: 90 tablet, Rfl: 2    pantoprazole (PROTONIX) 40 MG tablet, TAKE 1 TABLET BY MOUTH EVERY DAY, Disp: 90 tablet, Rfl: 3    rosuvastatin (CRESTOR) 5 MG tablet, TAKE 1 TABLET BY MOUTH EVERYDAY AT BEDTIME, Disp: 90 tablet, Rfl: 3    vitamin D (VITAMIN D3) 1000 units Tab, Take 1,000 Units by mouth once daily., Disp: , Rfl:     WESTAB MAX 2.5-25-2 mg Tab, TAKE 1 TABLET BY MOUTH EVERY DAY, Disp: 90 tablet, Rfl: 3    aspirin 81 MG Chew, Take 1 tablet (81 mg total) by mouth 3 (three) times a week., Disp: 13 tablet, Rfl: 0    cyclobenzaprine (FLEXERIL) 10 MG tablet, TAKE 1 TABLET BY MOUTH EVERY DAY (Patient not taking: Reported on 1/24/2023), Disp: 30 tablet, Rfl: 3  No current facility-administered medications for this visit.    Facility-Administered Medications Ordered in Other Visits:     lactated ringers infusion, , Intravenous, Continuous, Kev Bai MD    All medications and past history have been reviewed.    Review of Systems   Constitutional:  Negative for activity change, appetite change, diaphoresis, fatigue, fever and unexpected weight change.   HENT:  Negative for congestion and hearing loss.    Eyes:  Negative for visual disturbance.   Respiratory:  Negative for cough, chest tightness and shortness of breath.    Cardiovascular:  Negative for chest pain and leg swelling.   Gastrointestinal:  Negative for abdominal pain, blood in stool, diarrhea, nausea and vomiting.   Endocrine: Negative for cold intolerance and heat intolerance.   Genitourinary:  Negative for difficulty urinating, dysuria and hematuria.   Neurological:  Negative for dizziness and headaches.   Hematological:  Negative for adenopathy. Does  not bruise/bleed easily.   Psychiatric/Behavioral:  Negative for behavioral problems.      Objective:        /65   Pulse 87   Temp 98.2 °F (36.8 °C)   Wt 68.9 kg (151 lb 14.4 oz)   BMI 27.78 kg/m²     Physical Exam  Constitutional:       Appearance: She is well-developed.   HENT:      Head: Normocephalic and atraumatic.      Right Ear: External ear normal.      Left Ear: External ear normal.   Eyes:      Conjunctiva/sclera: Conjunctivae normal.      Pupils: Pupils are equal, round, and reactive to light.   Neck:      Thyroid: No thyromegaly.      Trachea: No tracheal deviation.   Cardiovascular:      Rate and Rhythm: Normal rate and regular rhythm.      Heart sounds: Normal heart sounds.   Pulmonary:      Effort: Pulmonary effort is normal.      Breath sounds: Normal breath sounds.   Abdominal:      General: Bowel sounds are normal. There is no distension.      Palpations: Abdomen is soft. There is no mass.      Tenderness: There is no abdominal tenderness.   Skin:     Findings: No rash.   Neurological:      Comments: Neuro intact througout   Psychiatric:         Behavior: Behavior normal.         Thought Content: Thought content normal.         Judgment: Judgment normal.         Lab  No results found for this or any previous visit (from the past 336 hour(s)).  CMP  Sodium   Date Value Ref Range Status   11/21/2022 137 136 - 145 mmol/L Final   06/28/2019 138 134 - 144 mmol/L      Potassium   Date Value Ref Range Status   11/21/2022 4.8 3.5 - 5.1 mmol/L Final     Chloride   Date Value Ref Range Status   11/21/2022 102 95 - 110 mmol/L Final   06/28/2019 101 98 - 110 mmol/L      CO2   Date Value Ref Range Status   11/21/2022 20 (L) 23 - 29 mmol/L Final     Glucose   Date Value Ref Range Status   11/21/2022 61 (L) 70 - 110 mg/dL Final   06/28/2019 111 (H) 70 - 99 mg/dL      BUN   Date Value Ref Range Status   11/21/2022 18 8 - 23 mg/dL Final     Creatinine   Date Value Ref Range Status   11/21/2022 1.2 0.5 -  1.4 mg/dL Final   06/28/2019 0.74 0.60 - 1.40 mg/dL      Calcium   Date Value Ref Range Status   11/21/2022 10.4 8.7 - 10.5 mg/dL Final     Total Protein   Date Value Ref Range Status   11/21/2022 8.8 (H) 6.0 - 8.4 g/dL Final     Albumin   Date Value Ref Range Status   11/21/2022 4.5 3.5 - 5.2 g/dL Final   06/28/2019 4.5 3.1 - 4.7 g/dL      Total Bilirubin   Date Value Ref Range Status   11/21/2022 0.5 0.1 - 1.0 mg/dL Final     Comment:     For infants and newborns, interpretation of results should be based  on gestational age, weight and in agreement with clinical  observations.    Premature Infant recommended reference ranges:  Up to 24 hours.............<8.0 mg/dL  Up to 48 hours............<12.0 mg/dL  3-5 days..................<15.0 mg/dL  6-29 days.................<15.0 mg/dL       Alkaline Phosphatase   Date Value Ref Range Status   11/21/2022 76 55 - 135 U/L Final     AST   Date Value Ref Range Status   11/21/2022 51 (H) 10 - 40 U/L Final     ALT   Date Value Ref Range Status   11/21/2022 37 10 - 44 U/L Final     Anion Gap   Date Value Ref Range Status   11/21/2022 15 8 - 16 mmol/L Final     eGFR if    Date Value Ref Range Status   05/11/2022 56.4 (A) >60 mL/min/1.73 m^2 Final   05/11/2022 56.4 (A) >60 mL/min/1.73 m^2 Final     eGFR if non    Date Value Ref Range Status   05/11/2022 48.9 (A) >60 mL/min/1.73 m^2 Final     Comment:     Calculation used to obtain the estimated glomerular filtration  rate (eGFR) is the CKD-EPI equation.      05/11/2022 48.9 (A) >60 mL/min/1.73 m^2 Final     Comment:     Calculation used to obtain the estimated glomerular filtration  rate (eGFR) is the CKD-EPI equation.            Specimen (24h ago, onward)      None                  All lab results and imaging results have been reviewed and discussed with the patient.     Assessment:       1. Squamous cell carcinoma of lung, unspecified laterality    2. Squamous Cell Carcinoma of CASSIDY of lung       Problem List Items Addressed This Visit       Squamous Cell Carcinoma of CASSIDY of lung     I discussed this new diagnosis in detail with her and her  today and reviewed the CT images with them.  This appears to be relatively early stage disease but she still needs PET imaging to be clear on this.  This will be done next week.      She has seen CT surgery and is planned for lobectomy on 2/9.  Provided the PET does not up-stage this Dx,  I feel this is the right thing to do.  I discussed that further decisions such as chemotherapy/xrt will be made based on the path from that surgery.      My plan is to see her back with me after surgery is done to review this path and discuss the path forward.  She is understanding of this and in agreement.            Other Visit Diagnoses       Squamous cell carcinoma of lung, unspecified laterality               Cancer Staging   No matching staging information was found for the patient.      Plan:         Follow up in about 4 weeks (around 2/21/2023).       The plan was discussed with the patient and all questions/concerns have been answered to the patient's satisfaction.

## 2023-01-24 ENCOUNTER — OFFICE VISIT (OUTPATIENT)
Dept: HEMATOLOGY/ONCOLOGY | Facility: CLINIC | Age: 63
End: 2023-01-24
Payer: COMMERCIAL

## 2023-01-24 VITALS
TEMPERATURE: 98 F | SYSTOLIC BLOOD PRESSURE: 114 MMHG | WEIGHT: 151.88 LBS | BODY MASS INDEX: 27.78 KG/M2 | HEART RATE: 87 BPM | DIASTOLIC BLOOD PRESSURE: 65 MMHG

## 2023-01-24 DIAGNOSIS — C34.90 SQUAMOUS CELL CARCINOMA OF LUNG, UNSPECIFIED LATERALITY: ICD-10-CM

## 2023-01-24 DIAGNOSIS — C34.12 MALIGNANT NEOPLASM OF UPPER LOBE OF LEFT LUNG: ICD-10-CM

## 2023-01-24 LAB
DNA RANGE(S) EXAMINED NAR: NORMAL
GENE DIS ANL INTERP-IMP: POSITIVE
GENE DIS ASSESSED: NORMAL
GENE MUT TESTED BLD/T: 6.8 M/MB
MSI CA SPEC-IMP: NORMAL
REASON FOR STUDY: NORMAL
TEMPUS LCA: NORMAL
TEMPUS PD-L1 (22C3) COMBINED POSITIVE SCORE: 1
TEMPUS PD-L1 (22C3) TUMOR PROPORTION SCORE: <1 %
TEMPUS PORTAL: NORMAL

## 2023-01-24 PROCEDURE — 3074F SYST BP LT 130 MM HG: CPT | Mod: CPTII,S$GLB,, | Performed by: INTERNAL MEDICINE

## 2023-01-24 PROCEDURE — 99204 OFFICE O/P NEW MOD 45 MIN: CPT | Mod: S$GLB,,, | Performed by: INTERNAL MEDICINE

## 2023-01-24 PROCEDURE — 3074F PR MOST RECENT SYSTOLIC BLOOD PRESSURE < 130 MM HG: ICD-10-PCS | Mod: CPTII,S$GLB,, | Performed by: INTERNAL MEDICINE

## 2023-01-24 PROCEDURE — 1159F PR MEDICATION LIST DOCUMENTED IN MEDICAL RECORD: ICD-10-PCS | Mod: CPTII,S$GLB,, | Performed by: INTERNAL MEDICINE

## 2023-01-24 PROCEDURE — 3008F PR BODY MASS INDEX (BMI) DOCUMENTED: ICD-10-PCS | Mod: CPTII,S$GLB,, | Performed by: INTERNAL MEDICINE

## 2023-01-24 PROCEDURE — 3078F DIAST BP <80 MM HG: CPT | Mod: CPTII,S$GLB,, | Performed by: INTERNAL MEDICINE

## 2023-01-24 PROCEDURE — 3078F PR MOST RECENT DIASTOLIC BLOOD PRESSURE < 80 MM HG: ICD-10-PCS | Mod: CPTII,S$GLB,, | Performed by: INTERNAL MEDICINE

## 2023-01-24 PROCEDURE — 3008F BODY MASS INDEX DOCD: CPT | Mod: CPTII,S$GLB,, | Performed by: INTERNAL MEDICINE

## 2023-01-24 PROCEDURE — 1159F MED LIST DOCD IN RCRD: CPT | Mod: CPTII,S$GLB,, | Performed by: INTERNAL MEDICINE

## 2023-01-24 PROCEDURE — 99204 PR OFFICE/OUTPT VISIT, NEW, LEVL IV, 45-59 MIN: ICD-10-PCS | Mod: S$GLB,,, | Performed by: INTERNAL MEDICINE

## 2023-01-24 NOTE — ASSESSMENT & PLAN NOTE
I discussed this new diagnosis in detail with her and her  today and reviewed the CT images with them.  This appears to be relatively early stage disease but she still needs PET imaging to be clear on this.  This will be done next week.      She has seen CT surgery and is planned for lobectomy on 2/9.  Provided the PET does not up-stage this Dx,  I feel this is the right thing to do.  I discussed that further decisions such as chemotherapy/xrt will be made based on the path from that surgery.      My plan is to see her back with me after surgery is done to review this path and discuss the path forward.  She is understanding of this and in agreement.

## 2023-01-27 LAB
BRPFT: NORMAL
DLCO ADJ PRE: 14.8 ML/(MIN*MMHG)
DLCO SINGLE BREATH LLN: 15.36
DLCO SINGLE BREATH PRE REF: 70.1 %
DLCO SINGLE BREATH REF: 21.09
DLCOC SBVA LLN: 3.01
DLCOC SBVA PRE REF: 84.9 %
DLCOC SBVA REF: 4.61
DLCOC SINGLE BREATH LLN: 15.36
DLCOC SINGLE BREATH PRE REF: 70.1 %
DLCOC SINGLE BREATH REF: 21.09
DLCOVA LLN: 3.01
DLCOVA PRE REF: 84.9 %
DLCOVA PRE: 3.92 ML/(MIN*MMHG*L)
DLCOVA REF: 4.61
DLVAADJ PRE: 3.92 ML/(MIN*MMHG*L)
ERVN2 LLN: -16449.25
ERVN2 PRE REF: 4.4 %
ERVN2 PRE: 0.03 L
ERVN2 REF: 0.75
FEF 25 75 CHG: 1.4 %
FEF 25 75 LLN: 1.04
FEF 25 75 POST REF: 64.4 %
FEF 25 75 PRE REF: 63.6 %
FEF 25 75 REF: 2.07
FET100 CHG: -3.3 %
FEV1 CHG: -0.1 %
FEV1 FVC CHG: 0.8 %
FEV1 FVC LLN: 67
FEV1 FVC POST REF: 94.5 %
FEV1 FVC PRE REF: 93.8 %
FEV1 FVC REF: 79
FEV1 LLN: 1.69
FEV1 POST REF: 76.3 %
FEV1 PRE REF: 76.4 %
FEV1 REF: 2.26
FRCN2 LLN: 1.76
FRCN2 PRE REF: 44.2 %
FRCN2 REF: 2.58
FVC CHG: -0.9 %
FVC LLN: 2.15
FVC POST REF: 80.2 %
FVC PRE REF: 81 %
FVC REF: 2.86
IVC PRE: 2.24 L
IVC SINGLE BREATH LLN: 2.15
IVC SINGLE BREATH PRE REF: 78.4 %
IVC SINGLE BREATH REF: 2.86
MVV LLN: 70
MVV PRE REF: 75.6 %
MVV REF: 82
PEF CHG: -6.1 %
PEF LLN: 4.25
PEF POST REF: 89.8 %
PEF PRE REF: 95.6 %
PEF REF: 5.85
POST FEF 25 75: 1.33 L/S
POST FET 100: 8.27 SEC
POST FEV1 FVC: 75.03 %
POST FEV1: 1.72 L
POST FVC: 2.29 L
POST PEF: 5.25 L/S
PRE DLCO: 14.8 ML/(MIN*MMHG)
PRE FEF 25 75: 1.31 L/S
PRE FET 100: 8.55 SEC
PRE FEV1 FVC: 74.41 %
PRE FEV1: 1.72 L
PRE FRC N2: 1.14 L
PRE FVC: 2.32 L
PRE MVV: 62 L/MIN
PRE PEF: 5.59 L/S
RVN2 LLN: 1.26
RVN2 PRE REF: 51.4 %
RVN2 PRE: 0.94 L
RVN2 REF: 1.83
RVN2TLCN2 LLN: 30.45
RVN2TLCN2 PRE REF: 72.2 %
RVN2TLCN2 PRE: 28.93 %
RVN2TLCN2 REF: 40.04
TLCN2 LLN: 3.58
TLCN2 PRE REF: 71.3 %
TLCN2 PRE: 3.26 L
TLCN2 REF: 4.57
VA PRE: 3.78 L
VA SINGLE BREATH LLN: 4.42
VA SINGLE BREATH PRE REF: 85.5 %
VA SINGLE BREATH REF: 4.42
VCMAXN2 LLN: 2.15
VCMAXN2 PRE REF: 81 %
VCMAXN2 PRE: 2.32 L
VCMAXN2 REF: 2.86

## 2023-01-30 ENCOUNTER — TELEPHONE (OUTPATIENT)
Dept: PULMONOLOGY | Facility: CLINIC | Age: 63
End: 2023-01-30
Payer: COMMERCIAL

## 2023-01-30 NOTE — TELEPHONE ENCOUNTER
Spoke with patient. Patient's PET SCAN has been rescheduled 2 times due to insurance issues. 1. Insurance wouldn't cover code 2.Insurance authorization not received in time.     Patient worried that PET Scan will not be done in time for surgery with Dr. Allan. Sent message to Dr. Allan's office regarding patient concern. Advised to reach out to patient if any issues with new PET SCAN scheduled date. Patient v/u of process.       ----- Message from Franky Hamilton sent at 1/30/2023  1:45 PM CST -----  Contact: pt at [phone]  Type: Needs Medical Advice  Who Called:  pt  Best Call Back Number: [phone]  Additional Information: pt is calling the office requesting a call back from the nurse. Please call back and advise.

## 2023-02-01 ENCOUNTER — TELEPHONE (OUTPATIENT)
Dept: PULMONOLOGY | Facility: CLINIC | Age: 63
End: 2023-02-01
Payer: COMMERCIAL

## 2023-02-06 ENCOUNTER — TELEPHONE (OUTPATIENT)
Dept: PULMONOLOGY | Facility: CLINIC | Age: 63
End: 2023-02-06
Payer: COMMERCIAL

## 2023-02-07 ENCOUNTER — HOSPITAL ENCOUNTER (OUTPATIENT)
Dept: RADIOLOGY | Facility: HOSPITAL | Age: 63
Discharge: HOME | End: 2023-02-07
Attending: NURSE PRACTITIONER
Payer: COMMERCIAL

## 2023-02-07 VITALS — BODY MASS INDEX: 27.6 KG/M2 | WEIGHT: 150 LBS | HEIGHT: 62 IN

## 2023-02-07 DIAGNOSIS — R91.1 SOLITARY LUNG NODULE: ICD-10-CM

## 2023-02-07 LAB — GLUCOSE SERPL-MCNC: 96 MG/DL (ref 70–110)

## 2023-02-07 PROCEDURE — 78815 PET IMAGE W/CT SKULL-THIGH: CPT | Mod: TC,PO

## 2023-02-07 PROCEDURE — A9552 F18 FDG: HCPCS | Mod: PO

## 2023-02-08 ENCOUNTER — ANESTHESIA EVENT (OUTPATIENT)
Dept: SURGERY | Facility: HOSPITAL | Age: 63
DRG: 164 | End: 2023-02-08
Payer: COMMERCIAL

## 2023-02-08 ENCOUNTER — TELEPHONE (OUTPATIENT)
Dept: CARDIOTHORACIC SURGERY | Facility: CLINIC | Age: 63
End: 2023-02-08
Payer: COMMERCIAL

## 2023-02-08 LAB — FUNGUS SPEC CULT: NORMAL

## 2023-02-08 NOTE — ANESTHESIA PREPROCEDURE EVALUATION
[facility]  Anesthesia Pre-Operative Evaluation         Patient Name: Cristin Segal  YOB: 1960  MRN: [id number]    SUBJECTIVE:     Pre-operative evaluation for Procedure(s) (LRB):  XI ROBOTIC RATS,WITH LEFT UPPER LOBECTOMY,LUNG (Left)  LYMPHADENECTOMY (Left)     02/08/2023    Cristin Segal is a 62 y.o. female w/ a significant PMHx of MCTD, mild to mod AS, T2DM, HTN, HLD, GERD, Anemia, HTN, tobacco use, Hypothyroidism, and SCC of CASSIDY lung. CT scan about 1.5 yrs ago which showed small lesion in the CASSIDY.  Inititally this was followed but when it showed growth to 11mm in December 2022 she underwent CT guided needle biopsy pos for SCC. PFTs with mild restriction.       Patient now presents for the above procedure(s).    TTE 9/20/21:   The left ventricle is normal in size with normal systolic function.   The estimated ejection fraction is 70%.   Normal right ventricular size with normal right ventricular systolic function.   Mild to moderate tricuspid regurgitation.   Mild mitral regurgitation.   There is mild-to-moderate aortic valve stenosis.   Aortic valve area is 1.33 cm2; peak velocity is 2.39 m/s; mean gradient is 16 mmHg.   Normal left ventricular diastolic function.      LDA: None documented.       Prev airway: Present Prior to Hospital Arrival?: No; Placement Date: 08/01/19; Placement Time: 0920; Method of Intubation: Glidescope; Inserted by: CRNA; Airway Device: Endotracheal Tube; Induction type: IV - routine; Mask Ventilation: Easy; Intubated: Postinduction; Airway Device Size: 7.5; Style: Cuffed; Cuff Inflation: Minimal occlusive pressure; Inflation Amount: 4; Placement Verified By: Auscultation, Capnometry; Grade: Grade I; Complicating Factors: None; Intubation Findings: Positive EtCO2, Bilateral breath sounds, Atraumatic/Condition of teeth unchanged;  Depth of Insertion: 21; Securment: Lips; Complications: None; Breath Sounds: Equal Bilateral; Insertion  Attempts: 1; Removal Date: 08/01/19;  Removal Time: 1235    Drips: None documented.      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    Mass of parotid gland    Displaced fracture of lesser trochanter of left femur, initial encounter for closed fracture    Macrocytic anemia    Hip pain, left    Essential hypertension    mild Metabolic acidosis with mildly elevated anion gap    Current smoker    Tachycardia    Back injury    Pulmonary nodule    Hyperhomocysteinemia    Closed wedge compression fracture of T12 vertebra with routine healing    Hypothyroidism    Squamous Cell Carcinoma of CASSIDY of lung       Review of patient's allergies indicates:   Allergen Reactions    Pcn [penicillins] Anaphylaxis     Unknown for her- family history with anaphylaxis    Lipitor [atorvastatin]        Current Inpatient Medications:      Current Facility-Administered Medications on File Prior to Encounter   Medication Dose Route Frequency Provider Last Rate Last Admin    lactated ringers infusion   Intravenous Continuous Kev Bai MD         Current Outpatient Medications on File Prior to Encounter   Medication Sig Dispense Refill    aspirin 81 MG Chew Take 1 tablet (81 mg total) by mouth 3 (three) times a week. 13 tablet 0    hydrOXYchloroQUINE (PLAQUENIL) 200 mg tablet Take 1 tablet (200 mg total) by mouth once daily. 90 tablet 1    KLOR-CON M20 20 mEq tablet TAKE 1 TABLET (20 MEQ TOTAL) BY MOUTH ONCE DAILY. 90 tablet 3    levothyroxine  (SYNTHROID) 50 MCG tablet Take 1 tablet (50 mcg total) by mouth before breakfast. 90 tablet 3    losartan (COZAAR) 25 MG tablet Take 1 tablet (25 mg total) by mouth once daily. 90 tablet 3    NIFEdipine (ADALAT CC) 30 MG TbSR TAKE 1 TABLET BY MOUTH EVERY DAY 90 tablet 2    pantoprazole (PROTONIX) 40 MG tablet TAKE 1 TABLET BY MOUTH EVERY DAY 90 tablet 3    rosuvastatin (CRESTOR) 5 MG tablet TAKE 1 TABLET BY MOUTH EVERYDAY AT BEDTIME (Patient taking differently: Take 5 mg by mouth once daily.) 90 tablet 3    WESTAB MAX 2.5-25-2 mg Tab TAKE 1 TABLET BY MOUTH EVERY DAY 90 tablet 3    albuterol (VENTOLIN HFA) 90 mcg/actuation inhaler Inhale 2 puffs into the lungs every 6 (six) hours as needed for Wheezing or Shortness of Breath. Rescue 8 g 5    cephALEXin (KEFLEX) 500 MG capsule Take 500 mg by mouth 2 (two) times daily.      cyclobenzaprine (FLEXERIL) 10 MG tablet TAKE 1 TABLET BY MOUTH EVERY DAY (Patient not taking: Reported on 1/24/2023) 30 tablet 3    fluticasone propionate (FLONASE) 50 mcg/actuation nasal spray 1 spray (50 mcg total) by Each Nostril route once daily. (Patient not taking: Reported on 2/8/2023) 16 g 11    mv-min/FA/C/glut/lysine/rmg305 (AIRBORNE, WITH FOLIC ACID, ORAL) Take 1 tablet by mouth 4 (four) times daily.      vitamin D (VITAMIN D3) 1000 units Tab Take 1,000 Units by mouth once daily.         Past Surgical History:   Procedure Laterality Date    CARPAL TUNNEL RELEASE      right    CATARACT EXTRACTION W/ INTRAOCULAR LENS  IMPLANT, BILATERAL      EXCISION OF PAROTID GLAND Right 8/1/2019    Procedure: PAROTIDECTOMY;  Surgeon: Abner Maki MD;  Location: [facility];  Service: ENT;  Laterality: Right;    HIP SURGERY Left 6/18/15    JANA    JOINT REPLACEMENT Bilateral     HIP    KNEE CARTILAGE SURGERY      right    TOTAL HIP ARTHROPLASTY Right 05/19/2016    JANA       Social History     Socioeconomic History    Marital status:    Occupational History     Employer: [employer]   Tobacco Use    Smoking status: Every Day     Packs/day: 0.50     Types: Cigarettes    Smokeless tobacco: Never   Substance and Sexual Activity    Alcohol use: Yes     Comment: occasional    Drug use: No    Sexual activity: Not Currently     Birth control/protection: Post-menopausal       OBJECTIVE:     Vital Signs Range (Last 24H):         Significant Labs:  Lab Results   Component Value Date    WBC 5.44 01/06/2023    HGB 10.8 (L) 01/06/2023    HCT 31.9 (L) 01/06/2023     01/06/2023    CHOL 242 (H) 11/21/2022    TRIG 75 11/21/2022     (H) 11/21/2022    ALT 37 11/21/2022    AST 51 (H) 11/21/2022     11/21/2022    K 4.8 11/21/2022     11/21/2022    CREATININE 1.2 11/21/2022    BUN 18 11/21/2022    CO2 20 (L) 11/21/2022    TSH 1.628 05/11/2022    INR 1.0 01/06/2023    HGBA1C 4.7 11/21/2022       Diagnostic Studies: No relevant studies.    EKG:   Results for orders placed or performed in visit on 12/29/22   IN OFFICE EKG 12-LEAD (to [place])    Collection Time: 12/29/22  3:58 PM    Narrative    Test Reason : R00.0,    Vent. Rate : 093 BPM     Atrial Rate : 093 BPM     P-R Int : 220 ms          QRS Dur : 078 ms      QT Int : 348 ms       P-R-T Axes : 070 081 067 degrees     QTc Int : 432 ms    Sinus rhythm with 1st degree A-V block  Low voltage QRS Early repolarization  Borderline Abnormal ECG  When compared with ECG of 21-MAR-2022 13:12,  Premature ventricular complexes are no longer Present  Confirmed by Raymond Nesbitt MD ([id number]) on 1/11/2023 6:24:29 PM    Referred By:             Confirmed By:Raymond Nesbitt MD       2D ECHO:  TTE:  Results for orders placed or performed during the hospital encounter of 09/20/21   Echo Color Flow Doppler? Yes   Result Value Ref Range    BSA 1.66 m2    AORTIC VALVE CUSP SEPERATION 1.10 cm    Ao VTI 50.10 cm    Ao peak jono 2.39 m/s    AV mean gradient 16 mmHg    IVRT 106.00 ms    IVS 0.92 0.6 - 1.1 cm    LVIDd 4.27 3.5 - 6.0 cm    LVIDs 2.53  2.1 - 4.0 cm    LVOT diameter 2.00 cm    LVOT peak VTI 21.20 cm    Posterior Wall 0.75 0.6 - 1.1 cm    LA size 3.00 cm    E wave deceleration time 95.00 ms    MV stenosis pressure 1/2 time 33.00 ms    MV Peak A Jacky 1.11 m/s    LVOT peak jacky 0.60 m/s    RVDD 2.57 cm    TR Max Jacky 2.21 m/s    Triscuspid Valve Regurgitation Peak Gradient 20 mmHg    TDI SEPTAL 0.05 m/s    LV LATERAL E/E' RATIO 6.00 m/s    LV SEPTAL E/E' RATIO 12.00 m/s    TDI LATERAL 0.10 m/s    FS 41 28 - 44 %    LV mass 110.20 g    Left Ventricle Relative Wall Thickness 0.35 cm    AV valve area 1.33 cm2    AV Velocity Ratio 0.25     AV index (prosthetic) 0.42     MV valve area p 1/2 method 6.67 cm2    E/A ratio 0.54     Mean e' 0.08 m/s    LVOT area 3.1 cm2    LVOT stroke volume 66.57 cm3    AV peak gradient 23 mmHg    E/E' ratio 8.00 m/s    MV Peak E Jacky 0.60 m/s    LV Mass Index 68 g/m2    EF 70 %    Narrative    · The left ventricle is normal in size with normal systolic function.  · The estimated ejection fraction is 70%.  · Normal right ventricular size with normal right ventricular systolic   function.  · Mild to moderate tricuspid regurgitation.  · Mild mitral regurgitation.  · There is mild-to-moderate aortic valve stenosis.  · Aortic valve area is 1.33 cm2; peak velocity is 2.39 m/s; mean gradient   is 16 mmHg.  · Normal left ventricular diastolic function.          FRANCINE:  No results found for this or any previous visit.    ASSESSMENT/PLAN:                                                                                                                  02/08/2023  Cristin Segal is a 62 y.o., female.      Pre-op Assessment    I have reviewed the Patient Summary Reports.     I have reviewed the Nursing Notes. I have reviewed the NPO Status.   I have reviewed the Medications.     Review of Systems  Anesthesia Hx:  No problems with previous Anesthesia  History of prior surgery of interest to airway management or planning: Denies Family Hx of  Anesthesia complications.   Denies Personal Hx of Anesthesia complications.   Social:  Smoker    Hematology/Oncology:         -- Anemia: Current/Recent Cancer. --  Cancer in past history:  surgery    Cardiovascular:   Exercise tolerance: good Hypertension Valvular problems/Murmurs, MR, AS Denies CAD.    Denies CABG/stent.   Denies CHF. hyperlipidemia    Pulmonary:   Denies COPD.  Denies Asthma.  Denies Sleep Apnea. SCC of the left lung   Renal/:   Denies Chronic Renal Disease.     Hepatic/GI:   GERD    Musculoskeletal:   Arthritis  MCTD   Neurological:   Denies CVA.  Denies Headaches. Denies Seizures.   Peripheral Neuropathy    Endocrine:   Diabetes, type 2, using insulin Hypothyroidism  Denies Obesity / BMI > 30  Psych:   Denies Psychiatric History.          Physical Exam  General: Well nourished, Cooperative, Alert and Oriented    Airway:  Mallampati: II / II  Mouth Opening: Normal  TM Distance: 4 - 6 cm  Tongue: Normal  Neck ROM: Normal ROM    Dental:        Anesthesia Plan  Type of Anesthesia, risks & benefits discussed:    Anesthesia Type: Gen ETT  Intra-op Monitoring Plan: Standard ASA Monitors and Art Line  Post Op Pain Control Plan: multimodal analgesia and IV/PO Opioids PRN  Induction:  IV  Airway Plan: Direct, Video and Fiberoptic, Post-Induction  Informed Consent: Informed consent signed with the Patient and all parties understand the risks and agree with anesthesia plan.  All questions answered. Patient consented to blood products? Yes  ASA Score: 3  Day of Surgery Review of History & Physical: H&P Update referred to the surgeon/provider.    Ready For Surgery From Anesthesia Perspective.     .

## 2023-02-08 NOTE — PRE-PROCEDURE INSTRUCTIONS
PreOp Instructions given:   - Verbal medication information (what to hold and what to take)   - NPO guidelines [id number]  - Arrival place directions given; time to be given the day before procedure by the   Surgeon's Office 0500 DOSC  - Bathing with antibacterial soap   - Don't wear any jewelry or bring any valuables AM of surgery   - No makeup or moisturizer to face   - No perfume/cologne, powder, lotions or aftershave   Pt. verbalized understanding.   Pt denies any h/o Anesthesia/Sedation complications or side effects.

## 2023-02-09 ENCOUNTER — ANESTHESIA (OUTPATIENT)
Dept: SURGERY | Facility: HOSPITAL | Age: 63
DRG: 164 | End: 2023-02-09
Payer: COMMERCIAL

## 2023-02-09 ENCOUNTER — HOSPITAL ENCOUNTER (INPATIENT)
Facility: HOSPITAL | Age: 63
LOS: 1 days | Discharge: HOME | DRG: 164 | End: 2023-02-10
Attending: THORACIC SURGERY (CARDIOTHORACIC VASCULAR SURGERY) | Admitting: THORACIC SURGERY (CARDIOTHORACIC VASCULAR SURGERY)
Payer: COMMERCIAL

## 2023-02-09 DIAGNOSIS — C34.92 NSCLC OF LEFT LUNG: ICD-10-CM

## 2023-02-09 DIAGNOSIS — C34.12 MALIGNANT NEOPLASM OF UPPER LOBE OF LEFT LUNG: Primary | ICD-10-CM

## 2023-02-09 LAB
ABO + RH BLD: NORMAL
BLD GP AB SCN CELLS X3 SERPL QL: NORMAL
CREAT SERPL-MCNC: 1 MG/DL (ref 0.5–1.4)
EST. GFR  (NO RACE VARIABLE): >60 ML/MIN/1.73 M^2

## 2023-02-09 PROCEDURE — 63600175 PHARM REV CODE 636 W HCPCS: Performed by: STUDENT IN AN ORGANIZED HEALTH CARE EDUCATION/TRAINING PROGRAM

## 2023-02-09 PROCEDURE — 88305 TISSUE EXAM BY PATHOLOGIST: CPT | Mod: 59 | Performed by: PATHOLOGY

## 2023-02-09 PROCEDURE — D9220A PRA ANESTHESIA: Mod: ,,, | Performed by: ANESTHESIOLOGY

## 2023-02-09 PROCEDURE — C1729 CATH, DRAINAGE: HCPCS | Performed by: THORACIC SURGERY (CARDIOTHORACIC VASCULAR SURGERY)

## 2023-02-09 PROCEDURE — 25000003 PHARM REV CODE 250: Performed by: STUDENT IN AN ORGANIZED HEALTH CARE EDUCATION/TRAINING PROGRAM

## 2023-02-09 PROCEDURE — 63600175 PHARM REV CODE 636 W HCPCS: Performed by: THORACIC SURGERY (CARDIOTHORACIC VASCULAR SURGERY)

## 2023-02-09 PROCEDURE — 88309 TISSUE EXAM BY PATHOLOGIST: CPT | Mod: 26,,, | Performed by: PATHOLOGY

## 2023-02-09 PROCEDURE — 25000003 PHARM REV CODE 250: Performed by: THORACIC SURGERY (CARDIOTHORACIC VASCULAR SURGERY)

## 2023-02-09 PROCEDURE — 63600175 PHARM REV CODE 636 W HCPCS

## 2023-02-09 PROCEDURE — 32669 THORACOSCOPY REMOVE SEGMENT: CPT | Mod: LT,,, | Performed by: THORACIC SURGERY (CARDIOTHORACIC VASCULAR SURGERY)

## 2023-02-09 PROCEDURE — 27000221 HC OXYGEN, UP TO 24 HOURS

## 2023-02-09 PROCEDURE — 88307 TISSUE EXAM BY PATHOLOGIST: CPT | Mod: 59 | Performed by: PATHOLOGY

## 2023-02-09 PROCEDURE — 32669 PR THORACOSCOPY REMOVE SEGMENT: ICD-10-PCS | Mod: LT,,, | Performed by: THORACIC SURGERY (CARDIOTHORACIC VASCULAR SURGERY)

## 2023-02-09 PROCEDURE — 37000009 HC ANESTHESIA EA ADD 15 MINS: Performed by: THORACIC SURGERY (CARDIOTHORACIC VASCULAR SURGERY)

## 2023-02-09 PROCEDURE — 88307 PR  SURG PATH,LEVEL V: ICD-10-PCS | Mod: 26,,, | Performed by: PATHOLOGY

## 2023-02-09 PROCEDURE — D9220A PRA ANESTHESIA: ICD-10-PCS | Mod: ,,, | Performed by: ANESTHESIOLOGY

## 2023-02-09 PROCEDURE — 25000003 PHARM REV CODE 250

## 2023-02-09 PROCEDURE — 36000712 HC OR TIME LEV V 1ST 15 MIN: Performed by: THORACIC SURGERY (CARDIOTHORACIC VASCULAR SURGERY)

## 2023-02-09 PROCEDURE — 32674 THORACOSCOPY LYMPH NODE EXC: CPT | Mod: AS,,, | Performed by: PHYSICIAN ASSISTANT

## 2023-02-09 PROCEDURE — 88305 TISSUE EXAM BY PATHOLOGIST: CPT | Mod: 26,,, | Performed by: PATHOLOGY

## 2023-02-09 PROCEDURE — C9290 INJ, BUPIVACAINE LIPOSOME: HCPCS | Performed by: THORACIC SURGERY (CARDIOTHORACIC VASCULAR SURGERY)

## 2023-02-09 PROCEDURE — 27201423 OPTIME MED/SURG SUP & DEVICES STERILE SUPPLY: Performed by: THORACIC SURGERY (CARDIOTHORACIC VASCULAR SURGERY)

## 2023-02-09 PROCEDURE — 32674 PR THORACOSCOPY LYMPH NODE EXC: ICD-10-PCS | Mod: AS,,, | Performed by: PHYSICIAN ASSISTANT

## 2023-02-09 PROCEDURE — 36620 ARTERIAL: ICD-10-PCS | Mod: 59,,, | Performed by: ANESTHESIOLOGY

## 2023-02-09 PROCEDURE — 36620 INSERTION CATHETER ARTERY: CPT | Mod: 59,,, | Performed by: ANESTHESIOLOGY

## 2023-02-09 PROCEDURE — 88305 TISSUE EXAM BY PATHOLOGIST: ICD-10-PCS | Mod: 26,,, | Performed by: PATHOLOGY

## 2023-02-09 PROCEDURE — 32674 THORACOSCOPY LYMPH NODE EXC: CPT | Mod: ,,, | Performed by: THORACIC SURGERY (CARDIOTHORACIC VASCULAR SURGERY)

## 2023-02-09 PROCEDURE — 20600001 HC STEP DOWN PRIVATE ROOM

## 2023-02-09 PROCEDURE — 32674 PR THORACOSCOPY LYMPH NODE EXC: ICD-10-PCS | Mod: ,,, | Performed by: THORACIC SURGERY (CARDIOTHORACIC VASCULAR SURGERY)

## 2023-02-09 PROCEDURE — 71000015 HC POSTOP RECOV 1ST HR: Performed by: THORACIC SURGERY (CARDIOTHORACIC VASCULAR SURGERY)

## 2023-02-09 PROCEDURE — 25000242 PHARM REV CODE 250 ALT 637 W/ HCPCS

## 2023-02-09 PROCEDURE — 71000033 HC RECOVERY, INTIAL HOUR: Performed by: THORACIC SURGERY (CARDIOTHORACIC VASCULAR SURGERY)

## 2023-02-09 PROCEDURE — 82565 ASSAY OF CREATININE: CPT | Performed by: THORACIC SURGERY (CARDIOTHORACIC VASCULAR SURGERY)

## 2023-02-09 PROCEDURE — 27201037 HC PRESSURE MONITORING SET UP

## 2023-02-09 PROCEDURE — 32669 THORACOSCOPY REMOVE SEGMENT: CPT | Mod: LT,,, | Performed by: PHYSICIAN ASSISTANT

## 2023-02-09 PROCEDURE — 32669 PR THORACOSCOPY REMOVE SEGMENT: ICD-10-PCS | Mod: LT,,, | Performed by: PHYSICIAN ASSISTANT

## 2023-02-09 PROCEDURE — 71000016 HC POSTOP RECOV ADDL HR: Performed by: THORACIC SURGERY (CARDIOTHORACIC VASCULAR SURGERY)

## 2023-02-09 PROCEDURE — 36000713 HC OR TIME LEV V EA ADD 15 MIN: Performed by: THORACIC SURGERY (CARDIOTHORACIC VASCULAR SURGERY)

## 2023-02-09 PROCEDURE — 88309 PR  SURG PATH,LEVEL VI: ICD-10-PCS | Mod: 26,,, | Performed by: PATHOLOGY

## 2023-02-09 PROCEDURE — 86900 BLOOD TYPING SEROLOGIC ABO: CPT

## 2023-02-09 PROCEDURE — 94640 AIRWAY INHALATION TREATMENT: CPT

## 2023-02-09 PROCEDURE — 94761 N-INVAS EAR/PLS OXIMETRY MLT: CPT

## 2023-02-09 PROCEDURE — 88309 TISSUE EXAM BY PATHOLOGIST: CPT | Performed by: PATHOLOGY

## 2023-02-09 PROCEDURE — 36415 COLL VENOUS BLD VENIPUNCTURE: CPT

## 2023-02-09 PROCEDURE — 37000008 HC ANESTHESIA 1ST 15 MINUTES: Performed by: THORACIC SURGERY (CARDIOTHORACIC VASCULAR SURGERY)

## 2023-02-09 PROCEDURE — 88307 TISSUE EXAM BY PATHOLOGIST: CPT | Mod: 26,,, | Performed by: PATHOLOGY

## 2023-02-09 RX ORDER — ENOXAPARIN SODIUM 100 MG/ML
40 INJECTION SUBCUTANEOUS EVERY 24 HOURS
Status: DISCONTINUED | OUTPATIENT
Start: 2023-02-09 | End: 2023-02-10 | Stop reason: HOSPADM

## 2023-02-09 RX ORDER — LIDOCAINE HYDROCHLORIDE 10 MG/ML
1 INJECTION, SOLUTION EPIDURAL; INFILTRATION; INTRACAUDAL; PERINEURAL ONCE
Status: COMPLETED | OUTPATIENT
Start: 2023-02-09 | End: 2023-02-09

## 2023-02-09 RX ORDER — PANTOPRAZOLE SODIUM 40 MG/1
40 TABLET, DELAYED RELEASE ORAL DAILY
Status: DISCONTINUED | OUTPATIENT
Start: 2023-02-10 | End: 2023-02-10 | Stop reason: HOSPADM

## 2023-02-09 RX ORDER — ONDANSETRON 8 MG/1
8 TABLET, ORALLY DISINTEGRATING ORAL EVERY 8 HOURS PRN
Status: DISCONTINUED | OUTPATIENT
Start: 2023-02-09 | End: 2023-02-10 | Stop reason: HOSPADM

## 2023-02-09 RX ORDER — ROCURONIUM BROMIDE 10 MG/ML
INJECTION, SOLUTION INTRAVENOUS
Status: DISCONTINUED | OUTPATIENT
Start: 2023-02-09 | End: 2023-02-09

## 2023-02-09 RX ORDER — ACETAMINOPHEN 500 MG
1000 TABLET ORAL
Status: DISCONTINUED | OUTPATIENT
Start: 2023-02-09 | End: 2023-02-09

## 2023-02-09 RX ORDER — DEXAMETHASONE SODIUM PHOSPHATE 4 MG/ML
INJECTION, SOLUTION INTRA-ARTICULAR; INTRALESIONAL; INTRAMUSCULAR; INTRAVENOUS; SOFT TISSUE
Status: DISCONTINUED | OUTPATIENT
Start: 2023-02-09 | End: 2023-02-09

## 2023-02-09 RX ORDER — FENTANYL CITRATE 50 UG/ML
INJECTION, SOLUTION INTRAMUSCULAR; INTRAVENOUS
Status: DISCONTINUED | OUTPATIENT
Start: 2023-02-09 | End: 2023-02-09

## 2023-02-09 RX ORDER — LEVOTHYROXINE SODIUM 50 UG/1
50 TABLET ORAL
Status: DISCONTINUED | OUTPATIENT
Start: 2023-02-10 | End: 2023-02-10 | Stop reason: HOSPADM

## 2023-02-09 RX ORDER — OXYCODONE HYDROCHLORIDE 5 MG/1
5 TABLET ORAL EVERY 4 HOURS PRN
Status: DISCONTINUED | OUTPATIENT
Start: 2023-02-09 | End: 2023-02-10 | Stop reason: HOSPADM

## 2023-02-09 RX ORDER — INDOCYANINE GREEN AND WATER 25 MG
KIT INJECTION
Status: DISCONTINUED | OUTPATIENT
Start: 2023-02-09 | End: 2023-02-09

## 2023-02-09 RX ORDER — ROSUVASTATIN CALCIUM 5 MG/1
5 TABLET, COATED ORAL NIGHTLY
Status: DISCONTINUED | OUTPATIENT
Start: 2023-02-09 | End: 2023-02-10 | Stop reason: HOSPADM

## 2023-02-09 RX ORDER — ATORVASTATIN CALCIUM 10 MG/1
10 TABLET, FILM COATED ORAL DAILY
Status: DISCONTINUED | OUTPATIENT
Start: 2023-02-10 | End: 2023-02-09

## 2023-02-09 RX ORDER — SODIUM CHLORIDE 0.9 % (FLUSH) 0.9 %
10 SYRINGE (ML) INJECTION
Status: DISCONTINUED | OUTPATIENT
Start: 2023-02-09 | End: 2023-02-10 | Stop reason: HOSPADM

## 2023-02-09 RX ORDER — SODIUM CHLORIDE 0.9 % (FLUSH) 0.9 %
10 SYRINGE (ML) INJECTION
Status: DISCONTINUED | OUTPATIENT
Start: 2023-02-09 | End: 2023-02-09 | Stop reason: HOSPADM

## 2023-02-09 RX ORDER — ONDANSETRON 2 MG/ML
INJECTION INTRAMUSCULAR; INTRAVENOUS
Status: DISCONTINUED | OUTPATIENT
Start: 2023-02-09 | End: 2023-02-09

## 2023-02-09 RX ORDER — NAPROXEN SODIUM 220 MG/1
81 TABLET, FILM COATED ORAL
Status: DISCONTINUED | OUTPATIENT
Start: 2023-02-10 | End: 2023-02-10 | Stop reason: HOSPADM

## 2023-02-09 RX ORDER — MIDAZOLAM HYDROCHLORIDE 1 MG/ML
INJECTION, SOLUTION INTRAMUSCULAR; INTRAVENOUS
Status: DISCONTINUED | OUTPATIENT
Start: 2023-02-09 | End: 2023-02-09

## 2023-02-09 RX ORDER — CEFAZOLIN SODIUM 1 G/3ML
INJECTION, POWDER, FOR SOLUTION INTRAMUSCULAR; INTRAVENOUS
Status: DISCONTINUED | OUTPATIENT
Start: 2023-02-09 | End: 2023-02-09

## 2023-02-09 RX ORDER — TALC
6 POWDER (GRAM) TOPICAL NIGHTLY PRN
Status: DISCONTINUED | OUTPATIENT
Start: 2023-02-09 | End: 2023-02-10 | Stop reason: HOSPADM

## 2023-02-09 RX ORDER — LIDOCAINE 50 MG/G
1 PATCH TOPICAL
Status: DISCONTINUED | OUTPATIENT
Start: 2023-02-09 | End: 2023-02-10 | Stop reason: HOSPADM

## 2023-02-09 RX ORDER — PROCHLORPERAZINE EDISYLATE 5 MG/ML
5 INJECTION INTRAMUSCULAR; INTRAVENOUS EVERY 30 MIN PRN
Status: DISCONTINUED | OUTPATIENT
Start: 2023-02-09 | End: 2023-02-09 | Stop reason: HOSPADM

## 2023-02-09 RX ORDER — METHOCARBAMOL 500 MG/1
500 TABLET, FILM COATED ORAL 4 TIMES DAILY
Status: DISCONTINUED | OUTPATIENT
Start: 2023-02-09 | End: 2023-02-10 | Stop reason: HOSPADM

## 2023-02-09 RX ORDER — IPRATROPIUM BROMIDE AND ALBUTEROL SULFATE 2.5; .5 MG/3ML; MG/3ML
3 SOLUTION RESPIRATORY (INHALATION) ONCE
Status: COMPLETED | OUTPATIENT
Start: 2023-02-09 | End: 2023-02-09

## 2023-02-09 RX ORDER — NIFEDIPINE 30 MG/1
30 TABLET, EXTENDED RELEASE ORAL DAILY
Status: DISCONTINUED | OUTPATIENT
Start: 2023-02-10 | End: 2023-02-10 | Stop reason: HOSPADM

## 2023-02-09 RX ORDER — ACETAMINOPHEN 500 MG
1000 TABLET ORAL EVERY 8 HOURS
Status: DISCONTINUED | OUTPATIENT
Start: 2023-02-09 | End: 2023-02-10 | Stop reason: HOSPADM

## 2023-02-09 RX ORDER — LIDOCAINE HYDROCHLORIDE 20 MG/ML
INJECTION, SOLUTION EPIDURAL; INFILTRATION; INTRACAUDAL; PERINEURAL
Status: DISCONTINUED | OUTPATIENT
Start: 2023-02-09 | End: 2023-02-09

## 2023-02-09 RX ORDER — PROPOFOL 10 MG/ML
VIAL (ML) INTRAVENOUS
Status: DISCONTINUED | OUTPATIENT
Start: 2023-02-09 | End: 2023-02-09

## 2023-02-09 RX ORDER — BUPIVACAINE HYDROCHLORIDE 2.5 MG/ML
INJECTION, SOLUTION EPIDURAL; INFILTRATION; INTRACAUDAL
Status: DISCONTINUED | OUTPATIENT
Start: 2023-02-09 | End: 2023-02-09

## 2023-02-09 RX ORDER — HYDROMORPHONE HYDROCHLORIDE 1 MG/ML
0.2 INJECTION, SOLUTION INTRAMUSCULAR; INTRAVENOUS; SUBCUTANEOUS EVERY 5 MIN PRN
Status: DISCONTINUED | OUTPATIENT
Start: 2023-02-09 | End: 2023-02-09 | Stop reason: HOSPADM

## 2023-02-09 RX ORDER — KETAMINE HCL IN 0.9 % NACL 50 MG/5 ML
SYRINGE (ML) INTRAVENOUS
Status: DISCONTINUED | OUTPATIENT
Start: 2023-02-09 | End: 2023-02-09

## 2023-02-09 RX ORDER — OXYCODONE HYDROCHLORIDE 10 MG/1
10 TABLET ORAL EVERY 4 HOURS PRN
Status: DISCONTINUED | OUTPATIENT
Start: 2023-02-09 | End: 2023-02-10 | Stop reason: HOSPADM

## 2023-02-09 RX ORDER — ACETAMINOPHEN 500 MG
1000 TABLET ORAL
Status: COMPLETED | OUTPATIENT
Start: 2023-02-09 | End: 2023-02-09

## 2023-02-09 RX ADMIN — SODIUM CHLORIDE: 0.9 INJECTION, SOLUTION INTRAVENOUS at 06:02

## 2023-02-09 RX ADMIN — GLYCOPYRROLATE 0.2 MG: 0.2 INJECTION INTRAMUSCULAR; INTRAVENOUS at 11:02

## 2023-02-09 RX ADMIN — SUGAMMADEX 200 MG: 100 INJECTION, SOLUTION INTRAVENOUS at 11:02

## 2023-02-09 RX ADMIN — ROCURONIUM BROMIDE 20 MG: 10 INJECTION INTRAVENOUS at 08:02

## 2023-02-09 RX ADMIN — ROCURONIUM BROMIDE 40 MG: 10 INJECTION INTRAVENOUS at 07:02

## 2023-02-09 RX ADMIN — PROPOFOL 30 MG: 10 INJECTION, EMULSION INTRAVENOUS at 08:02

## 2023-02-09 RX ADMIN — PROPOFOL 50 MG: 10 INJECTION, EMULSION INTRAVENOUS at 08:02

## 2023-02-09 RX ADMIN — METHOCARBAMOL 500 MG: 500 TABLET ORAL at 04:02

## 2023-02-09 RX ADMIN — METHOCARBAMOL 500 MG: 500 TABLET ORAL at 09:02

## 2023-02-09 RX ADMIN — INDOCYANINE GREEN AND WATER 12.5 MG: KIT at 10:02

## 2023-02-09 RX ADMIN — GABAPENTIN 400 MG: 300 CAPSULE ORAL at 05:02

## 2023-02-09 RX ADMIN — HYDROMORPHONE HYDROCHLORIDE 0.2 MG: 1 INJECTION, SOLUTION INTRAMUSCULAR; INTRAVENOUS; SUBCUTANEOUS at 12:02

## 2023-02-09 RX ADMIN — METHOCARBAMOL 500 MG: 500 TABLET ORAL at 12:02

## 2023-02-09 RX ADMIN — PROPOFOL 30 MG: 10 INJECTION, EMULSION INTRAVENOUS at 11:02

## 2023-02-09 RX ADMIN — OXYCODONE HYDROCHLORIDE 10 MG: 10 TABLET ORAL at 04:02

## 2023-02-09 RX ADMIN — Medication 20 MG: at 08:02

## 2023-02-09 RX ADMIN — ROCURONIUM BROMIDE 10 MG: 10 INJECTION INTRAVENOUS at 09:02

## 2023-02-09 RX ADMIN — ACETAMINOPHEN 1000 MG: 500 TABLET ORAL at 03:02

## 2023-02-09 RX ADMIN — IPRATROPIUM BROMIDE AND ALBUTEROL SULFATE 3 ML: 2.5; .5 SOLUTION RESPIRATORY (INHALATION) at 06:02

## 2023-02-09 RX ADMIN — Medication 10 MG: at 09:02

## 2023-02-09 RX ADMIN — LIDOCAINE HYDROCHLORIDE 80 MG: 20 INJECTION, SOLUTION EPIDURAL; INFILTRATION; INTRACAUDAL; PERINEURAL at 07:02

## 2023-02-09 RX ADMIN — DEXAMETHASONE SODIUM PHOSPHATE 8 MG: 4 INJECTION INTRA-ARTICULAR; INTRALESIONAL; INTRAMUSCULAR; INTRAVENOUS; SOFT TISSUE at 07:02

## 2023-02-09 RX ADMIN — MIDAZOLAM 2 MG: 1 INJECTION INTRAMUSCULAR; INTRAVENOUS at 06:02

## 2023-02-09 RX ADMIN — ROCURONIUM BROMIDE 20 MG: 10 INJECTION INTRAVENOUS at 09:02

## 2023-02-09 RX ADMIN — FENTANYL CITRATE 100 MCG: 50 INJECTION INTRAMUSCULAR; INTRAVENOUS at 07:02

## 2023-02-09 RX ADMIN — PROPOFOL 20 MG: 10 INJECTION, EMULSION INTRAVENOUS at 08:02

## 2023-02-09 RX ADMIN — OXYCODONE HYDROCHLORIDE 10 MG: 10 TABLET ORAL at 12:02

## 2023-02-09 RX ADMIN — ONDANSETRON 4 MG: 2 INJECTION INTRAMUSCULAR; INTRAVENOUS at 11:02

## 2023-02-09 RX ADMIN — PROPOFOL 100 MG: 10 INJECTION, EMULSION INTRAVENOUS at 07:02

## 2023-02-09 RX ADMIN — LIDOCAINE HYDROCHLORIDE 10 MG: 10 INJECTION, SOLUTION EPIDURAL; INFILTRATION; INTRACAUDAL; PERINEURAL at 05:02

## 2023-02-09 RX ADMIN — FENTANYL CITRATE 50 MCG: 50 INJECTION INTRAMUSCULAR; INTRAVENOUS at 08:02

## 2023-02-09 RX ADMIN — PROPOFOL 30 MG: 10 INJECTION, EMULSION INTRAVENOUS at 07:02

## 2023-02-09 RX ADMIN — ACETAMINOPHEN 1000 MG: 500 TABLET ORAL at 05:02

## 2023-02-09 RX ADMIN — HYDROMORPHONE HYDROCHLORIDE 0.2 MG: 1 INJECTION, SOLUTION INTRAMUSCULAR; INTRAVENOUS; SUBCUTANEOUS at 11:02

## 2023-02-09 RX ADMIN — ROCURONIUM BROMIDE 10 MG: 10 INJECTION INTRAVENOUS at 10:02

## 2023-02-09 RX ADMIN — CEFAZOLIN 2 G: 2 INJECTION, POWDER, FOR SOLUTION INTRAMUSCULAR; INTRAVENOUS at 07:02

## 2023-02-09 RX ADMIN — LIDOCAINE 1 PATCH: 50 PATCH CUTANEOUS at 12:02

## 2023-02-09 RX ADMIN — ROSUVASTATIN CALCIUM 5 MG: 5 TABLET, COATED ORAL at 09:02

## 2023-02-09 RX ADMIN — SODIUM CHLORIDE, SODIUM GLUCONATE, SODIUM ACETATE, POTASSIUM CHLORIDE, MAGNESIUM CHLORIDE, SODIUM PHOSPHATE, DIBASIC, AND POTASSIUM PHOSPHATE: .53; .5; .37; .037; .03; .012; .00082 INJECTION, SOLUTION INTRAVENOUS at 07:02

## 2023-02-09 RX ADMIN — ENOXAPARIN SODIUM 40 MG: 40 INJECTION SUBCUTANEOUS at 05:02

## 2023-02-09 RX ADMIN — ACETAMINOPHEN 1000 MG: 500 TABLET ORAL at 09:02

## 2023-02-09 RX ADMIN — Medication 10 MG: at 10:02

## 2023-02-09 NOTE — BRIEF OP NOTE
[facility] - Surgery ([facility])  Brief Operative Note    SUMMARY     Surgery Date: 2/9/2023     Surgeon(s) and Role:     * Isaiah lAlan MD - Primary     * Doris Colbert MD - Resident - Assisting    Pre-op Diagnosis:  Pulmonary nodule [R91.1]  Malignant neoplasm of left lung, unspecified part of lung [C34.92]    Post-op Diagnosis:  Post-Op Diagnosis Codes:     * Pulmonary nodule [R91.1]     * Malignant neoplasm of left lung, unspecified part of lung [C34.92]    Procedure(s) (LRB):  XI ROBOTIC RATS,WITH LEFT UPPER LOBECTOMY,LUNG (Left)  LYMPHADENECTOMY (Left)  BLOCK, NERVE, INTERCOSTAL, 2 OR MORE (Left)    Anesthesia: General    Operative Findings: Robotic assisted left lingulectomy with lymph node dissection. Placement of 24Fr sourav drain, no complications.    Estimated Blood Loss: 20mL    Estimated Blood Loss has been documented.         Specimens:   Specimen (24h ago, onward)       Start     Ordered    02/09/23 1115  Specimen to Pathology, Surgery Pulmonary and Thoracic  Once        Comments: Pre-op Diagnosis: Pulmonary nodule [R91.1]Malignant neoplasm of left lung, unspecified part of lung [C34.92]Procedure(s):XI ROBOTIC RATS,WITH LEFT UPPER LOBECTOMY,LUNGLYMPHADENECTOMYBLOCK, NERVE, INTERCOSTAL, 2 OR MORE Number of specimens: 7Name of specimens: 1. Left level 8- perm2. Level 7- perm3. Level 5- perm4. Left level 10- perm5. Left level 11- perm6. Left level 12- perm7. Lingulectomy- perm     References:    Click here for ordering Quick Tip   Question Answer Comment   Procedure Type: Pulmonary and Thoracic    Specimen Class: Routine/Screening    Which provider would you like to cc? ISAIAH ALLAN    Release to patient Immediate        02/09/23 1115                    [id number]    Doris Colbert MD  Pager: [phone]  General Surgery PGY-4  [facility]      
No

## 2023-02-09 NOTE — TRANSFER OF CARE
Anesthesia Transfer of Care Note    Patient: Cristin Segal    Procedure(s) Performed: Procedure(s) (LRB):  XI ROBOTIC RATS,WITH LEFT UPPER LOBECTOMY,LUNG (Left)  LYMPHADENECTOMY (Left)  BLOCK, NERVE, INTERCOSTAL, 2 OR MORE (Left)    Patient location: PACU    Anesthesia Type: general    Transport from OR: Transported from OR on 6-10 L/min O2 by face mask with adequate spontaneous ventilation    Post pain: adequate analgesia    Post assessment: no apparent anesthetic complications    Post vital signs: stable    Level of consciousness: awake and alert    Nausea/Vomiting: no nausea/vomiting    Complications: none    Transfer of care protocol was followed      Last vitals:   Visit Vitals  BP (!) 143/88   Pulse 95   Temp 36.7 °C (98 °F) (Oral)   Resp (!) 23   Wt 68 kg (150 lb)   SpO2 100%   Breastfeeding No   BMI 27.44 kg/m²

## 2023-02-09 NOTE — ANESTHESIA POSTPROCEDURE EVALUATION
Anesthesia Post Evaluation    Patient: Cristin Segal    Procedure(s) Performed: Procedure(s) (LRB):  XI ROBOTIC RATS,WITH LEFT UPPER LOBECTOMY,LUNG (Left)  LYMPHADENECTOMY (Left)  BLOCK, NERVE, INTERCOSTAL, 2 OR MORE (Left)    Final Anesthesia Type: general      Patient location during evaluation: PACU  Patient participation: Yes- Able to Participate  Level of consciousness: awake and alert and oriented  Post-procedure vital signs: reviewed and stable  Pain management: adequate  Airway patency: patent    PONV status at discharge: No PONV  Anesthetic complications: no      Cardiovascular status: blood pressure returned to baseline  Respiratory status: unassisted, room air and spontaneous ventilation  Hydration status: euvolemic  Follow-up not needed.          Vitals Value Taken Time   /71 02/09/23 1217   Temp 37 02/09/23 1222   Pulse 86 02/09/23 1221   Resp 17 02/09/23 1221   SpO2 93 % 02/09/23 1221   Vitals shown include unvalidated device data.      No case tracking events are documented in the log.      Pain/Zora Score: Pain Rating Prior to Med Admin: 9 (2/9/2023 11:59 AM)  Zora Score: 8 (2/9/2023 11:53 AM)

## 2023-02-09 NOTE — ANESTHESIA PROCEDURE NOTES
Arterial    Diagnosis: Lung cancer    Patient location during procedure: done in OR  Procedure start time: 2/9/2023 7:23 AM  Timeout: 2/9/2023 7:22 AM  Procedure end time: 2/9/2023 7:25 AM    Staffing  Authorizing Provider: Frank Kelley MD  Performing Provider: David Sumner MD    Anesthesiologist was present at the time of the procedure.    Preanesthetic Checklist  Completed: patient identified, IV checked, site marked, risks and benefits discussed, surgical consent, monitors and equipment checked, pre-op evaluation, timeout performed and anesthesia consent givenArterial  Skin Prep: chlorhexidine gluconate and isopropyl alcohol  Orientation: left  Location: radial    Catheter Size: 20 G  Catheter placement by Ultrasound guidance. Heme positive aspiration all ports.   Vessel Caliber: medium, patent, compressibility normal  Vascular Doppler:  not done  Needle advanced into vessel with real time Ultrasound guidance.Insertion Attempts: 2  Assessment  Dressing: secured with tape and tegaderm  Patient: Tolerated well

## 2023-02-09 NOTE — INTERVAL H&P NOTE
The patient has been examined and the H&P has been reviewed:    I concur with the findings and no changes have occurred since H&P was written.    Surgery risks, benefits and alternative options discussed and understood by patient/family. Consents obtained and L chest marked.          There are no hospital problems to display for this patient.

## 2023-02-09 NOTE — ANESTHESIA PROCEDURE NOTES
Intubation    Date/Time: 2/9/2023 7:12 AM  Performed by: David Sumner MD  Authorized by: Frank Kelley MD     Intubation:     Induction:  Intravenous    Intubated:  Postinduction    Mask Ventilation:  Easy mask    Attempts:  1    Attempted By:  Resident anesthesiologist    Method of Intubation:  Video laryngoscopy    Blade:  Ruby 3    Laryngeal View Grade: Grade I - full view of cords      Difficult Airway Encountered?: No      Complications:  None    Airway Device:  Double lumen tube left    Airway Device Size:  35F    Style/Cuff Inflation:  Cuffed (inflated to minimal occlusive pressure)    Tube secured:  29    Secured at:  The lips    Placement Verified By:  Capnometry    Complicating Factors:  Anterior larynx    Findings Post-Intubation:  BS equal bilateral and atraumatic/condition of teeth unchanged

## 2023-02-09 NOTE — OP NOTE
Date of Surgery: 2/9/2023  Preoperative Diagnosis:  Left upper lobe squamous cell cancer  Postoperative Diagnosis: Same  Procedure:  Extended robotic lingulectomy, mediastinal lymph node dissection, intercostal nerve block 2 or more intercostal levels  Surgeon: Isaiah Allan MD  First Assistant: Doris Colbert MD  Anesthesia: GETA, 7 level intercostal nerve block Exparel/ 0.5% Marcaine/saline  EBL: <10mL    Surgery in Detail:     The patient has a small peripheral left upper lobe squamous cell cancer.  Anatomic lung resection is indicated.      The patient was taken to the operating room placed supine and identified.  General anesthesia was established with double-lumen tube placement.  Tube positioning was confirmed fiberoptically.  The patient was positioned in a right lateral decubitus position.  All pressure points were padded.  Single lung anesthesia was instituted.  Robotic ports were placed along the 8th interspace between the anterior axillary line and paravertebral gutter.  A 7 level intercostal nerve block was performed.  A 12 mm assistant port was placed in the 10th interspace posterior axillary line.  Carbon dioxide insufflation was administered and the robot docked.  The tumor was immediately identified abutting the visceral pleural surface and confined to the lingular division.  There was a remnant fissure between the lingula and upper division.  The tumor was several cm distal to the fissure.  I decided to perform an anatomic lingulectomy.  Inferior pulmonary ligament was divided along with the posterior hilar pleura.  Level 8 and 7 nodes were harvested.  The aortopulmonary window was explored and a large tristin packet harvested.  A level 10 node was subsequently harvested.  The fissure was near completely fused along its entire length.  I performed a very tedious dissection exposing the interlobar pulmonary artery.  Multiple interlobar nodes were harvested.  The bifurcation between the lingular  and basilar trunk was identified.  It was a crossing vessel extending from the basilar trunk over an into the lingula segment.  The inferior fissure margin was completed using several robotic blue loads, incorporating the crossing vessel.  The staple line was hemostatic.  The lingular branches of the superior pulmonary vein were mobilized and divided using a robotic vascular load.  The lingular bronchus was mobilized and divided using a robotic green load after performing a clamp test.  A bifurcating lingular artery was divided using a robotic white load.  ICG was administered intravenously and the parenchymal demarcation line identified using the robotic firefly setting.  I then performed an extended lingulectomy using several fires of a robotic blue load, making sure to extend the staple line at least 1 cm on to the upper division to extend the resection margin.  The specimen was placed into an endobag and extracted through an enlarged anterior port.  Nu Knit hemostatic gauze was placed along the mediastinal tristin dissection bed and hilum.  All robotic instrumentation was removed and all port sites hemostatic. A #24Fr Gino drain was inserted and secured using silk suture.  Left lung ventilation was restored with complete lung expansion.  All port sites were closed in 2 layers using absorbable suture.  A sterile dressing was applied.  The patient was extubated transported to the PACU.     Mariana Joy PA-C functioned as a bedside 1st assistant.  Her duties included robotic docking, instrument exchange, and specimen retrieval throughout the entire surgery.  There was no qualified resident available to function as a bedside first assistant given the case complexity and extent of duties described above.     Attending attestation: I was present for and either directly assisted with or performed the critical and key portions of the procedure.

## 2023-02-10 VITALS
SYSTOLIC BLOOD PRESSURE: 122 MMHG | TEMPERATURE: 99 F | HEIGHT: 62 IN | RESPIRATION RATE: 14 BRPM | DIASTOLIC BLOOD PRESSURE: 64 MMHG | HEART RATE: 89 BPM | OXYGEN SATURATION: 89 % | WEIGHT: 150 LBS | BODY MASS INDEX: 27.6 KG/M2

## 2023-02-10 DIAGNOSIS — R91.1 PULMONARY NODULE: Primary | ICD-10-CM

## 2023-02-10 PROCEDURE — 25000003 PHARM REV CODE 250: Performed by: STUDENT IN AN ORGANIZED HEALTH CARE EDUCATION/TRAINING PROGRAM

## 2023-02-10 RX ORDER — METHOCARBAMOL 500 MG/1
500 TABLET, FILM COATED ORAL 4 TIMES DAILY
Qty: 40 TABLET | Refills: 0 | Status: SHIPPED | OUTPATIENT
Start: 2023-02-10 | End: 2023-02-20

## 2023-02-10 RX ORDER — ACETAMINOPHEN 500 MG
500 TABLET ORAL EVERY 6 HOURS PRN
Refills: 0 | COMMUNITY
Start: 2023-02-10

## 2023-02-10 RX ORDER — OXYCODONE HYDROCHLORIDE 5 MG/1
5 TABLET ORAL EVERY 4 HOURS PRN
Qty: 41 TABLET | Refills: 0 | Status: SHIPPED | OUTPATIENT
Start: 2023-02-10 | End: 2023-09-05

## 2023-02-10 RX ADMIN — LEVOTHYROXINE SODIUM 50 MCG: 50 TABLET ORAL at 05:02

## 2023-02-10 RX ADMIN — NIFEDIPINE 30 MG: 30 TABLET, FILM COATED, EXTENDED RELEASE ORAL at 09:02

## 2023-02-10 RX ADMIN — ASPIRIN 81 MG: 81 TABLET, CHEWABLE ORAL at 09:02

## 2023-02-10 RX ADMIN — OXYCODONE HYDROCHLORIDE 5 MG: 5 TABLET ORAL at 09:02

## 2023-02-10 RX ADMIN — ACETAMINOPHEN 1000 MG: 500 TABLET ORAL at 05:02

## 2023-02-10 RX ADMIN — MELATONIN TAB 3 MG 6 MG: 3 TAB at 01:02

## 2023-02-10 RX ADMIN — METHOCARBAMOL 500 MG: 500 TABLET ORAL at 05:02

## 2023-02-10 RX ADMIN — PANTOPRAZOLE SODIUM 40 MG: 40 TABLET, DELAYED RELEASE ORAL at 09:02

## 2023-02-10 RX ADMIN — OXYCODONE HYDROCHLORIDE 10 MG: 10 TABLET ORAL at 05:02

## 2023-02-10 RX ADMIN — METHOCARBAMOL 500 MG: 500 TABLET ORAL at 09:02

## 2023-02-10 NOTE — ASSESSMENT & PLAN NOTE
Mrs. Segal is a 62 y.o. female smoker with DM, HTN, HLD, MVP, MCTD, and left parotid gland nodule who is s/p robotic assisted extended lingulectomy with mediastinal lymph node dissection for CASSIDY NSCLC.     - CT clamp trial this AM. Pending CXR at 10:30 AM, possible pull CT.   - Possible discharge following CT removal  - Daily CXR  - Multimodal pain management  - DVT ppx: SCDs, RICK hose, lovenox  - OOB, ambulate as tolerated  - Encourage IS use

## 2023-02-10 NOTE — DISCHARGE SUMMARY
[facility]  Thoracic Surgery  Discharge Summary    Patient Name: Cristin Segal  MRN: [id number]  Admission Date: 2/9/2023  Hospital Length of Stay: 1 days  Discharge Date and Time:  02/10/2023 3:40 PM  Attending Physician: Isaiah Allan MD   Discharging Provider: Francisco Bailey PA-C  Primary Care Provider: Angela Gtz MD    HPI:   Mrs. Segal is a 62 y.o. female smoker with DM, HTN, HLD, MVP, MCTD, left parotid gland nodule and CASSIDY NSCLC. LDCT in Nov 2021 showed partly solid CASSIDY nodule. Followed with serial scans showing interval growth 11mm. Percutaneous biopsy positive for well differentiated squamous cell carcinoma.       Procedure(s) (LRB):  XI ROBOTIC RATS,WITH LEFT UPPER LOBECTOMY,LUNG (Left)  LYMPHADENECTOMY (Left)  BLOCK, NERVE, INTERCOSTAL, 2 OR MORE (Left)      Hospital Course: Admitted following robotic assisted extended lingulectomy with MLND for CASSIDY NSCLC. POD1: no acute events overnight. Patient's chest tube with 160 ml output overnight with no air leak present on morning rounds. Pain controlled. O2: on RA satting 97%. Vital signs stable. Patient's chest tube subsequently clamped to simulate removal of tube. CXR obtained at 10:30 AM revealed stable lung without PTX. Patient discharged post operative day 1.       Goals of Care Treatment Preferences:  Code Status: Full Code          Significant Diagnostic Studies: Radiology: X-Ray: CXR: X-Ray Chest 1 View (CXR):   Results for orders placed or performed during the hospital encounter of 02/09/23   X-Ray Chest 1 View    Narrative    EXAMINATION:  XR CHEST 1 VIEW    CLINICAL HISTORY:  chest tube clamped please do not take it before 11;    TECHNIQUE:  Single frontal view of the chest was performed.    COMPARISON:  N 02/10/2023 one    FINDINGS:  Left-sided chest tube remain.  Heart size normal.  Slight prominence of the left hilum as before.  No significant airspace consolidation or extrapulmonary air identified      Impression    See  above      Electronically signed by: Kev Bruce MD  Date:    02/10/2023  Time:    12:22       Pending Diagnostic Studies:     Procedure Component Value Units Date/Time    Specimen to Pathology, Surgery Pulmonary and Thoracic [[id number]] Collected: 02/09/23 1115    Order Status: Sent Lab Status: In process Updated: 02/09/23 1457    Specimen: Tissue         Final Active Diagnoses:    Diagnosis Date Noted POA    PRINCIPAL PROBLEM:  Squamous Cell Carcinoma of CASSIDY of lung [C34.12] 01/24/2023 Yes      Problems Resolved During this Admission:      Discharged Condition: good    Disposition: Home or Self Care    Follow Up:   Follow-up Information     Isaiah Allan MD Follow up in 2 week(s).    Specialty: Cardiothoracic Surgery  Contact information:  [address] [place] [id number]  [phone]                       Patient Instructions:      Diet Adult Regular     Diet Adult Regular     Remove dressing in 48 hours     Notify your health care provider if you experience any of the following:  temperature >100.4     Notify your health care provider if you experience any of the following:  persistent nausea and vomiting or diarrhea     Notify your health care provider if you experience any of the following:  severe uncontrolled pain     Notify your health care provider if you experience any of the following:  redness, tenderness, or signs of infection (pain, swelling, redness, odor or green/yellow discharge around incision site)     Notify your health care provider if you experience any of the following:  increased confusion or weakness     Notify your health care provider if you experience any of the following:  persistent dizziness, light-headedness, or visual disturbances     Notify your health care provider if you experience any of the following:  worsening rash     Notify your health care provider if you experience any of the following:  severe persistent headache     Notify your health care provider if  you experience any of the following:  difficulty breathing or increased cough     Lifting restrictions   Order Comments: Do not lift anything greater than 45 lbs prior to follow up appointment.     No driving until:   Order Comments: Do not drive until narcotic pain medication(s) discontinued.     Notify your health care provider if you experience any of the following:  temperature >100.4     Notify your health care provider if you experience any of the following:  persistent nausea and vomiting or diarrhea     Notify your health care provider if you experience any of the following:  severe uncontrolled pain     Notify your health care provider if you experience any of the following:  redness, tenderness, or signs of infection (pain, swelling, redness, odor or green/yellow discharge around incision site)     Notify your health care provider if you experience any of the following:  difficulty breathing or increased cough     Notify your health care provider if you experience any of the following:  severe persistent headache     Notify your health care provider if you experience any of the following:  worsening rash     Notify your health care provider if you experience any of the following:  persistent dizziness, light-headedness, or visual disturbances     Notify your health care provider if you experience any of the following:  increased confusion or weakness     Remove dressing in 48 hours   Order Comments: Leave dressing in place for 48 hours. Following the initial 48 hours, patient may take showers; however, do not scrub incision sites. Let soapy water rinse over incision sites.     Shower on day dressing removed (No bath)     Activity as tolerated     Medications:  Reconciled Home Medications:      Medication List      START taking these medications    acetaminophen 500 MG tablet  Commonly known as: TYLENOL  Take 1 tablet (500 mg total) by mouth every 6 (six) hours as needed for Pain.     methocarbamoL 500 MG  Tab  Commonly known as: ROBAXIN  Take 1 tablet (500 mg total) by mouth 4 (four) times daily. for 10 days     oxyCODONE 5 MG immediate release tablet  Commonly known as: ROXICODONE  Take 1 tablet (5 mg total) by mouth every 4 (four) hours as needed for Pain.        CHANGE how you take these medications    rosuvastatin 5 MG tablet  Commonly known as: CRESTOR  TAKE 1 TABLET BY MOUTH EVERYDAY AT BEDTIME  What changed: when to take this        CONTINUE taking these medications    AIRBORNE (WITH FOLIC ACID) ORAL  Take 1 tablet by mouth 4 (four) times daily.     albuterol 90 mcg/actuation inhaler  Commonly known as: VENTOLIN HFA  Inhale 2 puffs into the lungs every 6 (six) hours as needed for Wheezing or Shortness of Breath. Rescue     aspirin 81 MG Chew  Take 1 tablet (81 mg total) by mouth 3 (three) times a week.     hydrOXYchloroQUINE 200 mg tablet  Commonly known as: PLAQUENIL  Take 1 tablet (200 mg total) by mouth once daily.     KLOR-CON M20 20 MEQ tablet  Generic drug: potassium chloride SA  TAKE 1 TABLET (20 MEQ TOTAL) BY MOUTH ONCE DAILY.     levothyroxine 50 MCG tablet  Commonly known as: SYNTHROID  Take 1 tablet (50 mcg total) by mouth before breakfast.     losartan 25 MG tablet  Commonly known as: COZAAR  Take 1 tablet (25 mg total) by mouth once daily.     NIFEdipine 30 MG Tbsr  Commonly known as: ADALAT CC  TAKE 1 TABLET BY MOUTH EVERY DAY     pantoprazole 40 MG tablet  Commonly known as: PROTONIX  TAKE 1 TABLET BY MOUTH EVERY DAY     vitamin D 1000 units Tab  Commonly known as: VITAMIN D3  Take 1,000 Units by mouth once daily.     WESTAB MAX 2.5-25-2 mg Tab  Generic drug: folic acid-vit B6-vit B12 2.5-25-2 mg  TAKE 1 TABLET BY MOUTH EVERY DAY        STOP taking these medications    cephALEXin 500 MG capsule  Commonly known as: KEFLEX     cyclobenzaprine 10 MG tablet  Commonly known as: FLEXERIL     fluticasone propionate 50 mcg/actuation nasal spray  Commonly known as: BRANDI Bailey,  HEBER  Thoracic Surgery  [facility]

## 2023-02-10 NOTE — HOSPITAL COURSE
Admitted following robotic assisted extended lingulectomy with MLND for CASSIDY NSCLC. POD1: no acute events overnight. Patient's chest tube with 160 ml output overnight with no air leak present on morning rounds. Pain controlled. O2: on RA satting 97%. Vital signs stable. Patient's chest tube subsequently clamped to simulate removal of tube. CXR obtained at 10:30 AM revealed stable lung without PTX. Patient discharged post operative day 1.

## 2023-02-10 NOTE — PLAN OF CARE
Pt discharging to home. Medications will be picked up at pt pharmacy. IV removed. All discharge instructions went over and questions answered.

## 2023-02-10 NOTE — HPI
Mrs. Segal is a 62 y.o. female smoker with DM, HTN, HLD, MVP, MCTD, left parotid gland nodule and CASSIDY NSCLC. LDCT in Nov 2021 showed partly solid CASSIDY nodule. Followed with serial scans showing interval growth 11mm. Percutaneous biopsy positive for well differentiated squamous cell carcinoma.

## 2023-02-10 NOTE — SUBJECTIVE & OBJECTIVE
Interval History: NAEON.  ml overnight. No air leak present. Pain controlled. O2: on RA satting 97%. VSS.     Medications:  Continuous Infusions:  Scheduled Meds:   acetaminophen  1,000 mg Oral Q8H    aspirin  81 mg Oral Once per day on Mon Wed Fri    enoxaparin  40 mg Subcutaneous Daily    levothyroxine  50 mcg Oral Before breakfast    LIDOcaine  1 patch Transdermal Q24H    methocarbamoL  500 mg Oral QID    NIFEdipine  30 mg Oral Daily    pantoprazole  40 mg Oral Daily    rosuvastatin  5 mg Oral QHS     PRN Meds:melatonin, ondansetron, oxyCODONE, oxyCODONE, sodium chloride 0.9%     Review of patient's allergies indicates:   Allergen Reactions    Pcn [penicillins] Anaphylaxis     Unknown for her- family history with anaphylaxis    Lipitor [atorvastatin]      Objective:     Vital Signs (Most Recent):  Temp: 98 °F (36.7 °C) (02/10/23 0737)  Pulse: 65 (02/10/23 0739)  Resp: 20 (02/10/23 0737)  BP: (!) 154/73 (02/10/23 0737)  SpO2: 97 % (02/10/23 0737)   Vital Signs (24h Range):  Temp:  [97.3 °F (36.3 °C)-98.2 °F (36.8 °C)] 98 °F (36.7 °C)  Pulse:  [61-95] 65  Resp:  [10-52] 20  SpO2:  [93 %-100 %] 97 %  BP: (124-171)/(63-88) 154/73     Intake/Output - Last 3 Shifts         02/08 0700  02/09 0659 02/09 0700  02/10 0659 02/10 0700  02/11 0659    P.O.  480     IV Piggyback  1250     Total Intake(mL/kg)  1730 (25.4)     Urine (mL/kg/hr)  1400 (0.9)     Chest Tube  160     Total Output  1560     Net  +170                    SpO2: 97 %       Physical Exam  Constitutional:       Appearance: Normal appearance.   Eyes:      Extraocular Movements: Extraocular movements intact.   Cardiovascular:      Rate and Rhythm: Normal rate and regular rhythm.   Pulmonary:      Effort: Pulmonary effort is normal.      Breath sounds: Normal breath sounds.      Comments: CT in place on left side.   Abdominal:      General: Abdomen is flat.      Palpations: Abdomen is soft.   Skin:     General: Skin is warm and dry.   Neurological:       General: No focal deficit present.      Mental Status: She is alert and oriented to person, place, and time.   Psychiatric:         Mood and Affect: Mood normal.         Behavior: Behavior normal.         Thought Content: Thought content normal.       Significant Labs:  All pertinent labs from the last 24 hours have been reviewed.    Significant Diagnostics:  CXR: I have reviewed all pertinent results/findings within the past 24 hours    VTE Risk Mitigation (From admission, onward)           Ordered     Place sequential compression device  Until discontinued         02/09/23 1835     enoxaparin injection 40 mg  Daily         02/09/23 1147     IP VTE HIGH RISK PATIENT  Once         02/09/23 1147

## 2023-02-10 NOTE — PLAN OF CARE
[facility]  Initial Discharge Assessment       Primary Care Provider: Angela Gtz MD    Admission Diagnosis: Pulmonary nodule [R91.1]  Malignant neoplasm of left lung, unspecified part of lung [C34.92]  NSCLC of left lung [C34.92]    Admission Date: 2/9/2023  Expected Discharge Date: 2/13/2023    Discharge Barriers Identified: None    Payor: [employer] / Plan: [place] PPO / Product Type: PPO /     Extended Emergency Contact Information  Primary Emergency Contact: Jose De Jesus Segal  Address: [facility] [address] [address] [facility]  Home Phone: [phone]  Work Phone: [phone]  Mobile Phone: [phone]  Relation: Spouse  Preferred language: English   needed? No    Discharge Plan A: Home with family  Discharge Plan B: Home Health      [id number] - [place], [place] - [address] AT [facility]  [address]  [place] [address]  Phone: [phone] Fax: [phone]      Initial Assessment (most recent)       Adult Discharge Assessment - 02/10/23 1211          Discharge Assessment    Assessment Type Discharge Planning Assessment     Confirmed/corrected address, phone number and insurance Yes     Confirmed Demographics Correct on Facesheet     Source of Information patient;family   Jose De Jesus     When was your last doctors appointment? 12/07/22   Dr Angela Diallo    Communicated DEBBI with patient/caregiver Date not available/Unable to determine     Reason For Admission Pulmonary Nodule     People in Home spouse     Facility Arrived From: Home     Do you expect to return to your current living situation? Yes     Do you have help at home or someone to help you manage your care at home? No     Prior to hospitilization cognitive status: Alert/Oriented     Current cognitive status: Alert/Oriented     Equipment Currently Used at Home walker, standard;cane, straight   Patient only uses cane, the walker is not being used    Readmission  within 30 days? No     Patient currently being followed by outpatient case management? No     Do you currently have service(s) that help you manage your care at home? No     Do you take prescription medications? Yes     Do you have prescription coverage? Yes     Coverage [place]     Do you have any problems affording any of your prescribed medications? No     Is the patient taking medications as prescribed? yes     Who is going to help you get home at discharge? Jose De Jesus Segal spouse     How do you get to doctors appointments? family or friend will provide     Are you on dialysis? No                   This CM met with patient and spouse, Jose De Jesus CAMARGO At bedside   in room to complete DPA. Questions answered / contact numbers provided.  Use [facility] [place] [[phone]] and patient is agreeable  BEDSIDE DELIVERY for any necessary medications at time of discharge. Patient  is independent with use of a walking straight cane and is independent  all ADLs. Patient does not In-home equipment, Patient is not on HD. Patient is on ASA 81 mg.  Patient is not home oxygen.  Patients DEBBI: 2/13/23.   Patients, , Jose De Jesus CAMARGO  will be assisting with help upon discharge. Jose De Jesus CAMARGO [Spouse]   will be providing transportation home. Hospital follow up will be scheduled with PCP. Will continue to follow for course of hospitalization.     Michelle Stephens RN  Case Management  [facility]  [phone]

## 2023-02-10 NOTE — DISCHARGE SUMMARY
[facility] - [facility]  Thoracic Surgery  Discharge Summary    Patient Name: Cristin Segal  MRN: [id number]  Admission Date: 2/9/2023  Hospital Length of Stay: 1 days  Discharge Date and Time:  02/10/2023 3:57 PM  Attending Physician: Isaiah Allan MD   Discharging Provider: Mariana Joy PA-C  Primary Care Provider: Angela Gtz MD    HPI:   Mrs. Segal is a 62 y.o. female smoker with DM, HTN, HLD, MVP, MCTD, left parotid gland nodule and CASSIDY NSCLC. LDCT in Nov 2021 showed partly solid CASSIDY nodule. Followed with serial scans showing interval growth 11mm. Percutaneous biopsy positive for well differentiated squamous cell carcinoma.       Procedure(s) (LRB):  XI ROBOTIC RATS,WITH LEFT UPPER LOBECTOMY,LUNG (Left)  LYMPHADENECTOMY (Left)  BLOCK, NERVE, INTERCOSTAL, 2 OR MORE (Left)      Hospital Course: Admitted following robotic assisted extended lingulectomy with MLND for CASSIDY NSCLC. POD1: no acute events overnight. Chest tube clamp trial and subsequent removal. Pain controlled. O2: on RA satting 97%. Vital signs stable.  Patient discharged post operative day 1.       Goals of Care Treatment Preferences:  Code Status: Full Code          Significant Diagnostic Studies: Radiology: X-Ray: CXR: X-Ray Chest 1 View (CXR):   Results for orders placed or performed during the hospital encounter of 02/09/23   X-Ray Chest 1 View    Narrative    EXAMINATION:  XR CHEST 1 VIEW    CLINICAL HISTORY:  chest tube clamped please do not take it before 11;    TECHNIQUE:  Single frontal view of the chest was performed.    COMPARISON:  N 02/10/2023 one    FINDINGS:  Left-sided chest tube remain.  Heart size normal.  Slight prominence of the left hilum as before.  No significant airspace consolidation or extrapulmonary air identified      Impression    See above      Electronically signed by: Kev Bruce MD  Date:    02/10/2023  Time:    12:22       Pending Diagnostic Studies:     Procedure Component Value Units Date/Time     Specimen to Pathology, Surgery Pulmonary and Thoracic [[id number]] Collected: 02/09/23 1115    Order Status: Sent Lab Status: In process Updated: 02/09/23 1456    Specimen: Tissue         Final Active Diagnoses:    Diagnosis Date Noted POA    PRINCIPAL PROBLEM:  Squamous Cell Carcinoma of CASSIDY of lung [C34.12] 01/24/2023 Yes      Problems Resolved During this Admission:      Discharged Condition: good    Disposition: Home or Self Care    Follow Up:   Follow-up Information     Isaiah Allan MD Follow up in 2 week(s).    Specialty: Cardiothoracic Surgery  Contact information:  [address]  [facility] [address]  [phone]                       Patient Instructions:      Diet Adult Regular     Diet Adult Regular     Remove dressing in 48 hours     Notify your health care provider if you experience any of the following:  temperature >100.4     Notify your health care provider if you experience any of the following:  persistent nausea and vomiting or diarrhea     Notify your health care provider if you experience any of the following:  severe uncontrolled pain     Notify your health care provider if you experience any of the following:  redness, tenderness, or signs of infection (pain, swelling, redness, odor or green/yellow discharge around incision site)     Notify your health care provider if you experience any of the following:  increased confusion or weakness     Notify your health care provider if you experience any of the following:  persistent dizziness, light-headedness, or visual disturbances     Notify your health care provider if you experience any of the following:  worsening rash     Notify your health care provider if you experience any of the following:  severe persistent headache     Notify your health care provider if you experience any of the following:  difficulty breathing or increased cough     Lifting restrictions   Order Comments: Do not lift anything greater than 45 lbs prior to  follow up appointment.     No driving until:   Order Comments: Do not drive until narcotic pain medication(s) discontinued.     Notify your health care provider if you experience any of the following:  temperature >100.4     Notify your health care provider if you experience any of the following:  persistent nausea and vomiting or diarrhea     Notify your health care provider if you experience any of the following:  severe uncontrolled pain     Notify your health care provider if you experience any of the following:  redness, tenderness, or signs of infection (pain, swelling, redness, odor or green/yellow discharge around incision site)     Notify your health care provider if you experience any of the following:  difficulty breathing or increased cough     Notify your health care provider if you experience any of the following:  severe persistent headache     Notify your health care provider if you experience any of the following:  worsening rash     Notify your health care provider if you experience any of the following:  persistent dizziness, light-headedness, or visual disturbances     Notify your health care provider if you experience any of the following:  increased confusion or weakness     Remove dressing in 48 hours   Order Comments: Leave dressing in place for 48 hours. Following the initial 48 hours, patient may take showers; however, do not scrub incision sites. Let soapy water rinse over incision sites.     Shower on day dressing removed (No bath)     Activity as tolerated     Medications:  Reconciled Home Medications:      Medication List      START taking these medications    acetaminophen 500 MG tablet  Commonly known as: TYLENOL  Take 1 tablet (500 mg total) by mouth every 6 (six) hours as needed for Pain.     methocarbamoL 500 MG Tab  Commonly known as: ROBAXIN  Take 1 tablet (500 mg total) by mouth 4 (four) times daily. for 10 days     oxyCODONE 5 MG immediate release tablet  Commonly known as:  ROXICODONE  Take 1 tablet (5 mg total) by mouth every 4 (four) hours as needed for Pain.        CHANGE how you take these medications    rosuvastatin 5 MG tablet  Commonly known as: CRESTOR  TAKE 1 TABLET BY MOUTH EVERYDAY AT BEDTIME  What changed: when to take this        CONTINUE taking these medications    AIRBORNE (WITH FOLIC ACID) ORAL  Take 1 tablet by mouth 4 (four) times daily.     albuterol 90 mcg/actuation inhaler  Commonly known as: VENTOLIN HFA  Inhale 2 puffs into the lungs every 6 (six) hours as needed for Wheezing or Shortness of Breath. Rescue     aspirin 81 MG Chew  Take 1 tablet (81 mg total) by mouth 3 (three) times a week.     hydrOXYchloroQUINE 200 mg tablet  Commonly known as: PLAQUENIL  Take 1 tablet (200 mg total) by mouth once daily.     KLOR-CON M20 20 MEQ tablet  Generic drug: potassium chloride SA  TAKE 1 TABLET (20 MEQ TOTAL) BY MOUTH ONCE DAILY.     levothyroxine 50 MCG tablet  Commonly known as: SYNTHROID  Take 1 tablet (50 mcg total) by mouth before breakfast.     losartan 25 MG tablet  Commonly known as: COZAAR  Take 1 tablet (25 mg total) by mouth once daily.     NIFEdipine 30 MG Tbsr  Commonly known as: ADALAT CC  TAKE 1 TABLET BY MOUTH EVERY DAY     pantoprazole 40 MG tablet  Commonly known as: PROTONIX  TAKE 1 TABLET BY MOUTH EVERY DAY     vitamin D 1000 units Tab  Commonly known as: VITAMIN D3  Take 1,000 Units by mouth once daily.     WESTAB MAX 2.5-25-2 mg Tab  Generic drug: folic acid-vit B6-vit B12 2.5-25-2 mg  TAKE 1 TABLET BY MOUTH EVERY DAY        STOP taking these medications    cephALEXin 500 MG capsule  Commonly known as: KEFLEX     cyclobenzaprine 10 MG tablet  Commonly known as: FLEXERIL     fluticasone propionate 50 mcg/actuation nasal spray  Commonly known as: FLONASE            Mariana Joy PA-C  Thoracic Surgery  [facility]

## 2023-02-10 NOTE — PLAN OF CARE
Pt rested well overnight, up with staff assist x1 to ambulate to bathroom. Voiding without diff. CT in place, minimal pain reported overnight. Managed well with current regimen. No acute distress noted, safety and comfort maintained.

## 2023-02-10 NOTE — PROGRESS NOTES
Td Hwang - [facility]  Thoracic Surgery  Progress Note    Subjective:     History of Present Illness:  Mrs. Segal is a 62 y.o. female smoker with DM, HTN, HLD, MVP, MCTD, left parotid gland nodule and CASSIDY NSCLC. LDCT in Nov 2021 showed partly solid CASSIDY nodule. Followed with serial scans showing interval growth 11mm. Percutaneous biopsy positive for well differentiated squamous cell carcinoma.       Post-Op Info:  Procedure(s) (LRB):  XI ROBOTIC RATS,WITH LEFT UPPER LOBECTOMY,LUNG (Left)  LYMPHADENECTOMY (Left)  BLOCK, NERVE, INTERCOSTAL, 2 OR MORE (Left)   1 Day Post-Op     Interval History: NAEON.  ml overnight. No air leak present. Pain controlled. O2: on RA satting 97%. VSS.     Medications:  Continuous Infusions:  Scheduled Meds:   acetaminophen  1,000 mg Oral Q8H    aspirin  81 mg Oral Once per day on Mon Wed Fri    enoxaparin  40 mg Subcutaneous Daily    levothyroxine  50 mcg Oral Before breakfast    LIDOcaine  1 patch Transdermal Q24H    methocarbamoL  500 mg Oral QID    NIFEdipine  30 mg Oral Daily    pantoprazole  40 mg Oral Daily    rosuvastatin  5 mg Oral QHS     PRN Meds:melatonin, ondansetron, oxyCODONE, oxyCODONE, sodium chloride 0.9%     Review of patient's allergies indicates:   Allergen Reactions    Pcn [penicillins] Anaphylaxis     Unknown for her- family history with anaphylaxis    Lipitor [atorvastatin]      Objective:     Vital Signs (Most Recent):  Temp: 98 °F (36.7 °C) (02/10/23 0737)  Pulse: 65 (02/10/23 0739)  Resp: 20 (02/10/23 0737)  BP: (!) 154/73 (02/10/23 0737)  SpO2: 97 % (02/10/23 0737)   Vital Signs (24h Range):  Temp:  [97.3 °F (36.3 °C)-98.2 °F (36.8 °C)] 98 °F (36.7 °C)  Pulse:  [61-95] 65  Resp:  [10-52] 20  SpO2:  [93 %-100 %] 97 %  BP: (124-171)/(63-88) 154/73     Intake/Output - Last 3 Shifts         02/08 0700  02/09 0659 02/09 0700  02/10 0659 02/10 0700 02/11 0659    P.O.  480     IV Piggyback  1250     Total Intake(mL/kg)  1730 (25.4)     Urine (mL/kg/hr)   1400 (0.9)     Chest Tube  160     Total Output  1560     Net  +170                    SpO2: 97 %       Physical Exam  Constitutional:       Appearance: Normal appearance.   Eyes:      Extraocular Movements: Extraocular movements intact.   Cardiovascular:      Rate and Rhythm: Normal rate and regular rhythm.   Pulmonary:      Effort: Pulmonary effort is normal.      Breath sounds: Normal breath sounds.      Comments: CT in place on left side.   Abdominal:      General: Abdomen is flat.      Palpations: Abdomen is soft.   Skin:     General: Skin is warm and dry.   Neurological:      General: No focal deficit present.      Mental Status: She is alert and oriented to person, place, and time.   Psychiatric:         Mood and Affect: Mood normal.         Behavior: Behavior normal.         Thought Content: Thought content normal.       Significant Labs:  All pertinent labs from the last 24 hours have been reviewed.    Significant Diagnostics:  CXR: I have reviewed all pertinent results/findings within the past 24 hours    VTE Risk Mitigation (From admission, onward)           Ordered     Place sequential compression device  Until discontinued         02/09/23 1835     enoxaparin injection 40 mg  Daily         02/09/23 1147     IP VTE HIGH RISK PATIENT  Once         02/09/23 1147                  Assessment/Plan:     * Squamous Cell Carcinoma of CASSIDY of lung  Mrs. Segal is a 62 y.o. female smoker with DM, HTN, HLD, MVP, MCTD, and left parotid gland nodule who is s/p robotic assisted extended lingulectomy with mediastinal lymph node dissection for CASSIDY NSCLC.     - CT clamp trial this AM. Pending CXR at 10:30 AM, possible pull CT.   - Possible discharge following CT removal  - Daily CXR  - Multimodal pain management  - DVT ppx: SCDs, RICK hose, lovenox  - OOB, ambulate as tolerated  - Encourage IS use        Francisco Bailey PA-C  Thoracic Surgery  [facility]

## 2023-02-10 NOTE — NURSING TRANSFER
Nursing Transfer Note      2/9/2023     Transfer To: 1061    Transfer via bed    Transfer with cardiac monitoring via centralized telemetry, eyeglasses    Transported by RN and transport     Medicines sent: none    Chart send with patient: Yes    Notified: spouse via text messaging system    Patient reassessed at: 2/9/2023 6645

## 2023-02-10 NOTE — PLAN OF CARE
02/10/23 1555   Final Note   Assessment Type Final Discharge Note   Anticipated Discharge Disposition Home   What phone number can be called within the next 1-3 days to see how you are doing after discharge? [phone]   Post-Acute Status   Coverage [employer] [place] PPO   Discharge Delays None known at this time         Patient to be discharged home and no needs identified. Spoke with bedside pharmacy tech and discharge med's to be processed and will speak with the patient.    Follow Up:        Follow-up Information      Isaiah Allan MD Follow up in 2 week(s).    Specialty: Cardiothoracic Surgery  Contact information:  [address]  [facility] [address]  [phone]             Michelle Stephens RN  Case Management  [facility]  [phone]

## 2023-02-10 NOTE — PLAN OF CARE
Problem: Adult Inpatient Plan of Care  Goal: Plan of Care Review  Outcome: Ongoing, Progressing     Problem: Adult Inpatient Plan of Care  Goal: Optimal Comfort and Wellbeing  Outcome: Ongoing, Progressing     Problem: Infection  Goal: Absence of Infection Signs and Symptoms  Outcome: Ongoing, Progressing

## 2023-02-16 LAB
FINAL PATHOLOGIC DIAGNOSIS: NORMAL
GROSS: NORMAL
Lab: NORMAL

## 2023-02-25 LAB
ACID FAST MOD KINY STN SPEC: NORMAL
MYCOBACTERIUM SPEC QL CULT: NORMAL

## 2023-03-06 ENCOUNTER — OFFICE VISIT (OUTPATIENT)
Dept: HEMATOLOGY/ONCOLOGY | Facility: CLINIC | Age: 63
End: 2023-03-06
Payer: COMMERCIAL

## 2023-03-06 VITALS
SYSTOLIC BLOOD PRESSURE: 102 MMHG | BODY MASS INDEX: 26.13 KG/M2 | TEMPERATURE: 98 F | RESPIRATION RATE: 18 BRPM | HEIGHT: 62 IN | OXYGEN SATURATION: 96 % | WEIGHT: 142 LBS | HEART RATE: 72 BPM | DIASTOLIC BLOOD PRESSURE: 59 MMHG

## 2023-03-06 DIAGNOSIS — C34.12 MALIGNANT NEOPLASM OF UPPER LOBE OF LEFT LUNG: ICD-10-CM

## 2023-03-06 PROCEDURE — 1159F MED LIST DOCD IN RCRD: CPT | Mod: CPTII,S$GLB,, | Performed by: INTERNAL MEDICINE

## 2023-03-06 PROCEDURE — 3074F SYST BP LT 130 MM HG: CPT | Mod: CPTII,S$GLB,, | Performed by: INTERNAL MEDICINE

## 2023-03-06 PROCEDURE — 4010F PR ACE/ARB THEARPY RXD/TAKEN: ICD-10-PCS | Mod: CPTII,S$GLB,, | Performed by: INTERNAL MEDICINE

## 2023-03-06 PROCEDURE — 3074F PR MOST RECENT SYSTOLIC BLOOD PRESSURE < 130 MM HG: ICD-10-PCS | Mod: CPTII,S$GLB,, | Performed by: INTERNAL MEDICINE

## 2023-03-06 PROCEDURE — 99214 OFFICE O/P EST MOD 30 MIN: CPT | Mod: S$GLB,,, | Performed by: INTERNAL MEDICINE

## 2023-03-06 PROCEDURE — 3078F DIAST BP <80 MM HG: CPT | Mod: CPTII,S$GLB,, | Performed by: INTERNAL MEDICINE

## 2023-03-06 PROCEDURE — 99214 PR OFFICE/OUTPT VISIT, EST, LEVL IV, 30-39 MIN: ICD-10-PCS | Mod: S$GLB,,, | Performed by: INTERNAL MEDICINE

## 2023-03-06 PROCEDURE — 3078F PR MOST RECENT DIASTOLIC BLOOD PRESSURE < 80 MM HG: ICD-10-PCS | Mod: CPTII,S$GLB,, | Performed by: INTERNAL MEDICINE

## 2023-03-06 PROCEDURE — 1159F PR MEDICATION LIST DOCUMENTED IN MEDICAL RECORD: ICD-10-PCS | Mod: CPTII,S$GLB,, | Performed by: INTERNAL MEDICINE

## 2023-03-06 PROCEDURE — 1111F DSCHRG MED/CURRENT MED MERGE: CPT | Mod: CPTII,S$GLB,, | Performed by: INTERNAL MEDICINE

## 2023-03-06 PROCEDURE — 1111F PR DISCHARGE MEDS RECONCILED W/ CURRENT OUTPATIENT MED LIST: ICD-10-PCS | Mod: CPTII,S$GLB,, | Performed by: INTERNAL MEDICINE

## 2023-03-06 PROCEDURE — 3008F BODY MASS INDEX DOCD: CPT | Mod: CPTII,S$GLB,, | Performed by: INTERNAL MEDICINE

## 2023-03-06 PROCEDURE — 4010F ACE/ARB THERAPY RXD/TAKEN: CPT | Mod: CPTII,S$GLB,, | Performed by: INTERNAL MEDICINE

## 2023-03-06 PROCEDURE — 3008F PR BODY MASS INDEX (BMI) DOCUMENTED: ICD-10-PCS | Mod: CPTII,S$GLB,, | Performed by: INTERNAL MEDICINE

## 2023-03-06 NOTE — PROGRESS NOTES
PROGRESS NOTE    Subjective:       Patient ID: Cristin Segal is a 62 y.o. female.    12/27/2022:  CT chest:  Slowly enlarging soft tissue density nodule of the left upper lobe now measuring 11 mm.  Neoplasm is suspected and percutaneous biopsy is recommended.  Otherwise continued follow-up by CT scan is recommended with consideration of a repeat PET CT    1/6/2023:  CASSIDY Lung Biopsy:  LUNG, LEFT UPPER LOBE MASS, CT-GUIDED BIOPSY: Invasive squamous cell carcinoma, well differentiated.    1/20/2023:  MRI Brain-no mets    1/23/2023:  Bone scan, uptake at T11, history of vertebroplasty    2/9/2023:  Lung, lingulectomy: Invasive well-differentiated keratinizing squamous   cell carcinoma, measuring 16 mm (1.6 cm) in greatest dimension.          Bronchial and vascular margins are negative for atypia or malignancy.          See synoptic report in comment section for further details.   9 lymph nodes taken,  all negative.    Visceral pleural invasion:  Present  pT2aN0M0-Stage IB     medically appropriate patients with stage IB disease whose tumors display one or more high-risk features, including lymphovascular invasion, poor differentiation, or high standardized uptake value (SUV) on positron emission tomography (PET; variably defined as SUV 10 or more). However, there is no consensus among expert groups.     PLAN:  PET scan-1/31/2023,  CASSIDY lobectomy 2/9/2022-Dr. Allan  F/U with me 4 weeks after surgery to decide on chemo need    Chief Complaint:  No chief complaint on file.  Lung cancer follow up    History of Present Illness:   Cristin Segal is a 62 y.o. female who presents for follow up of above.        Family and Social history reviewed and is unchanged from 1/24/2023      ROS:  Review of Systems   Constitutional:  Negative for fever.   Respiratory:  Negative for shortness of breath.    Cardiovascular:  Negative for chest pain and leg swelling.    Gastrointestinal:  Negative for abdominal pain and blood in stool.   Genitourinary:  Negative for hematuria.   Skin:  Negative for rash.        Current Outpatient Medications:     acetaminophen (TYLENOL) 500 MG tablet, Take 1 tablet (500 mg total) by mouth every 6 (six) hours as needed for Pain., Disp: , Rfl: 0    albuterol (VENTOLIN HFA) 90 mcg/actuation inhaler, Inhale 2 puffs into the lungs every 6 (six) hours as needed for Wheezing or Shortness of Breath. Rescue, Disp: 8 g, Rfl: 5    hydrOXYchloroQUINE (PLAQUENIL) 200 mg tablet, Take 1 tablet (200 mg total) by mouth once daily., Disp: 90 tablet, Rfl: 1    KLOR-CON M20 20 mEq tablet, TAKE 1 TABLET (20 MEQ TOTAL) BY MOUTH ONCE DAILY., Disp: 90 tablet, Rfl: 3    levothyroxine (SYNTHROID) 50 MCG tablet, Take 1 tablet (50 mcg total) by mouth before breakfast., Disp: 90 tablet, Rfl: 3    losartan (COZAAR) 25 MG tablet, Take 1 tablet (25 mg total) by mouth once daily., Disp: 90 tablet, Rfl: 3    mv-min/FA/C/glut/lysine/djd491 (AIRBORNE, WITH FOLIC ACID, ORAL), Take 1 tablet by mouth 4 (four) times daily., Disp: , Rfl:     NIFEdipine (ADALAT CC) 30 MG TbSR, TAKE 1 TABLET BY MOUTH EVERY DAY, Disp: 90 tablet, Rfl: 2    oxyCODONE (ROXICODONE) 5 MG immediate release tablet, Take 1 tablet (5 mg total) by mouth every 4 (four) hours as needed for Pain., Disp: 41 tablet, Rfl: 0    pantoprazole (PROTONIX) 40 MG tablet, TAKE 1 TABLET BY MOUTH EVERY DAY, Disp: 90 tablet, Rfl: 3    rosuvastatin (CRESTOR) 5 MG tablet, TAKE 1 TABLET BY MOUTH EVERYDAY AT BEDTIME (Patient taking differently: Take 5 mg by mouth once daily.), Disp: 90 tablet, Rfl: 3    vitamin D (VITAMIN D3) 1000 units Tab, Take 1,000 Units by mouth once daily., Disp: , Rfl:     WESTAB MAX 2.5-25-2 mg Tab, TAKE 1 TABLET BY MOUTH EVERY DAY, Disp: 90 tablet, Rfl: 3    aspirin 81 MG Chew, Take 1 tablet (81 mg total) by mouth 3 (three) times a week., Disp: 13 tablet, Rfl: 0  No current facility-administered medications for  "this visit.    Facility-Administered Medications Ordered in Other Visits:     lactated ringers infusion, , Intravenous, Continuous, Kev Bai MD        Objective:       Physical Examination:     BP (!) 102/59   Pulse 72   Temp 97.7 °F (36.5 °C)   Resp 18   Ht 5' 2" (1.575 m)   Wt 64.4 kg (142 lb)   SpO2 96%   BMI 25.97 kg/m²     Physical Exam  Constitutional:       Appearance: She is well-developed.   HENT:      Head: Normocephalic and atraumatic.      Right Ear: External ear normal.      Left Ear: External ear normal.   Eyes:      Conjunctiva/sclera: Conjunctivae normal.      Pupils: Pupils are equal, round, and reactive to light.   Neck:      Thyroid: No thyromegaly.      Trachea: No tracheal deviation.   Cardiovascular:      Rate and Rhythm: Normal rate and regular rhythm.      Heart sounds: Normal heart sounds.   Pulmonary:      Effort: Pulmonary effort is normal.      Breath sounds: Normal breath sounds.   Abdominal:      General: Bowel sounds are normal. There is no distension.      Palpations: Abdomen is soft. There is no mass.      Tenderness: There is no abdominal tenderness.   Skin:     Findings: No rash.   Neurological:      Comments: Neuro intact througout   Psychiatric:         Behavior: Behavior normal.         Thought Content: Thought content normal.         Judgment: Judgment normal.       Labs:   No results found for this or any previous visit (from the past 336 hour(s)).  CMP  Sodium   Date Value Ref Range Status   11/21/2022 137 136 - 145 mmol/L Final   06/28/2019 138 134 - 144 mmol/L      Potassium   Date Value Ref Range Status   11/21/2022 4.8 3.5 - 5.1 mmol/L Final     Chloride   Date Value Ref Range Status   11/21/2022 102 95 - 110 mmol/L Final   06/28/2019 101 98 - 110 mmol/L      CO2   Date Value Ref Range Status   11/21/2022 20 (L) 23 - 29 mmol/L Final     Glucose   Date Value Ref Range Status   11/21/2022 61 (L) 70 - 110 mg/dL Final   06/28/2019 111 (H) 70 - 99 mg/dL      BUN "   Date Value Ref Range Status   11/21/2022 18 8 - 23 mg/dL Final     Creatinine   Date Value Ref Range Status   02/09/2023 1.0 0.5 - 1.4 mg/dL Final   06/28/2019 0.74 0.60 - 1.40 mg/dL      Calcium   Date Value Ref Range Status   11/21/2022 10.4 8.7 - 10.5 mg/dL Final     Total Protein   Date Value Ref Range Status   11/21/2022 8.8 (H) 6.0 - 8.4 g/dL Final     Albumin   Date Value Ref Range Status   11/21/2022 4.5 3.5 - 5.2 g/dL Final   06/28/2019 4.5 3.1 - 4.7 g/dL      Total Bilirubin   Date Value Ref Range Status   11/21/2022 0.5 0.1 - 1.0 mg/dL Final     Comment:     For infants and newborns, interpretation of results should be based  on gestational age, weight and in agreement with clinical  observations.    Premature Infant recommended reference ranges:  Up to 24 hours.............<8.0 mg/dL  Up to 48 hours............<12.0 mg/dL  3-5 days..................<15.0 mg/dL  6-29 days.................<15.0 mg/dL       Alkaline Phosphatase   Date Value Ref Range Status   11/21/2022 76 55 - 135 U/L Final     AST   Date Value Ref Range Status   11/21/2022 51 (H) 10 - 40 U/L Final     ALT   Date Value Ref Range Status   11/21/2022 37 10 - 44 U/L Final     Anion Gap   Date Value Ref Range Status   11/21/2022 15 8 - 16 mmol/L Final     eGFR if    Date Value Ref Range Status   05/11/2022 56.4 (A) >60 mL/min/1.73 m^2 Final   05/11/2022 56.4 (A) >60 mL/min/1.73 m^2 Final     eGFR if non    Date Value Ref Range Status   05/11/2022 48.9 (A) >60 mL/min/1.73 m^2 Final     Comment:     Calculation used to obtain the estimated glomerular filtration  rate (eGFR) is the CKD-EPI equation.      05/11/2022 48.9 (A) >60 mL/min/1.73 m^2 Final     Comment:     Calculation used to obtain the estimated glomerular filtration  rate (eGFR) is the CKD-EPI equation.        No results found for: CEA  No results found for: PSA        Assessment/Plan:     Problem List Items Addressed This Visit       Squamous Cell  Carcinoma of CASSIDY of lung     I had a long discussion with Ms. Segal today about this cancer.  She is T2 by nature of visceral pleural invasion.  Everything about this cancer o/w is low risk.  She is low grade, no nodes pos, margins negative and the tumor is small at 16mm, she SUV uptake on pet was very low at 1.8.  I will ask Dr. Lynn to run this past his tumor board and I will do the same.  There is no clear consensus for adjuvant treatment in Stage IB disease and I reviewed this with her today.  Spent over 30 min in total care today including pre-visit review and charting, visit and post-visit planning.               Discussion:     Follow up in about 4 weeks (around 4/3/2023).      Electronically signed by Lars Carrasco

## 2023-03-06 NOTE — PROGRESS NOTES
Subjective:       Patient ID: Cristin Segal is a 62 y.o. female.    Chief Complaint: No chief complaint on file.    Diagnosis:  CASSIDY Squamous Cell Carcinoma     Pre-operative therapy: none    Procedure(s) and date(s): 02/09/23 - Extended robotic lingulectomy, mediastinal lymph node dissection, intercostal nerve block 2 or more intercostal levels    Pathology: 1.6 cm well differentiated keratinizing squamous cell carcinoma. +VPI. Margins negative (1.8 cm bronchovascular). Levels 5, 7, 8, 10, 11, 12 negative for malignancy. [id number], stage IB    Post-operative therapy:  surveillance    HPI  Mrs. Segal is a 62 y.o. female smoker with DM, HTN, HLD, MVP, MCTD, left parotid gland nodule who presents to clinic today for 2 week follow up s/p robotic assisted lingulectomy for CASSIDY NSCLC. Post operative course uncomplicated. Today patient reports incisional pain.    Review of Systems   Constitutional: Negative.    HENT: Negative.     Respiratory: Negative.     Cardiovascular: Negative.    Gastrointestinal: Negative.    Psychiatric/Behavioral: Negative.         Objective:      Physical Exam  Constitutional:       Appearance: Normal appearance.   HENT:      Head: Normocephalic and atraumatic.   Eyes:      Extraocular Movements: Extraocular movements intact.      Conjunctiva/sclera: Conjunctivae normal.      Pupils: Pupils are equal, round, and reactive to light.   Cardiovascular:      Rate and Rhythm: Normal rate and regular rhythm.      Pulses: Normal pulses.      Heart sounds: Murmur heard.   Abdominal:      Palpations: Abdomen is soft.   Musculoskeletal:      Cervical back: Normal range of motion.   Skin:     General: Skin is warm and dry.      Capillary Refill: Capillary refill takes less than 2 seconds.      Comments: WCDI   Neurological:      General: No focal deficit present.      Mental Status: She is alert and oriented to person, place, and time.   Psychiatric:         Mood and Affect: Mood normal.          Behavior: Behavior normal.         Thought Content: Thought content normal.         Diagnostics:     CXR - 03/07/23: I reviewed image  No pneumothorax, mild postsurgical change  Assessment:       Mrs. Segal is a 62 y.o. female smoker with DM, HTN, HLD, MVP, MCTD, left parotid gland nodule who presents to clinic today for 2 week follow up s/p robotic assisted lingulectomy for CASSIDY NSCLC.  pT2aN0 NSCLC with negative margins, +vpi  Plan:     Stage I NSCLC: hold on adjuvant therapy  Close observation with chest CT imaging

## 2023-03-06 NOTE — ASSESSMENT & PLAN NOTE
I had a long discussion with Ms. Segal today about this cancer.  She is T2 by nature of visceral pleural invasion.  Everything about this cancer o/w is low risk.  She is low grade, no nodes pos, margins negative and the tumor is small at 16mm, she SUV uptake on pet was very low at 1.8.  I will ask Dr. Lynn to run this past his tumor board and I will do the same.  There is no clear consensus for adjuvant treatment in Stage IB disease and I reviewed this with her today.  Spent over 30 min in total care today including pre-visit review and charting, visit and post-visit planning.

## 2023-03-07 ENCOUNTER — OFFICE VISIT (OUTPATIENT)
Dept: PULMONOLOGY | Facility: CLINIC | Age: 63
End: 2023-03-07
Payer: COMMERCIAL

## 2023-03-07 ENCOUNTER — HOSPITAL ENCOUNTER (OUTPATIENT)
Dept: RADIOLOGY | Facility: HOSPITAL | Age: 63
Discharge: HOME | End: 2023-03-07
Attending: THORACIC SURGERY (CARDIOTHORACIC VASCULAR SURGERY)
Payer: COMMERCIAL

## 2023-03-07 ENCOUNTER — TELEPHONE (OUTPATIENT)
Dept: HEMATOLOGY/ONCOLOGY | Facility: CLINIC | Age: 63
End: 2023-03-07
Payer: COMMERCIAL

## 2023-03-07 VITALS
SYSTOLIC BLOOD PRESSURE: 107 MMHG | OXYGEN SATURATION: 97 % | HEIGHT: 62 IN | DIASTOLIC BLOOD PRESSURE: 72 MMHG | RESPIRATION RATE: 18 BRPM | TEMPERATURE: 97 F | WEIGHT: 150.38 LBS | BODY MASS INDEX: 27.67 KG/M2 | HEART RATE: 81 BPM

## 2023-03-07 DIAGNOSIS — R91.1 PULMONARY NODULE: ICD-10-CM

## 2023-03-07 DIAGNOSIS — C34.12 MALIGNANT NEOPLASM OF UPPER LOBE OF LEFT LUNG: Primary | ICD-10-CM

## 2023-03-07 PROCEDURE — 1159F MED LIST DOCD IN RCRD: CPT | Mod: CPTII,S$GLB,, | Performed by: THORACIC SURGERY (CARDIOTHORACIC VASCULAR SURGERY)

## 2023-03-07 PROCEDURE — 4010F ACE/ARB THERAPY RXD/TAKEN: CPT | Mod: CPTII,S$GLB,, | Performed by: THORACIC SURGERY (CARDIOTHORACIC VASCULAR SURGERY)

## 2023-03-07 PROCEDURE — 3008F BODY MASS INDEX DOCD: CPT | Mod: CPTII,S$GLB,, | Performed by: THORACIC SURGERY (CARDIOTHORACIC VASCULAR SURGERY)

## 2023-03-07 PROCEDURE — 3074F SYST BP LT 130 MM HG: CPT | Mod: CPTII,S$GLB,, | Performed by: THORACIC SURGERY (CARDIOTHORACIC VASCULAR SURGERY)

## 2023-03-07 PROCEDURE — 4010F PR ACE/ARB THEARPY RXD/TAKEN: ICD-10-PCS | Mod: CPTII,S$GLB,, | Performed by: THORACIC SURGERY (CARDIOTHORACIC VASCULAR SURGERY)

## 2023-03-07 PROCEDURE — 99024 POSTOP FOLLOW-UP VISIT: CPT | Mod: S$GLB,,, | Performed by: THORACIC SURGERY (CARDIOTHORACIC VASCULAR SURGERY)

## 2023-03-07 PROCEDURE — 3008F PR BODY MASS INDEX (BMI) DOCUMENTED: ICD-10-PCS | Mod: CPTII,S$GLB,, | Performed by: THORACIC SURGERY (CARDIOTHORACIC VASCULAR SURGERY)

## 2023-03-07 PROCEDURE — 99999 PR PBB SHADOW E&M-EST. PATIENT-LVL IV: CPT | Mod: PBBFAC,,, | Performed by: THORACIC SURGERY (CARDIOTHORACIC VASCULAR SURGERY)

## 2023-03-07 PROCEDURE — 3074F PR MOST RECENT SYSTOLIC BLOOD PRESSURE < 130 MM HG: ICD-10-PCS | Mod: CPTII,S$GLB,, | Performed by: THORACIC SURGERY (CARDIOTHORACIC VASCULAR SURGERY)

## 2023-03-07 PROCEDURE — 1159F PR MEDICATION LIST DOCUMENTED IN MEDICAL RECORD: ICD-10-PCS | Mod: CPTII,S$GLB,, | Performed by: THORACIC SURGERY (CARDIOTHORACIC VASCULAR SURGERY)

## 2023-03-07 PROCEDURE — 99024 PR POST-OP FOLLOW-UP VISIT: ICD-10-PCS | Mod: S$GLB,,, | Performed by: THORACIC SURGERY (CARDIOTHORACIC VASCULAR SURGERY)

## 2023-03-07 PROCEDURE — 71046 X-RAY EXAM CHEST 2 VIEWS: CPT | Mod: 26,,, | Performed by: RADIOLOGY

## 2023-03-07 PROCEDURE — 71046 XR CHEST PA AND LATERAL: ICD-10-PCS | Mod: 26,,, | Performed by: RADIOLOGY

## 2023-03-07 PROCEDURE — 3078F PR MOST RECENT DIASTOLIC BLOOD PRESSURE < 80 MM HG: ICD-10-PCS | Mod: CPTII,S$GLB,, | Performed by: THORACIC SURGERY (CARDIOTHORACIC VASCULAR SURGERY)

## 2023-03-07 PROCEDURE — 3078F DIAST BP <80 MM HG: CPT | Mod: CPTII,S$GLB,, | Performed by: THORACIC SURGERY (CARDIOTHORACIC VASCULAR SURGERY)

## 2023-03-07 PROCEDURE — 71046 X-RAY EXAM CHEST 2 VIEWS: CPT | Mod: TC,FY,PO

## 2023-03-07 PROCEDURE — 99999 PR PBB SHADOW E&M-EST. PATIENT-LVL IV: ICD-10-PCS | Mod: PBBFAC,,, | Performed by: THORACIC SURGERY (CARDIOTHORACIC VASCULAR SURGERY)

## 2023-03-07 NOTE — NURSING
Met with patient in multi disciplinary clinic today.  Explained my role as navigator.  Reviewed plan of care and answered questions.    Dr. Allan's plan is to have patient follow up in 6 months.  My contact information was provided and pt was encouraged to call with any questions or concerns.  Pt verbalized an understanding.

## 2023-03-08 DIAGNOSIS — C34.92 MALIGNANT NEOPLASM OF LEFT LUNG, UNSPECIFIED PART OF LUNG: Primary | ICD-10-CM

## 2023-03-15 ENCOUNTER — TUMOR BOARD CONFERENCE (OUTPATIENT)
Dept: HEMATOLOGY/ONCOLOGY | Facility: CLINIC | Age: 63
End: 2023-03-15
Payer: COMMERCIAL

## 2023-03-15 NOTE — PROGRESS NOTES
[facility]      THORACIC MULTIDISCIPLINARY TUMOR BOARD  PATIENT REVIEW FORM  ___________________________________________________________________    CLINIC #: [id number]  DATE: 3/14/2023    TUMOR SITE:   Cancer Type: Lung cancer       Presenting Hospital / Clinic: [facility]     Virtual Tumor Board Conference: Virtual       PRESENTER:   Presenter: Dr. Isaiah Allan     Specialties Present: Medical Oncology; Hematology; Radiation Oncology; Surgical Oncology; Pathology; Navigation; Radiology; Pulmonology       PATIENT SUMMARY:   Patient is a 62 y.o. female smoker with DM, HTN, HLD, MVP, MCTD, left parotid gland nodule and CASSIDY NSCLC. LDCT in Nov 2021 showed partly solid CASSIDY nodule. Followed with serial scans showing interval growth 11mm. Percutaneous biopsy positive for well differentiated squamous cell carcinoma.    Procedure(s) and date(s): 02/09/23 - Extended robotic lingulectomy, mediastinal lymph node dissection, intercostal nerve block 2 or more intercostal levels  Pathology: 1.6 cm well differentiated keratinizing squamous cell carcinoma. +VPI. Margins negative (1.8 cm bronchovascular). Levels 5, 7, 8, 10, 11, 12 negative for malignancy. [id number], stage IB    Mrs. Segal is a 62 y.o. female smoker with DM, HTN, HLD, MVP, MCTD, left parotid gland nodule who presents to clinic today for 2 week follow up s/p robotic assisted lingulectomy for CASSIDY NSCLC. Post operative course uncomplicated. Today patient reports incisional pain           Background Information  Patient Status: a current patient (Determine need for adjuvant treatment)  Reason for Consultation: Follow-Up from Prior Tumor Board  Biopsy Date: 01/06/23  Biopsy Results: Cancer  Treatment to Date: Surgical Intervention(s) (Extended robotic lingulectomy, mediastinal lymph node dissection, intercostal nerve block 2 or more intercostal levels)         BOARD  RECOMMENDATIONS:   -Surveillance and f/u with Dr. Allan and CT in 6 months          Cancer Staging   Squamous Cell Carcinoma of CASSIDY of lung  Staging form: Lung, AJCC 8th Edition  - Pathologic stage from 3/8/2023: Stage IB (pT2a, pN0, cM0) - Signed by Isaiah Allan MD on 3/8/2023             [x] [facility] Treatment Guidelines reviewed and care planned is consistent with guidelines.         (i.e., NCCN, NCI, PD, ACO, AUA, etc.)    PRESENTATION AT CANCER CONFERENCE:   Presentation at Cancer Conference: Prospective           Mirtha Duron

## 2023-03-16 ENCOUNTER — PATIENT MESSAGE (OUTPATIENT)
Dept: INTERNAL MEDICINE | Facility: CLINIC | Age: 63
End: 2023-03-16
Payer: COMMERCIAL

## 2023-03-22 DIAGNOSIS — Z12.31 OTHER SCREENING MAMMOGRAM: ICD-10-CM

## 2023-03-27 ENCOUNTER — PATIENT MESSAGE (OUTPATIENT)
Dept: ADMINISTRATIVE | Facility: HOSPITAL | Age: 63
End: 2023-03-27
Payer: COMMERCIAL

## 2023-04-02 NOTE — PROGRESS NOTES
PROGRESS NOTE    Subjective:       Patient ID: Cristin Segal is a 62 y.o. female.    12/27/2022:  CT chest:  Slowly enlarging soft tissue density nodule of the left upper lobe now measuring 11 mm.  Neoplasm is suspected and percutaneous biopsy is recommended.  Otherwise continued follow-up by CT scan is recommended with consideration of a repeat PET CT    1/6/2023:  CASSIDY Lung Biopsy:  LUNG, LEFT UPPER LOBE MASS, CT-GUIDED BIOPSY: Invasive squamous cell carcinoma, well differentiated.    1/20/2023:  MRI Brain-no mets    1/23/2023:  Bone scan, uptake at T11, history of vertebroplasty    2/9/2023:  Lung, lingulectomy: Invasive well-differentiated keratinizing squamous   cell carcinoma, measuring 16 mm (1.6 cm) in greatest dimension.          Bronchial and vascular margins are negative for atypia or malignancy.          See synoptic report in comment section for further details.   9 lymph nodes taken,  all negative.    Visceral pleural invasion:  Present  pT2aN0M0-Stage IB     medically appropriate patients with stage IB disease whose tumors display one or more high-risk features, including lymphovascular invasion, poor differentiation, or high standardized uptake value (SUV) on positron emission tomography (PET; variably defined as SUV 10 or more). However, there is no consensus among expert groups.     PLAN:  3/14/2023-[facility] Tumor board recommendation:  Surveillance and f/u with Dr. Allan and CT in 6 months    Chief Complaint:  No chief complaint on file.  Lung cancer follow up    History of Present Illness:   Cristin Segal is a 62 y.o. female who presents for follow up of above.    Patient is doing well and pain is improving.       Family and Social history reviewed and is unchanged from 1/24/2023      ROS:  Review of Systems   Constitutional:  Negative for fever.   Respiratory:  Negative for shortness of breath.    Cardiovascular:  Negative  for chest pain and leg swelling.   Gastrointestinal:  Negative for abdominal pain and blood in stool.   Genitourinary:  Negative for hematuria.   Skin:  Negative for rash.        Current Outpatient Medications:     acetaminophen (TYLENOL) 500 MG tablet, Take 1 tablet (500 mg total) by mouth every 6 (six) hours as needed for Pain., Disp: , Rfl: 0    albuterol (VENTOLIN HFA) 90 mcg/actuation inhaler, Inhale 2 puffs into the lungs every 6 (six) hours as needed for Wheezing or Shortness of Breath. Rescue, Disp: 8 g, Rfl: 5    hydrOXYchloroQUINE (PLAQUENIL) 200 mg tablet, Take 1 tablet (200 mg total) by mouth once daily., Disp: 90 tablet, Rfl: 1    KLOR-CON M20 20 mEq tablet, TAKE 1 TABLET (20 MEQ TOTAL) BY MOUTH ONCE DAILY., Disp: 90 tablet, Rfl: 3    levothyroxine (SYNTHROID) 50 MCG tablet, Take 1 tablet (50 mcg total) by mouth before breakfast., Disp: 90 tablet, Rfl: 3    losartan (COZAAR) 25 MG tablet, Take 1 tablet (25 mg total) by mouth once daily., Disp: 90 tablet, Rfl: 3    mv-min/FA/C/glut/lysine/evz596 (AIRBORNE, WITH FOLIC ACID, ORAL), Take 1 tablet by mouth 4 (four) times daily., Disp: , Rfl:     NIFEdipine (ADALAT CC) 30 MG TbSR, TAKE 1 TABLET BY MOUTH EVERY DAY, Disp: 90 tablet, Rfl: 2    pantoprazole (PROTONIX) 40 MG tablet, TAKE 1 TABLET BY MOUTH EVERY DAY, Disp: 90 tablet, Rfl: 3    rosuvastatin (CRESTOR) 5 MG tablet, TAKE 1 TABLET BY MOUTH EVERYDAY AT BEDTIME (Patient taking differently: Take 5 mg by mouth once daily.), Disp: 90 tablet, Rfl: 3    vitamin D (VITAMIN D3) 1000 units Tab, Take 1,000 Units by mouth once daily., Disp: , Rfl:     WESTAB MAX 2.5-25-2 mg Tab, TAKE 1 TABLET BY MOUTH EVERY DAY, Disp: 90 tablet, Rfl: 3    aspirin 81 MG Chew, Take 1 tablet (81 mg total) by mouth 3 (three) times a week., Disp: 13 tablet, Rfl: 0    oxyCODONE (ROXICODONE) 5 MG immediate release tablet, Take 1 tablet (5 mg total) by mouth every 4 (four) hours as needed for Pain. (Patient not taking: Reported on  "4/3/2023), Disp: 41 tablet, Rfl: 0  No current facility-administered medications for this visit.    Facility-Administered Medications Ordered in Other Visits:     lactated ringers infusion, , Intravenous, Continuous, Kev Bai MD        Objective:       Physical Examination:     BP (!) 111/53   Pulse 85   Temp 98.1 °F (36.7 °C)   Resp 18   Ht 5' 2" (1.575 m)   Wt 68.9 kg (152 lb)   SpO2 97%   BMI 27.80 kg/m²     Physical Exam  Constitutional:       Appearance: She is well-developed.   HENT:      Head: Normocephalic and atraumatic.      Right Ear: External ear normal.      Left Ear: External ear normal.   Eyes:      Conjunctiva/sclera: Conjunctivae normal.      Pupils: Pupils are equal, round, and reactive to light.   Neck:      Thyroid: No thyromegaly.      Trachea: No tracheal deviation.   Cardiovascular:      Rate and Rhythm: Normal rate and regular rhythm.      Heart sounds: Normal heart sounds.   Pulmonary:      Effort: Pulmonary effort is normal.      Breath sounds: Normal breath sounds.   Abdominal:      General: Bowel sounds are normal. There is no distension.      Palpations: Abdomen is soft. There is no mass.      Tenderness: There is no abdominal tenderness.   Skin:     Findings: No rash.   Neurological:      Comments: Neuro intact througout   Psychiatric:         Behavior: Behavior normal.         Thought Content: Thought content normal.         Judgment: Judgment normal.       Labs:   No results found for this or any previous visit (from the past 336 hour(s)).  CMP  Sodium   Date Value Ref Range Status   11/21/2022 137 136 - 145 mmol/L Final   06/28/2019 138 134 - 144 mmol/L      Potassium   Date Value Ref Range Status   11/21/2022 4.8 3.5 - 5.1 mmol/L Final     Chloride   Date Value Ref Range Status   11/21/2022 102 95 - 110 mmol/L Final   06/28/2019 101 98 - 110 mmol/L      CO2   Date Value Ref Range Status   11/21/2022 20 (L) 23 - 29 mmol/L Final     Glucose   Date Value Ref Range Status "   11/21/2022 61 (L) 70 - 110 mg/dL Final   06/28/2019 111 (H) 70 - 99 mg/dL      BUN   Date Value Ref Range Status   11/21/2022 18 8 - 23 mg/dL Final     Creatinine   Date Value Ref Range Status   02/09/2023 1.0 0.5 - 1.4 mg/dL Final   06/28/2019 0.74 0.60 - 1.40 mg/dL      Calcium   Date Value Ref Range Status   11/21/2022 10.4 8.7 - 10.5 mg/dL Final     Total Protein   Date Value Ref Range Status   11/21/2022 8.8 (H) 6.0 - 8.4 g/dL Final     Albumin   Date Value Ref Range Status   11/21/2022 4.5 3.5 - 5.2 g/dL Final   06/28/2019 4.5 3.1 - 4.7 g/dL      Total Bilirubin   Date Value Ref Range Status   11/21/2022 0.5 0.1 - 1.0 mg/dL Final     Comment:     For infants and newborns, interpretation of results should be based  on gestational age, weight and in agreement with clinical  observations.    Premature Infant recommended reference ranges:  Up to 24 hours.............<8.0 mg/dL  Up to 48 hours............<12.0 mg/dL  3-5 days..................<15.0 mg/dL  6-29 days.................<15.0 mg/dL       Alkaline Phosphatase   Date Value Ref Range Status   11/21/2022 76 55 - 135 U/L Final     AST   Date Value Ref Range Status   11/21/2022 51 (H) 10 - 40 U/L Final     ALT   Date Value Ref Range Status   11/21/2022 37 10 - 44 U/L Final     Anion Gap   Date Value Ref Range Status   11/21/2022 15 8 - 16 mmol/L Final     eGFR if    Date Value Ref Range Status   05/11/2022 56.4 (A) >60 mL/min/1.73 m^2 Final   05/11/2022 56.4 (A) >60 mL/min/1.73 m^2 Final     eGFR if non    Date Value Ref Range Status   05/11/2022 48.9 (A) >60 mL/min/1.73 m^2 Final     Comment:     Calculation used to obtain the estimated glomerular filtration  rate (eGFR) is the CKD-EPI equation.      05/11/2022 48.9 (A) >60 mL/min/1.73 m^2 Final     Comment:     Calculation used to obtain the estimated glomerular filtration  rate (eGFR) is the CKD-EPI equation.        No results found for: CEA  No results found for:  PSA        Assessment/Plan:     Problem List Items Addressed This Visit       Squamous Cell Carcinoma of CASSIDY of lung     Patient is doing well and case has been discussed in tumor board and decision is surveillance.  Will arrange CT chest in early June this year and have her back with me after this is done.  Will plan on PET in the next 6-8 months and discussed this today.             Relevant Orders    CT Chest Without Contrast     Discussion:     Follow up in about 3 months (around 7/3/2023).      Electronically signed by Lars Carrasco

## 2023-04-03 ENCOUNTER — OFFICE VISIT (OUTPATIENT)
Dept: HEMATOLOGY/ONCOLOGY | Facility: CLINIC | Age: 63
End: 2023-04-03
Payer: COMMERCIAL

## 2023-04-03 VITALS
BODY MASS INDEX: 27.97 KG/M2 | DIASTOLIC BLOOD PRESSURE: 53 MMHG | WEIGHT: 152 LBS | HEART RATE: 85 BPM | HEIGHT: 62 IN | TEMPERATURE: 98 F | OXYGEN SATURATION: 97 % | SYSTOLIC BLOOD PRESSURE: 111 MMHG | RESPIRATION RATE: 18 BRPM

## 2023-04-03 DIAGNOSIS — C34.12 MALIGNANT NEOPLASM OF UPPER LOBE OF LEFT LUNG: ICD-10-CM

## 2023-04-03 PROCEDURE — 3078F DIAST BP <80 MM HG: CPT | Mod: CPTII,S$GLB,, | Performed by: INTERNAL MEDICINE

## 2023-04-03 PROCEDURE — 4010F PR ACE/ARB THEARPY RXD/TAKEN: ICD-10-PCS | Mod: CPTII,S$GLB,, | Performed by: INTERNAL MEDICINE

## 2023-04-03 PROCEDURE — 99213 OFFICE O/P EST LOW 20 MIN: CPT | Mod: S$GLB,,, | Performed by: INTERNAL MEDICINE

## 2023-04-03 PROCEDURE — 3074F PR MOST RECENT SYSTOLIC BLOOD PRESSURE < 130 MM HG: ICD-10-PCS | Mod: CPTII,S$GLB,, | Performed by: INTERNAL MEDICINE

## 2023-04-03 PROCEDURE — 3008F BODY MASS INDEX DOCD: CPT | Mod: CPTII,S$GLB,, | Performed by: INTERNAL MEDICINE

## 2023-04-03 PROCEDURE — 99213 PR OFFICE/OUTPT VISIT, EST, LEVL III, 20-29 MIN: ICD-10-PCS | Mod: S$GLB,,, | Performed by: INTERNAL MEDICINE

## 2023-04-03 PROCEDURE — 1159F PR MEDICATION LIST DOCUMENTED IN MEDICAL RECORD: ICD-10-PCS | Mod: CPTII,S$GLB,, | Performed by: INTERNAL MEDICINE

## 2023-04-03 PROCEDURE — 3008F PR BODY MASS INDEX (BMI) DOCUMENTED: ICD-10-PCS | Mod: CPTII,S$GLB,, | Performed by: INTERNAL MEDICINE

## 2023-04-03 PROCEDURE — 4010F ACE/ARB THERAPY RXD/TAKEN: CPT | Mod: CPTII,S$GLB,, | Performed by: INTERNAL MEDICINE

## 2023-04-03 PROCEDURE — 3078F PR MOST RECENT DIASTOLIC BLOOD PRESSURE < 80 MM HG: ICD-10-PCS | Mod: CPTII,S$GLB,, | Performed by: INTERNAL MEDICINE

## 2023-04-03 PROCEDURE — 3074F SYST BP LT 130 MM HG: CPT | Mod: CPTII,S$GLB,, | Performed by: INTERNAL MEDICINE

## 2023-04-03 PROCEDURE — 1159F MED LIST DOCD IN RCRD: CPT | Mod: CPTII,S$GLB,, | Performed by: INTERNAL MEDICINE

## 2023-04-03 NOTE — ASSESSMENT & PLAN NOTE
Patient is doing well and case has been discussed in tumor board and decision is surveillance.  Will arrange CT chest in early June this year and have her back with me after this is done.  Will plan on PET in the next 6-8 months and discussed this today.

## 2023-04-04 ENCOUNTER — PATIENT MESSAGE (OUTPATIENT)
Dept: INTERNAL MEDICINE | Facility: CLINIC | Age: 63
End: 2023-04-04
Payer: COMMERCIAL

## 2023-04-11 ENCOUNTER — PATIENT MESSAGE (OUTPATIENT)
Dept: ADMINISTRATIVE | Facility: HOSPITAL | Age: 63
End: 2023-04-11
Payer: COMMERCIAL

## 2023-05-26 ENCOUNTER — LAB VISIT (OUTPATIENT)
Dept: LAB | Facility: HOSPITAL | Age: 63
End: 2023-05-26
Attending: FAMILY MEDICINE
Payer: COMMERCIAL

## 2023-05-26 ENCOUNTER — HOSPITAL ENCOUNTER (OUTPATIENT)
Dept: RADIOLOGY | Facility: HOSPITAL | Age: 63
Discharge: HOME | End: 2023-05-26
Attending: INTERNAL MEDICINE
Payer: COMMERCIAL

## 2023-05-26 DIAGNOSIS — E03.9 HYPOTHYROIDISM, UNSPECIFIED TYPE: ICD-10-CM

## 2023-05-26 DIAGNOSIS — C34.12 MALIGNANT NEOPLASM OF UPPER LOBE OF LEFT LUNG: ICD-10-CM

## 2023-05-26 DIAGNOSIS — E11.8 CONTROLLED TYPE 2 DIABETES MELLITUS WITH COMPLICATION, WITHOUT LONG-TERM CURRENT USE OF INSULIN: ICD-10-CM

## 2023-05-26 DIAGNOSIS — E78.2 MIXED HYPERLIPIDEMIA: ICD-10-CM

## 2023-05-26 LAB
ALBUMIN SERPL BCP-MCNC: 4 G/DL (ref 3.5–5.2)
ALP SERPL-CCNC: 68 U/L (ref 55–135)
ALT SERPL W/O P-5'-P-CCNC: 25 U/L (ref 10–44)
ANION GAP SERPL CALC-SCNC: 10 MMOL/L (ref 8–16)
AST SERPL-CCNC: 29 U/L (ref 10–40)
BASOPHILS # BLD AUTO: 0.11 K/UL (ref 0–0.2)
BASOPHILS NFR BLD: 1.7 % (ref 0–1.9)
BILIRUB SERPL-MCNC: 0.2 MG/DL (ref 0.1–1)
BUN SERPL-MCNC: 29 MG/DL (ref 8–23)
CALCIUM SERPL-MCNC: 10.4 MG/DL (ref 8.7–10.5)
CHLORIDE SERPL-SCNC: 107 MMOL/L (ref 95–110)
CHOLEST SERPL-MCNC: 197 MG/DL (ref 120–199)
CHOLEST/HDLC SERPL: 2.3 {RATIO} (ref 2–5)
CO2 SERPL-SCNC: 19 MMOL/L (ref 23–29)
CREAT SERPL-MCNC: 1 MG/DL (ref 0.5–1.4)
DIFFERENTIAL METHOD: ABNORMAL
EOSINOPHIL # BLD AUTO: 0.2 K/UL (ref 0–0.5)
EOSINOPHIL NFR BLD: 3.3 % (ref 0–8)
ERYTHROCYTE [DISTWIDTH] IN BLOOD BY AUTOMATED COUNT: 14.2 % (ref 11.5–14.5)
EST. GFR  (NO RACE VARIABLE): >60 ML/MIN/1.73 M^2
ESTIMATED AVG GLUCOSE: 91 MG/DL (ref 68–131)
GLUCOSE SERPL-MCNC: 91 MG/DL (ref 70–110)
HBA1C MFR BLD: 4.8 % (ref 4–5.6)
HCT VFR BLD AUTO: 32.5 % (ref 37–48.5)
HDLC SERPL-MCNC: 87 MG/DL (ref 40–75)
HDLC SERPL: 44.2 % (ref 20–50)
HGB BLD-MCNC: 10.6 G/DL (ref 12–16)
IMM GRANULOCYTES # BLD AUTO: 0.01 K/UL (ref 0–0.04)
IMM GRANULOCYTES NFR BLD AUTO: 0.2 % (ref 0–0.5)
LDLC SERPL CALC-MCNC: 98 MG/DL (ref 63–159)
LYMPHOCYTES # BLD AUTO: 2.9 K/UL (ref 1–4.8)
LYMPHOCYTES NFR BLD: 45.9 % (ref 18–48)
MCH RBC QN AUTO: 33.4 PG (ref 27–31)
MCHC RBC AUTO-ENTMCNC: 32.6 G/DL (ref 32–36)
MCV RBC AUTO: 103 FL (ref 82–98)
MONOCYTES # BLD AUTO: 1 K/UL (ref 0.3–1)
MONOCYTES NFR BLD: 15.3 % (ref 4–15)
NEUTROPHILS # BLD AUTO: 2.1 K/UL (ref 1.8–7.7)
NEUTROPHILS NFR BLD: 33.6 % (ref 38–73)
NONHDLC SERPL-MCNC: 110 MG/DL
NRBC BLD-RTO: 0 /100 WBC
PLATELET # BLD AUTO: 451 K/UL (ref 150–450)
PMV BLD AUTO: 8.7 FL (ref 9.2–12.9)
POTASSIUM SERPL-SCNC: 4.8 MMOL/L (ref 3.5–5.1)
PROT SERPL-MCNC: 7.9 G/DL (ref 6–8.4)
RBC # BLD AUTO: 3.17 M/UL (ref 4–5.4)
SODIUM SERPL-SCNC: 136 MMOL/L (ref 136–145)
T4 FREE SERPL-MCNC: 0.99 NG/DL (ref 0.71–1.51)
TRIGL SERPL-MCNC: 60 MG/DL (ref 30–150)
TSH SERPL DL<=0.005 MIU/L-ACNC: 0.9 UIU/ML (ref 0.4–4)
WBC # BLD AUTO: 6.34 K/UL (ref 3.9–12.7)

## 2023-05-26 PROCEDURE — 85025 COMPLETE CBC W/AUTO DIFF WBC: CPT | Performed by: FAMILY MEDICINE

## 2023-05-26 PROCEDURE — 36415 COLL VENOUS BLD VENIPUNCTURE: CPT | Mod: PO | Performed by: FAMILY MEDICINE

## 2023-05-26 PROCEDURE — 84443 ASSAY THYROID STIM HORMONE: CPT | Performed by: FAMILY MEDICINE

## 2023-05-26 PROCEDURE — 71250 CT THORAX DX C-: CPT | Mod: TC,PO

## 2023-05-26 PROCEDURE — 84439 ASSAY OF FREE THYROXINE: CPT | Performed by: FAMILY MEDICINE

## 2023-05-26 PROCEDURE — 80061 LIPID PANEL: CPT | Performed by: FAMILY MEDICINE

## 2023-05-26 PROCEDURE — 80053 COMPREHEN METABOLIC PANEL: CPT | Performed by: FAMILY MEDICINE

## 2023-05-26 PROCEDURE — 83036 HEMOGLOBIN GLYCOSYLATED A1C: CPT | Performed by: FAMILY MEDICINE

## 2023-06-15 ENCOUNTER — HOSPITAL ENCOUNTER (OUTPATIENT)
Dept: RADIOLOGY | Facility: CLINIC | Age: 63
Discharge: HOME | End: 2023-06-15
Attending: FAMILY MEDICINE
Payer: COMMERCIAL

## 2023-06-15 ENCOUNTER — OFFICE VISIT (OUTPATIENT)
Dept: FAMILY MEDICINE | Facility: CLINIC | Age: 63
End: 2023-06-15
Payer: COMMERCIAL

## 2023-06-15 VITALS
BODY MASS INDEX: 29.74 KG/M2 | WEIGHT: 161.63 LBS | HEART RATE: 80 BPM | HEIGHT: 62 IN | TEMPERATURE: 99 F | DIASTOLIC BLOOD PRESSURE: 60 MMHG | OXYGEN SATURATION: 97 % | RESPIRATION RATE: 18 BRPM | SYSTOLIC BLOOD PRESSURE: 127 MMHG

## 2023-06-15 DIAGNOSIS — I10 ESSENTIAL HYPERTENSION: ICD-10-CM

## 2023-06-15 DIAGNOSIS — M35.1 MCTD (MIXED CONNECTIVE TISSUE DISEASE): ICD-10-CM

## 2023-06-15 DIAGNOSIS — D53.9 MACROCYTIC ANEMIA: ICD-10-CM

## 2023-06-15 DIAGNOSIS — R19.5 POSITIVE FIT (FECAL IMMUNOCHEMICAL TEST): Primary | ICD-10-CM

## 2023-06-15 DIAGNOSIS — C34.12 MALIGNANT NEOPLASM OF UPPER LOBE OF LEFT LUNG: ICD-10-CM

## 2023-06-15 DIAGNOSIS — E03.9 HYPOTHYROIDISM, UNSPECIFIED TYPE: ICD-10-CM

## 2023-06-15 DIAGNOSIS — E11.8 CONTROLLED TYPE 2 DIABETES MELLITUS WITH COMPLICATION, WITHOUT LONG-TERM CURRENT USE OF INSULIN: ICD-10-CM

## 2023-06-15 DIAGNOSIS — Z12.31 OTHER SCREENING MAMMOGRAM: ICD-10-CM

## 2023-06-15 DIAGNOSIS — E78.2 MIXED HYPERLIPIDEMIA: ICD-10-CM

## 2023-06-15 DIAGNOSIS — E72.11 HYPERHOMOCYSTEINEMIA: ICD-10-CM

## 2023-06-15 PROCEDURE — 3044F PR MOST RECENT HEMOGLOBIN A1C LEVEL <7.0%: ICD-10-PCS | Mod: CPTII,S$GLB,, | Performed by: FAMILY MEDICINE

## 2023-06-15 PROCEDURE — 1159F MED LIST DOCD IN RCRD: CPT | Mod: CPTII,S$GLB,, | Performed by: FAMILY MEDICINE

## 2023-06-15 PROCEDURE — 1160F PR REVIEW ALL MEDS BY PRESCRIBER/CLIN PHARMACIST DOCUMENTED: ICD-10-PCS | Mod: CPTII,S$GLB,, | Performed by: FAMILY MEDICINE

## 2023-06-15 PROCEDURE — 77063 BREAST TOMOSYNTHESIS BI: CPT | Mod: 26,,, | Performed by: RADIOLOGY

## 2023-06-15 PROCEDURE — 99999 PR PBB SHADOW E&M-EST. PATIENT-LVL IV: CPT | Mod: PBBFAC,,, | Performed by: FAMILY MEDICINE

## 2023-06-15 PROCEDURE — 1160F RVW MEDS BY RX/DR IN RCRD: CPT | Mod: CPTII,S$GLB,, | Performed by: FAMILY MEDICINE

## 2023-06-15 PROCEDURE — 4010F ACE/ARB THERAPY RXD/TAKEN: CPT | Mod: CPTII,S$GLB,, | Performed by: FAMILY MEDICINE

## 2023-06-15 PROCEDURE — 3008F PR BODY MASS INDEX (BMI) DOCUMENTED: ICD-10-PCS | Mod: CPTII,S$GLB,, | Performed by: FAMILY MEDICINE

## 2023-06-15 PROCEDURE — 3078F PR MOST RECENT DIASTOLIC BLOOD PRESSURE < 80 MM HG: ICD-10-PCS | Mod: CPTII,S$GLB,, | Performed by: FAMILY MEDICINE

## 2023-06-15 PROCEDURE — 3008F BODY MASS INDEX DOCD: CPT | Mod: CPTII,S$GLB,, | Performed by: FAMILY MEDICINE

## 2023-06-15 PROCEDURE — 1159F PR MEDICATION LIST DOCUMENTED IN MEDICAL RECORD: ICD-10-PCS | Mod: CPTII,S$GLB,, | Performed by: FAMILY MEDICINE

## 2023-06-15 PROCEDURE — 3074F PR MOST RECENT SYSTOLIC BLOOD PRESSURE < 130 MM HG: ICD-10-PCS | Mod: CPTII,S$GLB,, | Performed by: FAMILY MEDICINE

## 2023-06-15 PROCEDURE — 3078F DIAST BP <80 MM HG: CPT | Mod: CPTII,S$GLB,, | Performed by: FAMILY MEDICINE

## 2023-06-15 PROCEDURE — 3074F SYST BP LT 130 MM HG: CPT | Mod: CPTII,S$GLB,, | Performed by: FAMILY MEDICINE

## 2023-06-15 PROCEDURE — 4010F PR ACE/ARB THEARPY RXD/TAKEN: ICD-10-PCS | Mod: CPTII,S$GLB,, | Performed by: FAMILY MEDICINE

## 2023-06-15 PROCEDURE — 99999 PR PBB SHADOW E&M-EST. PATIENT-LVL IV: ICD-10-PCS | Mod: PBBFAC,,, | Performed by: FAMILY MEDICINE

## 2023-06-15 PROCEDURE — 99214 PR OFFICE/OUTPT VISIT, EST, LEVL IV, 30-39 MIN: ICD-10-PCS | Mod: S$GLB,,, | Performed by: FAMILY MEDICINE

## 2023-06-15 PROCEDURE — 99214 OFFICE O/P EST MOD 30 MIN: CPT | Mod: S$GLB,,, | Performed by: FAMILY MEDICINE

## 2023-06-15 PROCEDURE — 77063 MAMMO DIGITAL SCREENING BILAT WITH TOMO: ICD-10-PCS | Mod: 26,,, | Performed by: RADIOLOGY

## 2023-06-15 PROCEDURE — 77067 SCR MAMMO BI INCL CAD: CPT | Mod: TC,PO

## 2023-06-15 PROCEDURE — 77067 MAMMO DIGITAL SCREENING BILAT WITH TOMO: ICD-10-PCS | Mod: 26,,, | Performed by: RADIOLOGY

## 2023-06-15 PROCEDURE — 3044F HG A1C LEVEL LT 7.0%: CPT | Mod: CPTII,S$GLB,, | Performed by: FAMILY MEDICINE

## 2023-06-15 PROCEDURE — 77067 SCR MAMMO BI INCL CAD: CPT | Mod: 26,,, | Performed by: RADIOLOGY

## 2023-06-15 NOTE — PROGRESS NOTES
Subjective:       Patient ID: Cristin Segal is a 62 y.o. female.    Chief Complaint: Follow-up (6mth f/u)    HPI  Review of Systems   Constitutional:  Negative for fatigue and unexpected weight change.   Respiratory:  Negative for chest tightness and shortness of breath.    Cardiovascular:  Negative for chest pain, palpitations and leg swelling.   Gastrointestinal:  Negative for abdominal pain.   Musculoskeletal:  Negative for arthralgias.   Neurological:  Negative for dizziness, syncope, light-headedness and headaches.     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    Reviewed labs 5/23  CKD stage 3, AST high  A1c 4.8  high homocysteine- on folbic-yes  HPL-   On crestor? Yes 5mg   Mammo 3/22 neg      FIT 12/2021 + . GI referral placed. Has not had time to schedule it     Pulm Dr. Espino lung nodule. CT chest 2/2022 showed slightly enlarging nodule left upper lobe.  Has PET scan scheduled 3/2/2022     Spine Dr. Victor low back pain/  Vu pain. H/o closed vertebral compression T12 s/p kyphoplasty 12/2021. Doing pt and taking pain     TFTs wnl 9/2021  Card Dr. brooks aortic stenosis     Dr. Cat eye exam     Had ED visit 3/20 [facility] for fall resulting in left ankle fracture. Dr. Tee treateed     Had ED visit 9/2/20 [place] for hitting head on freezer door then fell backwards striking back of head     DEXA 8/20 -nl BMD     History:   Ortho Dr. 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 Maki 8/19        Rheum Dr. GRACE Guzman MCTD on plaquenil.  Takes adalat for raynauds.      HTN controlled with diet       Elevated LFTs - NL LFTS 12/20.no fatty liver or enlargement on either CT ab 5/15 nor u/s and 1/19. Hepatitis panel normal     Type 2 DM- controlled without meds.  Last a1c 5.0. Eye Dr Cat 1/19.  On crestor. Not on ace or ARB-urine ma slightly elevated     GYn PAP Dr. Elmore < 3 years     Macrocytosis- b12 and folate normal 1/19 and 12/20  Objective:      Physical Exam  Vitals and nursing note reviewed.   Constitutional:       Appearance: She is well-developed.   Cardiovascular:      Rate and Rhythm: Normal rate and regular rhythm.      Heart sounds: Murmur heard.   Pulmonary:      Effort: Pulmonary effort is normal.      Breath sounds: Normal breath sounds.   Skin:     General: Skin is warm and dry.   Neurological:      Mental Status: She is alert and oriented to person, place, and time.       Assessment:       1. Positive FIT (fecal immunochemical test)    2. Essential hypertension    3. Mixed hyperlipidemia    4. MCTD (mixed connective tissue disease)    5. Macrocytic anemia    6. Squamous Cell Carcinoma of CASSIDY of lung    7. Controlled type 2 diabetes mellitus with complication, without long-term current use of insulin    8. Hypothyroidism, unspecified type    9. Hyperhomocysteinemia        Plan:         1. Positive FIT (fecal  immunochemical test)  Proceed with work up and   - Case Request Endoscopy: COLONOSCOPY    2. Essential hypertension  Controlled on current medications.  Continue current medications.      3. Mixed hyperlipidemia  Controlled on current medications.  Continue current medications.    - Lipid Panel; Future    4. MCTD (mixed connective tissue disease)  Cont rheum consult and cont current mgmt    5. Macrocytic anemia  Screen and treat as indicated:    - Vitamin B12; Future  - FOLATE; Future    6. Squamous Cell Carcinoma of CASSIDY of lung  Cont derm surveillance and care    7. Controlled type 2 diabetes mellitus with complication, without long-term current use of insulin  Controlled.  Continue current mgmt  - Comprehensive Metabolic Panel; Future  - Hemoglobin A1C; Future    8. Hypothyroidism, unspecified type  Controlled on current medications.  Continue current medications.    - CBC Auto Differential; Future  - TSH; Future  - T4, Free; Future    9. Hyperhomocysteinemia  Screen and treat as indicated:    - HOMOCYSTEINE, SERUM; Future  - Vitamin B12; Future  - FOLATE; Future      Time spent with patient: 20 minutes    Patient with be reevaluated in 6 months or sooner prn    Greater than 50% of this visit was spent counseling as described in above documentation:Yes

## 2023-06-16 ENCOUNTER — PATIENT MESSAGE (OUTPATIENT)
Dept: GASTROENTEROLOGY | Facility: CLINIC | Age: 63
End: 2023-06-16
Payer: COMMERCIAL

## 2023-06-21 ENCOUNTER — TELEPHONE (OUTPATIENT)
Dept: HEMATOLOGY/ONCOLOGY | Facility: CLINIC | Age: 63
End: 2023-06-21
Payer: COMMERCIAL

## 2023-06-21 NOTE — TELEPHONE ENCOUNTER
"----- Message from Kailey Varela sent at 6/21/2023 12:50 PM CDT -----  Regarding: Pt advice  Contact: Pt     Pt requesting call back in regards to genetics testthat was ordered. Pt stated she would like to know why  Please call and adv      Confirmed contact below:   Contact Name: Cristin Segal   Phone Number:[phone]               Additional Notes:  "Thank you for all that you do for our patients"                                           "

## 2023-06-21 NOTE — TELEPHONE ENCOUNTER
I spoke to patient, tried to explain about the [facility] and why Dr. Rodriguez's name is on the order. She said she is getting paperwork from [facility] that they are telling her to sign and send back and she's not sure what to do. I told her I would ask our nurses who are connected with [facility] and the testing, to give her a call to try to explain better. She said that would be fine.

## 2023-06-22 DIAGNOSIS — E72.11 HYPERHOMOCYSTEINEMIA: ICD-10-CM

## 2023-06-22 RX ORDER — CYANOCOBALAMIN/FOLIC AC/VIT B6 2-2.5-25MG
TABLET ORAL
Qty: 90 TABLET | Refills: 3 | Status: SHIPPED | OUTPATIENT
Start: 2023-06-22

## 2023-06-23 NOTE — PROGRESS NOTES
PROGRESS NOTE    Subjective:       Patient ID: Cristin Segal is a 62 y.o. female.    12/27/2022:  CT chest:  Slowly enlarging soft tissue density nodule of the left upper lobe now measuring 11 mm.  Neoplasm is suspected and percutaneous biopsy is recommended.  Otherwise continued follow-up by CT scan is recommended with consideration of a repeat PET CT    1/6/2023:  CASSIDY Lung Biopsy:  LUNG, LEFT UPPER LOBE MASS, CT-GUIDED BIOPSY: Invasive squamous cell carcinoma, well differentiated.    1/20/2023:  MRI Brain-no mets    1/23/2023:  Bone scan, uptake at T11, history of vertebroplasty    2/9/2023:  Lung, lingulectomy: Invasive well-differentiated keratinizing squamous   cell carcinoma, measuring 16 mm (1.6 cm) in greatest dimension.          Bronchial and vascular margins are negative for atypia or malignancy.          See synoptic report in comment section for further details.   9 lymph nodes taken,  all negative.    Visceral pleural invasion:  Present  pT2aN0M0-Stage IB     medically appropriate patients with stage IB disease whose tumors display one or more high-risk features, including lymphovascular invasion, poor differentiation, or high standardized uptake value (SUV) on positron emission tomography (PET; variably defined as SUV 10 or more). However, there is no consensus among expert groups.     PLAN:  3/14/2023-[facility] Tumor board recommendation:  Surveillance and f/u with Dr. Allan and CT in 6 months    CT Chest 5/26/2023:  IMPRESSION:  1. Postoperative changes in the left lung with no acute pulmonary process identified.  2. No concerning pulmonary nodule, mass or lymphadenopathy identified.    Chief Complaint:  Lung cancer follow up    History of Present Illness:   Cristin Segal is a 62 y.o. female who presents for follow up of above.    Patient is doing well and pain is consistent. She has mixed connective tissue dz and think this is  contributing. Pain is more from the upper and lower back. Previous pain was on the side.    She sees Dr. Gonzalez in [place] for rheum.    Family and Social history reviewed and is unchanged from 1/24/2023      ROS:  Review of Systems   Constitutional:  Negative for fever.   Respiratory:  Negative for shortness of breath.    Cardiovascular:  Negative for chest pain and leg swelling.   Gastrointestinal:  Negative for abdominal pain and blood in stool.   Genitourinary:  Negative for hematuria.   Musculoskeletal:  Positive for back pain.   Skin:  Negative for rash.        Current Outpatient Medications:     acetaminophen (TYLENOL) 500 MG tablet, Take 1 tablet (500 mg total) by mouth every 6 (six) hours as needed for Pain., Disp: , Rfl: 0    albuterol (VENTOLIN HFA) 90 mcg/actuation inhaler, Inhale 2 puffs into the lungs every 6 (six) hours as needed for Wheezing or Shortness of Breath. Rescue, Disp: 8 g, Rfl: 5    hydrOXYchloroQUINE (PLAQUENIL) 200 mg tablet, Take 1 tablet (200 mg total) by mouth once daily., Disp: 90 tablet, Rfl: 1    KLOR-CON M20 20 mEq tablet, TAKE 1 TABLET (20 MEQ TOTAL) BY MOUTH ONCE DAILY., Disp: 90 tablet, Rfl: 3    levothyroxine (SYNTHROID) 50 MCG tablet, TAKE 1 TABLET BY MOUTH BEFORE BREAKFAST., Disp: 90 tablet, Rfl: 3    losartan (COZAAR) 25 MG tablet, TAKE 1 TABLET BY MOUTH EVERY DAY, Disp: 90 tablet, Rfl: 3    multivit-min-FA-lycopen-lutein (CENTRUM SILVER) 0.4 mg-300 mcg- 250 mcg Tab, Take 1 tablet by mouth once daily., Disp: , Rfl:     mv-min/FA/C/glut/lysine/eby759 (AIR[place]ORNE, WITH FOLIC ACID, ORAL), Take 1 tablet by mouth 4 (four) times daily., Disp: , Rfl:     NIFEdipine (ADALAT CC) 30 MG TbSR, TAKE 1 TABLET BY MOUTH EVERY DAY, Disp: 90 tablet, Rfl: 2    oxyCODONE (ROXICODONE) 5 MG immediate release tablet, Take 1 tablet (5 mg total) by mouth every 4 (four) hours as needed for Pain., Disp: 41 tablet, Rfl: 0    pantoprazole (PROTONIX) 40 MG tablet, TAKE 1 TABLET BY MOUTH EVERY DAY, Disp:  "90 tablet, Rfl: 3    rosuvastatin (CRESTOR) 5 MG tablet, TAKE 1 TABLET BY MOUTH EVERYDAY AT BEDTIME (Patient taking differently: Take 5 mg by mouth once daily.), Disp: 90 tablet, Rfl: 3    vitamin D (VITAMIN D3) 1000 units Tab, Take 1,000 Units by mouth once daily., Disp: , Rfl:     WESTAB MAX 2.5-25-2 mg Tab, TAKE 1 TABLET BY MOUTH EVERY DAY, Disp: 90 tablet, Rfl: 3    aspirin 81 MG Chew, Take 1 tablet (81 mg total) by mouth 3 (three) times a week., Disp: 13 tablet, Rfl: 0  No current facility-administered medications for this visit.    Facility-Administered Medications Ordered in Other Visits:     lactated ringers infusion, , Intravenous, Continuous, Kev Bai MD        Objective:       Physical Examination:     /61   Pulse 75   Temp 98.2 °F (36.8 °C)   Resp 18   Ht 5' 2" (1.575 m)   Wt 73 kg (161 lb)   BMI 29.45 kg/m²     Physical Exam  Constitutional:       Appearance: Normal appearance. She is well-developed.   HENT:      Head: Normocephalic and atraumatic.      Right Ear: External ear normal.      Left Ear: External ear normal.      Nose: Nose normal.      Mouth/Throat:      Mouth: Mucous membranes are moist.   Eyes:      Conjunctiva/sclera: Conjunctivae normal.      Pupils: Pupils are equal, round, and reactive to light.   Neck:      Thyroid: No thyromegaly.      Trachea: No tracheal deviation.   Cardiovascular:      Rate and Rhythm: Normal rate and regular rhythm.      Heart sounds: Normal heart sounds.   Pulmonary:      Effort: Pulmonary effort is normal.      Breath sounds: Normal breath sounds.   Abdominal:      General: Bowel sounds are normal. There is no distension.      Palpations: Abdomen is soft. There is no mass.      Tenderness: There is no abdominal tenderness.   Skin:     General: Skin is warm and dry.      Findings: No rash.   Neurological:      General: No focal deficit present.      Mental Status: She is alert and oriented to person, place, and time.      Comments: Neuro intact " througout   Psychiatric:         Mood and Affect: Mood normal.         Behavior: Behavior normal.         Thought Content: Thought content normal.         Judgment: Judgment normal.       Labs:   No results found for this or any previous visit (from the past 336 hour(s)).  CMP  Sodium   Date Value Ref Range Status   05/26/2023 136 136 - 145 mmol/L Final   06/28/2019 138 134 - 144 mmol/L      Potassium   Date Value Ref Range Status   05/26/2023 4.8 3.5 - 5.1 mmol/L Final     Chloride   Date Value Ref Range Status   05/26/2023 107 95 - 110 mmol/L Final   06/28/2019 101 98 - 110 mmol/L      CO2   Date Value Ref Range Status   05/26/2023 19 (L) 23 - 29 mmol/L Final     Glucose   Date Value Ref Range Status   05/26/2023 91 70 - 110 mg/dL Final   06/28/2019 111 (H) 70 - 99 mg/dL      BUN   Date Value Ref Range Status   05/26/2023 29 (H) 8 - 23 mg/dL Final     Creatinine   Date Value Ref Range Status   05/26/2023 1.0 0.5 - 1.4 mg/dL Final   06/28/2019 0.74 0.60 - 1.40 mg/dL      Calcium   Date Value Ref Range Status   05/26/2023 10.4 8.7 - 10.5 mg/dL Final     Total Protein   Date Value Ref Range Status   05/26/2023 7.9 6.0 - 8.4 g/dL Final     Albumin   Date Value Ref Range Status   05/26/2023 4.0 3.5 - 5.2 g/dL Final   06/28/2019 4.5 3.1 - 4.7 g/dL      Total Bilirubin   Date Value Ref Range Status   05/26/2023 0.2 0.1 - 1.0 mg/dL Final     Comment:     For infants and newborns, interpretation of results should be based  on gestational age, weight and in agreement with clinical  observations.    Premature Infant recommended reference ranges:  Up to 24 hours.............<8.0 mg/dL  Up to 48 hours............<12.0 mg/dL  3-5 days..................<15.0 mg/dL  6-29 days.................<15.0 mg/dL       Alkaline Phosphatase   Date Value Ref Range Status   05/26/2023 68 55 - 135 U/L Final     AST   Date Value Ref Range Status   05/26/2023 29 10 - 40 U/L Final     ALT   Date Value Ref Range Status   05/26/2023 25 10 - 44 U/L  Final     Anion Gap   Date Value Ref Range Status   05/26/2023 10 8 - 16 mmol/L Final     eGFR if    Date Value Ref Range Status   05/11/2022 56.4 (A) >60 mL/min/1.73 m^2 Final   05/11/2022 56.4 (A) >60 mL/min/1.73 m^2 Final     eGFR if non    Date Value Ref Range Status   05/11/2022 48.9 (A) >60 mL/min/1.73 m^2 Final     Comment:     Calculation used to obtain the estimated glomerular filtration  rate (eGFR) is the CKD-EPI equation.      05/11/2022 48.9 (A) >60 mL/min/1.73 m^2 Final     Comment:     Calculation used to obtain the estimated glomerular filtration  rate (eGFR) is the CKD-EPI equation.        No results found for: CEA      Assessment/Plan:     Problem List Items Addressed This Visit          Immunology/Multi System    MCTD (mixed connective tissue disease)       Oncology    Squamous Cell Carcinoma of CASSIDY of lung - Primary     Other Visit Diagnoses       Back pain, unspecified back location, unspecified back pain laterality, unspecified chronicity              Lung Cancer- continue follow up with Dr. Allan with CT scan as scheduled 9/5/2023  2.  Pain- Continue follow up with Rheum  3.  Mixed Connective tissue- Continue to follow up with Rheum    Discussion:     Follow up in about 12 weeks (around 9/18/2023).      Electronically signed by Kenzie Isaac, MSN, APRN, AGNP-P, OCN

## 2023-06-26 ENCOUNTER — OFFICE VISIT (OUTPATIENT)
Dept: HEMATOLOGY/ONCOLOGY | Facility: CLINIC | Age: 63
End: 2023-06-26
Payer: COMMERCIAL

## 2023-06-26 VITALS
BODY MASS INDEX: 29.63 KG/M2 | SYSTOLIC BLOOD PRESSURE: 118 MMHG | DIASTOLIC BLOOD PRESSURE: 61 MMHG | TEMPERATURE: 98 F | RESPIRATION RATE: 18 BRPM | HEIGHT: 62 IN | WEIGHT: 161 LBS | HEART RATE: 75 BPM

## 2023-06-26 DIAGNOSIS — C34.12 MALIGNANT NEOPLASM OF UPPER LOBE OF LEFT LUNG: Primary | ICD-10-CM

## 2023-06-26 DIAGNOSIS — M35.1 MCTD (MIXED CONNECTIVE TISSUE DISEASE): ICD-10-CM

## 2023-06-26 DIAGNOSIS — M54.9 BACK PAIN, UNSPECIFIED BACK LOCATION, UNSPECIFIED BACK PAIN LATERALITY, UNSPECIFIED CHRONICITY: ICD-10-CM

## 2023-06-26 PROCEDURE — 4010F ACE/ARB THERAPY RXD/TAKEN: CPT | Mod: CPTII,S$GLB,, | Performed by: NURSE PRACTITIONER

## 2023-06-26 PROCEDURE — 3074F SYST BP LT 130 MM HG: CPT | Mod: CPTII,S$GLB,, | Performed by: NURSE PRACTITIONER

## 2023-06-26 PROCEDURE — 3078F PR MOST RECENT DIASTOLIC BLOOD PRESSURE < 80 MM HG: ICD-10-PCS | Mod: CPTII,S$GLB,, | Performed by: NURSE PRACTITIONER

## 2023-06-26 PROCEDURE — 99214 PR OFFICE/OUTPT VISIT, EST, LEVL IV, 30-39 MIN: ICD-10-PCS | Mod: S$GLB,,, | Performed by: NURSE PRACTITIONER

## 2023-06-26 PROCEDURE — 99214 OFFICE O/P EST MOD 30 MIN: CPT | Mod: S$GLB,,, | Performed by: NURSE PRACTITIONER

## 2023-06-26 PROCEDURE — 1160F RVW MEDS BY RX/DR IN RCRD: CPT | Mod: CPTII,S$GLB,, | Performed by: NURSE PRACTITIONER

## 2023-06-26 PROCEDURE — 3008F BODY MASS INDEX DOCD: CPT | Mod: CPTII,S$GLB,, | Performed by: NURSE PRACTITIONER

## 2023-06-26 PROCEDURE — 3044F PR MOST RECENT HEMOGLOBIN A1C LEVEL <7.0%: ICD-10-PCS | Mod: CPTII,S$GLB,, | Performed by: NURSE PRACTITIONER

## 2023-06-26 PROCEDURE — 1160F PR REVIEW ALL MEDS BY PRESCRIBER/CLIN PHARMACIST DOCUMENTED: ICD-10-PCS | Mod: CPTII,S$GLB,, | Performed by: NURSE PRACTITIONER

## 2023-06-26 PROCEDURE — 3074F PR MOST RECENT SYSTOLIC BLOOD PRESSURE < 130 MM HG: ICD-10-PCS | Mod: CPTII,S$GLB,, | Performed by: NURSE PRACTITIONER

## 2023-06-26 PROCEDURE — 1159F MED LIST DOCD IN RCRD: CPT | Mod: CPTII,S$GLB,, | Performed by: NURSE PRACTITIONER

## 2023-06-26 PROCEDURE — 4010F PR ACE/ARB THEARPY RXD/TAKEN: ICD-10-PCS | Mod: CPTII,S$GLB,, | Performed by: NURSE PRACTITIONER

## 2023-06-26 PROCEDURE — 3078F DIAST BP <80 MM HG: CPT | Mod: CPTII,S$GLB,, | Performed by: NURSE PRACTITIONER

## 2023-06-26 PROCEDURE — 3008F PR BODY MASS INDEX (BMI) DOCUMENTED: ICD-10-PCS | Mod: CPTII,S$GLB,, | Performed by: NURSE PRACTITIONER

## 2023-06-26 PROCEDURE — 3044F HG A1C LEVEL LT 7.0%: CPT | Mod: CPTII,S$GLB,, | Performed by: NURSE PRACTITIONER

## 2023-06-26 PROCEDURE — 1159F PR MEDICATION LIST DOCUMENTED IN MEDICAL RECORD: ICD-10-PCS | Mod: CPTII,S$GLB,, | Performed by: NURSE PRACTITIONER

## 2023-06-26 RX ORDER — MULTIVIT-MIN/FA/LYCOPEN/LUTEIN .4-300-25
1 TABLET ORAL DAILY
COMMUNITY

## 2023-07-26 DIAGNOSIS — E11.9 TYPE 2 DIABETES MELLITUS WITHOUT COMPLICATION: ICD-10-CM

## 2023-07-29 RX ORDER — POTASSIUM CHLORIDE 20 MEQ/1
TABLET, EXTENDED RELEASE ORAL
Qty: 90 TABLET | Refills: 3 | Status: SHIPPED | OUTPATIENT
Start: 2023-07-29

## 2023-07-29 NOTE — TELEPHONE ENCOUNTER
Refill Decision Note   Cristin Segal  is requesting a refill authorization.  Brief Assessment and Rationale for Refill:  Approve     Medication Therapy Plan:       Medication Reconciliation Completed: No   Comments:     No Care Gaps recommended.     Note composed:3:52 PM 07/29/2023

## 2023-07-29 NOTE — TELEPHONE ENCOUNTER
No care due was identified.  [facility] Embedded Care Due Messages. Reference number: [id number].   7/28/2023 7:21:22 PM CDT

## 2023-08-02 ENCOUNTER — PATIENT MESSAGE (OUTPATIENT)
Dept: ADMINISTRATIVE | Facility: HOSPITAL | Age: 63
End: 2023-08-02
Payer: COMMERCIAL

## 2023-08-30 NOTE — PROGRESS NOTES
Subjective:       Patient ID: Cristin Segal is a 62 y.o. female.    Chief Complaint: No chief complaint on file.    Diagnosis:  CASSIDY Squamous Cell Carcinoma     Pre-operative therapy: none    Procedure(s) and date(s): 02/09/23 - Extended robotic lingulectomy, mediastinal lymph node dissection, intercostal nerve block 2 or more intercostal levels    Pathology: 1.6 cm well differentiated keratinizing squamous cell carcinoma. +VPI. Margins negative (1.8 cm bronchovascular). Levels 5, 7, 8, 10, 11, 12 negative for malignancy. O2zO5Nc, stage IB    Post-operative therapy:  surveillance    HPI  Patient is a 62 y.o. female smoker with DM, HTN, HLD, MVP, MCTD, left parotid gland nodule who presents to clinic today for 6 month follow up s/p robotic assisted lingulectomy for CASSIDY NSCLC. Post operative course uncomplicated.     Review of Systems   Constitutional: Negative.    HENT: Negative.     Respiratory: Negative.     Cardiovascular: Negative.    Gastrointestinal: Negative.    Psychiatric/Behavioral: Negative.           Objective:      Physical Exam  Constitutional:       Appearance: Normal appearance.   HENT:      Head: Normocephalic and atraumatic.   Eyes:      Extraocular Movements: Extraocular movements intact.      Conjunctiva/sclera: Conjunctivae normal.      Pupils: Pupils are equal, round, and reactive to light.   Cardiovascular:      Rate and Rhythm: Normal rate and regular rhythm.      Pulses: Normal pulses.      Heart sounds: Murmur heard.   Abdominal:      Palpations: Abdomen is soft.   Musculoskeletal:      Cervical back: Normal range of motion.   Skin:     General: Skin is warm and dry.      Capillary Refill: Capillary refill takes less than 2 seconds.      Comments: WCDI   Neurological:      General: No focal deficit present.      Mental Status: She is alert and oriented to person, place, and time.   Psychiatric:         Mood and Affect: Mood normal.         Behavior: Behavior normal.         Thought  Content: Thought content normal.           Diagnostics:     CXR - 03/07/23: I reviewed image  No pneumothorax, mild postsurgical change    Chest CT 9/5/23:  I reviewed the images  Postsurgical changes of left lingulectomy.  No evidence of recurrent or metastatic disease within the chest.    Assessment:       Mrs. Segal is a 62 y.o. female smoker with DM, HTN, HLD, MVP, MCTD, left parotid gland nodule who presents to clinic today for 6 month follow up s/p robotic assisted lingulectomy for CASSIDY NSCLC.  pT2aN0 NSCLC with negative margins, +vpi    LOLIS    Plan:       Continue close observation with chest CT imaging

## 2023-09-05 ENCOUNTER — OFFICE VISIT (OUTPATIENT)
Dept: PULMONOLOGY | Facility: CLINIC | Age: 63
End: 2023-09-05
Payer: COMMERCIAL

## 2023-09-05 ENCOUNTER — HOSPITAL ENCOUNTER (OUTPATIENT)
Dept: RADIOLOGY | Facility: HOSPITAL | Age: 63
Discharge: HOME | End: 2023-09-05
Attending: PHYSICIAN ASSISTANT
Payer: COMMERCIAL

## 2023-09-05 ENCOUNTER — TELEPHONE (OUTPATIENT)
Dept: HEMATOLOGY/ONCOLOGY | Facility: CLINIC | Age: 63
End: 2023-09-05
Payer: COMMERCIAL

## 2023-09-05 VITALS
HEIGHT: 62 IN | BODY MASS INDEX: 29.82 KG/M2 | OXYGEN SATURATION: 97 % | SYSTOLIC BLOOD PRESSURE: 114 MMHG | WEIGHT: 162.06 LBS | RESPIRATION RATE: 16 BRPM | HEART RATE: 83 BPM | TEMPERATURE: 98 F | DIASTOLIC BLOOD PRESSURE: 67 MMHG

## 2023-09-05 DIAGNOSIS — C34.92 MALIGNANT NEOPLASM OF LEFT LUNG, UNSPECIFIED PART OF LUNG: Primary | ICD-10-CM

## 2023-09-05 DIAGNOSIS — C34.12 MALIGNANT NEOPLASM OF UPPER LOBE OF LEFT LUNG: Primary | ICD-10-CM

## 2023-09-05 DIAGNOSIS — C34.92 MALIGNANT NEOPLASM OF LEFT LUNG, UNSPECIFIED PART OF LUNG: ICD-10-CM

## 2023-09-05 PROCEDURE — 4010F PR ACE/ARB THEARPY RXD/TAKEN: ICD-10-PCS | Mod: CPTII,S$GLB,, | Performed by: THORACIC SURGERY (CARDIOTHORACIC VASCULAR SURGERY)

## 2023-09-05 PROCEDURE — 71250 CT THORAX DX C-: CPT | Mod: TC,PO

## 2023-09-05 PROCEDURE — 3074F SYST BP LT 130 MM HG: CPT | Mod: CPTII,S$GLB,, | Performed by: THORACIC SURGERY (CARDIOTHORACIC VASCULAR SURGERY)

## 2023-09-05 PROCEDURE — 3078F PR MOST RECENT DIASTOLIC BLOOD PRESSURE < 80 MM HG: ICD-10-PCS | Mod: CPTII,S$GLB,, | Performed by: THORACIC SURGERY (CARDIOTHORACIC VASCULAR SURGERY)

## 2023-09-05 PROCEDURE — 3008F PR BODY MASS INDEX (BMI) DOCUMENTED: ICD-10-PCS | Mod: CPTII,S$GLB,, | Performed by: THORACIC SURGERY (CARDIOTHORACIC VASCULAR SURGERY)

## 2023-09-05 PROCEDURE — 3008F BODY MASS INDEX DOCD: CPT | Mod: CPTII,S$GLB,, | Performed by: THORACIC SURGERY (CARDIOTHORACIC VASCULAR SURGERY)

## 2023-09-05 PROCEDURE — 4010F ACE/ARB THERAPY RXD/TAKEN: CPT | Mod: CPTII,S$GLB,, | Performed by: THORACIC SURGERY (CARDIOTHORACIC VASCULAR SURGERY)

## 2023-09-05 PROCEDURE — 99999 PR PBB SHADOW E&M-EST. PATIENT-LVL IV: CPT | Mod: PBBFAC,,, | Performed by: THORACIC SURGERY (CARDIOTHORACIC VASCULAR SURGERY)

## 2023-09-05 PROCEDURE — 1159F PR MEDICATION LIST DOCUMENTED IN MEDICAL RECORD: ICD-10-PCS | Mod: CPTII,S$GLB,, | Performed by: THORACIC SURGERY (CARDIOTHORACIC VASCULAR SURGERY)

## 2023-09-05 PROCEDURE — 3044F PR MOST RECENT HEMOGLOBIN A1C LEVEL <7.0%: ICD-10-PCS | Mod: CPTII,S$GLB,, | Performed by: THORACIC SURGERY (CARDIOTHORACIC VASCULAR SURGERY)

## 2023-09-05 PROCEDURE — 3044F HG A1C LEVEL LT 7.0%: CPT | Mod: CPTII,S$GLB,, | Performed by: THORACIC SURGERY (CARDIOTHORACIC VASCULAR SURGERY)

## 2023-09-05 PROCEDURE — 1159F MED LIST DOCD IN RCRD: CPT | Mod: CPTII,S$GLB,, | Performed by: THORACIC SURGERY (CARDIOTHORACIC VASCULAR SURGERY)

## 2023-09-05 PROCEDURE — 71250 CT CHEST WITHOUT CONTRAST: ICD-10-PCS | Mod: 26,,, | Performed by: RADIOLOGY

## 2023-09-05 PROCEDURE — 99213 PR OFFICE/OUTPT VISIT, EST, LEVL III, 20-29 MIN: ICD-10-PCS | Mod: S$GLB,,, | Performed by: THORACIC SURGERY (CARDIOTHORACIC VASCULAR SURGERY)

## 2023-09-05 PROCEDURE — 99999 PR PBB SHADOW E&M-EST. PATIENT-LVL IV: ICD-10-PCS | Mod: PBBFAC,,, | Performed by: THORACIC SURGERY (CARDIOTHORACIC VASCULAR SURGERY)

## 2023-09-05 PROCEDURE — 71250 CT THORAX DX C-: CPT | Mod: 26,,, | Performed by: RADIOLOGY

## 2023-09-05 PROCEDURE — 3078F DIAST BP <80 MM HG: CPT | Mod: CPTII,S$GLB,, | Performed by: THORACIC SURGERY (CARDIOTHORACIC VASCULAR SURGERY)

## 2023-09-05 PROCEDURE — 99213 OFFICE O/P EST LOW 20 MIN: CPT | Mod: S$GLB,,, | Performed by: THORACIC SURGERY (CARDIOTHORACIC VASCULAR SURGERY)

## 2023-09-05 PROCEDURE — 3074F PR MOST RECENT SYSTOLIC BLOOD PRESSURE < 130 MM HG: ICD-10-PCS | Mod: CPTII,S$GLB,, | Performed by: THORACIC SURGERY (CARDIOTHORACIC VASCULAR SURGERY)

## 2023-09-05 NOTE — NURSING
Met with patient in multi disciplinary clinic today with Dr. Allan.  Explained my role as navigator.  Reviewed plan of care and answered questions.    Dr Allan's plan is to follow up in 6 months with chest CT.  Pt verbalized understanding.

## 2023-09-11 ENCOUNTER — OFFICE VISIT (OUTPATIENT)
Dept: HEMATOLOGY/ONCOLOGY | Facility: CLINIC | Age: 63
End: 2023-09-11
Payer: COMMERCIAL

## 2023-09-11 VITALS
TEMPERATURE: 98 F | WEIGHT: 163.19 LBS | OXYGEN SATURATION: 95 % | DIASTOLIC BLOOD PRESSURE: 56 MMHG | HEART RATE: 77 BPM | BODY MASS INDEX: 29.85 KG/M2 | SYSTOLIC BLOOD PRESSURE: 120 MMHG

## 2023-09-11 DIAGNOSIS — C34.12 MALIGNANT NEOPLASM OF UPPER LOBE OF LEFT LUNG: ICD-10-CM

## 2023-09-11 PROCEDURE — 3044F PR MOST RECENT HEMOGLOBIN A1C LEVEL <7.0%: ICD-10-PCS | Mod: CPTII,S$GLB,, | Performed by: INTERNAL MEDICINE

## 2023-09-11 PROCEDURE — 3074F PR MOST RECENT SYSTOLIC BLOOD PRESSURE < 130 MM HG: ICD-10-PCS | Mod: CPTII,S$GLB,, | Performed by: INTERNAL MEDICINE

## 2023-09-11 PROCEDURE — 1159F PR MEDICATION LIST DOCUMENTED IN MEDICAL RECORD: ICD-10-PCS | Mod: CPTII,S$GLB,, | Performed by: INTERNAL MEDICINE

## 2023-09-11 PROCEDURE — 4010F PR ACE/ARB THEARPY RXD/TAKEN: ICD-10-PCS | Mod: CPTII,S$GLB,, | Performed by: INTERNAL MEDICINE

## 2023-09-11 PROCEDURE — 99213 PR OFFICE/OUTPT VISIT, EST, LEVL III, 20-29 MIN: ICD-10-PCS | Mod: S$GLB,,, | Performed by: INTERNAL MEDICINE

## 2023-09-11 PROCEDURE — 3078F PR MOST RECENT DIASTOLIC BLOOD PRESSURE < 80 MM HG: ICD-10-PCS | Mod: CPTII,S$GLB,, | Performed by: INTERNAL MEDICINE

## 2023-09-11 PROCEDURE — 3008F BODY MASS INDEX DOCD: CPT | Mod: CPTII,S$GLB,, | Performed by: INTERNAL MEDICINE

## 2023-09-11 PROCEDURE — 1159F MED LIST DOCD IN RCRD: CPT | Mod: CPTII,S$GLB,, | Performed by: INTERNAL MEDICINE

## 2023-09-11 PROCEDURE — 3078F DIAST BP <80 MM HG: CPT | Mod: CPTII,S$GLB,, | Performed by: INTERNAL MEDICINE

## 2023-09-11 PROCEDURE — 4010F ACE/ARB THERAPY RXD/TAKEN: CPT | Mod: CPTII,S$GLB,, | Performed by: INTERNAL MEDICINE

## 2023-09-11 PROCEDURE — 99213 OFFICE O/P EST LOW 20 MIN: CPT | Mod: S$GLB,,, | Performed by: INTERNAL MEDICINE

## 2023-09-11 PROCEDURE — 3008F PR BODY MASS INDEX (BMI) DOCUMENTED: ICD-10-PCS | Mod: CPTII,S$GLB,, | Performed by: INTERNAL MEDICINE

## 2023-09-11 PROCEDURE — 3074F SYST BP LT 130 MM HG: CPT | Mod: CPTII,S$GLB,, | Performed by: INTERNAL MEDICINE

## 2023-09-11 PROCEDURE — 3044F HG A1C LEVEL LT 7.0%: CPT | Mod: CPTII,S$GLB,, | Performed by: INTERNAL MEDICINE

## 2023-09-11 NOTE — PROGRESS NOTES
PROGRESS NOTE    Subjective:       Patient ID: Cristin Segal is a 63 y.o. female.    12/27/2022:  CT chest:  Slowly enlarging soft tissue density nodule of the left upper lobe now measuring 11 mm.  Neoplasm is suspected and percutaneous biopsy is recommended.  Otherwise continued follow-up by CT scan is recommended with consideration of a repeat PET CT    1/6/2023:  CASSIDY Lung Biopsy:  LUNG, LEFT UPPER LOBE MASS, CT-GUIDED BIOPSY: Invasive squamous cell carcinoma, well differentiated.    1/20/2023:  MRI Brain-no mets    1/23/2023:  Bone scan, uptake at T11, history of vertebroplasty    2/9/2023:  Lung, lingulectomy: Invasive well-differentiated keratinizing squamous   cell carcinoma, measuring 16 mm (1.6 cm) in greatest dimension.          Bronchial and vascular margins are negative for atypia or malignancy.          See synoptic report in comment section for further details.   9 lymph nodes taken,  all negative.    Visceral pleural invasion:  Present  [id number]-Stage IB     medically appropriate patients with stage IB disease whose tumors display one or more high-risk features, including lymphovascular invasion, poor differentiation, or high standardized uptake value (SUV) on positron emission tomography (PET; variably defined as SUV 10 or more). However, there is no consensus among expert groups.     9/5/2023-CT chest:  Negative    [facility]:  [facility]  Surveillance and f/u with Dr. Allan and CT in 6 months    Chief Complaint:  No chief complaint on file.  Lung cancer follow up    History of Present Illness:   Cristin Segal is a 63 y.o. female who presents for follow up of above.      Patient is doing well and pain is improving.       Family and Social history reviewed and is unchanged from 1/24/2023      ROS:  Review of Systems   Constitutional:  Negative for fever.   Respiratory:  Negative for shortness of  breath.    Cardiovascular:  Negative for chest pain and leg swelling.   Gastrointestinal:  Negative for abdominal pain and blood in stool.   Genitourinary:  Negative for hematuria.   Skin:  Negative for rash.          Current Outpatient Medications:     acetaminophen (TYLENOL) 500 MG tablet, Take 1 tablet (500 mg total) by mouth every 6 (six) hours as needed for Pain., Disp: , Rfl: 0    albuterol (VENTOLIN HFA) 90 mcg/actuation inhaler, Inhale 2 puffs into the lungs every 6 (six) hours as needed for Wheezing or Shortness of Breath. Rescue, Disp: 8 g, Rfl: 5    aspirin 81 MG Chew, Take 1 tablet (81 mg total) by mouth 3 (three) times a week., Disp: 13 tablet, Rfl: 0    hydrOXYchloroQUINE (PLAQUENIL) 200 mg tablet, Take 1 tablet (200 mg total) by mouth once daily., Disp: 90 tablet, Rfl: 1    levothyroxine (SYNTHROID) 50 MCG tablet, TAKE 1 TABLET BY MOUTH BEFORE BREAKFAST., Disp: 90 tablet, Rfl: 3    losartan (COZAAR) 25 MG tablet, TAKE 1 TABLET BY MOUTH EVERY DAY, Disp: 90 tablet, Rfl: 3    multivit-min-FA-lycopen-lutein (CENTRUM SILVER) 0.4 mg-300 mcg- 250 mcg Tab, Take 1 tablet by mouth once daily., Disp: , Rfl:     mv-min/FA/C/glut/lysine/xmi531 (AIRBORNE, WITH FOLIC ACID, ORAL), Take 1 tablet by mouth 4 (four) times daily., Disp: , Rfl:     NIFEdipine (ADALAT CC) 30 MG TbSR, TAKE 1 TABLET BY MOUTH EVERY DAY, Disp: 90 tablet, Rfl: 2    pantoprazole (PROTONIX) 40 MG tablet, TAKE 1 TABLET BY MOUTH EVERY DAY, Disp: 90 tablet, Rfl: 3    potassium chloride SA (K-DUR,KLOR-CON) 20 MEQ tablet, TAKE 1 TABLET BY MOUTH ONCE DAILY, Disp: 90 tablet, Rfl: 3    rosuvastatin (CRESTOR) 5 MG tablet, TAKE 1 TABLET BY MOUTH EVERYDAY AT BEDTIME (Patient taking differently: Take 5 mg by mouth once daily.), Disp: 90 tablet, Rfl: 3    vitamin D (VITAMIN D3) 1000 units Tab, Take 1,000 Units by mouth once daily., Disp: , Rfl:     WESTAB MAX 2.5-25-2 mg Tab, TAKE 1 TABLET BY MOUTH EVERY DAY, Disp: 90 tablet, Rfl: 3  No current  facility-administered medications for this visit.    Facility-Administered Medications Ordered in Other Visits:     lactated ringers infusion, , Intravenous, Continuous, VuKev MD        Objective:       Physical Examination:     BP (!) 120/56   Pulse 77   Temp 97.9 °F (36.6 °C)   Wt 74 kg (163 lb 3.2 oz)   SpO2 95%   BMI 29.85 kg/m²     Physical Exam  Constitutional:       Appearance: She is well-developed.   HENT:      Head: Normocephalic and atraumatic.      Right Ear: External ear normal.      Left Ear: External ear normal.   Eyes:      Conjunctiva/sclera: Conjunctivae normal.      Pupils: Pupils are equal, round, and reactive to light.   Neck:      Thyroid: No thyromegaly.      Trachea: No tracheal deviation.   Cardiovascular:      Rate and Rhythm: Normal rate and regular rhythm.      Heart sounds: Normal heart sounds.   Pulmonary:      Effort: Pulmonary effort is normal.      Breath sounds: Normal breath sounds.   Abdominal:      General: Bowel sounds are normal. There is no distension.      Palpations: Abdomen is soft. There is no mass.      Tenderness: There is no abdominal tenderness.   Skin:     Findings: No rash.   Neurological:      Comments: Neuro intact througout   Psychiatric:         Behavior: Behavior normal.         Thought Content: Thought content normal.         Judgment: Judgment normal.         Labs:   No results found for this or any previous visit (from the past 336 hour(s)).  CMP  Sodium   Date Value Ref Range Status   05/26/2023 136 136 - 145 mmol/L Final   06/28/2019 138 134 - 144 mmol/L      Potassium   Date Value Ref Range Status   05/26/2023 4.8 3.5 - 5.1 mmol/L Final     Chloride   Date Value Ref Range Status   05/26/2023 107 95 - 110 mmol/L Final   06/28/2019 101 98 - 110 mmol/L      CO2   Date Value Ref Range Status   05/26/2023 19 (L) 23 - 29 mmol/L Final     Glucose   Date Value Ref Range Status   05/26/2023 91 70 - 110 mg/dL Final   06/28/2019 111 (H) 70 - 99 mg/dL   "    BUN   Date Value Ref Range Status   05/26/2023 29 (H) 8 - 23 mg/dL Final     Creatinine   Date Value Ref Range Status   05/26/2023 1.0 0.5 - 1.4 mg/dL Final   06/28/2019 0.74 0.60 - 1.40 mg/dL      Calcium   Date Value Ref Range Status   05/26/2023 10.4 8.7 - 10.5 mg/dL Final     Total Protein   Date Value Ref Range Status   05/26/2023 7.9 6.0 - 8.4 g/dL Final     Albumin   Date Value Ref Range Status   05/26/2023 4.0 3.5 - 5.2 g/dL Final   06/28/2019 4.5 3.1 - 4.7 g/dL      Total Bilirubin   Date Value Ref Range Status   05/26/2023 0.2 0.1 - 1.0 mg/dL Final     Comment:     For infants and newborns, interpretation of results should be based  on gestational age, weight and in agreement with clinical  observations.    Premature Infant recommended reference ranges:  Up to 24 hours.............<8.0 mg/dL  Up to 48 hours............<12.0 mg/dL  3-5 days..................<15.0 mg/dL  6-29 days.................<15.0 mg/dL       Alkaline Phosphatase   Date Value Ref Range Status   05/26/2023 68 55 - 135 U/L Final     AST   Date Value Ref Range Status   05/26/2023 29 10 - 40 U/L Final     ALT   Date Value Ref Range Status   05/26/2023 25 10 - 44 U/L Final     Anion Gap   Date Value Ref Range Status   05/26/2023 10 8 - 16 mmol/L Final     eGFR if    Date Value Ref Range Status   05/11/2022 56.4 (A) >60 mL/min/1.73 m^2 Final   05/11/2022 56.4 (A) >60 mL/min/1.73 m^2 Final     eGFR if non    Date Value Ref Range Status   05/11/2022 48.9 (A) >60 mL/min/1.73 m^2 Final     Comment:     Calculation used to obtain the estimated glomerular filtration  rate (eGFR) is the CKD-EPI equation.      05/11/2022 48.9 (A) >60 mL/min/1.73 m^2 Final     Comment:     Calculation used to obtain the estimated glomerular filtration  rate (eGFR) is the CKD-EPI equation.        No results found for: "CEA"  No results found for: "PSA"        Assessment/Plan:     Problem List Items Addressed This Visit       Squamous " Cell Carcinoma of CASSIDY of lung     Patient is doing well.  Chest CT shows no sign of recurrence and she is feeling well.  Will continue with six month CT scanning and will see her again after this is done.            Discussion:     Follow up in about 6 months (around 3/11/2024).      Electronically signed by Lars Carrasco

## 2023-09-11 NOTE — ASSESSMENT & PLAN NOTE
Patient is doing well.  Chest CT shows no sign of recurrence and she is feeling well.  Will continue with six month CT scanning and will see her again after this is done.

## 2023-09-28 ENCOUNTER — OFFICE VISIT (OUTPATIENT)
Dept: CARDIOLOGY | Facility: CLINIC | Age: 63
End: 2023-09-28
Payer: COMMERCIAL

## 2023-09-28 VITALS
HEART RATE: 79 BPM | WEIGHT: 159 LBS | RESPIRATION RATE: 16 BRPM | DIASTOLIC BLOOD PRESSURE: 68 MMHG | HEIGHT: 62 IN | BODY MASS INDEX: 29.26 KG/M2 | SYSTOLIC BLOOD PRESSURE: 122 MMHG | OXYGEN SATURATION: 98 %

## 2023-09-28 DIAGNOSIS — I10 ESSENTIAL HYPERTENSION: ICD-10-CM

## 2023-09-28 DIAGNOSIS — S39.92XS INJURY OF BACK, SEQUELA: ICD-10-CM

## 2023-09-28 DIAGNOSIS — I35.8 AORTIC VALVE SCLEROSIS: ICD-10-CM

## 2023-09-28 DIAGNOSIS — I35.0 AORTIC STENOSIS, MODERATE: ICD-10-CM

## 2023-09-28 DIAGNOSIS — E08.65 DIABETES MELLITUS DUE TO UNDERLYING CONDITION WITH HYPERGLYCEMIA, WITHOUT LONG-TERM CURRENT USE OF INSULIN: ICD-10-CM

## 2023-09-28 DIAGNOSIS — E03.9 ACQUIRED HYPOTHYROIDISM: ICD-10-CM

## 2023-09-28 DIAGNOSIS — R00.0 TACHYCARDIA: Primary | ICD-10-CM

## 2023-09-28 DIAGNOSIS — R94.31 NONSPECIFIC ABNORMAL ELECTROCARDIOGRAM (ECG) (EKG): ICD-10-CM

## 2023-09-28 DIAGNOSIS — I73.00 RAYNAUD'S DISEASE WITHOUT GANGRENE: ICD-10-CM

## 2023-09-28 PROCEDURE — 93000 ELECTROCARDIOGRAM COMPLETE: CPT | Mod: S$GLB,,, | Performed by: INTERNAL MEDICINE

## 2023-09-28 PROCEDURE — 4010F ACE/ARB THERAPY RXD/TAKEN: CPT | Mod: CPTII,S$GLB,, | Performed by: INTERNAL MEDICINE

## 2023-09-28 PROCEDURE — 1159F PR MEDICATION LIST DOCUMENTED IN MEDICAL RECORD: ICD-10-PCS | Mod: CPTII,S$GLB,, | Performed by: INTERNAL MEDICINE

## 2023-09-28 PROCEDURE — 3044F PR MOST RECENT HEMOGLOBIN A1C LEVEL <7.0%: ICD-10-PCS | Mod: CPTII,S$GLB,, | Performed by: INTERNAL MEDICINE

## 2023-09-28 PROCEDURE — 93000 EKG 12-LEAD: ICD-10-PCS | Mod: S$GLB,,, | Performed by: INTERNAL MEDICINE

## 2023-09-28 PROCEDURE — 1160F RVW MEDS BY RX/DR IN RCRD: CPT | Mod: CPTII,S$GLB,, | Performed by: INTERNAL MEDICINE

## 2023-09-28 PROCEDURE — 4010F PR ACE/ARB THEARPY RXD/TAKEN: ICD-10-PCS | Mod: CPTII,S$GLB,, | Performed by: INTERNAL MEDICINE

## 2023-09-28 PROCEDURE — 3078F PR MOST RECENT DIASTOLIC BLOOD PRESSURE < 80 MM HG: ICD-10-PCS | Mod: CPTII,S$GLB,, | Performed by: INTERNAL MEDICINE

## 2023-09-28 PROCEDURE — 99999 PR PBB SHADOW E&M-EST. PATIENT-LVL IV: ICD-10-PCS | Mod: PBBFAC,,, | Performed by: INTERNAL MEDICINE

## 2023-09-28 PROCEDURE — 3078F DIAST BP <80 MM HG: CPT | Mod: CPTII,S$GLB,, | Performed by: INTERNAL MEDICINE

## 2023-09-28 PROCEDURE — 99214 OFFICE O/P EST MOD 30 MIN: CPT | Mod: S$GLB,,, | Performed by: INTERNAL MEDICINE

## 2023-09-28 PROCEDURE — 99214 PR OFFICE/OUTPT VISIT, EST, LEVL IV, 30-39 MIN: ICD-10-PCS | Mod: S$GLB,,, | Performed by: INTERNAL MEDICINE

## 2023-09-28 PROCEDURE — 1159F MED LIST DOCD IN RCRD: CPT | Mod: CPTII,S$GLB,, | Performed by: INTERNAL MEDICINE

## 2023-09-28 PROCEDURE — 99999 PR PBB SHADOW E&M-EST. PATIENT-LVL IV: CPT | Mod: PBBFAC,,, | Performed by: INTERNAL MEDICINE

## 2023-09-28 PROCEDURE — 3044F HG A1C LEVEL LT 7.0%: CPT | Mod: CPTII,S$GLB,, | Performed by: INTERNAL MEDICINE

## 2023-09-28 PROCEDURE — 3008F PR BODY MASS INDEX (BMI) DOCUMENTED: ICD-10-PCS | Mod: CPTII,S$GLB,, | Performed by: INTERNAL MEDICINE

## 2023-09-28 PROCEDURE — 3074F PR MOST RECENT SYSTOLIC BLOOD PRESSURE < 130 MM HG: ICD-10-PCS | Mod: CPTII,S$GLB,, | Performed by: INTERNAL MEDICINE

## 2023-09-28 PROCEDURE — 3008F BODY MASS INDEX DOCD: CPT | Mod: CPTII,S$GLB,, | Performed by: INTERNAL MEDICINE

## 2023-09-28 PROCEDURE — 3074F SYST BP LT 130 MM HG: CPT | Mod: CPTII,S$GLB,, | Performed by: INTERNAL MEDICINE

## 2023-09-28 PROCEDURE — 1160F PR REVIEW ALL MEDS BY PRESCRIBER/CLIN PHARMACIST DOCUMENTED: ICD-10-PCS | Mod: CPTII,S$GLB,, | Performed by: INTERNAL MEDICINE

## 2023-09-28 NOTE — PROGRESS NOTES
Subjective:    Patient ID:  Cristin Segal is a 63 y.o. female     Chief Complaint   Patient presents with    Hyperlipidemia    Hypertension       HPI:  Ms Cristin Segal is a 63 y.o. female is here for follow-up.    Patient has been doing well no specific complaints at the present time she does have significant back pain where she had kyphoplasty she is has intermittent pain especially when she is cooking and standing and washing dishes.  Her breathing is good denies any shortness of breath she does have some dyspnea on exertion.  Denies any chest pain or tightness or heaviness denies any loss of consciousness falls or head injury.    She is taking all her medications regularly.        Review of patient's allergies indicates:   Allergen Reactions    Pcn [penicillins] Anaphylaxis     Unknown for her- family history with anaphylaxis    Lipitor [atorvastatin]        Past Medical History:   Diagnosis Date    Arthritis     Cataract     Diabetes mellitus type II     diet controlled    Fracture     left 4th finger  - splinted    Hyperlipidemia     MCTD (mixed connective tissue disease)     Raynaud's disease     Thyroid disease     Hypothyroidism    Wears glasses     contacs     Past Surgical History:   Procedure Laterality Date    CARPAL TUNNEL RELEASE      right    CATARACT EXTRACTION W/ INTRAOCULAR LENS  IMPLANT, BILATERAL      EXCISION OF PAROTID GLAND Right 8/1/2019    Procedure: PAROTIDECTOMY;  Surgeon: Abner Maki MD;  Location: [facility];  Service: ENT;  Laterality: Right;    HIP SURGERY Left 6/18/15    JANA    INJECTION OF ANESTHETIC AGENT AROUND MULTIPLE INTERCOSTAL NERVES Left 2/9/2023    Procedure: BLOCK, NERVE, INTERCOSTAL, 2 OR MORE;  Surgeon: Isaiah Allan MD;  Location: [address];  Service: Thoracic;  Laterality: Left;    JOINT REPLACEMENT Bilateral     HIP    KNEE CARTILAGE SURGERY      right    LYMPHADENECTOMY Left 2/9/2023    Procedure: LYMPHADENECTOMY;  Surgeon: Isaiah OLVERA  MD Jerrell;  Location: [facility] OR [address] [place] [address];  Service: Thoracic;  Laterality: Left;    TOTAL HIP ARTHROPLASTY Right 2016    JANA    XI ROBOTIC RATS,WITH LOBECTOMY,LUNG Left 2023    Procedure: XI ROBOTIC RATS,WITH LEFT UPPER LOBECTOMY,LUNG;  Surgeon: Isaiah Allan MD;  Location: [facility] OR [address] [place] [address];  Service: Thoracic;  Laterality: Left;  extended lingulectomy     Social History     Tobacco Use    Smoking status: Former     Current packs/day: 0.00     Average packs/day: 0.5 packs/day for 45.1 years (22.6 ttl pk-yrs)     Types: Cigarettes     Start date:      Quit date: 2023     Years since quittin.6     Passive exposure: Past    Smokeless tobacco: Never   Substance Use Topics    Alcohol use: Yes     Comment: occasional    Drug use: No     Family History   Problem Relation Age of Onset    Diabetes Mother     Kidney disease Mother     Aneurysm Mother 73        brain    Hyperlipidemia Mother     Hypertension Sister     Breast cancer Sister     Diabetes Sister     Ovarian cancer Neg Hx         Review of Systems:   Constitution: Negative for diaphoresis and fever.   HEENT: Negative for nosebleeds.    Cardiovascular: Negative for chest pain       Intermittent dyspnea on exertion       No leg swelling        No palpitations  Respiratory: Negative for shortness of breath and wheezing.    Hematologic/Lymphatic: Negative for bleeding problem. Does not bruise/bleed easily.   Skin: Negative for color change and rash.   Musculoskeletal: Negative for falls and myalgias.  Back pain.  Gastrointestinal: Negative for hematemesis and hematochezia.   Genitourinary: Negative for hematuria.   Neurological: Negative for dizziness and light-headedness.   Psychiatric/Behavioral: Negative for altered mental status and memory loss.          Objective:        Vitals:    23 0951   BP: 122/68   Pulse: 79   Resp: 16       Lab Results   Component Value Date    WBC 6.34 2023    HGB 10.6 (L) 2023    HCT 32.5  (L) 05/26/2023     (H) 05/26/2023    CHOL 197 05/26/2023    TRIG 60 05/26/2023    HDL 87 (H) 05/26/2023    ALT 25 05/26/2023    AST 29 05/26/2023     05/26/2023    K 4.8 05/26/2023     05/26/2023    CREATININE 1.0 05/26/2023    BUN 29 (H) 05/26/2023    CO2 19 (L) 05/26/2023    TSH 0.899 05/26/2023    INR 1.0 01/06/2023    HGBA1C 4.8 05/26/2023        ECHOCARDIOGRAM RESULTS  Results for orders placed during the hospital encounter of 09/20/21    Echo Color Flow Doppler? Yes    Interpretation Summary  · The left ventricle is normal in size with normal systolic function.  · The estimated ejection fraction is 70%.  · Normal right ventricular size with normal right ventricular systolic function.  · Mild to moderate tricuspid regurgitation.  · Mild mitral regurgitation.  · There is mild-to-moderate aortic valve stenosis.  · Aortic valve area is 1.33 cm2; peak velocity is 2.39 m/s; mean gradient is 16 mmHg.  · Normal left ventricular diastolic function.        CURRENT/PREVIOUS VISIT EKG  Results for orders placed or performed in visit on 12/29/22   IN OFFICE EKG 12-LEAD (to [place])    Collection Time: 12/29/22  3:58 PM    Narrative    Test Reason : R00.0,    Vent. Rate : 093 BPM     Atrial Rate : 093 BPM     P-R Int : 220 ms          QRS Dur : 078 ms      QT Int : 348 ms       P-R-T Axes : 070 081 067 degrees     QTc Int : 432 ms    Sinus rhythm with 1st degree A-V block  Low voltage QRS Early repolarization  Borderline Abnormal ECG  When compared with ECG of 21-MAR-2022 13:12,  Premature ventricular complexes are no longer Present  Confirmed by Raymond Nesbitt MD ([id number]) on 1/11/2023 6:24:29 PM    Referred By:             Confirmed By:Raymond Nesbitt MD     No valid procedures specified.   No results found for this or any previous visit.      Physical Exam:  CONSTITUTIONAL: No fever, no chills  HEENT: Normocephalic, atraumatic,pupils reactive to light                 NECK:  No JVD no carotid bruit  CVS: S1S2+,  RRR, systolic murmurs,   LUNGS: Clear  ABDOMEN: Soft, NT, BS+  EXTREMITIES: No cyanosis, edema  : No milton catheter  NEURO: AAO X 3  PSY: Normal affect      Medication List with Changes/Refills   Current Medications    ACETAMINOPHEN (TYLENOL) 500 MG TABLET    Take 1 tablet (500 mg total) by mouth every 6 (six) hours as needed for Pain.    ALBUTEROL (VENTOLIN HFA) 90 MCG/ACTUATION INHALER    Inhale 2 puffs into the lungs every 6 (six) hours as needed for Wheezing or Shortness of Breath. Rescue    ASPIRIN 81 MG CHEW    Take 1 tablet (81 mg total) by mouth 3 (three) times a week.    HYDROXYCHLOROQUINE (PLAQUENIL) 200 MG TABLET    Take 1 tablet (200 mg total) by mouth once daily.    LEVOTHYROXINE (SYNTHROID) 50 MCG TABLET    TAKE 1 TABLET BY MOUTH BEFORE BREAKFAST.    LOSARTAN (COZAAR) 25 MG TABLET    TAKE 1 TABLET BY MOUTH EVERY DAY    MULTIVIT-MIN-FA-LYCOPEN-LUTEIN (CENTRUM SILVER) 0.4 MG-300 MCG- 250 MCG TAB    Take 1 tablet by mouth once daily.    MV-MIN/FA/C/GLUT/LYSINE/WEF079 (AIRBORNE, WITH FOLIC ACID, ORAL)    Take 1 tablet by mouth 4 (four) times daily.    NIFEDIPINE (ADALAT CC) 30 MG TBSR    TAKE 1 TABLET BY MOUTH EVERY DAY    PANTOPRAZOLE (PROTONIX) 40 MG TABLET    TAKE 1 TABLET BY MOUTH EVERY DAY    POTASSIUM CHLORIDE SA (K-DUR,KLOR-CON) 20 MEQ TABLET    TAKE 1 TABLET BY MOUTH ONCE DAILY    ROSUVASTATIN (CRESTOR) 5 MG TABLET    TAKE 1 TABLET BY MOUTH EVERYDAY AT BEDTIME    VITAMIN D (VITAMIN D3) 1000 UNITS TAB    Take 1,000 Units by mouth once daily.    WESTAB MAX 2.5-25-2 MG TAB    TAKE 1 TABLET BY MOUTH EVERY DAY             Assessment:       1. Tachycardia    2. Essential hypertension    3. Raynaud's disease without gangrene    4. Aortic valve sclerosis    5. Aortic stenosis, moderate    6. Acquired hypothyroidism    7. Diabetes mellitus due to underlying condition with hyperglycemia, without long-term current use of insulin    8. Injury of back, sequela    9. Nonspecific abnormal electrocardiogram  (ECG) (EKG)         Plan:   1. Tachycardia   Patient has been doing well no further episodes of palpitations or rapid heart rate.  And currently heart rate is around 79.    2. Essential hypertension  Patient's blood pressure is stable at 120 2/68 and she is currently on losartan 25 mg p.o. daily nifedipine 30 mg p.o. daily continue the same.  Her blood pressure is stable at the present time.    3. Raynaud's disease without gangrene   Both amlodipine and nifedipine a helping her to keep the muscle spasm to the lowest level.  She is doing well she has no specific complaints.  4. Aortic stenosis    On her previous echo from 2021 she had normal LV function with aortic valve area of 1.33 centimeter sq with a mean gradient of 16 mmHg will repeat aortic valve measurements again will do echocardiogram.    5. Acquired hypothyroidism   Patient is currently on levothyroxine 50 mcg p.o. daily continue the same and a primary physician is checking her thyroid function tests.  6. Diabetes mellitus   Her blood sugars are much better controlled.  She is controlled did very well with her diet.  Continue her low-carbohydrate diet.    7. EKG  Reviewed EKG independently patient is in normal sinus rhythm with first-degree AV block with a heart rate of 79 beats per minute normal QT and QTC intervals.  No acute ST T-wave changes.  Poor R-wave progression in the anterior precordial leads.    8. Patient to continue current management and will do an echocardiogram before she comes back to see me.            Problem List Items Addressed This Visit          Cardiac/Vascular    Raynaud disease    Essential hypertension    Tachycardia - Primary       Endocrine    Diabetes mellitus due to underlying condition with hyperglycemia, without long-term current use of insulin    Hypothyroidism       Orthopedic    Back injury     Other Visit Diagnoses       Aortic valve sclerosis        Aortic stenosis, moderate        Nonspecific abnormal  electrocardiogram (ECG) (EKG)                No follow-ups on file.

## 2023-10-18 ENCOUNTER — PATIENT MESSAGE (OUTPATIENT)
Dept: GASTROENTEROLOGY | Facility: CLINIC | Age: 63
End: 2023-10-18
Payer: COMMERCIAL

## 2023-10-27 ENCOUNTER — LAB VISIT (OUTPATIENT)
Dept: LAB | Facility: HOSPITAL | Age: 63
End: 2023-10-27
Attending: STUDENT IN AN ORGANIZED HEALTH CARE EDUCATION/TRAINING PROGRAM
Payer: COMMERCIAL

## 2023-10-27 DIAGNOSIS — M35.9 DIFFUSE DISEASE OF CONNECTIVE TISSUE: Primary | ICD-10-CM

## 2023-10-27 LAB
ALBUMIN SERPL BCP-MCNC: 4.6 G/DL (ref 3.5–5.2)
ALP SERPL-CCNC: 64 U/L (ref 55–135)
ALT SERPL W/O P-5'-P-CCNC: 33 U/L (ref 10–44)
ANION GAP SERPL CALC-SCNC: 9 MMOL/L (ref 8–16)
AST SERPL-CCNC: 38 U/L (ref 10–40)
BASOPHILS # BLD AUTO: 0.1 K/UL (ref 0–0.2)
BASOPHILS NFR BLD: 1.5 % (ref 0–1.9)
BILIRUB SERPL-MCNC: 0.4 MG/DL (ref 0.1–1)
BUN SERPL-MCNC: 14 MG/DL (ref 8–23)
CALCIUM SERPL-MCNC: 10.1 MG/DL (ref 8.7–10.5)
CHLORIDE SERPL-SCNC: 102 MMOL/L (ref 95–110)
CO2 SERPL-SCNC: 22 MMOL/L (ref 23–29)
CREAT SERPL-MCNC: 1.1 MG/DL (ref 0.5–1.4)
CRP SERPL-MCNC: 9.6 MG/L (ref 0–8.2)
DIFFERENTIAL METHOD: ABNORMAL
EOSINOPHIL # BLD AUTO: 0.2 K/UL (ref 0–0.5)
EOSINOPHIL NFR BLD: 3.2 % (ref 0–8)
ERYTHROCYTE [DISTWIDTH] IN BLOOD BY AUTOMATED COUNT: 13.8 % (ref 11.5–14.5)
ERYTHROCYTE [SEDIMENTATION RATE] IN BLOOD BY WESTERGREN METHOD: 10 MM/HR (ref 0–20)
EST. GFR  (NO RACE VARIABLE): 56.5 ML/MIN/1.73 M^2
GLUCOSE SERPL-MCNC: 75 MG/DL (ref 70–110)
HCT VFR BLD AUTO: 32.5 % (ref 37–48.5)
HGB BLD-MCNC: 11.1 G/DL (ref 12–16)
IMM GRANULOCYTES # BLD AUTO: 0.01 K/UL (ref 0–0.04)
IMM GRANULOCYTES NFR BLD AUTO: 0.1 % (ref 0–0.5)
LYMPHOCYTES # BLD AUTO: 2.2 K/UL (ref 1–4.8)
LYMPHOCYTES NFR BLD: 32.5 % (ref 18–48)
MCH RBC QN AUTO: 35.1 PG (ref 27–31)
MCHC RBC AUTO-ENTMCNC: 34.2 G/DL (ref 32–36)
MCV RBC AUTO: 103 FL (ref 82–98)
MONOCYTES # BLD AUTO: 0.6 K/UL (ref 0.3–1)
MONOCYTES NFR BLD: 8.9 % (ref 4–15)
NEUTROPHILS # BLD AUTO: 3.7 K/UL (ref 1.8–7.7)
NEUTROPHILS NFR BLD: 53.8 % (ref 38–73)
NRBC BLD-RTO: 0 /100 WBC
PLATELET # BLD AUTO: 310 K/UL (ref 150–450)
PMV BLD AUTO: 8.6 FL (ref 9.2–12.9)
POTASSIUM SERPL-SCNC: 4.4 MMOL/L (ref 3.5–5.1)
PROT SERPL-MCNC: 8.2 G/DL (ref 6–8.4)
RBC # BLD AUTO: 3.16 M/UL (ref 4–5.4)
SODIUM SERPL-SCNC: 133 MMOL/L (ref 136–145)
T4 FREE SERPL-MCNC: 1.13 NG/DL (ref 0.71–1.51)
TSH SERPL DL<=0.005 MIU/L-ACNC: 0.68 UIU/ML (ref 0.34–5.6)
WBC # BLD AUTO: 6.83 K/UL (ref 3.9–12.7)

## 2023-10-27 PROCEDURE — 86376 MICROSOMAL ANTIBODY EACH: CPT | Performed by: STUDENT IN AN ORGANIZED HEALTH CARE EDUCATION/TRAINING PROGRAM

## 2023-10-27 PROCEDURE — 36415 COLL VENOUS BLD VENIPUNCTURE: CPT | Performed by: STUDENT IN AN ORGANIZED HEALTH CARE EDUCATION/TRAINING PROGRAM

## 2023-10-27 PROCEDURE — 85651 RBC SED RATE NONAUTOMATED: CPT | Performed by: STUDENT IN AN ORGANIZED HEALTH CARE EDUCATION/TRAINING PROGRAM

## 2023-10-27 PROCEDURE — 86038 ANTINUCLEAR ANTIBODIES: CPT | Performed by: STUDENT IN AN ORGANIZED HEALTH CARE EDUCATION/TRAINING PROGRAM

## 2023-10-27 PROCEDURE — 84439 ASSAY OF FREE THYROXINE: CPT | Performed by: STUDENT IN AN ORGANIZED HEALTH CARE EDUCATION/TRAINING PROGRAM

## 2023-10-27 PROCEDURE — 86140 C-REACTIVE PROTEIN: CPT | Performed by: STUDENT IN AN ORGANIZED HEALTH CARE EDUCATION/TRAINING PROGRAM

## 2023-10-27 PROCEDURE — 85025 COMPLETE CBC W/AUTO DIFF WBC: CPT | Performed by: STUDENT IN AN ORGANIZED HEALTH CARE EDUCATION/TRAINING PROGRAM

## 2023-10-27 PROCEDURE — 86160 COMPLEMENT ANTIGEN: CPT | Performed by: STUDENT IN AN ORGANIZED HEALTH CARE EDUCATION/TRAINING PROGRAM

## 2023-10-27 PROCEDURE — 80053 COMPREHEN METABOLIC PANEL: CPT | Performed by: STUDENT IN AN ORGANIZED HEALTH CARE EDUCATION/TRAINING PROGRAM

## 2023-10-27 PROCEDURE — 86800 THYROGLOBULIN ANTIBODY: CPT | Performed by: STUDENT IN AN ORGANIZED HEALTH CARE EDUCATION/TRAINING PROGRAM

## 2023-10-27 PROCEDURE — 86235 NUCLEAR ANTIGEN ANTIBODY: CPT | Performed by: STUDENT IN AN ORGANIZED HEALTH CARE EDUCATION/TRAINING PROGRAM

## 2023-10-27 PROCEDURE — 86235 NUCLEAR ANTIGEN ANTIBODY: CPT | Mod: 59 | Performed by: STUDENT IN AN ORGANIZED HEALTH CARE EDUCATION/TRAINING PROGRAM

## 2023-10-27 PROCEDURE — 86225 DNA ANTIBODY NATIVE: CPT | Performed by: STUDENT IN AN ORGANIZED HEALTH CARE EDUCATION/TRAINING PROGRAM

## 2023-10-27 PROCEDURE — 84443 ASSAY THYROID STIM HORMONE: CPT | Performed by: STUDENT IN AN ORGANIZED HEALTH CARE EDUCATION/TRAINING PROGRAM

## 2023-10-27 PROCEDURE — 30000890 LABCORP MISCELLANEOUS TEST: Performed by: STUDENT IN AN ORGANIZED HEALTH CARE EDUCATION/TRAINING PROGRAM

## 2023-10-28 LAB
C3 SERPL-MCNC: 124 MG/DL (ref 82–167)
C4 SERPL-MCNC: 38 MG/DL (ref 12–38)
DSDNA AB SER-ACNC: 1 IU/ML (ref 0–9)
ENA RNP AB SER-ACNC: 0.3 AI (ref 0–0.9)
ENA SM AB SER-ACNC: <0.2 AI (ref 0–0.9)
THYROGLOB AB SERPL-ACNC: 0.9 IU/ML (ref 0–0.9)
THYROPEROXIDASE AB SERPL-ACNC: <9 IU/ML (ref 0–34)

## 2023-10-29 LAB — ANA TITR SER IF: NEGATIVE {TITER}

## 2023-10-31 LAB
LABCORP MISC TEST CODE: NORMAL
LABCORP MISC TEST NAME: NORMAL
LABCORP MISCELLANEOUS TEST: NORMAL

## 2023-12-18 ENCOUNTER — LAB VISIT (OUTPATIENT)
Dept: LAB | Facility: HOSPITAL | Age: 63
End: 2023-12-18
Attending: FAMILY MEDICINE
Payer: COMMERCIAL

## 2023-12-18 DIAGNOSIS — E11.8 CONTROLLED TYPE 2 DIABETES MELLITUS WITH COMPLICATION, WITHOUT LONG-TERM CURRENT USE OF INSULIN: ICD-10-CM

## 2023-12-18 DIAGNOSIS — D53.9 MACROCYTIC ANEMIA: ICD-10-CM

## 2023-12-18 DIAGNOSIS — E78.2 MIXED HYPERLIPIDEMIA: ICD-10-CM

## 2023-12-18 DIAGNOSIS — E03.9 HYPOTHYROIDISM, UNSPECIFIED TYPE: ICD-10-CM

## 2023-12-18 DIAGNOSIS — E72.11 HYPERHOMOCYSTEINEMIA: ICD-10-CM

## 2023-12-18 LAB
ALBUMIN SERPL BCP-MCNC: 4.4 G/DL (ref 3.5–5.2)
ALP SERPL-CCNC: 67 U/L (ref 55–135)
ALT SERPL W/O P-5'-P-CCNC: 20 U/L (ref 10–44)
ANION GAP SERPL CALC-SCNC: 9 MMOL/L (ref 8–16)
AST SERPL-CCNC: 20 U/L (ref 10–40)
BASOPHILS # BLD AUTO: 0.05 K/UL (ref 0–0.2)
BASOPHILS NFR BLD: 0.6 % (ref 0–1.9)
BILIRUB SERPL-MCNC: 0.8 MG/DL (ref 0.1–1)
BUN SERPL-MCNC: 28 MG/DL (ref 8–23)
CALCIUM SERPL-MCNC: 10.7 MG/DL (ref 8.7–10.5)
CHLORIDE SERPL-SCNC: 99 MMOL/L (ref 95–110)
CHOLEST SERPL-MCNC: 245 MG/DL (ref 120–199)
CHOLEST/HDLC SERPL: 1.9 {RATIO} (ref 2–5)
CO2 SERPL-SCNC: 27 MMOL/L (ref 23–29)
CREAT SERPL-MCNC: 1.2 MG/DL (ref 0.5–1.4)
DIFFERENTIAL METHOD: ABNORMAL
EOSINOPHIL # BLD AUTO: 0 K/UL (ref 0–0.5)
EOSINOPHIL NFR BLD: 0.2 % (ref 0–8)
ERYTHROCYTE [DISTWIDTH] IN BLOOD BY AUTOMATED COUNT: 13.6 % (ref 11.5–14.5)
EST. GFR  (NO RACE VARIABLE): 50.9 ML/MIN/1.73 M^2
ESTIMATED AVG GLUCOSE: 91 MG/DL (ref 68–131)
FOLATE SERPL-MCNC: 17.4 NG/ML (ref 4–24)
GLUCOSE SERPL-MCNC: 106 MG/DL (ref 70–110)
HBA1C MFR BLD: 4.8 % (ref 4–5.6)
HCT VFR BLD AUTO: 33.9 % (ref 37–48.5)
HDLC SERPL-MCNC: 128 MG/DL (ref 40–75)
HDLC SERPL: 52.2 % (ref 20–50)
HGB BLD-MCNC: 11.6 G/DL (ref 12–16)
IMM GRANULOCYTES # BLD AUTO: 0.04 K/UL (ref 0–0.04)
IMM GRANULOCYTES NFR BLD AUTO: 0.5 % (ref 0–0.5)
LDLC SERPL CALC-MCNC: 106.6 MG/DL (ref 63–159)
LYMPHOCYTES # BLD AUTO: 3.4 K/UL (ref 1–4.8)
LYMPHOCYTES NFR BLD: 39.6 % (ref 18–48)
MCH RBC QN AUTO: 35 PG (ref 27–31)
MCHC RBC AUTO-ENTMCNC: 34.2 G/DL (ref 32–36)
MCV RBC AUTO: 102 FL (ref 82–98)
MONOCYTES # BLD AUTO: 0.9 K/UL (ref 0.3–1)
MONOCYTES NFR BLD: 10.7 % (ref 4–15)
NEUTROPHILS # BLD AUTO: 4.2 K/UL (ref 1.8–7.7)
NEUTROPHILS NFR BLD: 48.4 % (ref 38–73)
NONHDLC SERPL-MCNC: 117 MG/DL
NRBC BLD-RTO: 0 /100 WBC
PLATELET # BLD AUTO: 408 K/UL (ref 150–450)
PMV BLD AUTO: 9 FL (ref 9.2–12.9)
POTASSIUM SERPL-SCNC: 4.2 MMOL/L (ref 3.5–5.1)
PROT SERPL-MCNC: 8.4 G/DL (ref 6–8.4)
RBC # BLD AUTO: 3.31 M/UL (ref 4–5.4)
SODIUM SERPL-SCNC: 135 MMOL/L (ref 136–145)
T4 FREE SERPL-MCNC: 1.16 NG/DL (ref 0.71–1.51)
TRIGL SERPL-MCNC: 52 MG/DL (ref 30–150)
TSH SERPL DL<=0.005 MIU/L-ACNC: 1.72 UIU/ML (ref 0.4–4)
VIT B12 SERPL-MCNC: 1879 PG/ML (ref 210–950)
WBC # BLD AUTO: 8.67 K/UL (ref 3.9–12.7)

## 2023-12-18 PROCEDURE — 80053 COMPREHEN METABOLIC PANEL: CPT | Performed by: FAMILY MEDICINE

## 2023-12-18 PROCEDURE — 83036 HEMOGLOBIN GLYCOSYLATED A1C: CPT | Performed by: FAMILY MEDICINE

## 2023-12-18 PROCEDURE — 85025 COMPLETE CBC W/AUTO DIFF WBC: CPT | Performed by: FAMILY MEDICINE

## 2023-12-18 PROCEDURE — 84443 ASSAY THYROID STIM HORMONE: CPT | Performed by: FAMILY MEDICINE

## 2023-12-18 PROCEDURE — 82746 ASSAY OF FOLIC ACID SERUM: CPT | Performed by: FAMILY MEDICINE

## 2023-12-18 PROCEDURE — 80061 LIPID PANEL: CPT | Performed by: FAMILY MEDICINE

## 2023-12-18 PROCEDURE — 84439 ASSAY OF FREE THYROXINE: CPT | Performed by: FAMILY MEDICINE

## 2023-12-18 PROCEDURE — 36415 COLL VENOUS BLD VENIPUNCTURE: CPT | Mod: PO | Performed by: FAMILY MEDICINE

## 2023-12-18 PROCEDURE — 82607 VITAMIN B-12: CPT | Performed by: FAMILY MEDICINE

## 2023-12-18 PROCEDURE — 83090 ASSAY OF HOMOCYSTEINE: CPT | Performed by: FAMILY MEDICINE

## 2023-12-19 LAB — HCYS SERPL-SCNC: 11.4 UMOL/L (ref 4–15.5)

## 2023-12-22 ENCOUNTER — OFFICE VISIT (OUTPATIENT)
Dept: FAMILY MEDICINE | Facility: CLINIC | Age: 63
End: 2023-12-22
Payer: COMMERCIAL

## 2023-12-22 VITALS
RESPIRATION RATE: 14 BRPM | SYSTOLIC BLOOD PRESSURE: 120 MMHG | WEIGHT: 157.19 LBS | TEMPERATURE: 98 F | BODY MASS INDEX: 28.93 KG/M2 | DIASTOLIC BLOOD PRESSURE: 70 MMHG | HEIGHT: 62 IN | HEART RATE: 90 BPM | OXYGEN SATURATION: 96 %

## 2023-12-22 DIAGNOSIS — M35.1 MCTD (MIXED CONNECTIVE TISSUE DISEASE): ICD-10-CM

## 2023-12-22 DIAGNOSIS — Z23 FLU VACCINE NEED: ICD-10-CM

## 2023-12-22 DIAGNOSIS — C34.12 MALIGNANT NEOPLASM OF UPPER LOBE OF LEFT LUNG: ICD-10-CM

## 2023-12-22 DIAGNOSIS — E78.2 MIXED HYPERLIPIDEMIA: Primary | ICD-10-CM

## 2023-12-22 DIAGNOSIS — I10 ESSENTIAL HYPERTENSION: ICD-10-CM

## 2023-12-22 DIAGNOSIS — E11.8 CONTROLLED TYPE 2 DIABETES MELLITUS WITH COMPLICATION, WITHOUT LONG-TERM CURRENT USE OF INSULIN: ICD-10-CM

## 2023-12-22 DIAGNOSIS — D53.9 MACROCYTIC ANEMIA: ICD-10-CM

## 2023-12-22 DIAGNOSIS — E72.11 HYPERHOMOCYSTEINEMIA: ICD-10-CM

## 2023-12-22 DIAGNOSIS — R19.5 POSITIVE FIT (FECAL IMMUNOCHEMICAL TEST): ICD-10-CM

## 2023-12-22 DIAGNOSIS — E03.9 HYPOTHYROIDISM, UNSPECIFIED TYPE: ICD-10-CM

## 2023-12-22 PROCEDURE — 3044F PR MOST RECENT HEMOGLOBIN A1C LEVEL <7.0%: ICD-10-PCS | Mod: CPTII,S$GLB,, | Performed by: FAMILY MEDICINE

## 2023-12-22 PROCEDURE — 99999 PR PBB SHADOW E&M-EST. PATIENT-LVL III: CPT | Mod: PBBFAC,,, | Performed by: FAMILY MEDICINE

## 2023-12-22 PROCEDURE — 3008F BODY MASS INDEX DOCD: CPT | Mod: CPTII,S$GLB,, | Performed by: FAMILY MEDICINE

## 2023-12-22 PROCEDURE — 99999 PR PBB SHADOW E&M-EST. PATIENT-LVL III: ICD-10-PCS | Mod: PBBFAC,,, | Performed by: FAMILY MEDICINE

## 2023-12-22 PROCEDURE — 4010F PR ACE/ARB THEARPY RXD/TAKEN: ICD-10-PCS | Mod: CPTII,S$GLB,, | Performed by: FAMILY MEDICINE

## 2023-12-22 PROCEDURE — 4010F ACE/ARB THERAPY RXD/TAKEN: CPT | Mod: CPTII,S$GLB,, | Performed by: FAMILY MEDICINE

## 2023-12-22 PROCEDURE — 3078F PR MOST RECENT DIASTOLIC BLOOD PRESSURE < 80 MM HG: ICD-10-PCS | Mod: CPTII,S$GLB,, | Performed by: FAMILY MEDICINE

## 2023-12-22 PROCEDURE — 1160F PR REVIEW ALL MEDS BY PRESCRIBER/CLIN PHARMACIST DOCUMENTED: ICD-10-PCS | Mod: CPTII,S$GLB,, | Performed by: FAMILY MEDICINE

## 2023-12-22 PROCEDURE — 3074F PR MOST RECENT SYSTOLIC BLOOD PRESSURE < 130 MM HG: ICD-10-PCS | Mod: CPTII,S$GLB,, | Performed by: FAMILY MEDICINE

## 2023-12-22 PROCEDURE — 1160F RVW MEDS BY RX/DR IN RCRD: CPT | Mod: CPTII,S$GLB,, | Performed by: FAMILY MEDICINE

## 2023-12-22 PROCEDURE — 3074F SYST BP LT 130 MM HG: CPT | Mod: CPTII,S$GLB,, | Performed by: FAMILY MEDICINE

## 2023-12-22 PROCEDURE — 3008F PR BODY MASS INDEX (BMI) DOCUMENTED: ICD-10-PCS | Mod: CPTII,S$GLB,, | Performed by: FAMILY MEDICINE

## 2023-12-22 PROCEDURE — 3078F DIAST BP <80 MM HG: CPT | Mod: CPTII,S$GLB,, | Performed by: FAMILY MEDICINE

## 2023-12-22 PROCEDURE — 99214 PR OFFICE/OUTPT VISIT, EST, LEVL IV, 30-39 MIN: ICD-10-PCS | Mod: S$GLB,,, | Performed by: FAMILY MEDICINE

## 2023-12-22 PROCEDURE — 1159F PR MEDICATION LIST DOCUMENTED IN MEDICAL RECORD: ICD-10-PCS | Mod: CPTII,S$GLB,, | Performed by: FAMILY MEDICINE

## 2023-12-22 PROCEDURE — 1159F MED LIST DOCD IN RCRD: CPT | Mod: CPTII,S$GLB,, | Performed by: FAMILY MEDICINE

## 2023-12-22 PROCEDURE — 3044F HG A1C LEVEL LT 7.0%: CPT | Mod: CPTII,S$GLB,, | Performed by: FAMILY MEDICINE

## 2023-12-22 PROCEDURE — 99214 OFFICE O/P EST MOD 30 MIN: CPT | Mod: S$GLB,,, | Performed by: FAMILY MEDICINE

## 2023-12-22 NOTE — PROGRESS NOTES
Subjective:       Patient ID: rCistin Segal is a 63 y.o. female.    Chief Complaint: Follow-up (6mt f/u)    HPI  Review of Systems   Constitutional:  Negative for fatigue and unexpected weight change.   Respiratory:  Negative for chest tightness and shortness of breath.    Cardiovascular:  Negative for chest pain, palpitations and leg swelling.   Gastrointestinal:  Negative for abdominal pain.   Musculoskeletal:  Negative for arthralgias.   Neurological:  Negative for dizziness, syncope, light-headedness and headaches.       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    Reviewed labs 12/23  CKD stage 3, anemia  A1c 4.8  high homocysteine- on folbic-yes  HPL-   On crestor? Yes 5mg   Mammo 6/23 neg   Tft nl       FIT 12/2021 + . Referred to GI but deferred colonoscopy until after treatment for lung CA     T surgery Dr. Allan and Heme/onc Dr. Carrasco treating  SCCA left lung diagnosed 2023. Pulm Dr. Epsino lung nodule. CT chest 2/2022  showed slightly enlarging nodule left upper lobe.  1/23 bx invasive SCCA. Mri brain-no mets. Bone scan T11 uptake with h/o vertebroplasty.  S/p left lingulectomy margins neg, lymph nodes neg. F/u CT chest planned 3/24 . Heme/onc Dr. Alvarado      Spine Dr. Victor low back pain/Dr. Bai pain. H/o closed vertebral compression T12 s/p kyphoplasty 12/2021. Doing pt and taking pain.  Completed DMV handicapped form today for [place]       Card Dr. brooks aortic stenosis     Dr. Cat eye exam     Had ED visit 3/20 [facility] for fall resulting in left ankle fracture. Dr. Tee treateed     Had ED visit 9/2/20 [place] for hitting head on freezer door then fell backwards striking back of head     DEXA 8/20 -nl BMD     History:   Ortho Dr. 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 Maki 8/19        Rheum Dr. GRACE Guzman MCTD on plaquenil.  Takes adalat for raynauds.      HTN controlled with diet       Elevated LFTs - NL LFTS 12/20.no fatty liver or enlargement on either CT ab 5/15 nor u/s and 1/19. Hepatitis panel normal     Type 2 DM- controlled without meds.  Last a1c 4.8. Eye Dr Cat 1/19.  On crestor. Not on ace or ARB-urine ma slightly elevated     GYn PAP Dr. Elmore < 3 years     Macrocytosis- b12 and folate normal 1/19 and 12/20  Objective:      Physical Exam  Vitals and nursing note reviewed.   Constitutional:       Appearance: She is well-developed.   Cardiovascular:      Rate and Rhythm: Normal rate and regular rhythm.      Heart sounds: Murmur heard.   Pulmonary:      Effort: Pulmonary effort is normal.      Breath sounds: Normal breath sounds.   Skin:     General: Skin is warm and dry.   Neurological:      Mental Status: She is alert and oriented to person, place, and time.         Assessment:       1. Mixed hyperlipidemia    2. MCTD (mixed connective tissue disease)    3. Essential hypertension    4. Squamous  Cell Carcinoma of CASSIDY of lung    5. Hypothyroidism, unspecified type    6. Controlled type 2 diabetes mellitus with complication, without long-term current use of insulin    7. Macrocytic anemia    8. Hyperhomocysteinemia    9. Positive FIT (fecal immunochemical test)    10. Flu vaccine need        Plan:       1. Mixed hyperlipidemia  I recommend a heart healthy diet rich in fiber, fresh vegetables and fruit and low in saturated fats (fried foods, red meat, etc.).  I also recommend regular exercise including a minimum of 150 minutes of cardio exercise per week and 2-30 minute workouts of strength training like light weights, yoga, pilates, etc.  You should work toward a body mass index of < 25.      - Lipid Panel; Future    2. MCTD (mixed connective tissue disease)  Cont current mgmt    3. Essential hypertension  Controlled on current medications.  Continue current medications.      4. Squamous Cell Carcinoma of CASSIDY of lung  Cont onc and pulm care and monitoring    5. Hypothyroidism, unspecified type  Controlled on current medications.  Continue current medications.    - TSH; Future  - T4, Free; Future    6. Controlled type 2 diabetes mellitus with complication, without long-term current use of insulin  Controlled on current medications.  Continue current medications.    - CBC Auto Differential; Future  - Comprehensive Metabolic Panel; Future  - Hemoglobin A1C; Future  - Microalbumin/Creatinine Ratio, Urine; Future    7. Macrocytic anemia  Stable and chronic.  Will continue to monitor q3-6 months and control chronic conditions as optimally as possible to preserve function.  No sign of b12 or folic acid def    8. Hyperhomocysteinemia  Improved with statin and folbic supplement    9. Positive FIT (fecal immunochemical test)  Refer for diagnostic  - Case Request Endoscopy: COLONOSCOPY    10. Flu vaccine need  declined        Time spent with patient: 20 minutes    Patient with be reevaluated in 6 months or sooner  prn    Greater than 50% of this visit was spent counseling as described in above documentation:Yes

## 2023-12-26 ENCOUNTER — PATIENT MESSAGE (OUTPATIENT)
Dept: GASTROENTEROLOGY | Facility: CLINIC | Age: 63
End: 2023-12-26
Payer: COMMERCIAL

## 2024-01-03 RX ORDER — ROSUVASTATIN CALCIUM 5 MG/1
5 TABLET, COATED ORAL DAILY
Qty: 90 TABLET | Refills: 3 | Status: SHIPPED | OUTPATIENT
Start: 2024-01-03

## 2024-02-27 NOTE — PROGRESS NOTES
"Subjective:       Patient ID: Cristin Segal is a 63 y.o. female.    Chief Complaint: Follow-up    Diagnosis:  CASSIDY Squamous Cell Carcinoma     Pre-operative therapy: none    Procedure(s) and date(s): 02/09/23 - Extended robotic lingulectomy, mediastinal lymph node dissection, intercostal nerve block 2 or more intercostal levels    Pathology: 1.6 cm well differentiated keratinizing squamous cell carcinoma. +VPI. Margins negative (1.8 cm bronchovascular). Levels 5, 7, 8, 10, 11, 12 negative for malignancy. Q9bY5Ww, stage IB    Post-operative therapy:  surveillance    HPI  Patient is a 62 y.o. female smoker with DM, HTN, HLD, MVP, MCTD, left parotid gland nodule who presents to clinic today for 1 year follow up s/p robotic assisted lingulectomy for CASSIDY NSCLC. Post operative course uncomplicated. Doing well today.     Review of Systems   Constitutional: Negative.    HENT: Negative.     Respiratory: Negative.     Cardiovascular: Negative.    Gastrointestinal: Negative.    Psychiatric/Behavioral: Negative.           Objective:      Vitals:    03/05/24 0852   BP: (!) 143/83   Pulse: 84   Resp: 16   Temp: 98.1 °F (36.7 °C)   TempSrc: Temporal   SpO2: 98%   Weight: 73.3 kg (161 lb 9.6 oz)   Height: 5' 2" (1.575 m)   PainSc:   3   PainLoc: Hip       Physical Exam  Constitutional:       Appearance: Normal appearance.   HENT:      Head: Normocephalic and atraumatic.   Eyes:      Extraocular Movements: Extraocular movements intact.      Conjunctiva/sclera: Conjunctivae normal.      Pupils: Pupils are equal, round, and reactive to light.   Cardiovascular:      Rate and Rhythm: Normal rate and regular rhythm.      Pulses: Normal pulses.      Heart sounds: Murmur (harsh systolic) heard.   Abdominal:      Palpations: Abdomen is soft.   Musculoskeletal:      Cervical back: Normal range of motion.   Skin:     General: Skin is warm and dry.      Capillary Refill: Capillary refill takes less than 2 seconds.   Neurological:      " General: No focal deficit present.      Mental Status: She is alert and oriented to person, place, and time.   Psychiatric:         Mood and Affect: Mood normal.         Behavior: Behavior normal.         Thought Content: Thought content normal.        Diagnostics:     Chest CT 9/5/23:  I reviewed the images  Postsurgical changes of left lingulectomy.  No evidence of recurrent or metastatic disease within the chest.    Chest CT 03/04/24:  I reviewed the images  Stable CT of the chest status post prior lingulectomy without evidence of locoregional recurrence or metastatic disease within the chest. No acute process.     Assessment:       63 y.o.  female smoker with DM, HTN, HLD, MVP, MCTD, left parotid gland nodule who presents to clinic today for 1 year follow up s/p robotic assisted lingulectomy for CASSIDY NSCLC.  pT2aN0 NSCLC with negative margins, +vpi  LOLIS    Plan:     RTC in 6 months with chest CT.

## 2024-02-28 ENCOUNTER — HOSPITAL ENCOUNTER (OUTPATIENT)
Dept: CARDIOLOGY | Facility: HOSPITAL | Age: 64
Discharge: HOME | End: 2024-02-28
Attending: INTERNAL MEDICINE
Payer: COMMERCIAL

## 2024-02-28 VITALS — WEIGHT: 157 LBS | HEIGHT: 62 IN | BODY MASS INDEX: 28.89 KG/M2

## 2024-02-28 DIAGNOSIS — I35.8 AORTIC VALVE SCLEROSIS: ICD-10-CM

## 2024-02-28 DIAGNOSIS — I35.0 AORTIC STENOSIS, MODERATE: ICD-10-CM

## 2024-02-28 DIAGNOSIS — S39.92XS INJURY OF BACK, SEQUELA: ICD-10-CM

## 2024-02-28 DIAGNOSIS — E08.65 DIABETES MELLITUS DUE TO UNDERLYING CONDITION WITH HYPERGLYCEMIA, WITHOUT LONG-TERM CURRENT USE OF INSULIN: ICD-10-CM

## 2024-02-28 DIAGNOSIS — E03.9 ACQUIRED HYPOTHYROIDISM: ICD-10-CM

## 2024-02-28 DIAGNOSIS — I73.00 RAYNAUD'S DISEASE WITHOUT GANGRENE: ICD-10-CM

## 2024-02-28 DIAGNOSIS — R00.0 TACHYCARDIA: ICD-10-CM

## 2024-02-28 DIAGNOSIS — I10 ESSENTIAL HYPERTENSION: ICD-10-CM

## 2024-02-28 DIAGNOSIS — R94.31 NONSPECIFIC ABNORMAL ELECTROCARDIOGRAM (ECG) (EKG): ICD-10-CM

## 2024-02-28 PROCEDURE — 93306 TTE W/DOPPLER COMPLETE: CPT

## 2024-02-28 PROCEDURE — 93306 TTE W/DOPPLER COMPLETE: CPT | Mod: 26,,, | Performed by: INTERNAL MEDICINE

## 2024-02-29 LAB
AORTIC ROOT ANNULUS: 3.6 CM
AORTIC VALVE CUSP SEPERATION: 1 CM
AV INDEX (PROSTH): 0.47
AV MEAN GRADIENT: 19 MMHG
AV PEAK GRADIENT: 33 MMHG
AV REGURGITATION PRESSURE HALF TIME: 618 MS
AV VALVE AREA BY VELOCITY RATIO: 1.25 CM²
AV VALVE AREA: 1.48 CM²
AV VELOCITY RATIO: 0.4
BSA FOR ECHO PROCEDURE: 1.76 M2
CV ECHO LV RWT: 0.51 CM
DOP CALC AO PEAK VEL: 2.88 M/S
DOP CALC AO VTI: 65 CM
DOP CALC LVOT AREA: 3.1 CM2
DOP CALC LVOT DIAMETER: 2 CM
DOP CALC LVOT PEAK VEL: 1.15 M/S
DOP CALC LVOT STROKE VOLUME: 96.08 CM3
DOP CALCLVOT PEAK VEL VTI: 30.6 CM
E WAVE DECELERATION TIME: 206 MSEC
E/A RATIO: 0.65
E/E' RATIO: 13.71 M/S
ECHO LV POSTERIOR WALL: 1.02 CM (ref 0.6–1.1)
FRACTIONAL SHORTENING: 36 % (ref 28–44)
INTERVENTRICULAR SEPTUM: 1.08 CM (ref 0.6–1.1)
IVRT: 132 MSEC
LEFT ATRIUM SIZE: 4.1 CM
LEFT INTERNAL DIMENSION IN SYSTOLE: 2.55 CM (ref 2.1–4)
LEFT VENTRICLE DIASTOLIC VOLUME INDEX: 40 ML/M2
LEFT VENTRICLE DIASTOLIC VOLUME: 68.8 ML
LEFT VENTRICLE MASS INDEX: 78 G/M2
LEFT VENTRICLE SYSTOLIC VOLUME INDEX: 13.6 ML/M2
LEFT VENTRICLE SYSTOLIC VOLUME: 23.4 ML
LEFT VENTRICULAR INTERNAL DIMENSION IN DIASTOLE: 3.97 CM (ref 3.5–6)
LEFT VENTRICULAR MASS: 134.62 G
LV LATERAL E/E' RATIO: 12 M/S
LV SEPTAL E/E' RATIO: 16 M/S
LVOT MG: 3 MMHG
LVOT MV: 0.79 CM/S
MV PEAK A VEL: 1.47 M/S
MV PEAK E VEL: 0.96 M/S
MV STENOSIS PRESSURE HALF TIME: 57 MS
MV VALVE AREA P 1/2 METHOD: 3.86 CM2
PISA AR MAX VEL: 4.13 M/S
PISA TR MAX VEL: 2.07 M/S
RA PRESSURE ESTIMATED: 3 MMHG
RIGHT VENTRICULAR END-DIASTOLIC DIMENSION: 2.99 CM
RV TB RVSP: 5 MMHG
TDI LATERAL: 0.08 M/S
TDI SEPTAL: 0.06 M/S
TDI: 0.07 M/S
TR MAX PG: 17 MMHG
TV REST PULMONARY ARTERY PRESSURE: 20 MMHG
Z-SCORE OF LEFT VENTRICULAR DIMENSION IN END DIASTOLE: -1.84
Z-SCORE OF LEFT VENTRICULAR DIMENSION IN END SYSTOLE: -1.16

## 2024-03-05 ENCOUNTER — OFFICE VISIT (OUTPATIENT)
Dept: PULMONOLOGY | Facility: CLINIC | Age: 64
End: 2024-03-05
Payer: COMMERCIAL

## 2024-03-05 ENCOUNTER — DOCUMENTATION ONLY (OUTPATIENT)
Dept: HEMATOLOGY/ONCOLOGY | Facility: CLINIC | Age: 64
End: 2024-03-05
Payer: COMMERCIAL

## 2024-03-05 ENCOUNTER — HOSPITAL ENCOUNTER (OUTPATIENT)
Dept: RADIOLOGY | Facility: HOSPITAL | Age: 64
Discharge: HOME | End: 2024-03-05
Attending: PHYSICIAN ASSISTANT
Payer: COMMERCIAL

## 2024-03-05 VITALS
HEART RATE: 84 BPM | OXYGEN SATURATION: 98 % | RESPIRATION RATE: 16 BRPM | SYSTOLIC BLOOD PRESSURE: 143 MMHG | DIASTOLIC BLOOD PRESSURE: 83 MMHG | BODY MASS INDEX: 29.74 KG/M2 | WEIGHT: 161.63 LBS | TEMPERATURE: 98 F | HEIGHT: 62 IN

## 2024-03-05 DIAGNOSIS — Z85.118 HISTORY OF LUNG CANCER: Primary | ICD-10-CM

## 2024-03-05 DIAGNOSIS — C34.92 MALIGNANT NEOPLASM OF LEFT LUNG, UNSPECIFIED PART OF LUNG: ICD-10-CM

## 2024-03-05 PROCEDURE — 71250 CT THORAX DX C-: CPT | Mod: TC,PO

## 2024-03-05 PROCEDURE — 1159F MED LIST DOCD IN RCRD: CPT | Mod: CPTII,S$GLB,, | Performed by: THORACIC SURGERY (CARDIOTHORACIC VASCULAR SURGERY)

## 2024-03-05 PROCEDURE — 3077F SYST BP >= 140 MM HG: CPT | Mod: CPTII,S$GLB,, | Performed by: THORACIC SURGERY (CARDIOTHORACIC VASCULAR SURGERY)

## 2024-03-05 PROCEDURE — 71250 CT THORAX DX C-: CPT | Mod: 26,,, | Performed by: RADIOLOGY

## 2024-03-05 PROCEDURE — 3008F BODY MASS INDEX DOCD: CPT | Mod: CPTII,S$GLB,, | Performed by: THORACIC SURGERY (CARDIOTHORACIC VASCULAR SURGERY)

## 2024-03-05 PROCEDURE — 99999 PR PBB SHADOW E&M-EST. PATIENT-LVL IV: CPT | Mod: PBBFAC,,, | Performed by: THORACIC SURGERY (CARDIOTHORACIC VASCULAR SURGERY)

## 2024-03-05 PROCEDURE — 99213 OFFICE O/P EST LOW 20 MIN: CPT | Mod: S$GLB,,, | Performed by: THORACIC SURGERY (CARDIOTHORACIC VASCULAR SURGERY)

## 2024-03-05 PROCEDURE — 4010F ACE/ARB THERAPY RXD/TAKEN: CPT | Mod: CPTII,S$GLB,, | Performed by: THORACIC SURGERY (CARDIOTHORACIC VASCULAR SURGERY)

## 2024-03-05 PROCEDURE — 3079F DIAST BP 80-89 MM HG: CPT | Mod: CPTII,S$GLB,, | Performed by: THORACIC SURGERY (CARDIOTHORACIC VASCULAR SURGERY)

## 2024-03-05 RX ORDER — TRAMADOL HYDROCHLORIDE 50 MG/1
50 TABLET ORAL NIGHTLY
COMMUNITY
Start: 2024-01-05

## 2024-03-05 RX ORDER — GABAPENTIN 100 MG/1
100 CAPSULE ORAL
COMMUNITY
Start: 2023-11-03

## 2024-03-05 RX ORDER — MELOXICAM 7.5 MG/1
7.5 TABLET ORAL DAILY
COMMUNITY
Start: 2023-12-01

## 2024-03-06 ENCOUNTER — TELEPHONE (OUTPATIENT)
Dept: GASTROENTEROLOGY | Facility: CLINIC | Age: 64
End: 2024-03-06
Payer: COMMERCIAL

## 2024-03-06 DIAGNOSIS — C34.92 MALIGNANT NEOPLASM OF LEFT LUNG, UNSPECIFIED PART OF LUNG: Primary | ICD-10-CM

## 2024-03-06 NOTE — TELEPHONE ENCOUNTER
Colonoscopy 6/6 at 10am arrive for 9am instructions reviewed and patient states understanding. Copy to portal.

## 2024-03-11 ENCOUNTER — OFFICE VISIT (OUTPATIENT)
Dept: HEMATOLOGY/ONCOLOGY | Facility: CLINIC | Age: 64
End: 2024-03-11
Payer: COMMERCIAL

## 2024-03-11 VITALS
DIASTOLIC BLOOD PRESSURE: 60 MMHG | RESPIRATION RATE: 18 BRPM | WEIGHT: 161 LBS | BODY MASS INDEX: 29.63 KG/M2 | SYSTOLIC BLOOD PRESSURE: 129 MMHG | HEIGHT: 62 IN | TEMPERATURE: 98 F | HEART RATE: 80 BPM

## 2024-03-11 DIAGNOSIS — C34.12 MALIGNANT NEOPLASM OF UPPER LOBE OF LEFT LUNG: Primary | ICD-10-CM

## 2024-03-11 PROCEDURE — 3074F SYST BP LT 130 MM HG: CPT | Mod: CPTII,S$GLB,, | Performed by: INTERNAL MEDICINE

## 2024-03-11 PROCEDURE — 3008F BODY MASS INDEX DOCD: CPT | Mod: CPTII,S$GLB,, | Performed by: INTERNAL MEDICINE

## 2024-03-11 PROCEDURE — 3078F DIAST BP <80 MM HG: CPT | Mod: CPTII,S$GLB,, | Performed by: INTERNAL MEDICINE

## 2024-03-11 PROCEDURE — 99213 OFFICE O/P EST LOW 20 MIN: CPT | Mod: S$GLB,,, | Performed by: INTERNAL MEDICINE

## 2024-03-11 PROCEDURE — 4010F ACE/ARB THERAPY RXD/TAKEN: CPT | Mod: CPTII,S$GLB,, | Performed by: INTERNAL MEDICINE

## 2024-03-11 PROCEDURE — 1159F MED LIST DOCD IN RCRD: CPT | Mod: CPTII,S$GLB,, | Performed by: INTERNAL MEDICINE

## 2024-03-11 NOTE — PROGRESS NOTES
PROGRESS NOTE    Subjective:       Patient ID: Cristin Segal is a 63 y.o. female.    12/27/2022:  CT chest:  Slowly enlarging soft tissue density nodule of the left upper lobe now measuring 11 mm.  Neoplasm is suspected and percutaneous biopsy is recommended.  Otherwise continued follow-up by CT scan is recommended with consideration of a repeat PET CT    1/6/2023:  CASSIDY Lung Biopsy:  LUNG, LEFT UPPER LOBE MASS, CT-GUIDED BIOPSY: Invasive squamous cell carcinoma, well differentiated.    1/20/2023:  MRI Brain-no mets    1/23/2023:  Bone scan, uptake at T11, history of vertebroplasty    2/9/2023:  Lung, lingulectomy: Invasive well-differentiated keratinizing squamous   cell carcinoma, measuring 16 mm (1.6 cm) in greatest dimension.          Bronchial and vascular margins are negative for atypia or malignancy.          See synoptic report in comment section for further details.   9 lymph nodes taken,  all negative.    Visceral pleural invasion:  Present  pT2aN0M0-Stage IB     medically appropriate patients with stage IB disease whose tumors display one or more high-risk features, including lymphovascular invasion, poor differentiation, or high standardized uptake value (SUV) on positron emission tomography (PET; variably defined as SUV 10 or more). However, there is no consensus among expert groups.     9/5/2023-CT chest:  Negative    PLAN:  [facility]  Surveillance and f/u with Dr. Allan and CT in 6 months    3/5/2024:  CT Chest negative for malignancy    Chief Complaint:  No chief complaint on file.  Lung cancer follow up    History of Present Illness:   Cristin Segal is a 63 y.o. female who presents for follow up of above.      Patient is doing well and pain is improving.       Family and Social history reviewed and is unchanged from 1/24/2023      ROS:  Review of Systems   Constitutional:  Negative for  fever.   Respiratory:  Negative for shortness of breath.    Cardiovascular:  Negative for chest pain and leg swelling.   Gastrointestinal:  Negative for abdominal pain and blood in stool.   Genitourinary:  Negative for hematuria.   Skin:  Negative for rash.          Current Outpatient Medications:     acetaminophen (TYLENOL) 500 MG tablet, Take 1 tablet (500 mg total) by mouth every 6 (six) hours as needed for Pain., Disp: , Rfl: 0    aspirin 81 MG Chew, Take 1 tablet (81 mg total) by mouth 3 (three) times a week., Disp: 13 tablet, Rfl: 0    gabapentin (NEURONTIN) 100 MG capsule, 100 mg., Disp: , Rfl:     hydrOXYchloroQUINE (PLAQUENIL) 200 mg tablet, Take 1 tablet (200 mg total) by mouth once daily., Disp: 90 tablet, Rfl: 1    levothyroxine (SYNTHROID) 50 MCG tablet, TAKE 1 TABLET BY MOUTH BEFORE BREAKFAST., Disp: 90 tablet, Rfl: 3    losartan (COZAAR) 25 MG tablet, TAKE 1 TABLET BY MOUTH EVERY DAY, Disp: 90 tablet, Rfl: 3    meloxicam (MOBIC) 7.5 MG tablet, 7.5 mg., Disp: , Rfl:     multivit-min-FA-lycopen-lutein (CENTRUM SILVER) 0.4 mg-300 mcg- 250 mcg Tab, Take 1 tablet by mouth once daily., Disp: , Rfl:     mv-min/FA/C/glut/lysine/kyf635 (AIRBORNE, WITH FOLIC ACID, ORAL), Take 1 tablet by mouth 4 (four) times daily., Disp: , Rfl:     NIFEdipine (ADALAT CC) 30 MG TbSR, TAKE 1 TABLET BY MOUTH EVERY DAY, Disp: 90 tablet, Rfl: 2    pantoprazole (PROTONIX) 40 MG tablet, TAKE 1 TABLET BY MOUTH EVERY DAY, Disp: 90 tablet, Rfl: 3    potassium chloride SA (K-DUR,KLOR-CON) 20 MEQ tablet, TAKE 1 TABLET BY MOUTH ONCE DAILY, Disp: 90 tablet, Rfl: 3    rosuvastatin (CRESTOR) 5 MG tablet, Take 1 tablet (5 mg total) by mouth once daily., Disp: 90 tablet, Rfl: 3    traMADoL (ULTRAM) 50 mg tablet, Take 50 mg by mouth every evening., Disp: , Rfl:     vitamin D (VITAMIN D3) 1000 units Tab, Take 1,000 Units by mouth once daily., Disp: , Rfl:     WESTAB MAX 2.5-25-2 mg Tab, TAKE 1 TABLET BY MOUTH EVERY DAY, Disp: 90 tablet, Rfl: 3     "albuterol (VENTOLIN HFA) 90 mcg/actuation inhaler, Inhale 2 puffs into the lungs every 6 (six) hours as needed for Wheezing or Shortness of Breath. Rescue, Disp: 8 g, Rfl: 5  No current facility-administered medications for this visit.    Facility-Administered Medications Ordered in Other Visits:     lactated ringers infusion, , Intravenous, Continuous, VuKev MD        Objective:       Physical Examination:     /60   Pulse 80   Temp 98.1 °F (36.7 °C)   Resp 18   Ht 5' 2" (1.575 m)   Wt 73 kg (161 lb)   BMI 29.45 kg/m²     Physical Exam  Constitutional:       Appearance: She is well-developed.   HENT:      Head: Normocephalic and atraumatic.      Right Ear: External ear normal.      Left Ear: External ear normal.   Eyes:      Conjunctiva/sclera: Conjunctivae normal.      Pupils: Pupils are equal, round, and reactive to light.   Neck:      Thyroid: No thyromegaly.      Trachea: No tracheal deviation.   Cardiovascular:      Rate and Rhythm: Normal rate and regular rhythm.      Heart sounds: Normal heart sounds.   Pulmonary:      Effort: Pulmonary effort is normal.      Breath sounds: Normal breath sounds.   Abdominal:      General: Bowel sounds are normal. There is no distension.      Palpations: Abdomen is soft. There is no mass.      Tenderness: There is no abdominal tenderness.   Skin:     Findings: No rash.   Neurological:      Comments: Neuro intact througout   Psychiatric:         Behavior: Behavior normal.         Thought Content: Thought content normal.         Judgment: Judgment normal.         Labs:   No results found for this or any previous visit (from the past 336 hour(s)).  CMP  Sodium   Date Value Ref Range Status   12/18/2023 135 (L) 136 - 145 mmol/L Final   06/28/2019 138 134 - 144 mmol/L      Potassium   Date Value Ref Range Status   12/18/2023 4.2 3.5 - 5.1 mmol/L Final     Chloride   Date Value Ref Range Status   12/18/2023 99 95 - 110 mmol/L Final   06/28/2019 101 98 - 110 mmol/L "      CO2   Date Value Ref Range Status   12/18/2023 27 23 - 29 mmol/L Final     Glucose   Date Value Ref Range Status   12/18/2023 106 70 - 110 mg/dL Final   06/28/2019 111 (H) 70 - 99 mg/dL      BUN   Date Value Ref Range Status   12/18/2023 28 (H) 8 - 23 mg/dL Final     Creatinine   Date Value Ref Range Status   12/18/2023 1.2 0.5 - 1.4 mg/dL Final   06/28/2019 0.74 0.60 - 1.40 mg/dL      Calcium   Date Value Ref Range Status   12/18/2023 10.7 (H) 8.7 - 10.5 mg/dL Final     Total Protein   Date Value Ref Range Status   12/18/2023 8.4 6.0 - 8.4 g/dL Final     Albumin   Date Value Ref Range Status   12/18/2023 4.4 3.5 - 5.2 g/dL Final   06/28/2019 4.5 3.1 - 4.7 g/dL      Total Bilirubin   Date Value Ref Range Status   12/18/2023 0.8 0.1 - 1.0 mg/dL Final     Comment:     For infants and newborns, interpretation of results should be based  on gestational age, weight and in agreement with clinical  observations.    Premature Infant recommended reference ranges:  Up to 24 hours.............<8.0 mg/dL  Up to 48 hours............<12.0 mg/dL  3-5 days..................<15.0 mg/dL  6-29 days.................<15.0 mg/dL       Alkaline Phosphatase   Date Value Ref Range Status   12/18/2023 67 55 - 135 U/L Final     AST   Date Value Ref Range Status   12/18/2023 20 10 - 40 U/L Final     ALT   Date Value Ref Range Status   12/18/2023 20 10 - 44 U/L Final     Anion Gap   Date Value Ref Range Status   12/18/2023 9 8 - 16 mmol/L Final     eGFR if    Date Value Ref Range Status   05/11/2022 56.4 (A) >60 mL/min/1.73 m^2 Final   05/11/2022 56.4 (A) >60 mL/min/1.73 m^2 Final     eGFR if non    Date Value Ref Range Status   05/11/2022 48.9 (A) >60 mL/min/1.73 m^2 Final     Comment:     Calculation used to obtain the estimated glomerular filtration  rate (eGFR) is the CKD-EPI equation.      05/11/2022 48.9 (A) >60 mL/min/1.73 m^2 Final     Comment:     Calculation used to obtain the estimated glomerular  "filtration  rate (eGFR) is the CKD-EPI equation.        No results found for: "CEA"  No results found for: "PSA"        Assessment/Plan:     Problem List Items Addressed This Visit       Squamous Cell Carcinoma of CASSIDY of lung - Primary     CT chest is negative and patient appears LOLIS at this time.  Will continue six month imaging and visits and discussed this today.           Relevant Orders    CBC Auto Differential    Comprehensive Metabolic Panel    CT Chest Without Contrast     Discussion:     Follow up in about 6 months (around 9/11/2024).      Electronically signed by Lars Carrasco      "

## 2024-03-11 NOTE — ASSESSMENT & PLAN NOTE
CT chest is negative and patient appears LOLIS at this time.  Will continue six month imaging and visits and discussed this today.

## 2024-03-29 DIAGNOSIS — R80.9 URINE TEST POSITIVE FOR MICROALBUMINURIA: ICD-10-CM

## 2024-03-29 NOTE — TELEPHONE ENCOUNTER
No care due was identified.  [facility] Embedded Care Due Messages. Reference number: [id number].   3/29/2024 6:24:02 PM CDT

## 2024-03-31 RX ORDER — LOSARTAN POTASSIUM 25 MG/1
TABLET ORAL
Qty: 90 TABLET | Refills: 2 | Status: ON HOLD | OUTPATIENT
Start: 2024-03-31 | End: 2024-04-10 | Stop reason: HOSPADM

## 2024-03-31 NOTE — TELEPHONE ENCOUNTER
Refill Decision Note   Cristin Philipp  is requesting a refill authorization.  Brief Assessment and Rationale for Refill:  Approve     Medication Therapy Plan:         Comments:     Note composed:4:13 AM 03/31/2024

## 2024-04-08 ENCOUNTER — OFFICE VISIT (OUTPATIENT)
Dept: CARDIOLOGY | Facility: CLINIC | Age: 64
End: 2024-04-08
Payer: COMMERCIAL

## 2024-04-08 ENCOUNTER — HOSPITAL ENCOUNTER (INPATIENT)
Facility: HOSPITAL | Age: 64
LOS: 1 days | Discharge: HOME | DRG: 322 | End: 2024-04-10
Attending: STUDENT IN AN ORGANIZED HEALTH CARE EDUCATION/TRAINING PROGRAM | Admitting: INTERNAL MEDICINE
Payer: COMMERCIAL

## 2024-04-08 VITALS
RESPIRATION RATE: 16 BRPM | BODY MASS INDEX: 28.16 KG/M2 | WEIGHT: 153 LBS | OXYGEN SATURATION: 98 % | HEART RATE: 66 BPM | HEIGHT: 62 IN | DIASTOLIC BLOOD PRESSURE: 80 MMHG | SYSTOLIC BLOOD PRESSURE: 118 MMHG

## 2024-04-08 DIAGNOSIS — E78.2 MIXED HYPERLIPIDEMIA: ICD-10-CM

## 2024-04-08 DIAGNOSIS — R94.31 NONSPECIFIC ABNORMAL ELECTROCARDIOGRAM (ECG) (EKG): ICD-10-CM

## 2024-04-08 DIAGNOSIS — I21.4 NSTEMI (NON-ST ELEVATED MYOCARDIAL INFARCTION): ICD-10-CM

## 2024-04-08 DIAGNOSIS — E03.9 ACQUIRED HYPOTHYROIDISM: ICD-10-CM

## 2024-04-08 DIAGNOSIS — Z95.5 STATUS POST INSERTION OF DRUG ELUTING CORONARY ARTERY STENT: ICD-10-CM

## 2024-04-08 DIAGNOSIS — E78.00 HYPERCHOLESTEREMIA: ICD-10-CM

## 2024-04-08 DIAGNOSIS — R07.9 CHEST PAIN, UNSPECIFIED TYPE: Primary | ICD-10-CM

## 2024-04-08 DIAGNOSIS — F17.200 CURRENT SMOKER: ICD-10-CM

## 2024-04-08 DIAGNOSIS — R79.89 ELEVATED TROPONIN: ICD-10-CM

## 2024-04-08 DIAGNOSIS — E08.65 DIABETES MELLITUS DUE TO UNDERLYING CONDITION WITH HYPERGLYCEMIA, WITHOUT LONG-TERM CURRENT USE OF INSULIN: ICD-10-CM

## 2024-04-08 DIAGNOSIS — I35.0 MODERATE AORTIC STENOSIS: ICD-10-CM

## 2024-04-08 DIAGNOSIS — I35.0 AORTIC VALVE STENOSIS, ETIOLOGY OF CARDIAC VALVE DISEASE UNSPECIFIED: ICD-10-CM

## 2024-04-08 DIAGNOSIS — R07.9 CHEST PAIN: Primary | ICD-10-CM

## 2024-04-08 DIAGNOSIS — I10 ESSENTIAL HYPERTENSION: ICD-10-CM

## 2024-04-08 DIAGNOSIS — I25.10 CAD (CORONARY ARTERY DISEASE): ICD-10-CM

## 2024-04-08 DIAGNOSIS — J45.909 EXTRINSIC ASTHMA WITHOUT COMPLICATION, UNSPECIFIED ASTHMA SEVERITY, UNSPECIFIED WHETHER PERSISTENT: ICD-10-CM

## 2024-04-08 DIAGNOSIS — I73.00 RAYNAUD'S DISEASE WITHOUT GANGRENE: ICD-10-CM

## 2024-04-08 LAB
ALBUMIN SERPL BCP-MCNC: 4.6 G/DL (ref 3.5–5.2)
ALP SERPL-CCNC: 67 U/L (ref 55–135)
ALT SERPL W/O P-5'-P-CCNC: 24 U/L (ref 10–44)
ANION GAP SERPL CALC-SCNC: 12 MMOL/L (ref 8–16)
AST SERPL-CCNC: 28 U/L (ref 10–40)
BASOPHILS # BLD AUTO: 0.05 K/UL (ref 0–0.2)
BASOPHILS NFR BLD: 0.8 % (ref 0–1.9)
BILIRUB SERPL-MCNC: 0.5 MG/DL (ref 0.1–1)
BNP SERPL-MCNC: 248 PG/ML (ref 0–99)
BUN SERPL-MCNC: 15 MG/DL (ref 8–23)
CALCIUM SERPL-MCNC: 10.6 MG/DL (ref 8.7–10.5)
CHLORIDE SERPL-SCNC: 98 MMOL/L (ref 95–110)
CO2 SERPL-SCNC: 24 MMOL/L (ref 23–29)
CREAT SERPL-MCNC: 1.2 MG/DL (ref 0.5–1.4)
DIFFERENTIAL METHOD BLD: ABNORMAL
EOSINOPHIL # BLD AUTO: 0 K/UL (ref 0–0.5)
EOSINOPHIL NFR BLD: 0.5 % (ref 0–8)
ERYTHROCYTE [DISTWIDTH] IN BLOOD BY AUTOMATED COUNT: 13.1 % (ref 11.5–14.5)
EST. GFR  (NO RACE VARIABLE): 50.9 ML/MIN/1.73 M^2
GLUCOSE SERPL-MCNC: 93 MG/DL (ref 70–110)
HCT VFR BLD AUTO: 33 % (ref 37–48.5)
HGB BLD-MCNC: 11.2 G/DL (ref 12–16)
IMM GRANULOCYTES # BLD AUTO: 0.01 K/UL (ref 0–0.04)
IMM GRANULOCYTES NFR BLD AUTO: 0.2 % (ref 0–0.5)
LYMPHOCYTES # BLD AUTO: 2.1 K/UL (ref 1–4.8)
LYMPHOCYTES NFR BLD: 35.7 % (ref 18–48)
MAGNESIUM SERPL-MCNC: 1.1 MG/DL (ref 1.6–2.6)
MCH RBC QN AUTO: 35.2 PG (ref 27–31)
MCHC RBC AUTO-ENTMCNC: 33.9 G/DL (ref 32–36)
MCV RBC AUTO: 104 FL (ref 82–98)
MONOCYTES # BLD AUTO: 0.8 K/UL (ref 0.3–1)
MONOCYTES NFR BLD: 12.8 % (ref 4–15)
NEUTROPHILS # BLD AUTO: 3 K/UL (ref 1.8–7.7)
NEUTROPHILS NFR BLD: 50 % (ref 38–73)
NRBC BLD-RTO: 0 /100 WBC
PLATELET # BLD AUTO: 283 K/UL (ref 150–450)
PLATELET BLD QL SMEAR: ABNORMAL
PMV BLD AUTO: 9 FL (ref 9.2–12.9)
POTASSIUM SERPL-SCNC: 4.8 MMOL/L (ref 3.5–5.1)
PROT SERPL-MCNC: 7.8 G/DL (ref 6–8.4)
RBC # BLD AUTO: 3.18 M/UL (ref 4–5.4)
SODIUM SERPL-SCNC: 134 MMOL/L (ref 136–145)
TROPONIN I SERPL HS-MCNC: 67.9 PG/ML (ref 0–14.9)
TROPONIN I SERPL HS-MCNC: 94.4 PG/ML (ref 0–14.9)
TSH SERPL DL<=0.005 MIU/L-ACNC: 2.34 UIU/ML (ref 0.34–5.6)
WBC # BLD AUTO: 6 K/UL (ref 3.9–12.7)

## 2024-04-08 PROCEDURE — 99215 OFFICE O/P EST HI 40 MIN: CPT | Mod: S$GLB,,, | Performed by: INTERNAL MEDICINE

## 2024-04-08 PROCEDURE — 96365 THER/PROPH/DIAG IV INF INIT: CPT

## 2024-04-08 PROCEDURE — 93000 ELECTROCARDIOGRAM COMPLETE: CPT | Mod: S$GLB,,, | Performed by: INTERNAL MEDICINE

## 2024-04-08 PROCEDURE — 85610 PROTHROMBIN TIME: CPT | Performed by: INTERNAL MEDICINE

## 2024-04-08 PROCEDURE — 85025 COMPLETE CBC W/AUTO DIFF WBC: CPT | Performed by: PHYSICIAN ASSISTANT

## 2024-04-08 PROCEDURE — G0378 HOSPITAL OBSERVATION PER HR: HCPCS

## 2024-04-08 PROCEDURE — 99291 CRITICAL CARE FIRST HOUR: CPT

## 2024-04-08 PROCEDURE — 3079F DIAST BP 80-89 MM HG: CPT | Mod: CPTII,S$GLB,, | Performed by: INTERNAL MEDICINE

## 2024-04-08 PROCEDURE — 84484 ASSAY OF TROPONIN QUANT: CPT | Mod: 91 | Performed by: STUDENT IN AN ORGANIZED HEALTH CARE EDUCATION/TRAINING PROGRAM

## 2024-04-08 PROCEDURE — 63600175 PHARM REV CODE 636 W HCPCS: Performed by: INTERNAL MEDICINE

## 2024-04-08 PROCEDURE — 96366 THER/PROPH/DIAG IV INF ADDON: CPT

## 2024-04-08 PROCEDURE — 93005 ELECTROCARDIOGRAM TRACING: CPT | Performed by: GENERAL PRACTICE

## 2024-04-08 PROCEDURE — 83880 ASSAY OF NATRIURETIC PEPTIDE: CPT | Performed by: PHYSICIAN ASSISTANT

## 2024-04-08 PROCEDURE — 83735 ASSAY OF MAGNESIUM: CPT | Performed by: PHYSICIAN ASSISTANT

## 2024-04-08 PROCEDURE — 25000003 PHARM REV CODE 250: Performed by: INTERNAL MEDICINE

## 2024-04-08 PROCEDURE — 85730 THROMBOPLASTIN TIME PARTIAL: CPT | Performed by: INTERNAL MEDICINE

## 2024-04-08 PROCEDURE — 1159F MED LIST DOCD IN RCRD: CPT | Mod: CPTII,S$GLB,, | Performed by: INTERNAL MEDICINE

## 2024-04-08 PROCEDURE — 25000003 PHARM REV CODE 250: Performed by: STUDENT IN AN ORGANIZED HEALTH CARE EDUCATION/TRAINING PROGRAM

## 2024-04-08 PROCEDURE — 93010 ELECTROCARDIOGRAM REPORT: CPT | Mod: ,,, | Performed by: GENERAL PRACTICE

## 2024-04-08 PROCEDURE — 99999 PR PBB SHADOW E&M-EST. PATIENT-LVL IV: CPT | Mod: PBBFAC,,, | Performed by: INTERNAL MEDICINE

## 2024-04-08 PROCEDURE — 3074F SYST BP LT 130 MM HG: CPT | Mod: CPTII,S$GLB,, | Performed by: INTERNAL MEDICINE

## 2024-04-08 PROCEDURE — 80053 COMPREHEN METABOLIC PANEL: CPT | Performed by: PHYSICIAN ASSISTANT

## 2024-04-08 PROCEDURE — 96375 TX/PRO/DX INJ NEW DRUG ADDON: CPT

## 2024-04-08 PROCEDURE — 84443 ASSAY THYROID STIM HORMONE: CPT | Performed by: STUDENT IN AN ORGANIZED HEALTH CARE EDUCATION/TRAINING PROGRAM

## 2024-04-08 PROCEDURE — 36415 COLL VENOUS BLD VENIPUNCTURE: CPT | Performed by: STUDENT IN AN ORGANIZED HEALTH CARE EDUCATION/TRAINING PROGRAM

## 2024-04-08 PROCEDURE — 1160F RVW MEDS BY RX/DR IN RCRD: CPT | Mod: CPTII,S$GLB,, | Performed by: INTERNAL MEDICINE

## 2024-04-08 PROCEDURE — 84484 ASSAY OF TROPONIN QUANT: CPT | Performed by: PHYSICIAN ASSISTANT

## 2024-04-08 PROCEDURE — 4010F ACE/ARB THERAPY RXD/TAKEN: CPT | Mod: CPTII,S$GLB,, | Performed by: INTERNAL MEDICINE

## 2024-04-08 PROCEDURE — 3008F BODY MASS INDEX DOCD: CPT | Mod: CPTII,S$GLB,, | Performed by: INTERNAL MEDICINE

## 2024-04-08 RX ORDER — SODIUM,POTASSIUM PHOSPHATES 280-250MG
2 POWDER IN PACKET (EA) ORAL
Status: DISCONTINUED | OUTPATIENT
Start: 2024-04-08 | End: 2024-04-10 | Stop reason: HOSPADM

## 2024-04-08 RX ORDER — LANOLIN ALCOHOL/MO/W.PET/CERES
800 CREAM (GRAM) TOPICAL
Status: DISCONTINUED | OUTPATIENT
Start: 2024-04-08 | End: 2024-04-10 | Stop reason: HOSPADM

## 2024-04-08 RX ORDER — METOPROLOL TARTRATE 1 MG/ML
5 INJECTION, SOLUTION INTRAVENOUS EVERY 6 HOURS
Status: DISCONTINUED | OUTPATIENT
Start: 2024-04-08 | End: 2024-04-09

## 2024-04-08 RX ORDER — ASPIRIN 81 MG/1
81 TABLET ORAL DAILY
Status: DISCONTINUED | OUTPATIENT
Start: 2024-04-09 | End: 2024-04-10 | Stop reason: HOSPADM

## 2024-04-08 RX ORDER — ONDANSETRON HYDROCHLORIDE 2 MG/ML
4 INJECTION, SOLUTION INTRAVENOUS EVERY 6 HOURS PRN
Status: DISCONTINUED | OUTPATIENT
Start: 2024-04-08 | End: 2024-04-10 | Stop reason: HOSPADM

## 2024-04-08 RX ORDER — SODIUM CHLORIDE 9 MG/ML
INJECTION, SOLUTION INTRAVENOUS CONTINUOUS
Status: DISCONTINUED | OUTPATIENT
Start: 2024-04-09 | End: 2024-04-09

## 2024-04-08 RX ORDER — MORPHINE SULFATE 2 MG/ML
2 INJECTION, SOLUTION INTRAMUSCULAR; INTRAVENOUS
Status: DISCONTINUED | OUTPATIENT
Start: 2024-04-08 | End: 2024-04-10 | Stop reason: HOSPADM

## 2024-04-08 RX ORDER — SODIUM CHLORIDE 0.9 % (FLUSH) 0.9 %
3 SYRINGE (ML) INJECTION EVERY 12 HOURS PRN
Status: DISCONTINUED | OUTPATIENT
Start: 2024-04-08 | End: 2024-04-10 | Stop reason: HOSPADM

## 2024-04-08 RX ORDER — ACETAMINOPHEN 325 MG/1
650 TABLET ORAL EVERY 8 HOURS PRN
Status: DISCONTINUED | OUTPATIENT
Start: 2024-04-08 | End: 2024-04-10 | Stop reason: HOSPADM

## 2024-04-08 RX ORDER — IBUPROFEN 200 MG
24 TABLET ORAL
Status: DISCONTINUED | OUTPATIENT
Start: 2024-04-08 | End: 2024-04-10 | Stop reason: HOSPADM

## 2024-04-08 RX ORDER — IBUPROFEN 200 MG
16 TABLET ORAL
Status: DISCONTINUED | OUTPATIENT
Start: 2024-04-08 | End: 2024-04-10 | Stop reason: HOSPADM

## 2024-04-08 RX ORDER — NALOXONE HCL 0.4 MG/ML
0.02 VIAL (ML) INJECTION
Status: DISCONTINUED | OUTPATIENT
Start: 2024-04-08 | End: 2024-04-10 | Stop reason: HOSPADM

## 2024-04-08 RX ORDER — TALC
6 POWDER (GRAM) TOPICAL NIGHTLY PRN
Status: DISCONTINUED | OUTPATIENT
Start: 2024-04-08 | End: 2024-04-10 | Stop reason: HOSPADM

## 2024-04-08 RX ORDER — ACETAMINOPHEN 325 MG/1
650 TABLET ORAL EVERY 4 HOURS PRN
Status: DISCONTINUED | OUTPATIENT
Start: 2024-04-08 | End: 2024-04-10 | Stop reason: HOSPADM

## 2024-04-08 RX ORDER — GLUCAGON 1 MG
1 KIT INJECTION
Status: DISCONTINUED | OUTPATIENT
Start: 2024-04-08 | End: 2024-04-10 | Stop reason: HOSPADM

## 2024-04-08 RX ORDER — MAGNESIUM SULFATE HEPTAHYDRATE 40 MG/ML
4 INJECTION, SOLUTION INTRAVENOUS ONCE
Status: COMPLETED | OUTPATIENT
Start: 2024-04-08 | End: 2024-04-08

## 2024-04-08 RX ORDER — HEPARIN SODIUM,PORCINE/D5W 25000/250
0-40 INTRAVENOUS SOLUTION INTRAVENOUS CONTINUOUS
Status: DISCONTINUED | OUTPATIENT
Start: 2024-04-08 | End: 2024-04-09 | Stop reason: ALTCHOICE

## 2024-04-08 RX ORDER — ASPIRIN 325 MG
325 TABLET ORAL
Status: COMPLETED | OUTPATIENT
Start: 2024-04-08 | End: 2024-04-08

## 2024-04-08 RX ORDER — ALUMINUM HYDROXIDE, MAGNESIUM HYDROXIDE, AND SIMETHICONE 1200; 120; 1200 MG/30ML; MG/30ML; MG/30ML
30 SUSPENSION ORAL 4 TIMES DAILY PRN
Status: DISCONTINUED | OUTPATIENT
Start: 2024-04-08 | End: 2024-04-10 | Stop reason: HOSPADM

## 2024-04-08 RX ORDER — LORATADINE 10 MG/1
10 TABLET ORAL DAILY
COMMUNITY

## 2024-04-08 RX ORDER — FAMOTIDINE 20 MG/50ML
20 INJECTION, SOLUTION INTRAVENOUS 2 TIMES DAILY
Status: DISCONTINUED | OUTPATIENT
Start: 2024-04-08 | End: 2024-04-08

## 2024-04-08 RX ORDER — FAMOTIDINE 10 MG/ML
20 INJECTION INTRAVENOUS 2 TIMES DAILY
Status: DISCONTINUED | OUTPATIENT
Start: 2024-04-08 | End: 2024-04-09

## 2024-04-08 RX ADMIN — METOPROLOL TARTRATE 5 MG: 1 INJECTION, SOLUTION INTRAVENOUS at 09:04

## 2024-04-08 RX ADMIN — MAGNESIUM SULFATE HEPTAHYDRATE 4 G: 40 INJECTION, SOLUTION INTRAVENOUS at 08:04

## 2024-04-08 RX ADMIN — ASPIRIN 325 MG ORAL TABLET 325 MG: 325 PILL ORAL at 07:04

## 2024-04-08 RX ADMIN — FAMOTIDINE 20 MG: 10 INJECTION INTRAVENOUS at 08:04

## 2024-04-08 NOTE — PROGRESS NOTES
Subjective:    Patient ID:  Cristin Segal is a 63 y.o. female     Chief Complaint   Patient presents with    Results       HPI:  Ms Cristin Segal is a 63 y.o. female is here for follow-up.    Patient has been doing well no specific complaints at the present time.  Her breathing has been good denies any shortness a breath or difficulty in breathing however she has been having intermittent heartburn since past 2 weeks and she has been having repeated episodes of the heartburn.  She has been having episodes after she eats and sometimes even without eating she does have the episodes and today she had an episode while coming to the office.  And it lasted for more than an hour and she took couple of Tums but it is gradually working it did not completely relieve her.    She denies any nausea vomiting or diaphoresis or lightheadedness or dizziness or loss of consciousness.        Review of patient's allergies indicates:   Allergen Reactions    Pcn [penicillins] Anaphylaxis     Unknown for her- family history with anaphylaxis    Lipitor [atorvastatin]        Past Medical History:   Diagnosis Date    Arthritis     Cataract     Diabetes mellitus type II     diet controlled    Fracture     left 4th finger  - splinted    Hyperlipidemia     MCTD (mixed connective tissue disease)     Raynaud's disease     Thyroid disease     Hypothyroidism    Wears glasses     contacs     Past Surgical History:   Procedure Laterality Date    CARPAL TUNNEL RELEASE      right    CATARACT EXTRACTION W/ INTRAOCULAR LENS  IMPLANT, BILATERAL      EXCISION OF PAROTID GLAND Right 8/1/2019    Procedure: PAROTIDECTOMY;  Surgeon: Abner Maki MD;  Location: [facility];  Service: ENT;  Laterality: Right;    HIP SURGERY Left 6/18/15    JANA    INJECTION OF ANESTHETIC AGENT AROUND MULTIPLE INTERCOSTAL NERVES Left 2/9/2023    Procedure: BLOCK, NERVE, INTERCOSTAL, 2 OR MORE;  Surgeon: Isaiah Allan MD;  Location: [facility] OR [address] [place] [address];  Service:  Thoracic;  Laterality: Left;    JOINT REPLACEMENT Bilateral     HIP    KNEE CARTILAGE SURGERY      right    LYMPHADENECTOMY Left 2023    Procedure: LYMPHADENECTOMY;  Surgeon: Isaiah Allan MD;  Location: [facility] OR [address] [place] [address];  Service: Thoracic;  Laterality: Left;    TOTAL HIP ARTHROPLASTY Right 2016    JANA    XI ROBOTIC RATS,WITH LOBECTOMY,LUNG Left 2023    Procedure: XI ROBOTIC RATS,WITH LEFT UPPER LOBECTOMY,LUNG;  Surgeon: Isaiah Allan MD;  Location: [facility] OR [address] [place] [address];  Service: Thoracic;  Laterality: Left;  extended lingulectomy     Social History     Tobacco Use    Smoking status: Former     Current packs/day: 0.00     Average packs/day: 0.5 packs/day for 45.1 years (22.6 ttl pk-yrs)     Types: Cigarettes     Start date:      Quit date: 2023     Years since quittin.1     Passive exposure: Past    Smokeless tobacco: Never   Substance Use Topics    Alcohol use: Yes     Comment: occasional    Drug use: No     Family History   Problem Relation Age of Onset    Diabetes Mother     Kidney disease Mother     Aneurysm Mother 73        brain    Hyperlipidemia Mother     Hypertension Sister     Breast cancer Sister     Diabetes Sister     Ovarian cancer Neg Hx         Review of Systems:   Constitution: Negative for diaphoresis and fever.   HEENT: Negative for nosebleeds.    Cardiovascular:  Recurrent for chest pain       No dyspnea on exertion       No leg swelling        No palpitations  Respiratory: Negative for shortness of breath and wheezing.    Hematologic/Lymphatic: Negative for bleeding problem. Does not bruise/bleed easily.   Skin: Negative for color change and rash.   Musculoskeletal: Negative for falls and myalgias.   Gastrointestinal: Negative for hematemesis and hematochezia.   Genitourinary: Negative for hematuria.   Neurological: Negative for dizziness and light-headedness.   Psychiatric/Behavioral: Negative for altered mental status and memory loss.          Objective:         Vitals:    04/08/24 1524   BP: 118/80   Pulse: 66   Resp: 16       Lab Results   Component Value Date    WBC 8.67 12/18/2023    HGB 11.6 (L) 12/18/2023    HCT 33.9 (L) 12/18/2023     12/18/2023    CHOL 245 (H) 12/18/2023    TRIG 52 12/18/2023     (H) 12/18/2023    ALT 20 12/18/2023    AST 20 12/18/2023     (L) 12/18/2023    K 4.2 12/18/2023    CL 99 12/18/2023    CREATININE 1.2 12/18/2023    BUN 28 (H) 12/18/2023    CO2 27 12/18/2023    TSH 1.718 12/18/2023    INR 1.0 01/06/2023    HGBA1C 4.8 12/18/2023        ECHOCARDIOGRAM RESULTS  Results for orders placed during the hospital encounter of 02/28/24    Echo    Interpretation Summary    Left Ventricle: There is normal systolic function with a visually estimated ejection fraction of 55 - 70%. There is normal diastolic function.    Right Ventricle: Normal right ventricular cavity size. Wall thickness is normal. Right ventricle wall motion  is normal. Systolic function is normal.    Left Atrium: Left atrium is mildly dilated.    Aortic Valve: Moderately calcified left, right and noncoronary cusps. Moderately restricted motion. There is moderate stenosis. Aortic valve area by VTI is 1.48 cm². Aortic valve peak velocity is 2.88 m/s. Mean gradient is 19 mmHg. The dimensionless index is 0.47. There is mild aortic regurgitation with a centrally directed jet.    IVC/SVC: Normal venous pressure at 3 mmHg.        CURRENT/PREVIOUS VISIT EKG  Results for orders placed or performed in visit on 09/28/23   IN OFFICE EKG 12-LEAD (to [place])    Collection Time: 09/28/23  9:47 AM    Narrative    Test Reason : R94.31,    Vent. Rate : 079 BPM     Atrial Rate : 079 BPM     P-R Int : 222 ms          QRS Dur : 084 ms      QT Int : 380 ms       P-R-T Axes : 058 062 021 degrees     QTc Int : 435 ms    Sinus rhythm with 1st degree A-V block  Cannot rule out Anterior infarct ,age undetermined  Abnormal ECG  When compared with ECG of 29-DEC-2022 15:58,  No significant  change was found  Confirmed by Raymond Nesbitt MD ([id number]) on 10/4/2023 8:46:16 PM    Referred By:             Confirmed By:Raymond Nesbitt MD     No valid procedures specified.   No results found for this or any previous visit.      Physical Exam:  CONSTITUTIONAL: No fever, no chills  HEENT: Normocephalic, atraumatic,pupils reactive to light                 NECK:  No JVD no carotid bruit  CVS: S1S2+, RRR, systolic murmurs,   LUNGS: Clear  ABDOMEN: Soft, NT, BS+  EXTREMITIES: No cyanosis, edema  : No milton catheter  NEURO: AAO X 3  PSY: Normal affect      Medication List with Changes/Refills   Current Medications    ACETAMINOPHEN (TYLENOL) 500 MG TABLET    Take 1 tablet (500 mg total) by mouth every 6 (six) hours as needed for Pain.    ALBUTEROL (VENTOLIN HFA) 90 MCG/ACTUATION INHALER    Inhale 2 puffs into the lungs every 6 (six) hours as needed for Wheezing or Shortness of Breath. Rescue    ASPIRIN 81 MG CHEW    Take 1 tablet (81 mg total) by mouth 3 (three) times a week.    GABAPENTIN (NEURONTIN) 100 MG CAPSULE    100 mg.    HYDROXYCHLOROQUINE (PLAQUENIL) 200 MG TABLET    Take 1 tablet (200 mg total) by mouth once daily.    LEVOTHYROXINE (SYNTHROID) 50 MCG TABLET    TAKE 1 TABLET BY MOUTH BEFORE BREAKFAST.    LOSARTAN (COZAAR) 25 MG TABLET    TAKE 1 TABLET BY MOUTH EVERY DAY    MELOXICAM (MOBIC) 7.5 MG TABLET    7.5 mg.    MULTIVIT-MIN-FA-LYCOPEN-LUTEIN (CENTRUM SILVER) 0.4 MG-300 MCG- 250 MCG TAB    Take 1 tablet by mouth once daily.    MV-MIN/FA/C/GLUT/LYSINE/KNZ559 ([place], WITH FOLIC ACID, ORAL)    Take 1 tablet by mouth 4 (four) times daily.    NIFEDIPINE (ADALAT CC) 30 MG TBSR    TAKE 1 TABLET BY MOUTH EVERY DAY    PANTOPRAZOLE (PROTONIX) 40 MG TABLET    TAKE 1 TABLET BY MOUTH EVERY DAY    POTASSIUM CHLORIDE SA (K-DUR,KLOR-CON) 20 MEQ TABLET    TAKE 1 TABLET BY MOUTH ONCE DAILY    ROSUVASTATIN (CRESTOR) 5 MG TABLET    Take 1 tablet (5 mg total) by mouth once daily.    TRAMADOL (ULTRAM) 50 MG TABLET    Take  50 mg by mouth every evening.    VITAMIN D (VITAMIN D3) 1000 UNITS TAB    Take 1,000 Units by mouth once daily.    WESTAB MAX 2.5-25-2 MG TAB    TAKE 1 TABLET BY MOUTH EVERY DAY             Assessment:       1. Chest pain, unspecified type    2. Raynaud's disease without gangrene    3. Essential hypertension    4. Moderate aortic stenosis    5. Diabetes mellitus due to underlying condition with hyperglycemia, without long-term current use of insulin    6. Acquired hypothyroidism    7. Mixed hyperlipidemia    8. Current smoker    9. Nonspecific abnormal electrocardiogram (ECG) (EKG)         Plan:   1. Chest and abnormal EKG.    Patient has been having chest discomfort for past 2 weeks intermittently.  It appears more like a reflux disease however her symptoms lasts for an hour or 2.  And she had chest pain earlier on today while coming to the office and she still has dull ache which is not completely relieved.    EKG shows nonspecific ST T-wave changes especially in the inferior leads she had ST elevation which is somewhat old she had repolarization changes in the inferior leads in the past.  2. Raynaud's disease and essentially need to rule out coronary spasm.  3. Essential hypertension  Patient's blood pressure is stable at the present time.  Is 118/80.  4. Diabetes mellitus   Patient is followed up by her primary physician.    5. Current smoker   Patient is an active smoker.  Patient to stop smoking completely.  6. EKG  Reviewed EKG independently patient is in normal sinus rhythm with a first-degree AV block with a heart rate of 95 beats per minute and nonspecific ST T-wave changes in the inferior leads suggestive of possible inferior MI however she had prior ST elevations in the inferior leads suggestive of repolarization changes.  7. Patient to follow-up with me in the office in 2-3 weeks time.            Problem List Items Addressed This Visit          Cardiac/Vascular    Raynaud disease    Essential  hypertension    Hyperlipidemia       Endocrine    Diabetes mellitus due to underlying condition with hyperglycemia, without long-term current use of insulin    Hypothyroidism       Other    Current smoker     Other Visit Diagnoses       Chest pain, unspecified type    -  Primary    Moderate aortic stenosis        Nonspecific abnormal electrocardiogram (ECG) (EKG)                No follow-ups on file.

## 2024-04-08 NOTE — Clinical Note
The catheter was inserted into the left ventricle. Hemodynamics were performed.  and Pullback was recorded.  An angiography was performed Multiple views were taken.

## 2024-04-08 NOTE — FIRST PROVIDER EVALUATION
Emergency Department TeleTriage Encounter Note      CHIEF COMPLAINT    Chief Complaint   Patient presents with    Heartburn     Heartburn x 2 weeks. Pt was seen at Dr. Nesbitt's office today and sent straight here. EKG changes.        VITAL SIGNS   Initial Vitals [04/08/24 1618]   BP Pulse Resp Temp SpO2   126/71 94 18 97.5 °F (36.4 °C) 99 %      MAP       --            ALLERGIES    Review of patient's allergies indicates:   Allergen Reactions    Pcn [penicillins] Anaphylaxis     Unknown for her- family history with anaphylaxis    Lipitor [atorvastatin]        PROVIDER TRIAGE NOTE  Patient presents with complaint of heartburn for 2 weeks.  Saw cars today who did an EKG and sent her directly to the emergency room.      Phy:   Constitutional: well nourished, well developed, appearing stated age, NAD        Initial orders will be placed and care will be transferred to an alternate provider when patient is roomed for a full evaluation. Any additional orders and the final disposition will be determined by that provider.        ORDERS  Labs Reviewed - No data to display    ED Orders (720h ago, onward)      None              Virtual Visit Note: The provider triage portion of this emergency department evaluation and documentation was performed via [employer], a HIPAA-compliant telemedicine application, in concert with a tele-presenter in the room. A face to face patient evaluation with one of my colleagues will occur once the patient is placed in an emergency department room.      DISCLAIMER: This note was prepared with [employer] voice recognition transcription software. Garbled syntax, mangled pronouns, and other bizarre constructions may be attributed to that software system.

## 2024-04-09 PROBLEM — E83.42 HYPOMAGNESEMIA: Status: ACTIVE | Noted: 2024-04-09

## 2024-04-09 LAB
ALBUMIN SERPL BCP-MCNC: 4.2 G/DL (ref 3.5–5.2)
ALP SERPL-CCNC: 61 U/L (ref 55–135)
ALT SERPL W/O P-5'-P-CCNC: 21 U/L (ref 10–44)
ANION GAP SERPL CALC-SCNC: 7 MMOL/L (ref 8–16)
APTT PPP: 27 SEC (ref 21–32)
APTT PPP: 37 SEC (ref 21–32)
APTT PPP: 54.1 SEC (ref 21–32)
AST SERPL-CCNC: 24 U/L (ref 10–40)
BILIRUB SERPL-MCNC: 0.4 MG/DL (ref 0.1–1)
BUN SERPL-MCNC: 16 MG/DL (ref 8–23)
CALCIUM SERPL-MCNC: 10 MG/DL (ref 8.7–10.5)
CHLORIDE SERPL-SCNC: 101 MMOL/L (ref 95–110)
CHOLEST SERPL-MCNC: 209 MG/DL (ref 120–199)
CHOLEST/HDLC SERPL: 1.6 {RATIO} (ref 2–5)
CO2 SERPL-SCNC: 27 MMOL/L (ref 23–29)
CREAT SERPL-MCNC: 1.1 MG/DL (ref 0.5–1.4)
EST. GFR  (NO RACE VARIABLE): 56.5 ML/MIN/1.73 M^2
ESTIMATED AVG GLUCOSE: 82 MG/DL (ref 68–131)
GLUCOSE SERPL-MCNC: 91 MG/DL (ref 70–110)
HBA1C MFR BLD: 4.5 % (ref 4.5–6.2)
HDLC SERPL-MCNC: 128 MG/DL (ref 40–75)
HDLC SERPL: 61.2 % (ref 20–50)
INR PPP: 0.9 (ref 0.8–1.2)
LDLC SERPL CALC-MCNC: 65.4 MG/DL (ref 63–159)
MAGNESIUM SERPL-MCNC: 1.9 MG/DL (ref 1.6–2.6)
NONHDLC SERPL-MCNC: 81 MG/DL
POC ACTIVATED CLOTTING TIME K: 228 SEC (ref 74–137)
POC ACTIVATED CLOTTING TIME K: 233 SEC (ref 74–137)
POC ACTIVATED CLOTTING TIME K: 255 SEC (ref 74–137)
POTASSIUM SERPL-SCNC: 4.6 MMOL/L (ref 3.5–5.1)
PROT SERPL-MCNC: 7.3 G/DL (ref 6–8.4)
PROTHROMBIN TIME: 9.7 SEC (ref 9–12.5)
SAMPLE: ABNORMAL
SODIUM SERPL-SCNC: 135 MMOL/L (ref 136–145)
TRIGL SERPL-MCNC: 78 MG/DL (ref 30–150)
TROPONIN I SERPL HS-MCNC: 70 PG/ML (ref 0–14.9)

## 2024-04-09 PROCEDURE — 92978 ENDOLUMINL IVUS OCT C 1ST: CPT | Mod: 26,RC,, | Performed by: INTERNAL MEDICINE

## 2024-04-09 PROCEDURE — 93458 L HRT ARTERY/VENTRICLE ANGIO: CPT | Mod: 59 | Performed by: INTERNAL MEDICINE

## 2024-04-09 PROCEDURE — 85730 THROMBOPLASTIN TIME PARTIAL: CPT | Performed by: INTERNAL MEDICINE

## 2024-04-09 PROCEDURE — 63600175 PHARM REV CODE 636 W HCPCS: Performed by: INTERNAL MEDICINE

## 2024-04-09 PROCEDURE — B240ZZ3 ULTRASONOGRAPHY OF SINGLE CORONARY ARTERY, INTRAVASCULAR: ICD-10-PCS | Performed by: INTERNAL MEDICINE

## 2024-04-09 PROCEDURE — 83036 HEMOGLOBIN GLYCOSYLATED A1C: CPT | Performed by: INTERNAL MEDICINE

## 2024-04-09 PROCEDURE — B2111ZZ FLUOROSCOPY OF MULTIPLE CORONARY ARTERIES USING LOW OSMOLAR CONTRAST: ICD-10-PCS | Performed by: INTERNAL MEDICINE

## 2024-04-09 PROCEDURE — 96375 TX/PRO/DX INJ NEW DRUG ADDON: CPT

## 2024-04-09 PROCEDURE — 25000003 PHARM REV CODE 250: Performed by: NURSE PRACTITIONER

## 2024-04-09 PROCEDURE — 96376 TX/PRO/DX INJ SAME DRUG ADON: CPT

## 2024-04-09 PROCEDURE — C1894 INTRO/SHEATH, NON-LASER: HCPCS | Performed by: INTERNAL MEDICINE

## 2024-04-09 PROCEDURE — 83735 ASSAY OF MAGNESIUM: CPT | Performed by: INTERNAL MEDICINE

## 2024-04-09 PROCEDURE — 36415 COLL VENOUS BLD VENIPUNCTURE: CPT | Performed by: INTERNAL MEDICINE

## 2024-04-09 PROCEDURE — 96366 THER/PROPH/DIAG IV INF ADDON: CPT

## 2024-04-09 PROCEDURE — C1874 STENT, COATED/COV W/DEL SYS: HCPCS | Performed by: INTERNAL MEDICINE

## 2024-04-09 PROCEDURE — 21400001 HC TELEMETRY ROOM

## 2024-04-09 PROCEDURE — 36415 COLL VENOUS BLD VENIPUNCTURE: CPT | Performed by: FAMILY MEDICINE

## 2024-04-09 PROCEDURE — 99406 BEHAV CHNG SMOKING 3-10 MIN: CPT

## 2024-04-09 PROCEDURE — 99152 MOD SED SAME PHYS/QHP 5/>YRS: CPT | Performed by: INTERNAL MEDICINE

## 2024-04-09 PROCEDURE — 80053 COMPREHEN METABOLIC PANEL: CPT | Performed by: INTERNAL MEDICINE

## 2024-04-09 PROCEDURE — 84484 ASSAY OF TROPONIN QUANT: CPT | Performed by: INTERNAL MEDICINE

## 2024-04-09 PROCEDURE — 93458 L HRT ARTERY/VENTRICLE ANGIO: CPT | Mod: 26,51,59, | Performed by: INTERNAL MEDICINE

## 2024-04-09 PROCEDURE — 4A023N7 MEASUREMENT OF CARDIAC SAMPLING AND PRESSURE, LEFT HEART, PERCUTANEOUS APPROACH: ICD-10-PCS | Performed by: INTERNAL MEDICINE

## 2024-04-09 PROCEDURE — 94799 UNLISTED PULMONARY SVC/PX: CPT

## 2024-04-09 PROCEDURE — 93010 ELECTROCARDIOGRAM REPORT: CPT | Mod: ,,, | Performed by: GENERAL PRACTICE

## 2024-04-09 PROCEDURE — 25000003 PHARM REV CODE 250: Performed by: INTERNAL MEDICINE

## 2024-04-09 PROCEDURE — 99900035 HC TECH TIME PER 15 MIN (STAT)

## 2024-04-09 PROCEDURE — 027034Z DILATION OF CORONARY ARTERY, ONE ARTERY WITH DRUG-ELUTING INTRALUMINAL DEVICE, PERCUTANEOUS APPROACH: ICD-10-PCS | Performed by: INTERNAL MEDICINE

## 2024-04-09 PROCEDURE — 27201423 OPTIME MED/SURG SUP & DEVICES STERILE SUPPLY: Performed by: INTERNAL MEDICINE

## 2024-04-09 PROCEDURE — 96367 TX/PROPH/DG ADDL SEQ IV INF: CPT

## 2024-04-09 PROCEDURE — C9600 PERC DRUG-EL COR STENT SING: HCPCS | Mod: RC | Performed by: INTERNAL MEDICINE

## 2024-04-09 PROCEDURE — 93005 ELECTROCARDIOGRAM TRACING: CPT | Performed by: GENERAL PRACTICE

## 2024-04-09 PROCEDURE — C1725 CATH, TRANSLUMIN NON-LASER: HCPCS | Performed by: INTERNAL MEDICINE

## 2024-04-09 PROCEDURE — 94760 N-INVAS EAR/PLS OXIMETRY 1: CPT

## 2024-04-09 PROCEDURE — 99152 MOD SED SAME PHYS/QHP 5/>YRS: CPT | Mod: ,,, | Performed by: INTERNAL MEDICINE

## 2024-04-09 PROCEDURE — 63600175 PHARM REV CODE 636 W HCPCS: Mod: JZ,JG | Performed by: INTERNAL MEDICINE

## 2024-04-09 PROCEDURE — 85730 THROMBOPLASTIN TIME PARTIAL: CPT | Mod: 91 | Performed by: FAMILY MEDICINE

## 2024-04-09 PROCEDURE — 99153 MOD SED SAME PHYS/QHP EA: CPT | Performed by: INTERNAL MEDICINE

## 2024-04-09 PROCEDURE — 94761 N-INVAS EAR/PLS OXIMETRY MLT: CPT

## 2024-04-09 PROCEDURE — C1887 CATHETER, GUIDING: HCPCS | Performed by: INTERNAL MEDICINE

## 2024-04-09 PROCEDURE — 92978 ENDOLUMINL IVUS OCT C 1ST: CPT | Mod: RC | Performed by: INTERNAL MEDICINE

## 2024-04-09 PROCEDURE — 99215 OFFICE O/P EST HI 40 MIN: CPT | Mod: 25,,, | Performed by: INTERNAL MEDICINE

## 2024-04-09 PROCEDURE — C1769 GUIDE WIRE: HCPCS | Performed by: INTERNAL MEDICINE

## 2024-04-09 PROCEDURE — C1753 CATH, INTRAVAS ULTRASOUND: HCPCS | Performed by: INTERNAL MEDICINE

## 2024-04-09 PROCEDURE — 80061 LIPID PANEL: CPT | Performed by: INTERNAL MEDICINE

## 2024-04-09 PROCEDURE — 92928 PRQ TCAT PLMT NTRAC ST 1 LES: CPT | Mod: RC,,, | Performed by: INTERNAL MEDICINE

## 2024-04-09 DEVICE — STENT ONYXNG25038UX ONYX 2.50X38RX
Type: IMPLANTABLE DEVICE | Site: CORONARY | Status: FUNCTIONAL
Brand: ONYX FRONTIER™

## 2024-04-09 RX ORDER — LIDOCAINE HYDROCHLORIDE 10 MG/ML
INJECTION, SOLUTION EPIDURAL; INFILTRATION; INTRACAUDAL; PERINEURAL
Status: DISCONTINUED | OUTPATIENT
Start: 2024-04-09 | End: 2024-04-09 | Stop reason: HOSPADM

## 2024-04-09 RX ORDER — FAMOTIDINE 10 MG/ML
20 INJECTION INTRAVENOUS DAILY
Status: DISCONTINUED | OUTPATIENT
Start: 2024-04-10 | End: 2024-04-10 | Stop reason: HOSPADM

## 2024-04-09 RX ORDER — MIDAZOLAM HYDROCHLORIDE 1 MG/ML
INJECTION INTRAMUSCULAR; INTRAVENOUS
Status: DISCONTINUED | OUTPATIENT
Start: 2024-04-09 | End: 2024-04-09 | Stop reason: HOSPADM

## 2024-04-09 RX ORDER — HEPARIN SODIUM 1000 [USP'U]/ML
INJECTION, SOLUTION INTRAVENOUS; SUBCUTANEOUS
Status: DISCONTINUED | OUTPATIENT
Start: 2024-04-09 | End: 2024-04-09 | Stop reason: HOSPADM

## 2024-04-09 RX ORDER — CLOPIDOGREL BISULFATE 75 MG/1
TABLET ORAL
Status: DISCONTINUED | OUTPATIENT
Start: 2024-04-09 | End: 2024-04-09 | Stop reason: HOSPADM

## 2024-04-09 RX ORDER — SODIUM CHLORIDE 9 MG/ML
INJECTION, SOLUTION INTRAVENOUS ONCE
Status: DISCONTINUED | OUTPATIENT
Start: 2024-04-09 | End: 2024-04-09 | Stop reason: HOSPADM

## 2024-04-09 RX ORDER — LANOLIN ALCOHOL/MO/W.PET/CERES
400 CREAM (GRAM) TOPICAL DAILY
Status: DISCONTINUED | OUTPATIENT
Start: 2024-04-09 | End: 2024-04-10 | Stop reason: HOSPADM

## 2024-04-09 RX ORDER — TIROFIBAN HYDROCHLORIDE 50 UG/ML
0.15 INJECTION INTRAVENOUS CONTINUOUS
Status: ACTIVE | OUTPATIENT
Start: 2024-04-09 | End: 2024-04-09

## 2024-04-09 RX ORDER — NITROGLYCERIN 5 MG/ML
INJECTION, SOLUTION INTRAVENOUS
Status: DISCONTINUED | OUTPATIENT
Start: 2024-04-09 | End: 2024-04-09 | Stop reason: HOSPADM

## 2024-04-09 RX ORDER — CLOPIDOGREL BISULFATE 75 MG/1
75 TABLET ORAL DAILY
Status: DISCONTINUED | OUTPATIENT
Start: 2024-04-10 | End: 2024-04-10 | Stop reason: HOSPADM

## 2024-04-09 RX ORDER — LEVOTHYROXINE SODIUM 25 UG/1
50 TABLET ORAL
Status: DISCONTINUED | OUTPATIENT
Start: 2024-04-10 | End: 2024-04-10 | Stop reason: HOSPADM

## 2024-04-09 RX ORDER — FENTANYL CITRATE 50 UG/ML
INJECTION, SOLUTION INTRAMUSCULAR; INTRAVENOUS
Status: DISCONTINUED | OUTPATIENT
Start: 2024-04-09 | End: 2024-04-09 | Stop reason: HOSPADM

## 2024-04-09 RX ORDER — DIPHENHYDRAMINE HCL 25 MG
50 CAPSULE ORAL
Status: DISCONTINUED | OUTPATIENT
Start: 2024-04-09 | End: 2024-04-09 | Stop reason: HOSPADM

## 2024-04-09 RX ADMIN — HEPARIN SODIUM 14 UNITS/KG/HR: 10000 INJECTION, SOLUTION INTRAVENOUS at 01:04

## 2024-04-09 RX ADMIN — FAMOTIDINE 20 MG: 10 INJECTION INTRAVENOUS at 09:04

## 2024-04-09 RX ADMIN — ASPIRIN 81 MG: 81 TABLET, COATED ORAL at 09:04

## 2024-04-09 RX ADMIN — Medication 400 MG: at 09:04

## 2024-04-09 RX ADMIN — SODIUM CHLORIDE: 9 INJECTION, SOLUTION INTRAVENOUS at 12:04

## 2024-04-09 RX ADMIN — HEPARIN SODIUM 12 UNITS/KG/HR: 10000 INJECTION, SOLUTION INTRAVENOUS at 12:04

## 2024-04-09 NOTE — INTERVAL H&P NOTE
The patient has been examined and the H&P has been reviewed:    I concur with the findings and no changes have occurred since H&P was written.    Procedure risks, benefits and alternative options discussed and understood by patient/family.          Active Hospital Problems    Diagnosis  POA    *NSTEMI (non-ST elevated myocardial infarction) [I21.4]  Yes    Hypomagnesemia [E83.42]  Yes    Essential hypertension [I10]  Yes    Controlled type 2 diabetes mellitus with complication, without long-term current use of insulin [E11.8]  Yes     Dx updated per 2019 IMO Load        Resolved Hospital Problems    Diagnosis Date Resolved POA    Diabetic neuropathy [E11.40] 04/09/2024 Yes

## 2024-04-09 NOTE — HPI
63 WF With pmh of HTN , Smoking long term ,  Lung CA 2023 with resection , DMII not on insulin , GERD     Still smokes 1 pack a day   Non drinker  Lives at home     Cc;  chest pain       She Presents with Chest pain  from Dr Nesbitt office .   She actually was going into His office ( cardiologist )  for follow up about her ECHO results.    I'm assuming because she had a murmur.   Echo was normal but had Aortic valve stenosis ( 1.4 cm valve area)   normal EF .        And in the office she mentions that she is having this recurring chest pain again .  Dull pain lower sternum center chest area.    She thinks sometimes it comes on after eating food or spicy food.   So lately all she has been eating is Soups.     She will try to take TUMS and then 30 min later It eases up.     But never radiates .  Its not sharp or extreme pain.        So they did EKG in the office and she had some non specific ST-T wave changes     Of note she did have stress test here and obs admit overnight for some chest pain 6 years ago and it was all negative / normal .        She really hasn't had pain since then until just lately .         Dr Nesbitt's office brought her down to the ER for work up and admission possibly     In the ER EKG showed same. But normal otherwise     First troponin actually was elevated in 60s     Second troponin went to 90s     They gave her aspirin PO       And called us for admission to our service for NSTEMI       Patient was having no new pain and actually pain was gone when I saw her   She was doing fine

## 2024-04-09 NOTE — CARE UPDATE
04/09/24 1624   PRE-TX-O2   SpO2 (!) 93 %   Pulse 73   Resp 18   /62   Tobacco Cessation Intervention   Do you use any type of tobacco product? Yes   Are you interested in quitting use of tobacco products? Thinking about quitting   Are you interested in Nicotine Replacement for symptom relief? Yes   $ Smoking Cessation Charges Smoking Cessation - Intermediate (Non-CTTS)   Respiratory Evaluation   $ Care Plan Tech Time 15 min   $ Eval/Re-eval Charges Evaluation   Evaluation For New Orders   Admitting Diagnosis NSTEMI   Cardiac Diagnosis MI   Pulmonary Diagnosis LUNG CANCER   Home Oxygen   Has Home Oxygen? No   Home Aerosol, MDI, DPI, and Other Treatments/Therapies   Home Respiratory Therapy Per Patient/Review of Chart No   Oxygen Care Plan   Oxygen Care Plan Per Protocol   SPO2 Goal (%) MD order   Rationale SpO2 is <MD Goal   Other Oxygen NONE   Bronchodilator Care Plan   Rationale No Rationale found   Atelectasis Care Plan   Rationale No Rational Found   Airway Clearance Care Plan   Rationale No rationale found

## 2024-04-09 NOTE — NURSING
Patient arrived to unit via stretcher from ED. Patient ambulated without difficulty. Tele box. Patient oriented to room, bed in lowest position, wheels locked, bed rails up x2, bed alarm initiated, call light and bedside table within reach. Safety measures addressed.

## 2024-04-09 NOTE — HOSPITAL COURSE
63 y.o. F presents with chest pain from Cardiology office, Dr. Nesbitt - after noticing non-specific ST changes on EKG. Elevated trops peaked at 94.4. 2D echo showed EF 55-70% with NDF but showed moderate aortic valve stenosis.  Patient underwent a LHC with PCI to RCA.  She was cleared for discharge home by cardiology on DAPT, toprol, and NTG PRN.  Patient seen on day of discharge and in stable condition for discharge home.  She is to follow up with PCP and Cardiology in 1-2 weeks.  She was also referred to outpatient cardiac rehab.

## 2024-04-09 NOTE — ED PROVIDER NOTES
Encounter Date: 4/8/2024       History     Chief Complaint   Patient presents with    Heartburn     Heartburn x 2 weeks. Pt was seen at Dr. Nesbitt's office today and sent straight here. EKG changes.     ekg changes     Sent from dr. Nesbitt's office      63-year-old female with history of hyperlipidemia, diabetes, aortic stenosis, was sent from Cardiology, Dr. GIUSEPPE Nesbitt, for abnormal EKG and 2 weeks of heartburn.  Patient reports history of esophageal webs status post dilation, presenting with 2 weeks of epigastric abdominal pain, burning in nature, relieved with over-the-counter medication.  She had a regularly scheduled follow-up with her cardiologist today for echocardiogram results, which showed mild aortic stenosis.  In office EKG showed T-wave inversions in inferior leads concerning her cardiologist for ACS given the chest pain, so she was sent here for further evaluation.  Patient denies other associated symptoms, denies radiation of the pain, shortness of breath, cough, fevers or chills.  She reports compliance with medication, reports no known drug allergies and reports stress test few years ago, which was normal.      Review of patient's allergies indicates:   Allergen Reactions    Pcn [penicillins] Anaphylaxis     Unknown for her- family history with anaphylaxis    Lipitor [atorvastatin]      Past Medical History:   Diagnosis Date    Arthritis     Cataract     Diabetes mellitus type II     diet controlled    Fracture     left 4th finger  - splinted    Hyperlipidemia     MCTD (mixed connective tissue disease)     Raynaud's disease     Thyroid disease     Hypothyroidism    Wears glasses     contacs     Past Surgical History:   Procedure Laterality Date    CARPAL TUNNEL RELEASE      right    CATARACT EXTRACTION W/ INTRAOCULAR LENS  IMPLANT, BILATERAL      EXCISION OF PAROTID GLAND Right 8/1/2019    Procedure: PAROTIDECTOMY;  Surgeon: Abner Maki MD;  Location: [facility];  Service: ENT;  Laterality:  Right;    HIP SURGERY Left 6/18/15    JANA    INJECTION OF ANESTHETIC AGENT AROUND MULTIPLE INTERCOSTAL NERVES Left 2023    Procedure: BLOCK, NERVE, INTERCOSTAL, 2 OR MORE;  Surgeon: Isaiah Allan MD;  Location: [facility] OR [address] [place] [address];  Service: Thoracic;  Laterality: Left;    JOINT REPLACEMENT Bilateral     HIP    KNEE CARTILAGE SURGERY      right    LYMPHADENECTOMY Left 2023    Procedure: LYMPHADENECTOMY;  Surgeon: Isaiah Allan MD;  Location: [facility] OR [address] [place] [address];  Service: Thoracic;  Laterality: Left;    TOTAL HIP ARTHROPLASTY Right 2016    JANA    XI ROBOTIC RATS,WITH LOBECTOMY,LUNG Left 2023    Procedure: XI ROBOTIC RATS,WITH LEFT UPPER LOBECTOMY,LUNG;  Surgeon: Isaiah Allan MD;  Location: [facility] OR [address] [place] [address];  Service: Thoracic;  Laterality: Left;  extended lingulectomy     Family History   Problem Relation Age of Onset    Diabetes Mother     Kidney disease Mother     Aneurysm Mother 73        brain    Hyperlipidemia Mother     Hypertension Sister     Breast cancer Sister     Diabetes Sister     Ovarian cancer Neg Hx      Social History     Tobacco Use    Smoking status: Former     Current packs/day: 0.00     Average packs/day: 0.5 packs/day for 45.1 years (22.6 ttl pk-yrs)     Types: Cigarettes     Start date:      Quit date: 2023     Years since quittin.1     Passive exposure: Past    Smokeless tobacco: Never   Substance Use Topics    Alcohol use: Yes     Comment: occasional    Drug use: No     Review of Systems   Constitutional:  Negative for activity change, appetite change, chills, fever and unexpected weight change.   HENT:  Negative for dental problem and drooling.    Eyes:  Negative for discharge and itching.   Respiratory:  Negative for cough, chest tightness, shortness of breath, wheezing and stridor.    Cardiovascular:  Positive for chest pain. Negative for palpitations and leg swelling.   Gastrointestinal:  Negative for abdominal distention, abdominal pain, diarrhea  and nausea.   Genitourinary:  Negative for difficulty urinating, dysuria, frequency and urgency.   Musculoskeletal:  Negative for back pain, gait problem and joint swelling.   Neurological:  Negative for dizziness, syncope, numbness and headaches.   Psychiatric/Behavioral:  Negative for agitation, behavioral problems and confusion.        Physical Exam     Initial Vitals [04/08/24 1618]   BP Pulse Resp Temp SpO2   126/71 94 18 97.5 °F (36.4 °C) 99 %      MAP       --         Physical Exam    Nursing note and vitals reviewed.  Constitutional: She appears well-developed and well-nourished. She is not diaphoretic.   HENT:   Head: Normocephalic and atraumatic.   Mouth/Throat: Oropharynx is clear and moist.   Eyes: EOM are normal. Pupils are equal, round, and reactive to light. Right eye exhibits no discharge. Left eye exhibits no discharge.   Neck: No tracheal deviation present.   Normal range of motion.  Cardiovascular:  Normal rate, regular rhythm and intact distal pulses.           Pulmonary/Chest: No respiratory distress. She has no wheezes. She exhibits no tenderness.   Abdominal: Abdomen is soft. She exhibits no distension. There is no abdominal tenderness.   Musculoskeletal:         General: No tenderness or edema. Normal range of motion.      Cervical back: Normal range of motion.     Neurological: She is alert and oriented to person, place, and time. She has normal strength. No cranial nerve deficit or sensory deficit. GCS eye subscore is 4. GCS verbal subscore is 5. GCS motor subscore is 6.   Skin: Skin is warm and dry. No rash noted.   Psychiatric: She has a normal mood and affect. Her behavior is normal. Thought content normal.         ED Course   Critical Care    Date/Time: 4/9/2024 3:50 AM    Performed by: Mau Dodge MD  Authorized by: Mau Dodge MD  Direct patient critical care time: 15 minutes  Additional history critical care time: 5 minutes  Ordering / reviewing critical care time: 5  minutes  Total critical care time (exclusive of procedural time) : 25 minutes  Critical care was necessary to treat or prevent imminent or life-threatening deterioration of the following conditions: cardiac failure.  Critical care was time spent personally by me on the following activities: ordering and performing treatments and interventions, ordering and review of radiographic studies, ordering and review of laboratory studies, obtaining history from patient or surrogate, examination of patient, re-evaluation of patient's condition and pulse oximetry.        Labs Reviewed   MAGNESIUM - Abnormal; Notable for the following components:       Result Value    Magnesium 1.1 (*)     All other components within normal limits   CBC W/ AUTO DIFFERENTIAL - Abnormal; Notable for the following components:    RBC 3.18 (*)     Hemoglobin 11.2 (*)     Hematocrit 33.0 (*)      (*)     MCH 35.2 (*)     MPV 9.0 (*)     All other components within normal limits   COMPREHENSIVE METABOLIC PANEL - Abnormal; Notable for the following components:    Sodium 134 (*)     Calcium 10.6 (*)     eGFR 50.9 (*)     All other components within normal limits   TROPONIN I HIGH SENSITIVITY - Abnormal; Notable for the following components:    Troponin I High Sensitivity 67.9 (*)     All other components within normal limits   B-TYPE NATRIURETIC PEPTIDE - Abnormal; Notable for the following components:     (*)     All other components within normal limits   TROPONIN I HIGH SENSITIVITY - Abnormal; Notable for the following components:    Troponin I High Sensitivity 94.4 (*)     All other components within normal limits   TSH   TSH   APTT    Narrative:     Draw baseline aPTT prior to starting the heparin bolus or  infusion  (if patient is on warfarin prior to heparin therapy)   PROTIME-INR    Narrative:     Draw baseline aPTT prior to starting the heparin bolus or  infusion  (if patient is on warfarin prior to heparin therapy)     EKG  Readings: (Independently Interpreted)   EKG shows sinus rhythm, regular rate, no acute ST elevations, T-wave inversion in 3.      ECG Results              EKG 12-lead (In process)        Collection Time Result Time QRS Duration [facility] QTC Calculation    04/08/24 16:30:31 04/08/24 16:37:17 90 440                     In process by Interface, Lab In [facility] (04/08/24 16:37:25)                   Narrative:    Test Reason : R07.9,    Vent. Rate : 090 BPM     Atrial Rate : 090 BPM     P-R Int : 214 ms          QRS Dur : 090 ms      QT Int : 360 ms       P-R-T Axes : 045 054 018 degrees     QTc Int : 440 ms    Sinus rhythm with 1st degree A-V block  Nonspecific T wave abnormality  Abnormal ECG  When compared with ECG of 08-APR-2024 15:43,  ST no longer elevated in Inferior leads    Referred By:             Confirmed By:                                   Imaging Results              X-Ray Chest PA And Lateral (Final result)  Result time 04/08/24 18:00:30   Procedure changed from X-Ray Chest AP Portable     Final result by Lars Lock MD (04/08/24 18:00:30)                   Narrative:    EXAMINATION:  XR CHEST PA AND LATERAL    CLINICAL HISTORY:  pain;Chest Pain;    TECHNIQUE:  PA and lateral views of the chest were performed.    COMPARISON:  03/07/2023    FINDINGS:  Postsurgical change left lung, again noted.  No new airspace disease.  Unchanged heart size.  No significant pleural fluid.  No pneumothorax.  Remote left rib fracture.  Atherosclerosis aorta.  Remote compression fractures and also vertebroplasty at several levels near the thoracolumbar junction.      Electronically signed by: Lars Lock  Date:    04/08/2024  Time:    18:00                                     Medications   0.9%  NaCl infusion ( Intravenous New Bag 4/9/24 0001)   sodium chloride 0.9% flush 3 mL (has no administration in time range)   melatonin tablet 6 mg (has no administration in time range)   ondansetron injection 4 mg (has no  administration in time range)   acetaminophen tablet 650 mg (has no administration in time range)   aluminum-magnesium hydroxide-simethicone 200-200-20 mg/5 mL suspension 30 mL (has no administration in time range)   acetaminophen tablet 650 mg (has no administration in time range)   naloxone 0.4 mg/mL injection 0.02 mg (has no administration in time range)   potassium bicarbonate disintegrating tablet 50 mEq (has no administration in time range)   potassium bicarbonate disintegrating tablet 35 mEq (has no administration in time range)   potassium bicarbonate disintegrating tablet 60 mEq (has no administration in time range)   magnesium oxide tablet 800 mg (has no administration in time range)   magnesium oxide tablet 800 mg (has no administration in time range)   potassium, sodium phosphates 280-160-250 mg packet 2 packet (has no administration in time range)   potassium, sodium phosphates 280-160-250 mg packet 2 packet (has no administration in time range)   potassium, sodium phosphates 280-160-250 mg packet 2 packet (has no administration in time range)   glucose chewable tablet 16 g (has no administration in time range)   glucose chewable tablet 24 g (has no administration in time range)   dextrose 50% injection 12.5 g (has no administration in time range)   dextrose 50% injection 25 g (has no administration in time range)   glucagon (human recombinant) injection 1 mg (has no administration in time range)   famotidine (PF) injection 20 mg (20 mg Intravenous Given 4/8/24 2046)   metoprolol injection 5 mg (5 mg Intravenous Given 4/8/24 2136)   aspirin EC tablet 81 mg (has no administration in time range)   morphine injection 2 mg (has no administration in time range)   heparin 25,000 units in dextrose 5% 250 mL (100 units/mL) infusion LOW INTENSITY nomogram - [facility] (14 Units/kg/hr × 57.8 kg (Adjusted) Intravenous New Bag 4/9/24 0116)   heparin 25,000 units in dextrose 5% (100 units/ml) IV bolus from bag LOW  INTENSITY nomogram - [facility] (3,460 Units Intravenous Bolus from Bag 4/9/24 0115)   heparin 25,000 units in dextrose 5% (100 units/ml) IV bolus from bag LOW INTENSITY nomogram - [facility] (has no administration in time range)   aspirin tablet 325 mg (325 mg Oral Given 4/8/24 1926)   magnesium sulfate 2g in water 50mL IVPB (premix) (0 g Intravenous Stopped 4/8/24 2300)   heparin 25,000 units in dextrose 5% (100 units/ml) IV bolus from bag LOW INTENSITY nomogram - [facility] (3,460 Units Intravenous Bolus from Bag 4/8/24 2356)     Medical Decision Making  Differential diagnosis includes heartburn, ACS, GERD, aortic stenosis,    Amount and/or Complexity of Data Reviewed  Labs: ordered.    Risk  OTC drugs.               ED Course as of 04/09/24 0350 Tue Apr 09, 2024   0348 Protime-INR [BS]   0348 CBC auto differential(!) [BS]   0348 TSH [BS]   0348 APTT [BS]   0348 TSH [BS]   0348 TROPONIN I HIGH SENSITIVITY(!!) [BS]   0348 Comprehensive metabolic panel(!) [BS]   0348 B-Type natriuretic peptide (BNP)(!) [BS]   0348 X-Ray Chest PA And Lateral [BS]   0348 EKG 12-lead [BS]   0348 63-year-old female with history of hypertension, hyperlipidemia presents now for epigastric abdominal pain ongoing for weeks.  Patient is sent over from cardiologist's office for EKG changes including T-wave inversions in inferior leads, and workup here concerning for ACS given patient's elevated troponin, up trending from 63-93, EKG changes T-wave inversions in inferior leads.  Patient given IV fluids, 325 mg aspirin, started on heparin and beta-blocker.  Given EKG without acute ST elevation, will not consult Cardiology at this time.  Patient admitted to Hospital Medicine for further management. [BS]      ED Course User Index  [BS] Mau Dodge MD                           Clinical Impression:  Final diagnoses:  [R07.9] Chest pain (Primary)  [R79.89] Elevated troponin  [I35.0] Aortic valve stenosis, etiology of cardiac valve disease unspecified  [E78.00]  Hypercholesteremia          ED Disposition Condition    Observation Stable                Mau Dodge MD  04/09/24 2574

## 2024-04-09 NOTE — H&P
[facility] - Emergency Dept  Hospital Medicine  History & Physical    Patient Name: Cristin Segal  MRN: [id number]  Patient Class: OP- Observation  Admission Date: 4/8/2024  Attending Physician: Blake Avalos MD  Primary Care Provider: Angela Gtz MD         Patient information was obtained from patient and ER records.     Subjective:     Principal Problem:NSTEMI (non-ST elevated myocardial infarction)    Chief Complaint:   Chief Complaint   Patient presents with    Heartburn     Heartburn x 2 weeks. Pt was seen at Dr. Nesbitt's office today and sent straight here. EKG changes.     ekg changes     Sent from dr. Nesbitt's office         HPI: 63 WF With pmh of HTN , Smoking long term ,  Lung CA 2023 with resection , DMII not on insulin , GERD     Still smokes 1 pack a day   Non drinker  Lives at home     Cc;  chest pain       She Presents with Chest pain  from Dr Nesbitt office .   She actually was going into His office ( cardiologist )  for follow up about her ECHO results.    I'm assuming because she had a murmur.   Echo was normal but had Aortic valve stenosis ( 1.4 cm valve area)   normal EF .        And in the office she mentions that she is having this recurring chest pain again .  Dull pain lower sternum center chest area.    She thinks sometimes it comes on after eating food or spicy food.   So lately all she has been eating is Soups.     She will try to take TUMS and then 30 min later It eases up.     But never radiates .  Its not sharp or extreme pain.        So they did EKG in the office and she had some non specific ST-T wave changes     Of note she did have stress test here and obs admit overnight for some chest pain 6 years ago and it was all negative / normal .        She really hasn't had pain since then until just lately .         Dr Nesbitt's office brought her down to the ER for work up and admission possibly     In the ER EKG showed same. But normal otherwise     First  troponin actually was elevated in 60s     Second troponin went to 90s     They gave her aspirin PO       And called us for admission to our service for NSTEMI       Patient was having no new pain and actually pain was gone when I saw her   She was doing fine     Past Medical History:   Diagnosis Date    Arthritis     Cataract     Diabetes mellitus type II     diet controlled    Fracture     left 4th finger  - splinted    Hyperlipidemia     MCTD (mixed connective tissue disease)     Raynaud's disease     Thyroid disease     Hypothyroidism    Wears glasses     contacs       Past Surgical History:   Procedure Laterality Date    CARPAL TUNNEL RELEASE      right    CATARACT EXTRACTION W/ INTRAOCULAR LENS  IMPLANT, BILATERAL      EXCISION OF PAROTID GLAND Right 8/1/2019    Procedure: PAROTIDECTOMY;  Surgeon: Abner Maki MD;  Location: [facility];  Service: ENT;  Laterality: Right;    HIP SURGERY Left 6/18/15    JANA    INJECTION OF ANESTHETIC AGENT AROUND MULTIPLE INTERCOSTAL NERVES Left 2/9/2023    Procedure: BLOCK, NERVE, INTERCOSTAL, 2 OR MORE;  Surgeon: Isaiah Allan MD;  Location: [address];  Service: Thoracic;  Laterality: Left;    JOINT REPLACEMENT Bilateral     HIP    KNEE CARTILAGE SURGERY      right    LYMPHADENECTOMY Left 2/9/2023    Procedure: LYMPHADENECTOMY;  Surgeon: Isaiah Allan MD;  Location: [facility] OR [address] [place] [address];  Service: Thoracic;  Laterality: Left;    TOTAL HIP ARTHROPLASTY Right 05/19/2016    JANA    XI ROBOTIC RATS,WITH LOBECTOMY,LUNG Left 2/9/2023    Procedure: XI ROBOTIC RATS,WITH LEFT UPPER LOBECTOMY,LUNG;  Surgeon: Isaiah Allan MD;  Location: [address];  Service: Thoracic;  Laterality: Left;  extended lingulectomy       Review of patient's allergies indicates:   Allergen Reactions    Pcn [penicillins] Anaphylaxis     Unknown for her- family history with anaphylaxis    Lipitor [atorvastatin]        Current Facility-Administered Medications on File Prior to Encounter    Medication    lactated ringers infusion     Current Outpatient Medications on File Prior to Encounter   Medication Sig    aspirin 81 MG Chew Take 1 tablet (81 mg total) by mouth 3 (three) times a week.    hydrOXYchloroQUINE (PLAQUENIL) 200 mg tablet Take 1 tablet (200 mg total) by mouth once daily.    levothyroxine (SYNTHROID) 50 MCG tablet TAKE 1 TABLET BY MOUTH BEFORE BREAKFAST.    loratadine (CLARITIN) 10 mg tablet Take 10 mg by mouth once daily.    losartan (COZAAR) 25 MG tablet TAKE 1 TABLET BY MOUTH EVERY DAY    meloxicam (MOBIC) 7.5 MG tablet Take 7.5 mg by mouth once daily.    multivit-min-FA-lycopen-lutein (CENTRUM SILVER) 0.4 mg-300 mcg- 250 mcg Tab Take 1 tablet by mouth once daily.    NIFEdipine (ADALAT CC) 30 MG TbSR TAKE 1 TABLET BY MOUTH EVERY DAY    pantoprazole (PROTONIX) 40 MG tablet TAKE 1 TABLET BY MOUTH EVERY DAY    potassium chloride SA (K-DUR,KLOR-CON) 20 MEQ tablet TAKE 1 TABLET BY MOUTH ONCE DAILY    rosuvastatin (CRESTOR) 5 MG tablet Take 1 tablet (5 mg total) by mouth once daily.    traMADoL (ULTRAM) 50 mg tablet Take 50 mg by mouth every evening.    WESTAB MAX 2.5-25-2 mg Tab TAKE 1 TABLET BY MOUTH EVERY DAY    acetaminophen (TYLENOL) 500 MG tablet Take 1 tablet (500 mg total) by mouth every 6 (six) hours as needed for Pain.    albuterol (VENTOLIN HFA) 90 mcg/actuation inhaler Inhale 2 puffs into the lungs every 6 (six) hours as needed for Wheezing or Shortness of Breath. Rescue    gabapentin (NEURONTIN) 100 MG capsule Take 100 mg by mouth.     Family History       Problem Relation (Age of Onset)    Aneurysm Mother (73)    Breast cancer Sister    Diabetes Mother, Sister    Hyperlipidemia Mother    Hypertension Sister    Kidney disease Mother          Tobacco Use    Smoking status: Former     Current packs/day: 0.00     Average packs/day: 0.5 packs/day for 45.1 years (22.6 ttl pk-yrs)     Types: Cigarettes     Start date:      Quit date: 2023     Years since quittin.1      Passive exposure: Past    Smokeless tobacco: Never   Substance and Sexual Activity    Alcohol use: Yes     Comment: occasional    Drug use: No    Sexual activity: Not Currently     Birth control/protection: Post-menopausal     Review of Systems   Constitutional:  Negative for activity change, appetite change, chills and fever.   HENT:  Negative for congestion, hearing loss and tinnitus.    Eyes:  Negative for pain, redness and visual disturbance.   Respiratory:  Positive for chest tightness. Negative for apnea, cough, shortness of breath and wheezing.    Cardiovascular:  Positive for chest pain. Negative for palpitations and leg swelling.   Gastrointestinal:  Negative for abdominal distention, abdominal pain, blood in stool, constipation, diarrhea, nausea and vomiting.   Genitourinary:  Negative for dysuria.   Musculoskeletal:  Negative for arthralgias, back pain, gait problem, joint swelling and myalgias.   Skin:  Negative for color change and rash.   Neurological:  Negative for dizziness, weakness, light-headedness and headaches.   Hematological:  Does not bruise/bleed easily.   Psychiatric/Behavioral:  Negative for confusion and sleep disturbance. The patient is not nervous/anxious.    All other systems reviewed and are negative.    Objective:     Vital Signs (Most Recent):  Temp: 97.5 °F (36.4 °C) (04/08/24 1618)  Pulse: 69 (04/08/24 2203)  Resp: 20 (04/08/24 2146)  BP: 124/69 (04/08/24 2203)  SpO2: 98 % (04/08/24 2203) Vital Signs (24h Range):  Temp:  [97.5 °F (36.4 °C)] 97.5 °F (36.4 °C)  Pulse:  [63-94] 69  Resp:  [16-20] 20  SpO2:  [98 %-99 %] 98 %  BP: (118-140)/(62-82) 124/69     Weight: 69.4 kg (153 lb)  Body mass index is 27.98 kg/m².     Physical Exam  Vitals and nursing note reviewed.   Constitutional:       Appearance: Normal appearance.   HENT:      Head: Normocephalic and atraumatic.      Nose: Nose normal.      Mouth/Throat:      Mouth: Mucous membranes are dry.   Eyes:      Extraocular Movements:  "Extraocular movements intact.      Pupils: Pupils are equal, round, and reactive to light.   Cardiovascular:      Rate and Rhythm: Normal rate and regular rhythm.      Pulses: Normal pulses.      Heart sounds: Murmur (systolic murmur blowing quality aortic area) heard.   Pulmonary:      Effort: Pulmonary effort is normal.      Breath sounds: Normal breath sounds.   Abdominal:      General: Abdomen is flat. Bowel sounds are normal.      Palpations: Abdomen is soft.   Musculoskeletal:         General: Normal range of motion.      Cervical back: Normal range of motion.   Skin:     General: Skin is warm and dry.      Capillary Refill: Capillary refill takes less than 2 seconds.   Neurological:      General: No focal deficit present.      Mental Status: She is alert and oriented to person, place, and time.   Psychiatric:         Mood and Affect: Mood normal.         Behavior: Behavior normal.              CRANIAL NERVES     CN III, IV, VI   Pupils are equal, round, and reactive to light.       Significant Labs: All pertinent labs within the past 24 hours have been reviewed.  CBC:   Recent Labs   Lab 04/08/24  1639   WBC 6.00   HGB 11.2*   HCT 33.0*        CMP:   Recent Labs   Lab 04/08/24  1639   *   K 4.8   CL 98   CO2 24   GLU 93   BUN 15   CREATININE 1.2   CALCIUM 10.6*   PROT 7.8   ALBUMIN 4.6   BILITOT 0.5   ALKPHOS 67   AST 28   ALT 24   ANIONGAP 12     Cardiac Markers:   Recent Labs   Lab 04/08/24  1639   *     Coagulation: No results for input(s): "PT", "INR", "APTT" in the last 48 hours.  Lactic Acid: No results for input(s): "LACTATE" in the last 48 hours.  Lipase: No results for input(s): "LIPASE" in the last 48 hours.  Lipid Panel: No results for input(s): "CHOL", "HDL", "LDLCALC", "TRIG", "CHOLHDL" in the last 48 hours.  Magnesium:   Recent Labs   Lab 04/08/24  1639   MG 1.1*     Troponin:   Recent Labs   Lab 04/08/24  1639 04/08/24  1926   TROPONINIHS 67.9* 94.4*       Significant " Imaging: I have reviewed all pertinent imaging results/findings within the past 24 hours.  I have reviewed and interpreted all pertinent imaging results/findings within the past 24 hours.  Assessment/Plan:     * NSTEMI (non-ST elevated myocardial infarction)  Troponins going up     No more chest pain though   She is stable     She is high risk with long term smoking and family history   And I talked to her about quitting and the importance of it since she just survived Lung cancer and now an MI     I did message the cardiologist on call about her so he will be aware but there was nothing urgent right now to do for her   EKG is ok still.       PLAN    - NPO  - Pepcid IV Q12 start now  - morphine 2 mg IV PRN chest pain  - run some IVFs after midnight incase of cardiac cath    which I did prepare her for saying it will likely happen explaining what a cath is   - Heparin drip ACS protocol with bolus   - Aspirin 325 mg given in ER and then po daily   - Lopressor 5 mg IV Q6 to reduce myocardial oxygen demand and limit ischemia   - check A1c and Lipids in morning  - check bmp and mag     Keep Mag over 2 and K over 4    I'm giving IV Mag now also since she was Low 1.1     Repeat troponins and EKG in am 6 am     Consulted cardiology       Hypomagnesemia  I ordered 4 grams mag IV     I will put her on oral mag po bid also while here and send home on mag ox po     Maybe some KCL also po      She says mag is always low       I wonder if she has a genetic salt losing tubulopathy like Gittleman syndrome     ?    Her Low mag is much more pronounced than her low K     her k was actually normal today       PLAN    - check urine calcium (  should be low)   - fix the low mag and give po as well BID  - recheck it all in am labs   Send home on oral mag and K       Essential hypertension  Control her BP in setting of MI  NSTEMI   tighter than normal     Because now we have evidence of end organ damage     Controlled type 2 diabetes  mellitus with complication, without long-term current use of insulin  Check A1C in am   No insulin   No meds  No accu checks          VTE Risk Mitigation (From admission, onward)           Ordered     heparin 25,000 units in dextrose 5% (100 units/ml) IV bolus from bag LOW INTENSITY nomogram - [facility]  As needed (PRN)        Question:  Heparin Infusion Adjustment (DO NOT MODIFY ANSWER)  Answer:  [email or web address]\epic\Images\Pharmacy\HeparinInfusions\heparin LOW INTENSITY nomogram for [facility] [id number]    04/08/24 2246     heparin 25,000 units in dextrose 5% (100 units/ml) IV bolus from bag LOW INTENSITY nomogram - [facility]  As needed (PRN)        Question:  Heparin Infusion Adjustment (DO NOT MODIFY ANSWER)  Answer:  [email or web address]\epic\Images\Pharmacy\HeparinInfusions\heparin LOW INTENSITY nomogram for [facility] [id number].pdf    04/08/24 2246     heparin 25,000 units in dextrose 5% 250 mL (100 units/mL) infusion LOW INTENSITY nomogram - [facility]  Continuous        Question:  Begin at (units/kg/hr)  Answer:  12    04/08/24 2246     Reason for No Pharmacological VTE Prophylaxis  Once        Question:  Reasons:  Answer:  Patient is Ambulatory    04/08/24 2028     IP VTE HIGH RISK PATIENT  Once         04/08/24 2028     Place sequential compression device  Until discontinued         04/08/24 2028                       On 04/09/2024, patient should be placed in hospital observation services under my care.             Blake Avalos MD  Department of Hospital Medicine  [facility] - Emergency Dept

## 2024-04-09 NOTE — NURSING
Nurses Note -- 4 Eyes      4/9/2024   2:23 AM      Skin assessed during: Admit      [x] No Altered Skin Integrity Present    [x]Prevention Measures Documented      [] Yes- Altered Skin Integrity Present or Discovered   [] LDA Added if Not in Epic (Describe Wound)   [] New Altered Skin Integrity was Present on Admit and Documented in LDA   [] Wound Image Taken    Wound Care Consulted? No    Attending Nurse:  Maria Elena Hawkins RN/Staff Member:   [id number]

## 2024-04-09 NOTE — PROGRESS NOTES
[facility] Medicine  Progress Note    Patient Name: Cristni Segal  MRN: [id number]  Patient Class: OP- Observation   Admission Date: 4/8/2024  Length of Stay: 0 days  Attending Physician: Laurie Farris MD  Primary Care Provider: Angela Gtz MD        Subjective:     Principal Problem:NSTEMI (non-ST elevated myocardial infarction)        HPI:  63 WF With pmh of HTN , Smoking long term ,  Lung CA 2023 with resection , DMII not on insulin , GERD     Still smokes 1 pack a day   Non drinker  Lives at home     Cc;  chest pain       She Presents with Chest pain  from Dr Nesbitt office .   She actually was going into His office ( cardiologist )  for follow up about her ECHO results.    I'm assuming because she had a murmur.   Echo was normal but had Aortic valve stenosis ( 1.4 cm valve area)   normal EF .        And in the office she mentions that she is having this recurring chest pain again .  Dull pain lower sternum center chest area.    She thinks sometimes it comes on after eating food or spicy food.   So lately all she has been eating is Soups.     She will try to take TUMS and then 30 min later It eases up.     But never radiates .  Its not sharp or extreme pain.        So they did EKG in the office and she had some non specific ST-T wave changes     Of note she did have stress test here and obs admit overnight for some chest pain 6 years ago and it was all negative / normal .        She really hasn't had pain since then until just lately .         Dr Nesbitt's office brought her down to the ER for work up and admission possibly     In the ER EKG showed same. But normal otherwise     First troponin actually was elevated in 60s     Second troponin went to 90s     They gave her aspirin PO       And called us for admission to our service for NSTEMI       Patient was having no new pain and actually pain was gone when I saw her   She was doing fine               Overview/Hospital  Course:  63 y.o. F presents with chest pain from Cardiology office, Dr. Nesbitt - after noticing non-specific ST changes on EKG. Elevated trops peaked at 94.4. 2D echo showed EF 55-70% with NDF but showed moderate aortic valve stenosis. Awaiting angio.     Past Medical History:   Diagnosis Date    Arthritis     Cataract     Diabetes mellitus type II     diet controlled    Fracture     left 4th finger  - splinted    Hyperlipidemia     MCTD (mixed connective tissue disease)     Raynaud's disease     Thyroid disease     Hypothyroidism    Wears glasses     contacs       Past Surgical History:   Procedure Laterality Date    CARPAL TUNNEL RELEASE      right    CATARACT EXTRACTION W/ INTRAOCULAR LENS  IMPLANT, BILATERAL      EXCISION OF PAROTID GLAND Right 8/1/2019    Procedure: PAROTIDECTOMY;  Surgeon: Abner Maki MD;  Location: [facility] OR;  Service: ENT;  Laterality: Right;    HIP SURGERY Left 6/18/15    JANA    INJECTION OF ANESTHETIC AGENT AROUND MULTIPLE INTERCOSTAL NERVES Left 2/9/2023    Procedure: BLOCK, NERVE, INTERCOSTAL, 2 OR MORE;  Surgeon: Isaiah Allan MD;  Location: [facility] OR [address] [place] [address];  Service: Thoracic;  Laterality: Left;    JOINT REPLACEMENT Bilateral     HIP    KNEE CARTILAGE SURGERY      right    LYMPHADENECTOMY Left 2/9/2023    Procedure: LYMPHADENECTOMY;  Surgeon: Isaiah Allan MD;  Location: [facility] OR [address] [place] [address];  Service: Thoracic;  Laterality: Left;    TOTAL HIP ARTHROPLASTY Right 05/19/2016    JANA    XI ROBOTIC RATS,WITH LOBECTOMY,LUNG Left 2/9/2023    Procedure: XI ROBOTIC RATS,WITH LEFT UPPER LOBECTOMY,LUNG;  Surgeon: Isaiah Allan MD;  Location: [facility] OR [address] [place] [address];  Service: Thoracic;  Laterality: Left;  extended lingulectomy       Review of patient's allergies indicates:   Allergen Reactions    Pcn [penicillins] Anaphylaxis     Unknown for her- family history with anaphylaxis    Lipitor [atorvastatin]        Current Facility-Administered Medications on File Prior to Encounter    Medication    lactated ringers infusion     Current Outpatient Medications on File Prior to Encounter   Medication Sig    aspirin 81 MG Chew Take 1 tablet (81 mg total) by mouth 3 (three) times a week.    hydrOXYchloroQUINE (PLAQUENIL) 200 mg tablet Take 1 tablet (200 mg total) by mouth once daily.    levothyroxine (SYNTHROID) 50 MCG tablet TAKE 1 TABLET BY MOUTH BEFORE BREAKFAST.    loratadine (CLARITIN) 10 mg tablet Take 10 mg by mouth once daily.    losartan (COZAAR) 25 MG tablet TAKE 1 TABLET BY MOUTH EVERY DAY    meloxicam (MOBIC) 7.5 MG tablet Take 7.5 mg by mouth once daily.    multivit-min-FA-lycopen-lutein (CENTRUM SILVER) 0.4 mg-300 mcg- 250 mcg Tab Take 1 tablet by mouth once daily.    NIFEdipine (ADALAT CC) 30 MG TbSR TAKE 1 TABLET BY MOUTH EVERY DAY    pantoprazole (PROTONIX) 40 MG tablet TAKE 1 TABLET BY MOUTH EVERY DAY    potassium chloride SA (K-DUR,KLOR-CON) 20 MEQ tablet TAKE 1 TABLET BY MOUTH ONCE DAILY    rosuvastatin (CRESTOR) 5 MG tablet Take 1 tablet (5 mg total) by mouth once daily.    traMADoL (ULTRAM) 50 mg tablet Take 50 mg by mouth every evening.    WESTAB MAX 2.5-25-2 mg Tab TAKE 1 TABLET BY MOUTH EVERY DAY    acetaminophen (TYLENOL) 500 MG tablet Take 1 tablet (500 mg total) by mouth every 6 (six) hours as needed for Pain.    albuterol (VENTOLIN HFA) 90 mcg/actuation inhaler Inhale 2 puffs into the lungs every 6 (six) hours as needed for Wheezing or Shortness of Breath. Rescue    gabapentin (NEURONTIN) 100 MG capsule Take 100 mg by mouth.     Family History       Problem Relation (Age of Onset)    Aneurysm Mother (73)    Breast cancer Sister    Diabetes Mother, Sister    Hyperlipidemia Mother    Hypertension Sister    Kidney disease Mother          Tobacco Use    Smoking status: Former     Current packs/day: 0.00     Average packs/day: 0.5 packs/day for 45.1 years (22.6 ttl pk-yrs)     Types: Cigarettes     Start date:      Quit date: 2023     Years since quittin.1      Passive exposure: Past    Smokeless tobacco: Never   Substance and Sexual Activity    Alcohol use: Yes     Comment: occasional    Drug use: No    Sexual activity: Not Currently     Birth control/protection: Post-menopausal     Review of Systems   Constitutional:  Negative for activity change, appetite change, chills and fever.   HENT:  Negative for congestion, hearing loss and tinnitus.    Eyes:  Negative for pain, redness and visual disturbance.   Respiratory:  Positive for chest tightness. Negative for apnea, cough, shortness of breath and wheezing.    Cardiovascular:  Positive for chest pain. Negative for palpitations and leg swelling.   Gastrointestinal:  Negative for abdominal distention, abdominal pain, blood in stool, constipation, diarrhea, nausea and vomiting.   Genitourinary:  Negative for dysuria.   Musculoskeletal:  Negative for arthralgias, back pain, gait problem, joint swelling and myalgias.   Skin:  Negative for color change and rash.   Neurological:  Negative for dizziness, weakness, light-headedness and headaches.   Hematological:  Does not bruise/bleed easily.   Psychiatric/Behavioral:  Negative for confusion and sleep disturbance. The patient is not nervous/anxious.    All other systems reviewed and are negative.    Objective:     Vital Signs (Most Recent):  Temp: 98 °F (36.7 °C) (04/09/24 0751)  Pulse: 73 (04/09/24 0751)  Resp: 18 (04/09/24 0751)  BP: 122/73 (04/09/24 0751)  SpO2: 97 % (04/09/24 0751) Vital Signs (24h Range):  Temp:  [97.5 °F (36.4 °C)-98.3 °F (36.8 °C)] 98 °F (36.7 °C)  Pulse:  [63-94] 73  Resp:  [16-20] 18  SpO2:  [95 %-99 %] 97 %  BP: (105-141)/(59-82) 122/73     Weight: 62.7 kg (138 lb 3.7 oz)  Body mass index is 25.28 kg/m².     Physical Exam  Vitals and nursing note reviewed.   Constitutional:       Appearance: Normal appearance.   HENT:      Head: Normocephalic and atraumatic.      Nose: Nose normal.      Mouth/Throat:      Mouth: Mucous membranes are dry.   Eyes:       "Extraocular Movements: Extraocular movements intact.      Pupils: Pupils are equal, round, and reactive to light.   Cardiovascular:      Rate and Rhythm: Normal rate and regular rhythm.      Pulses: Normal pulses.      Heart sounds: Murmur (systolic murmur blowing quality aortic area) heard.   Pulmonary:      Effort: Pulmonary effort is normal.      Breath sounds: Normal breath sounds.   Abdominal:      General: Abdomen is flat. Bowel sounds are normal.      Palpations: Abdomen is soft.   Musculoskeletal:         General: Normal range of motion.      Cervical back: Normal range of motion.   Skin:     General: Skin is warm and dry.      Capillary Refill: Capillary refill takes less than 2 seconds.   Neurological:      General: No focal deficit present.      Mental Status: She is alert and oriented to person, place, and time.   Psychiatric:         Mood and Affect: Mood normal.         Behavior: Behavior normal.              CRANIAL NERVES     CN III, IV, VI   Pupils are equal, round, and reactive to light.       Significant Labs: All pertinent labs within the past 24 hours have been reviewed.  CBC:   Recent Labs   Lab 04/08/24  1639   WBC 6.00   HGB 11.2*   HCT 33.0*          CMP:   Recent Labs   Lab 04/08/24  1639 04/09/24  0633   * 135*   K 4.8 4.6   CL 98 101   CO2 24 27   GLU 93 91   BUN 15 16   CREATININE 1.2 1.1   CALCIUM 10.6* 10.0   PROT 7.8 7.3   ALBUMIN 4.6 4.2   BILITOT 0.5 0.4   ALKPHOS 67 61   AST 28 24   ALT 24 21   ANIONGAP 12 7*       Cardiac Markers:   Recent Labs   Lab 04/08/24  1639   *       Coagulation:   Recent Labs   Lab 04/08/24  2350 04/09/24  0633   INR 0.9  --    APTT 27.0 54.1*     Lactic Acid: No results for input(s): "LACTATE" in the last 48 hours.  Lipase: No results for input(s): "LIPASE" in the last 48 hours.  Lipid Panel:   Recent Labs   Lab 04/09/24  0633   CHOL 209*   *   LDLCALC 65.4   TRIG 78   CHOLHDL 61.2*     Magnesium:   Recent Labs   Lab " 04/08/24  1639 04/09/24  0633   MG 1.1* 1.9       Troponin:   Recent Labs   Lab 04/08/24  1639 04/08/24  1926 04/09/24  0633   TROPONINIHS 67.9* 94.4* 70.0*         Significant Imaging: I have reviewed all pertinent imaging results/findings within the past 24 hours.  I have reviewed and interpreted all pertinent imaging results/findings within the past 24 hours.    Assessment/Plan:      * NSTEMI (non-ST elevated myocardial infarction)  Cardiology following  -Angio today  -hep gtt  - tele monitoring  - BP control  - abn lipid panel - statin allergy      Hypomagnesemia  I ordered 4 grams mag IV     I will put her on oral mag po bid also while here and send home on mag ox po     Maybe some KCL also po      She says mag is always low       I wonder if she has a genetic salt losing tubulopathy like Gittleman syndrome     ?    Her Low mag is much more pronounced than her low K     her k was actually normal today       PLAN    - check urine calcium (  should be low)   - fix the low mag and give po as well BID  - recheck it all in am labs   Send home on oral mag and K       Essential hypertension  Chronic, controlled.  Latest blood pressure and vitals reviewed-   Temp:  [97.5 °F (36.4 °C)-98.3 °F (36.8 °C)]   Pulse:  [63-94]   Resp:  [16-20]   BP: (105-141)/(59-82)   SpO2:  [95 %-99 %] .   Home meds for hypertension were reviewed and noted below.   Hypertension Medications               losartan (COZAAR) 25 MG tablet TAKE 1 TABLET BY MOUTH EVERY DAY    NIFEdipine (ADALAT CC) 30 MG TbSR TAKE 1 TABLET BY MOUTH EVERY DAY            While in the hospital, will manage blood pressure as follows; Continue home antihypertensive regimen    Will utilize p.r.n. blood pressure medication only if patient's blood pressure greater than  180/110 and she develops symptoms such as worsening chest pain or shortness of breath.      Controlled type 2 diabetes mellitus with complication, without long-term current use of insulin  LpW6f=2.5  Last  A1c reviewed-   Lab Results   Component Value Date    HGBA1C 4.5 04/09/2024     Hold home oral hyperglycemics if indicated - while hospitalized will use combined insulin therapy with basal and prandial insulin coverage, POCT glucose checks, hypoglycemic protocol and moderate correction scale             VTE Risk Mitigation (From admission, onward)           Ordered     heparin (porcine) injection  As needed (PRN)         04/09/24 1208     heparin 25,000 units in dextrose 5% (100 units/ml) IV bolus from bag LOW INTENSITY nomogram - [facility]  As needed (PRN)        Question:  Heparin Infusion Adjustment (DO NOT MODIFY ANSWER)  Answer:  \\[email or web address]\epic\Images\Pharmacy\HeparinInfusions\heparin LOW INTENSITY nomogram for [facility] [id number]    04/08/24 2246     heparin 25,000 units in dextrose 5% (100 units/ml) IV bolus from bag LOW INTENSITY nomogram - [facility]  As needed (PRN)        Question:  Heparin Infusion Adjustment (DO NOT MODIFY ANSWER)  Answer:  \\[email or web address]\epic\Images\Pharmacy\HeparinInfusions\heparin LOW INTENSITY nomogram for [facility] [id number]    04/08/24 2246     heparin 25,000 units in dextrose 5% 250 mL (100 units/mL) infusion LOW INTENSITY nomogram - [facility]  Continuous        Question:  Begin at (units/kg/hr)  Answer:  12    04/08/24 2246     Reason for No Pharmacological VTE Prophylaxis  Once        Question:  Reasons:  Answer:  Patient is Ambulatory    04/08/24 2028     IP VTE HIGH RISK PATIENT  Once         04/08/24 2028     Place sequential compression device  Until discontinued         04/08/24 2028                    Discharge Planning   DEBBI: 4/10/2024     Code Status: Full Code   Is the patient medically ready for discharge?:     Reason for patient still in hospital (select all that apply): Patient trending condition, Treatment, and Consult recommendations               Piedad Dunham, DNP, APRN, FNP-C  [facility]  [facility]  [email or web address]

## 2024-04-09 NOTE — PLAN OF CARE
Problem: Adult Inpatient Plan of Care  Goal: Plan of Care Review  Outcome: Ongoing, Progressing  Goal: Optimal Comfort and Wellbeing  Outcome: Ongoing, Progressing     Problem: Chest Pain  Goal: Resolution of Chest Pain Symptoms  Outcome: Ongoing, Progressing     Problem: Fall Injury Risk  Goal: Absence of Fall and Fall-Related Injury  Outcome: Ongoing, Progressing

## 2024-04-09 NOTE — NURSING
Patient back from heart [place] at 1613 via stretcher. Right radial angiogram site clean dry and intact. No hematoma noted.

## 2024-04-09 NOTE — ASSESSMENT & PLAN NOTE
Cardiology following  -Angio today  -hep gtt  - tele monitoring  - BP control  - abn lipid panel - statin allergy

## 2024-04-09 NOTE — CONSULTS
[facility]  Department of Cardiology  Consult Note      PATIENT NAME: Cristin Segal    MRN: [id number]  TODAY'S DATE: 04/09/2024  ADMIT DATE: 4/8/2024                          CONSULT REQUESTED BY: Laurie Farris MD    SUBJECTIVE     PRINCIPAL PROBLEM: NSTEMI (non-ST elevated myocardial infarction)    HPI:  Patient is a 63-year-old female who presented to the emergency room with complaints of ongoing chest discomfort with burning and pressure in her chest not relieved with antacids.  Patient was at the visit with her primary cardiologist at that time and he advised her to go to the emergency room due to concerning symptoms.  Patient found to have a mildly elevated high sensitivity troponin which ryley slightly and is now trending downward.  Patient was placed on IV fluids as well as heparin drip.  She is chest pain-free this morning but states the symptoms have been intermittent over the past 2 weeks.  Patient had an echocardiogram which showed a normal ejection fraction with no significant abnormalities noted.        REASON FOR CONSULT:  From Hospitalist H&P:    Heartburn       Heartburn x 2 weeks. Pt was seen at Dr. Nesbitt's office today and sent straight here. EKG changes.     ekg changes       Sent from dr. Nesbitt's office          HPI: 63 WF With pmh of HTN , Smoking long term ,  Lung CA 2023 with resection , DMII not on insulin , GERD      Still smokes 1 pack a day   Non drinker  Lives at home      Cc;  chest pain         She Presents with Chest pain  from Dr Nesbitt office .   She actually was going into His office ( cardiologist )  for follow up about her ECHO results.    I'm assuming because she had a murmur.   Echo was normal but had Aortic valve stenosis ( 1.4 cm valve area)   normal EF .         And in the office she mentions that she is having this recurring chest pain again .  Dull pain lower sternum center chest area.    She thinks sometimes it comes on after eating food or  spicy food.   So lately all she has been eating is Soups.     She will try to take TUMS and then 30 min later It eases up.      But never radiates .  Its not sharp or extreme pain.         So they did EKG in the office and she had some non specific ST-T wave changes      Of note she did have stress test here and obs admit overnight for some chest pain 6 years ago and it was all negative / normal .         She really hasn't had pain since then until just lately .           Dr Nesbitt's office brought her down to the ER for work up and admission possibly      In the ER EKG showed same. But normal otherwise      First troponin actually was elevated in 60s      Second troponin went to 90s      They gave her aspirin PO         And called us for admission to our service for NSTEMI         Patient was having no new pain and actually pain was gone when I saw her   She was doing fine       Review of patient's allergies indicates:   Allergen Reactions    Pcn [penicillins] Anaphylaxis     Unknown for her- family history with anaphylaxis    Lipitor [atorvastatin]        Past Medical History:   Diagnosis Date    Arthritis     Cataract     Diabetes mellitus type II     diet controlled    Fracture     left 4th finger  - splinted    Hyperlipidemia     MCTD (mixed connective tissue disease)     Raynaud's disease     Thyroid disease     Hypothyroidism    Wears glasses     contacs     Past Surgical History:   Procedure Laterality Date    CARPAL TUNNEL RELEASE      right    CATARACT EXTRACTION W/ INTRAOCULAR LENS  IMPLANT, BILATERAL      EXCISION OF PAROTID GLAND Right 8/1/2019    Procedure: PAROTIDECTOMY;  Surgeon: Abner Maki MD;  Location: [facility];  Service: ENT;  Laterality: Right;    HIP SURGERY Left 6/18/15    JANA    INJECTION OF ANESTHETIC AGENT AROUND MULTIPLE INTERCOSTAL NERVES Left 2/9/2023    Procedure: BLOCK, NERVE, INTERCOSTAL, 2 OR MORE;  Surgeon: Isaiah Allan MD;  Location: [facility] OR [address] [place] [address];  Service:  Thoracic;  Laterality: Left;    JOINT REPLACEMENT Bilateral     HIP    KNEE CARTILAGE SURGERY      right    LYMPHADENECTOMY Left 2023    Procedure: LYMPHADENECTOMY;  Surgeon: Isaiah Allan MD;  Location: [facility] OR [address] [place] [address];  Service: Thoracic;  Laterality: Left;    TOTAL HIP ARTHROPLASTY Right 2016    JANA    XI ROBOTIC RATS,WITH LOBECTOMY,LUNG Left 2023    Procedure: XI ROBOTIC RATS,WITH LEFT UPPER LOBECTOMY,LUNG;  Surgeon: Isaiah Allan MD;  [facility];  Service: Thoracic;  Laterality: Left;  extended lingulectomy     Social History     Tobacco Use    Smoking status: Former     Current packs/day: 0.00     Average packs/day: 0.5 packs/day for 45.1 years (22.6 ttl pk-yrs)     Types: Cigarettes     Start date:      Quit date: 2023     Years since quittin.1     Passive exposure: Past    Smokeless tobacco: Never   Substance Use Topics    Alcohol use: Yes     Comment: occasional    Drug use: No        REVIEW OF SYSTEMS  Per HPI    OBJECTIVE     VITAL SIGNS (Most Recent)  Temp: 98 °F (36.7 °C) (24 075)  Pulse: 73 (24 075)  Resp: 18 (24)  BP: 122/73 (24)  SpO2: 97 % (24)    VENTILATION STATUS  Resp: 18 (24)  SpO2: 97 % (24 075)           I & O (Last 24H):  Intake/Output Summary (Last 24 hours) at 2024 1050  Last data filed at 2024 0751  Gross per 24 hour   Intake 100 ml   Output --   Net 100 ml       WEIGHTS  Wt Readings from Last 1 Encounters:   24 0051 62.7 kg (138 lb 3.7 oz)   24 1618 69.4 kg (153 lb)       PHYSICAL EXAM  CONSTITUTIONAL: No fever, no chills  HEENT: Normocephalic, atraumatic,pupils reactive to light                 NECK:  No JVD no carotid bruit  CVS: S1S2+, RRR , positive murmur  LUNGS: Clear  ABDOMEN: Soft, NT, BS+  EXTREMITIES: No cyanosis, edema  : No milton catheter  NEURO: AAO X 3  PSY: Normal affect      HOME MEDICATIONS:  Current Facility-Administered  Medications on File Prior to Encounter   Medication Dose Route Frequency Provider Last Rate Last Admin    lactated ringers infusion   Intravenous Continuous Kev Bai MD         Current Outpatient Medications on File Prior to Encounter   Medication Sig Dispense Refill    aspirin 81 MG Chew Take 1 tablet (81 mg total) by mouth 3 (three) times a week. 13 tablet 0    hydrOXYchloroQUINE (PLAQUENIL) 200 mg tablet Take 1 tablet (200 mg total) by mouth once daily. 90 tablet 1    levothyroxine (SYNTHROID) 50 MCG tablet TAKE 1 TABLET BY MOUTH BEFORE BREAKFAST. 90 tablet 3    loratadine (CLARITIN) 10 mg tablet Take 10 mg by mouth once daily.      losartan (COZAAR) 25 MG tablet TAKE 1 TABLET BY MOUTH EVERY DAY 90 tablet 2    meloxicam (MOBIC) 7.5 MG tablet Take 7.5 mg by mouth once daily.      multivit-min-FA-lycopen-lutein (CENTRUM SILVER) 0.4 mg-300 mcg- 250 mcg Tab Take 1 tablet by mouth once daily.      NIFEdipine (ADALAT CC) 30 MG TbSR TAKE 1 TABLET BY MOUTH EVERY DAY 90 tablet 2    pantoprazole (PROTONIX) 40 MG tablet TAKE 1 TABLET BY MOUTH EVERY DAY 90 tablet 3    potassium chloride SA (K-DUR,KLOR-CON) 20 MEQ tablet TAKE 1 TABLET BY MOUTH ONCE DAILY 90 tablet 3    rosuvastatin (CRESTOR) 5 MG tablet Take 1 tablet (5 mg total) by mouth once daily. 90 tablet 3    traMADoL (ULTRAM) 50 mg tablet Take 50 mg by mouth every evening.      WESTAB MAX 2.5-25-2 mg Tab TAKE 1 TABLET BY MOUTH EVERY DAY 90 tablet 3    acetaminophen (TYLENOL) 500 MG tablet Take 1 tablet (500 mg total) by mouth every 6 (six) hours as needed for Pain.  0    albuterol (VENTOLIN HFA) 90 mcg/actuation inhaler Inhale 2 puffs into the lungs every 6 (six) hours as needed for Wheezing or Shortness of Breath. Rescue 8 g 5    gabapentin (NEURONTIN) 100 MG capsule Take 100 mg by mouth.         SCHEDULED MEDS:   sodium chloride 0.9%   Intravenous Once    aspirin  81 mg Oral Daily    famotidine (PF)  20 mg Intravenous BID    [START ON 4/10/2024] levothyroxine  50  "mcg Oral Before breakfast    magnesium oxide  400 mg Oral Daily    metoprolol  5 mg Intravenous Q6H       CONTINUOUS INFUSIONS:   sodium chloride 0.9% 100 mL/hr at 04/09/24 0001    heparin (porcine) in D5W 14 Units/kg/hr (04/09/24 0116)       PRN MEDS:acetaminophen, acetaminophen, aluminum-magnesium hydroxide-simethicone, dextrose 50%, dextrose 50%, diphenhydrAMINE, glucagon (human recombinant), glucose, glucose, heparin (PORCINE), heparin (PORCINE), magnesium oxide, magnesium oxide, melatonin, morphine, naloxone, ondansetron, potassium bicarbonate, potassium bicarbonate, potassium bicarbonate, potassium, sodium phosphates, potassium, sodium phosphates, potassium, sodium phosphates, sodium chloride 0.9%    LABS AND DIAGNOSTICS     CBC LAST 3 DAYS  Recent Labs   Lab 04/08/24  1639   WBC 6.00   RBC 3.18*   HGB 11.2*   HCT 33.0*   *   MCH 35.2*   MCHC 33.9   RDW 13.1      MPV 9.0*   GRAN 50.0  3.0   LYMPH 35.7  2.1   MONO 12.8  0.8   BASO 0.05   NRBC 0       COAGULATION LAST 3 DAYS  Recent Labs   Lab 04/08/24  2350 04/09/24  0633   INR 0.9  --    APTT 27.0 54.1*       CHEMISTRY LAST 3 DAYS  Recent Labs   Lab 04/08/24  1639 04/09/24  0633   * 135*   K 4.8 4.6   CL 98 101   CO2 24 27   ANIONGAP 12 7*   BUN 15 16   CREATININE 1.2 1.1   GLU 93 91   CALCIUM 10.6* 10.0   MG 1.1* 1.9   ALBUMIN 4.6 4.2   PROT 7.8 7.3   ALKPHOS 67 61   ALT 24 21   AST 28 24   BILITOT 0.5 0.4       CARDIAC PROFILE LAST 3 DAYS  Recent Labs   Lab 04/08/24  1639 04/08/24  1926 04/09/24  0633   *  --   --    TROPONINIHS 67.9* 94.4* 70.0*       ENDOCRINE LAST 3 DAYS  Recent Labs   Lab 04/08/24  1926   TSH 2.340       LAST ARTERIAL BLOOD GAS  ABG  No results for input(s): "PH", "PO2", "PCO2", "HCO3", "BE" in the last 168 hours.    LAST 7 DAYS MICROBIOLOGY   Microbiology Results (last 7 days)       ** No results found for the last 168 hours. **            MOST RECENT IMAGING  X-Ray Chest PA And Lateral  EXAMINATION:  XR " CHEST PA AND LATERAL    CLINICAL HISTORY:  pain;Chest Pain;    TECHNIQUE:  PA and lateral views of the chest were performed.    COMPARISON:  03/07/2023    FINDINGS:  Postsurgical change left lung, again noted.  No new airspace disease.  Unchanged heart size.  No significant pleural fluid.  No pneumothorax.  Remote left rib fracture.  Atherosclerosis aorta.  Remote compression fractures and also vertebroplasty at several levels near the thoracolumbar junction.    Electronically signed by: Lars Lock  Date:    04/08/2024  Time:    18:00      ECHOCARDIOGRAM RESULTS (last 5)  Results for orders placed during the hospital encounter of 02/28/24    Echo    Interpretation Summary    Left Ventricle: There is normal systolic function with a visually estimated ejection fraction of 55 - 70%. There is normal diastolic function.    Right Ventricle: Normal right ventricular cavity size. Wall thickness is normal. Right ventricle wall motion  is normal. Systolic function is normal.    Left Atrium: Left atrium is mildly dilated.    Aortic Valve: Moderately calcified left, right and noncoronary cusps. Moderately restricted motion. There is moderate stenosis. Aortic valve area by VTI is 1.48 cm². Aortic valve peak velocity is 2.88 m/s. Mean gradient is 19 mmHg. The dimensionless index is 0.47. There is mild aortic regurgitation with a centrally directed jet.    IVC/SVC: Normal venous pressure at 3 mmHg.      Results for orders placed during the hospital encounter of 09/20/21    Echo Color Flow Doppler? Yes    Interpretation Summary  · The left ventricle is normal in size with normal systolic function.  · The estimated ejection fraction is 70%.  · Normal right ventricular size with normal right ventricular systolic function.  · Mild to moderate tricuspid regurgitation.  · Mild mitral regurgitation.  · There is mild-to-moderate aortic valve stenosis.  · Aortic valve area is 1.33 cm2; peak velocity is 2.39 m/s; mean gradient is 16  mmHg.  · Normal left ventricular diastolic function.      CURRENT/PREVIOUS VISIT EKG  Results for orders placed or performed during the hospital encounter of 24   EKG 12-lead    Collection Time: 24  4:30 PM   Result Value Ref Range    QRS Duration 90 ms    [facility] QTC Calculation 440 ms    Narrative    Test Reason : R07.9,    Vent. Rate : 090 BPM     Atrial Rate : 090 BPM     P-R Int : 214 ms          QRS Dur : 090 ms      QT Int : 360 ms       P-R-T Axes : 045 054 018 degrees     QTc Int : 440 ms    Sinus rhythm with 1st degree A-V block  Nonspecific T wave abnormality  Abnormal ECG  When compared with ECG of 2024 15:43,  ST no longer elevated in Inferior leads    Referred By:             Confirmed By:            ASSESSMENT/PLAN:     Active Hospital Problems    Diagnosis    *NSTEMI (non-ST elevated myocardial infarction)    Hypomagnesemia    Essential hypertension    Controlled type 2 diabetes mellitus with complication, without long-term current use of insulin     Dx updated per 2019 IMO Load         ASSESSMENT & PLAN:      NSTEMI   Hypomagnesemia   hypertension      RECOMMENDATIONS:    Advised the patient on the recommendation for angiogram with possible PCI. Discussed with patient the risks and benefits of angiogram with PCI. Risks include but are not limited to the followin:1000 risk of heart attack, stroke, emergency CABG, and death as well as a 3-5% risk of bleeding, vessel damage, and contrast-induce nephropathy.  Alternately the option for medical management can be considered.   All questions have been answered to patient's satisfaction, and patient has voiced the desire to proceed with the procedure.   Keep NPO. Proceed today.   Further recommendations to follow.         Gabby Tejeda NP  Date of Service: 2024  10:50 AM

## 2024-04-09 NOTE — ASSESSMENT & PLAN NOTE
Control her BP in setting of MI  NSTEMI   tighter than normal     Because now we have evidence of end organ damage

## 2024-04-09 NOTE — H&P (VIEW-ONLY)
Critical access hospital  Department of Cardiology  Consult Note      PATIENT NAME: Cristin Segal    MRN: [id number]  TODAY'S DATE: 04/09/2024  ADMIT DATE: 4/8/2024                          CONSULT REQUESTED BY: Laurie Farris MD    SUBJECTIVE     PRINCIPAL PROBLEM: NSTEMI (non-ST elevated myocardial infarction)    HPI:  Patient is a 63-year-old female who presented to the emergency room with complaints of ongoing chest discomfort with burning and pressure in her chest not relieved with antacids.  Patient was at the visit with her primary cardiologist at that time and he advised her to go to the emergency room due to concerning symptoms.  Patient found to have a mildly elevated high sensitivity troponin which ryley slightly and is now trending downward.  Patient was placed on IV fluids as well as heparin drip.  She is chest pain-free this morning but states the symptoms have been intermittent over the past 2 weeks.  Patient had an echocardiogram which showed a normal ejection fraction with no significant abnormalities noted.        REASON FOR CONSULT:  From Hospitalist H&P:    Heartburn       Heartburn x 2 weeks. Pt was seen at Dr. Nesbitt's office today and sent straight here. EKG changes.     ekg changes       Sent from dr. Nesbitt's office          HPI: 63 WF With pmh of HTN , Smoking long term ,  Lung CA 2023 with resection , DMII not on insulin , GERD      Still smokes 1 pack a day   Non drinker  Lives at home      Cc;  chest pain         She Presents with Chest pain  from Dr Nesbitt office .   She actually was going into His office ( cardiologist )  for follow up about her ECHO results.    I'm assuming because she had a murmur.   Echo was normal but had Aortic valve stenosis ( 1.4 cm valve area)   normal EF .         And in the office she mentions that she is having this recurring chest pain again .  Dull pain lower sternum center chest area.    She thinks sometimes it comes on after eating food or  spicy food.   So lately all she has been eating is Soups.     She will try to take TUMS and then 30 min later It eases up.      But never radiates .  Its not sharp or extreme pain.         So they did EKG in the office and she had some non specific ST-T wave changes      Of note she did have stress test here and obs admit overnight for some chest pain 6 years ago and it was all negative / normal .         She really hasn't had pain since then until just lately .           Dr Nesbitt's office brought her down to the ER for work up and admission possibly      In the ER EKG showed same. But normal otherwise      First troponin actually was elevated in 60s      Second troponin went to 90s      They gave her aspirin PO         And called us for admission to our service for NSTEMI         Patient was having no new pain and actually pain was gone when I saw her   She was doing fine       Review of patient's allergies indicates:   Allergen Reactions    Pcn [penicillins] Anaphylaxis     Unknown for her- family history with anaphylaxis    Lipitor [atorvastatin]        Past Medical History:   Diagnosis Date    Arthritis     Cataract     Diabetes mellitus type II     diet controlled    Fracture     left 4th finger  - splinted    Hyperlipidemia     MCTD (mixed connective tissue disease)     Raynaud's disease     Thyroid disease     Hypothyroidism    Wears glasses     contacs     Past Surgical History:   Procedure Laterality Date    CARPAL TUNNEL RELEASE      right    CATARACT EXTRACTION W/ INTRAOCULAR LENS  IMPLANT, BILATERAL      EXCISION OF PAROTID GLAND Right 8/1/2019    Procedure: PAROTIDECTOMY;  Surgeon: Abner Maki MD;  Location: [facility];  Service: ENT;  Laterality: Right;    HIP SURGERY Left 6/18/15    JANA    INJECTION OF ANESTHETIC AGENT AROUND MULTIPLE INTERCOSTAL NERVES Left 2/9/2023    Procedure: BLOCK, NERVE, INTERCOSTAL, 2 OR MORE;  Surgeon: Isaiah Allan MD;  Location: [facility] OR [address] [place] [address];  Service:  Thoracic;  Laterality: Left;    JOINT REPLACEMENT Bilateral     HIP    KNEE CARTILAGE SURGERY      right    LYMPHADENECTOMY Left 2023    Procedure: LYMPHADENECTOMY;  Surgeon: Isaiah Allan MD;  Location: [facility] OR [address] [place] [address];  Service: Thoracic;  Laterality: Left;    TOTAL HIP ARTHROPLASTY Right 2016    JANA    XI ROBOTIC RATS,WITH LOBECTOMY,LUNG Left 2023    Procedure: XI ROBOTIC RATS,WITH LEFT UPPER LOBECTOMY,LUNG;  Surgeon: Isaiah Allan MD;  [facility];  Service: Thoracic;  Laterality: Left;  extended lingulectomy     Social History     Tobacco Use    Smoking status: Former     Current packs/day: 0.00     Average packs/day: 0.5 packs/day for 45.1 years (22.6 ttl pk-yrs)     Types: Cigarettes     Start date:      Quit date: 2023     Years since quittin.1     Passive exposure: Past    Smokeless tobacco: Never   Substance Use Topics    Alcohol use: Yes     Comment: occasional    Drug use: No        REVIEW OF SYSTEMS  Per HPI    OBJECTIVE     VITAL SIGNS (Most Recent)  Temp: 98 °F (36.7 °C) (24 075)  Pulse: 73 (24 075)  Resp: 18 (24)  BP: 122/73 (24)  SpO2: 97 % (24)    VENTILATION STATUS  Resp: 18 (24)  SpO2: 97 % (24 075)           I & O (Last 24H):  Intake/Output Summary (Last 24 hours) at 2024 1050  Last data filed at 2024 0751  Gross per 24 hour   Intake 100 ml   Output --   Net 100 ml       WEIGHTS  Wt Readings from Last 1 Encounters:   24 0051 62.7 kg (138 lb 3.7 oz)   24 1618 69.4 kg (153 lb)       PHYSICAL EXAM  CONSTITUTIONAL: No fever, no chills  HEENT: Normocephalic, atraumatic,pupils reactive to light                 NECK:  No JVD no carotid bruit  CVS: S1S2+, RRR , positive murmur  LUNGS: Clear  ABDOMEN: Soft, NT, BS+  EXTREMITIES: No cyanosis, edema  : No milton catheter  NEURO: AAO X 3  PSY: Normal affect      HOME MEDICATIONS:  Current Facility-Administered  Medications on File Prior to Encounter   Medication Dose Route Frequency Provider Last Rate Last Admin    lactated ringers infusion   Intravenous Continuous Kev Bai MD         Current Outpatient Medications on File Prior to Encounter   Medication Sig Dispense Refill    aspirin 81 MG Chew Take 1 tablet (81 mg total) by mouth 3 (three) times a week. 13 tablet 0    hydrOXYchloroQUINE (PLAQUENIL) 200 mg tablet Take 1 tablet (200 mg total) by mouth once daily. 90 tablet 1    levothyroxine (SYNTHROID) 50 MCG tablet TAKE 1 TABLET BY MOUTH BEFORE BREAKFAST. 90 tablet 3    loratadine (CLARITIN) 10 mg tablet Take 10 mg by mouth once daily.      losartan (COZAAR) 25 MG tablet TAKE 1 TABLET BY MOUTH EVERY DAY 90 tablet 2    meloxicam (MOBIC) 7.5 MG tablet Take 7.5 mg by mouth once daily.      multivit-min-FA-lycopen-lutein (CENTRUM SILVER) 0.4 mg-300 mcg- 250 mcg Tab Take 1 tablet by mouth once daily.      NIFEdipine (ADALAT CC) 30 MG TbSR TAKE 1 TABLET BY MOUTH EVERY DAY 90 tablet 2    pantoprazole (PROTONIX) 40 MG tablet TAKE 1 TABLET BY MOUTH EVERY DAY 90 tablet 3    potassium chloride SA (K-DUR,KLOR-CON) 20 MEQ tablet TAKE 1 TABLET BY MOUTH ONCE DAILY 90 tablet 3    rosuvastatin (CRESTOR) 5 MG tablet Take 1 tablet (5 mg total) by mouth once daily. 90 tablet 3    traMADoL (ULTRAM) 50 mg tablet Take 50 mg by mouth every evening.      WESTAB MAX 2.5-25-2 mg Tab TAKE 1 TABLET BY MOUTH EVERY DAY 90 tablet 3    acetaminophen (TYLENOL) 500 MG tablet Take 1 tablet (500 mg total) by mouth every 6 (six) hours as needed for Pain.  0    albuterol (VENTOLIN HFA) 90 mcg/actuation inhaler Inhale 2 puffs into the lungs every 6 (six) hours as needed for Wheezing or Shortness of Breath. Rescue 8 g 5    gabapentin (NEURONTIN) 100 MG capsule Take 100 mg by mouth.         SCHEDULED MEDS:   sodium chloride 0.9%   Intravenous Once    aspirin  81 mg Oral Daily    famotidine (PF)  20 mg Intravenous BID    [START ON 4/10/2024] levothyroxine  50  "mcg Oral Before breakfast    magnesium oxide  400 mg Oral Daily    metoprolol  5 mg Intravenous Q6H       CONTINUOUS INFUSIONS:   sodium chloride 0.9% 100 mL/hr at 04/09/24 0001    heparin (porcine) in D5W 14 Units/kg/hr (04/09/24 0116)       PRN MEDS:acetaminophen, acetaminophen, aluminum-magnesium hydroxide-simethicone, dextrose 50%, dextrose 50%, diphenhydrAMINE, glucagon (human recombinant), glucose, glucose, heparin (PORCINE), heparin (PORCINE), magnesium oxide, magnesium oxide, melatonin, morphine, naloxone, ondansetron, potassium bicarbonate, potassium bicarbonate, potassium bicarbonate, potassium, sodium phosphates, potassium, sodium phosphates, potassium, sodium phosphates, sodium chloride 0.9%    LABS AND DIAGNOSTICS     CBC LAST 3 DAYS  Recent Labs   Lab 04/08/24  1639   WBC 6.00   RBC 3.18*   HGB 11.2*   HCT 33.0*   *   MCH 35.2*   MCHC 33.9   RDW 13.1      MPV 9.0*   GRAN 50.0  3.0   LYMPH 35.7  2.1   MONO 12.8  0.8   BASO 0.05   NRBC 0       COAGULATION LAST 3 DAYS  Recent Labs   Lab 04/08/24  2350 04/09/24  0633   INR 0.9  --    APTT 27.0 54.1*       CHEMISTRY LAST 3 DAYS  Recent Labs   Lab 04/08/24  1639 04/09/24  0633   * 135*   K 4.8 4.6   CL 98 101   CO2 24 27   ANIONGAP 12 7*   BUN 15 16   CREATININE 1.2 1.1   GLU 93 91   CALCIUM 10.6* 10.0   MG 1.1* 1.9   ALBUMIN 4.6 4.2   PROT 7.8 7.3   ALKPHOS 67 61   ALT 24 21   AST 28 24   BILITOT 0.5 0.4       CARDIAC PROFILE LAST 3 DAYS  Recent Labs   Lab 04/08/24  1639 04/08/24  1926 04/09/24  0633   *  --   --    TROPONINIHS 67.9* 94.4* 70.0*       ENDOCRINE LAST 3 DAYS  Recent Labs   Lab 04/08/24  1926   TSH 2.340       LAST ARTERIAL BLOOD GAS  ABG  No results for input(s): "PH", "PO2", "PCO2", "HCO3", "BE" in the last 168 hours.    LAST 7 DAYS MICROBIOLOGY   Microbiology Results (last 7 days)       ** No results found for the last 168 hours. **            MOST RECENT IMAGING  X-Ray Chest PA And Lateral  EXAMINATION:  XR " CHEST PA AND LATERAL    CLINICAL HISTORY:  pain;Chest Pain;    TECHNIQUE:  PA and lateral views of the chest were performed.    COMPARISON:  03/07/2023    FINDINGS:  Postsurgical change left lung, again noted.  No new airspace disease.  Unchanged heart size.  No significant pleural fluid.  No pneumothorax.  Remote left rib fracture.  Atherosclerosis aorta.  Remote compression fractures and also vertebroplasty at several levels near the thoracolumbar junction.    Electronically signed by: Lars Lock  Date:    04/08/2024  Time:    18:00      ECHOCARDIOGRAM RESULTS (last 5)  Results for orders placed during the hospital encounter of 02/28/24    Echo    Interpretation Summary    Left Ventricle: There is normal systolic function with a visually estimated ejection fraction of 55 - 70%. There is normal diastolic function.    Right Ventricle: Normal right ventricular cavity size. Wall thickness is normal. Right ventricle wall motion  is normal. Systolic function is normal.    Left Atrium: Left atrium is mildly dilated.    Aortic Valve: Moderately calcified left, right and noncoronary cusps. Moderately restricted motion. There is moderate stenosis. Aortic valve area by VTI is 1.48 cm². Aortic valve peak velocity is 2.88 m/s. Mean gradient is 19 mmHg. The dimensionless index is 0.47. There is mild aortic regurgitation with a centrally directed jet.    IVC/SVC: Normal venous pressure at 3 mmHg.      Results for orders placed during the hospital encounter of 09/20/21    Echo Color Flow Doppler? Yes    Interpretation Summary  · The left ventricle is normal in size with normal systolic function.  · The estimated ejection fraction is 70%.  · Normal right ventricular size with normal right ventricular systolic function.  · Mild to moderate tricuspid regurgitation.  · Mild mitral regurgitation.  · There is mild-to-moderate aortic valve stenosis.  · Aortic valve area is 1.33 cm2; peak velocity is 2.39 m/s; mean gradient is 16  mmHg.  · Normal left ventricular diastolic function.      CURRENT/PREVIOUS VISIT EKG  Results for orders placed or performed during the hospital encounter of 24   EKG 12-lead    Collection Time: 24  4:30 PM   Result Value Ref Range    QRS Duration 90 ms    [facility] QTC Calculation 440 ms    Narrative    Test Reason : R07.9,    Vent. Rate : 090 BPM     Atrial Rate : 090 BPM     P-R Int : 214 ms          QRS Dur : 090 ms      QT Int : 360 ms       P-R-T Axes : 045 054 018 degrees     QTc Int : 440 ms    Sinus rhythm with 1st degree A-V block  Nonspecific T wave abnormality  Abnormal ECG  When compared with ECG of 2024 15:43,  ST no longer elevated in Inferior leads    Referred By:             Confirmed By:            ASSESSMENT/PLAN:     Active Hospital Problems    Diagnosis    *NSTEMI (non-ST elevated myocardial infarction)    Hypomagnesemia    Essential hypertension    Controlled type 2 diabetes mellitus with complication, without long-term current use of insulin     Dx updated per 2019 IMO Load         ASSESSMENT & PLAN:      NSTEMI   Hypomagnesemia   hypertension      RECOMMENDATIONS:    Advised the patient on the recommendation for angiogram with possible PCI. Discussed with patient the risks and benefits of angiogram with PCI. Risks include but are not limited to the followin:1000 risk of heart attack, stroke, emergency CABG, and death as well as a 3-5% risk of bleeding, vessel damage, and contrast-induce nephropathy.  Alternately the option for medical management can be considered.   All questions have been answered to patient's satisfaction, and patient has voiced the desire to proceed with the procedure.   Keep NPO. Proceed today.   Further recommendations to follow.         Gabby Tejeda NP  Date of Service: 2024  10:50 AM

## 2024-04-09 NOTE — NURSING TRANSFER
Nursing Transfer Note      4/9/2024   4:37 PM    Nurse giving handoff:Damian CINTRON RN  Nurse receiving handoff:Colette ANTUNEZ    Reason patient is being transferred: s/p angiogram    Transfer To: 2523 from 1408    Transfer via stretcher    Transfer with cardiac monitoring    Transported by Damian CINTRON RN

## 2024-04-09 NOTE — ASSESSMENT & PLAN NOTE
Chronic, controlled.  Latest blood pressure and vitals reviewed-   Temp:  [97.5 °F (36.4 °C)-98.3 °F (36.8 °C)]   Pulse:  [63-94]   Resp:  [16-20]   BP: (105-141)/(59-82)   SpO2:  [95 %-99 %] .   Home meds for hypertension were reviewed and noted below.   Hypertension Medications               losartan (COZAAR) 25 MG tablet TAKE 1 TABLET BY MOUTH EVERY DAY    NIFEdipine (ADALAT CC) 30 MG TbSR TAKE 1 TABLET BY MOUTH EVERY DAY            While in the hospital, will manage blood pressure as follows; Continue home antihypertensive regimen    Will utilize p.r.n. blood pressure medication only if patient's blood pressure greater than  180/110 and she develops symptoms such as worsening chest pain or shortness of breath.

## 2024-04-09 NOTE — SUBJECTIVE & OBJECTIVE
Past Medical History:   Diagnosis Date    Arthritis     Cataract     Diabetes mellitus type II     diet controlled    Fracture     left 4th finger  - splinted    Hyperlipidemia     MCTD (mixed connective tissue disease)     Raynaud's disease     Thyroid disease     Hypothyroidism    Wears glasses     contacs       Past Surgical History:   Procedure Laterality Date    CARPAL TUNNEL RELEASE      right    CATARACT EXTRACTION W/ INTRAOCULAR LENS  IMPLANT, BILATERAL      EXCISION OF PAROTID GLAND Right 8/1/2019    Procedure: PAROTIDECTOMY;  Surgeon: Abner Maki MD;  Location: [facility] OR;  Service: ENT;  Laterality: Right;    HIP SURGERY Left 6/18/15    JANA    INJECTION OF ANESTHETIC AGENT AROUND MULTIPLE INTERCOSTAL NERVES Left 2/9/2023    Procedure: BLOCK, NERVE, INTERCOSTAL, 2 OR MORE;  Surgeon: Isaiah Allan MD;  Location: [facility] OR [address] [place] [address];  Service: Thoracic;  Laterality: Left;    JOINT REPLACEMENT Bilateral     HIP    KNEE CARTILAGE SURGERY      right    LYMPHADENECTOMY Left 2/9/2023    Procedure: LYMPHADENECTOMY;  Surgeon: Isaiah Allan MD;  [facility];  Service: Thoracic;  Laterality: Left;    TOTAL HIP ARTHROPLASTY Right 05/19/2016    JANA    XI ROBOTIC RATS,WITH LOBECTOMY,LUNG Left 2/9/2023    Procedure: XI ROBOTIC RATS,WITH LEFT UPPER LOBECTOMY,LUNG;  Surgeon: Isaiah Allan MD;  [facility];  Service: Thoracic;  Laterality: Left;  extended lingulectomy       Review of patient's allergies indicates:   Allergen Reactions    Pcn [penicillins] Anaphylaxis     Unknown for her- family history with anaphylaxis    Lipitor [atorvastatin]        Current Facility-Administered Medications on File Prior to Encounter   Medication    lactated ringers infusion     Current Outpatient Medications on File Prior to Encounter   Medication Sig    aspirin 81 MG Chew Take 1 tablet (81 mg total) by mouth 3 (three) times a week.    hydrOXYchloroQUINE (PLAQUENIL) 200 mg tablet Take 1 tablet  (200 mg total) by mouth once daily.    levothyroxine (SYNTHROID) 50 MCG tablet TAKE 1 TABLET BY MOUTH BEFORE BREAKFAST.    loratadine (CLARITIN) 10 mg tablet Take 10 mg by mouth once daily.    losartan (COZAAR) 25 MG tablet TAKE 1 TABLET BY MOUTH EVERY DAY    meloxicam (MOBIC) 7.5 MG tablet Take 7.5 mg by mouth once daily.    multivit-min-FA-lycopen-lutein (CENTRUM SILVER) 0.4 mg-300 mcg- 250 mcg Tab Take 1 tablet by mouth once daily.    NIFEdipine (ADALAT CC) 30 MG TbSR TAKE 1 TABLET BY MOUTH EVERY DAY    pantoprazole (PROTONIX) 40 MG tablet TAKE 1 TABLET BY MOUTH EVERY DAY    potassium chloride SA (K-DUR,KLOR-CON) 20 MEQ tablet TAKE 1 TABLET BY MOUTH ONCE DAILY    rosuvastatin (CRESTOR) 5 MG tablet Take 1 tablet (5 mg total) by mouth once daily.    traMADoL (ULTRAM) 50 mg tablet Take 50 mg by mouth every evening.    WESTAB MAX 2.5-25-2 mg Tab TAKE 1 TABLET BY MOUTH EVERY DAY    acetaminophen (TYLENOL) 500 MG tablet Take 1 tablet (500 mg total) by mouth every 6 (six) hours as needed for Pain.    albuterol (VENTOLIN HFA) 90 mcg/actuation inhaler Inhale 2 puffs into the lungs every 6 (six) hours as needed for Wheezing or Shortness of Breath. Rescue    gabapentin (NEURONTIN) 100 MG capsule Take 100 mg by mouth.     Family History       Problem Relation (Age of Onset)    Aneurysm Mother (73)    Breast cancer Sister    Diabetes Mother, Sister    Hyperlipidemia Mother    Hypertension Sister    Kidney disease Mother          Tobacco Use    Smoking status: Former     Current packs/day: 0.00     Average packs/day: 0.5 packs/day for 45.1 years (22.6 ttl pk-yrs)     Types: Cigarettes     Start date:      Quit date: 2023     Years since quittin.1     Passive exposure: Past    Smokeless tobacco: Never   Substance and Sexual Activity    Alcohol use: Yes     Comment: occasional    Drug use: No    Sexual activity: Not Currently     Birth control/protection: Post-menopausal     Review of Systems   Constitutional:   Negative for activity change, appetite change, chills and fever.   HENT:  Negative for congestion, hearing loss and tinnitus.    Eyes:  Negative for pain, redness and visual disturbance.   Respiratory:  Positive for chest tightness. Negative for apnea, cough, shortness of breath and wheezing.    Cardiovascular:  Positive for chest pain. Negative for palpitations and leg swelling.   Gastrointestinal:  Negative for abdominal distention, abdominal pain, blood in stool, constipation, diarrhea, nausea and vomiting.   Genitourinary:  Negative for dysuria.   Musculoskeletal:  Negative for arthralgias, back pain, gait problem, joint swelling and myalgias.   Skin:  Negative for color change and rash.   Neurological:  Negative for dizziness, weakness, light-headedness and headaches.   Hematological:  Does not bruise/bleed easily.   Psychiatric/Behavioral:  Negative for confusion and sleep disturbance. The patient is not nervous/anxious.    All other systems reviewed and are negative.    Objective:     Vital Signs (Most Recent):  Temp: 98 °F (36.7 °C) (04/09/24 0751)  Pulse: 73 (04/09/24 0751)  Resp: 18 (04/09/24 0751)  BP: 122/73 (04/09/24 0751)  SpO2: 97 % (04/09/24 0751) Vital Signs (24h Range):  Temp:  [97.5 °F (36.4 °C)-98.3 °F (36.8 °C)] 98 °F (36.7 °C)  Pulse:  [63-94] 73  Resp:  [16-20] 18  SpO2:  [95 %-99 %] 97 %  BP: (105-141)/(59-82) 122/73     Weight: 62.7 kg (138 lb 3.7 oz)  Body mass index is 25.28 kg/m².     Physical Exam  Vitals and nursing note reviewed.   Constitutional:       Appearance: Normal appearance.   HENT:      Head: Normocephalic and atraumatic.      Nose: Nose normal.      Mouth/Throat:      Mouth: Mucous membranes are dry.   Eyes:      Extraocular Movements: Extraocular movements intact.      Pupils: Pupils are equal, round, and reactive to light.   Cardiovascular:      Rate and Rhythm: Normal rate and regular rhythm.      Pulses: Normal pulses.      Heart sounds: Murmur (systolic murmur blowing  "quality aortic area) heard.   Pulmonary:      Effort: Pulmonary effort is normal.      Breath sounds: Normal breath sounds.   Abdominal:      General: Abdomen is flat. Bowel sounds are normal.      Palpations: Abdomen is soft.   Musculoskeletal:         General: Normal range of motion.      Cervical back: Normal range of motion.   Skin:     General: Skin is warm and dry.      Capillary Refill: Capillary refill takes less than 2 seconds.   Neurological:      General: No focal deficit present.      Mental Status: She is alert and oriented to person, place, and time.   Psychiatric:         Mood and Affect: Mood normal.         Behavior: Behavior normal.              CRANIAL NERVES     CN III, IV, VI   Pupils are equal, round, and reactive to light.       Significant Labs: All pertinent labs within the past 24 hours have been reviewed.  CBC:   Recent Labs   Lab 04/08/24  1639   WBC 6.00   HGB 11.2*   HCT 33.0*          CMP:   Recent Labs   Lab 04/08/24  1639 04/09/24  0633   * 135*   K 4.8 4.6   CL 98 101   CO2 24 27   GLU 93 91   BUN 15 16   CREATININE 1.2 1.1   CALCIUM 10.6* 10.0   PROT 7.8 7.3   ALBUMIN 4.6 4.2   BILITOT 0.5 0.4   ALKPHOS 67 61   AST 28 24   ALT 24 21   ANIONGAP 12 7*       Cardiac Markers:   Recent Labs   Lab 04/08/24  1639   *       Coagulation:   Recent Labs   Lab 04/08/24  2350 04/09/24  0633   INR 0.9  --    APTT 27.0 54.1*     Lactic Acid: No results for input(s): "LACTATE" in the last 48 hours.  Lipase: No results for input(s): "LIPASE" in the last 48 hours.  Lipid Panel:   Recent Labs   Lab 04/09/24  0633   CHOL 209*   *   LDLCALC 65.4   TRIG 78   CHOLHDL 61.2*     Magnesium:   Recent Labs   Lab 04/08/24  1639 04/09/24  0633   MG 1.1* 1.9       Troponin:   Recent Labs   Lab 04/08/24  1639 04/08/24  1926 04/09/24  0633   TROPONINIHS 67.9* 94.4* 70.0*         Significant Imaging: I have reviewed all pertinent imaging results/findings within the past 24 hours.  I have " reviewed and interpreted all pertinent imaging results/findings within the past 24 hours.

## 2024-04-09 NOTE — ASSESSMENT & PLAN NOTE
WqF2d=9.5  Last A1c reviewed-   Lab Results   Component Value Date    HGBA1C 4.5 04/09/2024     Hold home oral hyperglycemics if indicated - while hospitalized will use combined insulin therapy with basal and prandial insulin coverage, POCT glucose checks, hypoglycemic protocol and moderate correction scale

## 2024-04-09 NOTE — ASSESSMENT & PLAN NOTE
Troponins going up     No more chest pain though   She is stable     She is high risk with long term smoking and family history   And I talked to her about quitting and the importance of it since she just survived Lung cancer and now an MI     I did message the cardiologist on call about her so he will be aware but there was nothing urgent right now to do for her   EKG is ok still.       PLAN    - NPO  - Pepcid IV Q12 start now  - morphine 2 mg IV PRN chest pain  - run some IVFs after midnight incase of cardiac cath    which I did prepare her for saying it will likely happen explaining what a cath is   - Heparin drip ACS protocol with bolus   - Aspirin 325 mg given in ER and then po daily   - Lopressor 5 mg IV Q6 to reduce myocardial oxygen demand and limit ischemia   - check A1c and Lipids in morning  - check bmp and mag     Keep Mag over 2 and K over 4    I'm giving IV Mag now also since she was Low 1.1     Repeat troponins and EKG in am 6 am     Consulted cardiology

## 2024-04-09 NOTE — SUBJECTIVE & OBJECTIVE
Past Medical History:   Diagnosis Date    Arthritis     Cataract     Diabetes mellitus type II     diet controlled    Fracture     left 4th finger  - splinted    Hyperlipidemia     MCTD (mixed connective tissue disease)     Raynaud's disease     Thyroid disease     Hypothyroidism    Wears glasses     contacs       Past Surgical History:   Procedure Laterality Date    CARPAL TUNNEL RELEASE      right    CATARACT EXTRACTION W/ INTRAOCULAR LENS  IMPLANT, BILATERAL      EXCISION OF PAROTID GLAND Right 8/1/2019    Procedure: PAROTIDECTOMY;  Surgeon: Abner Maki MD;  Location: [facility] OR;  Service: ENT;  Laterality: Right;    HIP SURGERY Left 6/18/15    JANA    INJECTION OF ANESTHETIC AGENT AROUND MULTIPLE INTERCOSTAL NERVES Left 2/9/2023    Procedure: BLOCK, NERVE, INTERCOSTAL, 2 OR MORE;  Surgeon: Isaiah Allan MD;  Location: [facility] OR [address] [place] [address];  Service: Thoracic;  Laterality: Left;    JOINT REPLACEMENT Bilateral     HIP    KNEE CARTILAGE SURGERY      right    LYMPHADENECTOMY Left 2/9/2023    Procedure: LYMPHADENECTOMY;  Surgeon: Isaiah Allan MD;  [facility];  Service: Thoracic;  Laterality: Left;    TOTAL HIP ARTHROPLASTY Right 05/19/2016    JANA    XI ROBOTIC RATS,WITH LOBECTOMY,LUNG Left 2/9/2023    Procedure: XI ROBOTIC RATS,WITH LEFT UPPER LOBECTOMY,LUNG;  Surgeon: Isaiah Allan MD;  [facility];  Service: Thoracic;  Laterality: Left;  extended lingulectomy       Review of patient's allergies indicates:   Allergen Reactions    Pcn [penicillins] Anaphylaxis     Unknown for her- family history with anaphylaxis    Lipitor [atorvastatin]        Current Facility-Administered Medications on File Prior to Encounter   Medication    lactated ringers infusion     Current Outpatient Medications on File Prior to Encounter   Medication Sig    aspirin 81 MG Chew Take 1 tablet (81 mg total) by mouth 3 (three) times a week.    hydrOXYchloroQUINE (PLAQUENIL) 200 mg tablet Take 1 tablet  (200 mg total) by mouth once daily.    levothyroxine (SYNTHROID) 50 MCG tablet TAKE 1 TABLET BY MOUTH BEFORE BREAKFAST.    loratadine (CLARITIN) 10 mg tablet Take 10 mg by mouth once daily.    losartan (COZAAR) 25 MG tablet TAKE 1 TABLET BY MOUTH EVERY DAY    meloxicam (MOBIC) 7.5 MG tablet Take 7.5 mg by mouth once daily.    multivit-min-FA-lycopen-lutein (CENTRUM SILVER) 0.4 mg-300 mcg- 250 mcg Tab Take 1 tablet by mouth once daily.    NIFEdipine (ADALAT CC) 30 MG TbSR TAKE 1 TABLET BY MOUTH EVERY DAY    pantoprazole (PROTONIX) 40 MG tablet TAKE 1 TABLET BY MOUTH EVERY DAY    potassium chloride SA (K-DUR,KLOR-CON) 20 MEQ tablet TAKE 1 TABLET BY MOUTH ONCE DAILY    rosuvastatin (CRESTOR) 5 MG tablet Take 1 tablet (5 mg total) by mouth once daily.    traMADoL (ULTRAM) 50 mg tablet Take 50 mg by mouth every evening.    WESTAB MAX 2.5-25-2 mg Tab TAKE 1 TABLET BY MOUTH EVERY DAY    acetaminophen (TYLENOL) 500 MG tablet Take 1 tablet (500 mg total) by mouth every 6 (six) hours as needed for Pain.    albuterol (VENTOLIN HFA) 90 mcg/actuation inhaler Inhale 2 puffs into the lungs every 6 (six) hours as needed for Wheezing or Shortness of Breath. Rescue    gabapentin (NEURONTIN) 100 MG capsule Take 100 mg by mouth.     Family History       Problem Relation (Age of Onset)    Aneurysm Mother (73)    Breast cancer Sister    Diabetes Mother, Sister    Hyperlipidemia Mother    Hypertension Sister    Kidney disease Mother          Tobacco Use    Smoking status: Former     Current packs/day: 0.00     Average packs/day: 0.5 packs/day for 45.1 years (22.6 ttl pk-yrs)     Types: Cigarettes     Start date:      Quit date: 2023     Years since quittin.1     Passive exposure: Past    Smokeless tobacco: Never   Substance and Sexual Activity    Alcohol use: Yes     Comment: occasional    Drug use: No    Sexual activity: Not Currently     Birth control/protection: Post-menopausal     Review of Systems   Constitutional:   Negative for activity change, appetite change, chills and fever.   HENT:  Negative for congestion, hearing loss and tinnitus.    Eyes:  Negative for pain, redness and visual disturbance.   Respiratory:  Positive for chest tightness. Negative for apnea, cough, shortness of breath and wheezing.    Cardiovascular:  Positive for chest pain. Negative for palpitations and leg swelling.   Gastrointestinal:  Negative for abdominal distention, abdominal pain, blood in stool, constipation, diarrhea, nausea and vomiting.   Genitourinary:  Negative for dysuria.   Musculoskeletal:  Negative for arthralgias, back pain, gait problem, joint swelling and myalgias.   Skin:  Negative for color change and rash.   Neurological:  Negative for dizziness, weakness, light-headedness and headaches.   Hematological:  Does not bruise/bleed easily.   Psychiatric/Behavioral:  Negative for confusion and sleep disturbance. The patient is not nervous/anxious.    All other systems reviewed and are negative.    Objective:     Vital Signs (Most Recent):  Temp: 97.5 °F (36.4 °C) (04/08/24 1618)  Pulse: 69 (04/08/24 2203)  Resp: 20 (04/08/24 2146)  BP: 124/69 (04/08/24 2203)  SpO2: 98 % (04/08/24 2203) Vital Signs (24h Range):  Temp:  [97.5 °F (36.4 °C)] 97.5 °F (36.4 °C)  Pulse:  [63-94] 69  Resp:  [16-20] 20  SpO2:  [98 %-99 %] 98 %  BP: (118-140)/(62-82) 124/69     Weight: 69.4 kg (153 lb)  Body mass index is 27.98 kg/m².     Physical Exam  Vitals and nursing note reviewed.   Constitutional:       Appearance: Normal appearance.   HENT:      Head: Normocephalic and atraumatic.      Nose: Nose normal.      Mouth/Throat:      Mouth: Mucous membranes are dry.   Eyes:      Extraocular Movements: Extraocular movements intact.      Pupils: Pupils are equal, round, and reactive to light.   Cardiovascular:      Rate and Rhythm: Normal rate and regular rhythm.      Pulses: Normal pulses.      Heart sounds: Murmur (systolic murmur blowing quality aortic area)  "heard.   Pulmonary:      Effort: Pulmonary effort is normal.      Breath sounds: Normal breath sounds.   Abdominal:      General: Abdomen is flat. Bowel sounds are normal.      Palpations: Abdomen is soft.   Musculoskeletal:         General: Normal range of motion.      Cervical back: Normal range of motion.   Skin:     General: Skin is warm and dry.      Capillary Refill: Capillary refill takes less than 2 seconds.   Neurological:      General: No focal deficit present.      Mental Status: She is alert and oriented to person, place, and time.   Psychiatric:         Mood and Affect: Mood normal.         Behavior: Behavior normal.              CRANIAL NERVES     CN III, IV, VI   Pupils are equal, round, and reactive to light.       Significant Labs: All pertinent labs within the past 24 hours have been reviewed.  CBC:   Recent Labs   Lab 04/08/24  1639   WBC 6.00   HGB 11.2*   HCT 33.0*        CMP:   Recent Labs   Lab 04/08/24  1639   *   K 4.8   CL 98   CO2 24   GLU 93   BUN 15   CREATININE 1.2   CALCIUM 10.6*   PROT 7.8   ALBUMIN 4.6   BILITOT 0.5   ALKPHOS 67   AST 28   ALT 24   ANIONGAP 12     Cardiac Markers:   Recent Labs   Lab 04/08/24  1639   *     Coagulation: No results for input(s): "PT", "INR", "APTT" in the last 48 hours.  Lactic Acid: No results for input(s): "LACTATE" in the last 48 hours.  Lipase: No results for input(s): "LIPASE" in the last 48 hours.  Lipid Panel: No results for input(s): "CHOL", "HDL", "LDLCALC", "TRIG", "CHOLHDL" in the last 48 hours.  Magnesium:   Recent Labs   Lab 04/08/24  1639   MG 1.1*     Troponin:   Recent Labs   Lab 04/08/24  1639 04/08/24  1926   TROPONINIHS 67.9* 94.4*       Significant Imaging: I have reviewed all pertinent imaging results/findings within the past 24 hours.  I have reviewed and interpreted all pertinent imaging results/findings within the past 24 hours.  "

## 2024-04-09 NOTE — ASSESSMENT & PLAN NOTE
I ordered 4 grams mag IV     I will put her on oral mag po bid also while here and send home on mag ox po     Maybe some KCL also po      She says mag is always low       I wonder if she has a genetic salt losing tubulopathy like Gittleman syndrome     ?    Her Low mag is much more pronounced than her low K     her k was actually normal today       PLAN    - check urine calcium (  should be low)   - fix the low mag and give po as well BID  - recheck it all in am labs   Send home on oral mag and K

## 2024-04-09 NOTE — PROGRESS NOTES
Pharmacist Renal Dose Adjustment Note    Cristin Segal is a 63 y.o. female being treated with the medication famotidine    Patient Data:    Vital Signs (Most Recent):  Temp: 98 °F (36.7 °C) (04/09/24 0751)  Pulse: 73 (04/09/24 0751)  Resp: 18 (04/09/24 0751)  BP: 122/73 (04/09/24 0751)  SpO2: 97 % (04/09/24 0751) Vital Signs (72h Range):  Temp:  [97.5 °F (36.4 °C)-98.3 °F (36.8 °C)]   Pulse:  [63-94]   Resp:  [16-20]   BP: (105-141)/(59-82)   SpO2:  [95 %-99 %]      Recent Labs   Lab 04/08/24  1639 04/09/24  0633   CREATININE 1.2 1.1     Serum creatinine: 1.1 mg/dL 04/09/24 0633  Estimated creatinine clearance: 45.5 mL/min    Medication:famotidine dose: 20 mg frequency BID will be changed to medication:famotidine dose:20 mg  frequency:Q24H  Reason: CrCl < 50 mL/min    Pharmacist's Name: Joshua Farris  Pharmacist's Extension: 1501

## 2024-04-10 VITALS
DIASTOLIC BLOOD PRESSURE: 66 MMHG | HEIGHT: 62 IN | OXYGEN SATURATION: 96 % | HEART RATE: 80 BPM | SYSTOLIC BLOOD PRESSURE: 119 MMHG | RESPIRATION RATE: 18 BRPM | BODY MASS INDEX: 27.02 KG/M2 | TEMPERATURE: 98 F | WEIGHT: 146.81 LBS

## 2024-04-10 LAB
ALBUMIN SERPL BCP-MCNC: 3.9 G/DL (ref 3.5–5.2)
ALP SERPL-CCNC: 56 U/L (ref 55–135)
ALT SERPL W/O P-5'-P-CCNC: 18 U/L (ref 10–44)
ANION GAP SERPL CALC-SCNC: 8 MMOL/L (ref 8–16)
AST SERPL-CCNC: 22 U/L (ref 10–40)
BASOPHILS # BLD AUTO: 0.05 K/UL (ref 0–0.2)
BASOPHILS NFR BLD: 1 % (ref 0–1.9)
BILIRUB SERPL-MCNC: 0.4 MG/DL (ref 0.1–1)
BUN SERPL-MCNC: 13 MG/DL (ref 8–23)
CALCIUM SERPL-MCNC: 8.9 MG/DL (ref 8.7–10.5)
CHLORIDE SERPL-SCNC: 101 MMOL/L (ref 95–110)
CO2 SERPL-SCNC: 25 MMOL/L (ref 23–29)
CREAT SERPL-MCNC: 1 MG/DL (ref 0.5–1.4)
DIFFERENTIAL METHOD BLD: ABNORMAL
EOSINOPHIL # BLD AUTO: 0.1 K/UL (ref 0–0.5)
EOSINOPHIL NFR BLD: 2.7 % (ref 0–8)
ERYTHROCYTE [DISTWIDTH] IN BLOOD BY AUTOMATED COUNT: 13.1 % (ref 11.5–14.5)
EST. GFR  (NO RACE VARIABLE): >60 ML/MIN/1.73 M^2
GLUCOSE SERPL-MCNC: 79 MG/DL (ref 70–110)
HCT VFR BLD AUTO: 30.7 % (ref 37–48.5)
HGB BLD-MCNC: 10.7 G/DL (ref 12–16)
IMM GRANULOCYTES # BLD AUTO: 0.02 K/UL (ref 0–0.04)
IMM GRANULOCYTES NFR BLD AUTO: 0.4 % (ref 0–0.5)
LYMPHOCYTES # BLD AUTO: 1.8 K/UL (ref 1–4.8)
LYMPHOCYTES NFR BLD: 33.7 % (ref 18–48)
MAGNESIUM SERPL-MCNC: 1.4 MG/DL (ref 1.6–2.6)
MCH RBC QN AUTO: 36.1 PG (ref 27–31)
MCHC RBC AUTO-ENTMCNC: 34.9 G/DL (ref 32–36)
MCV RBC AUTO: 104 FL (ref 82–98)
MONOCYTES # BLD AUTO: 0.5 K/UL (ref 0.3–1)
MONOCYTES NFR BLD: 10.3 % (ref 4–15)
NEUTROPHILS # BLD AUTO: 2.7 K/UL (ref 1.8–7.7)
NEUTROPHILS NFR BLD: 51.9 % (ref 38–73)
NRBC BLD-RTO: 0 /100 WBC
PLATELET # BLD AUTO: 263 K/UL (ref 150–450)
PMV BLD AUTO: 8.9 FL (ref 9.2–12.9)
POTASSIUM SERPL-SCNC: 3.8 MMOL/L (ref 3.5–5.1)
PROT SERPL-MCNC: 6.7 G/DL (ref 6–8.4)
RBC # BLD AUTO: 2.96 M/UL (ref 4–5.4)
SODIUM SERPL-SCNC: 134 MMOL/L (ref 136–145)
WBC # BLD AUTO: 5.25 K/UL (ref 3.9–12.7)

## 2024-04-10 PROCEDURE — 25000003 PHARM REV CODE 250: Performed by: NURSE PRACTITIONER

## 2024-04-10 PROCEDURE — 94760 N-INVAS EAR/PLS OXIMETRY 1: CPT

## 2024-04-10 PROCEDURE — 36415 COLL VENOUS BLD VENIPUNCTURE: CPT | Performed by: INTERNAL MEDICINE

## 2024-04-10 PROCEDURE — 25000003 PHARM REV CODE 250: Performed by: INTERNAL MEDICINE

## 2024-04-10 PROCEDURE — 99233 SBSQ HOSP IP/OBS HIGH 50: CPT | Mod: ,,, | Performed by: INTERNAL MEDICINE

## 2024-04-10 PROCEDURE — 83735 ASSAY OF MAGNESIUM: CPT | Performed by: INTERNAL MEDICINE

## 2024-04-10 PROCEDURE — 94640 AIRWAY INHALATION TREATMENT: CPT

## 2024-04-10 PROCEDURE — 85025 COMPLETE CBC W/AUTO DIFF WBC: CPT | Performed by: INTERNAL MEDICINE

## 2024-04-10 PROCEDURE — 99900031 HC PATIENT EDUCATION (STAT)

## 2024-04-10 PROCEDURE — 80053 COMPREHEN METABOLIC PANEL: CPT | Performed by: INTERNAL MEDICINE

## 2024-04-10 PROCEDURE — 25000242 PHARM REV CODE 250 ALT 637 W/ HCPCS: Performed by: FAMILY MEDICINE

## 2024-04-10 RX ORDER — METOPROLOL SUCCINATE 25 MG/1
25 TABLET, EXTENDED RELEASE ORAL DAILY
Qty: 30 TABLET | Refills: 0 | Status: SHIPPED | OUTPATIENT
Start: 2024-04-11 | End: 2024-04-10

## 2024-04-10 RX ORDER — LANOLIN ALCOHOL/MO/W.PET/CERES
400 CREAM (GRAM) TOPICAL DAILY
Qty: 30 TABLET | Refills: 0 | Status: SHIPPED | OUTPATIENT
Start: 2024-04-11 | End: 2024-04-10

## 2024-04-10 RX ORDER — LANOLIN ALCOHOL/MO/W.PET/CERES
400 CREAM (GRAM) TOPICAL DAILY
Qty: 30 TABLET | Refills: 0 | Status: SHIPPED | OUTPATIENT
Start: 2024-04-11 | End: 2024-05-10 | Stop reason: SDUPTHER

## 2024-04-10 RX ORDER — NITROGLYCERIN 0.4 MG/1
0.4 TABLET SUBLINGUAL EVERY 5 MIN PRN
Qty: 30 TABLET | Refills: 0 | Status: SHIPPED | OUTPATIENT
Start: 2024-04-10 | End: 2024-04-10

## 2024-04-10 RX ORDER — CLOPIDOGREL BISULFATE 75 MG/1
75 TABLET ORAL DAILY
Qty: 30 TABLET | Refills: 0 | Status: SHIPPED | OUTPATIENT
Start: 2024-04-11 | End: 2024-04-23 | Stop reason: SDUPTHER

## 2024-04-10 RX ORDER — METOPROLOL SUCCINATE 25 MG/1
25 TABLET, EXTENDED RELEASE ORAL DAILY
Status: DISCONTINUED | OUTPATIENT
Start: 2024-04-10 | End: 2024-04-10 | Stop reason: HOSPADM

## 2024-04-10 RX ORDER — ALBUTEROL SULFATE 90 UG/1
2 AEROSOL, METERED RESPIRATORY (INHALATION) EVERY 6 HOURS PRN
Qty: 8 G | Refills: 0 | Status: SHIPPED | OUTPATIENT
Start: 2024-04-10 | End: 2025-04-10

## 2024-04-10 RX ORDER — NITROGLYCERIN 0.4 MG/1
0.4 TABLET SUBLINGUAL EVERY 5 MIN PRN
Qty: 30 TABLET | Refills: 0 | Status: SHIPPED | OUTPATIENT
Start: 2024-04-10 | End: 2024-05-10

## 2024-04-10 RX ORDER — IPRATROPIUM BROMIDE AND ALBUTEROL SULFATE 2.5; .5 MG/3ML; MG/3ML
3 SOLUTION RESPIRATORY (INHALATION) EVERY 6 HOURS
Status: DISCONTINUED | OUTPATIENT
Start: 2024-04-10 | End: 2024-04-10 | Stop reason: HOSPADM

## 2024-04-10 RX ORDER — CLOPIDOGREL BISULFATE 75 MG/1
75 TABLET ORAL DAILY
Qty: 30 TABLET | Refills: 0 | Status: SHIPPED | OUTPATIENT
Start: 2024-04-11 | End: 2024-04-10

## 2024-04-10 RX ORDER — METOPROLOL SUCCINATE 25 MG/1
25 TABLET, EXTENDED RELEASE ORAL DAILY
Qty: 30 TABLET | Refills: 0 | Status: SHIPPED | OUTPATIENT
Start: 2024-04-11 | End: 2024-04-23 | Stop reason: SDUPTHER

## 2024-04-10 RX ADMIN — IPRATROPIUM BROMIDE AND ALBUTEROL SULFATE 3 ML: 2.5; .5 SOLUTION RESPIRATORY (INHALATION) at 08:04

## 2024-04-10 RX ADMIN — FAMOTIDINE 20 MG: 10 INJECTION INTRAVENOUS at 08:04

## 2024-04-10 RX ADMIN — CLOPIDOGREL BISULFATE 75 MG: 75 TABLET, FILM COATED ORAL at 08:04

## 2024-04-10 RX ADMIN — ASPIRIN 81 MG: 81 TABLET, COATED ORAL at 08:04

## 2024-04-10 RX ADMIN — IPRATROPIUM BROMIDE AND ALBUTEROL SULFATE 3 ML: 2.5; .5 SOLUTION RESPIRATORY (INHALATION) at 01:04

## 2024-04-10 RX ADMIN — Medication 400 MG: at 08:04

## 2024-04-10 RX ADMIN — LEVOTHYROXINE SODIUM 50 MCG: 0.03 TABLET ORAL at 06:04

## 2024-04-10 RX ADMIN — METOPROLOL SUCCINATE 25 MG: 25 TABLET, EXTENDED RELEASE ORAL at 08:04

## 2024-04-10 NOTE — DISCHARGE SUMMARY
[facility] Medicine  Discharge Summary      Patient Name: Cristin Segal  MRN: [id number]  KERRIE: [id number]  Patient Class: IP- Inpatient  Admission Date: 4/8/2024  Hospital Length of Stay: 1 days  Discharge Date and Time: 4/10/2024  3:43 PM  Attending Physician: No att. providers found   Discharging Provider: Gabby Asencio NP  Primary Care Provider: Angela Gtz MD    Primary Care Team: Networked reference to record PCT     HPI:   63 WF With pmh of HTN , Smoking long term ,  Lung CA 2023 with resection , DMII not on insulin , GERD     Still smokes 1 pack a day   Non drinker  Lives at home     Cc;  chest pain       She Presents with Chest pain  from Dr Nesbitt office .   She actually was going into His office ( cardiologist )  for follow up about her ECHO results.    I'm assuming because she had a murmur.   Echo was normal but had Aortic valve stenosis ( 1.4 cm valve area)   normal EF .        And in the office she mentions that she is having this recurring chest pain again .  Dull pain lower sternum center chest area.    She thinks sometimes it comes on after eating food or spicy food.   So lately all she has been eating is Soups.     She will try to take TUMS and then 30 min later It eases up.     But never radiates .  Its not sharp or extreme pain.        So they did EKG in the office and she had some non specific ST-T wave changes     Of note she did have stress test here and obs admit overnight for some chest pain 6 years ago and it was all negative / normal .        She really hasn't had pain since then until just lately .         Dr Nesbitt's office brought her down to the ER for work up and admission possibly     In the ER EKG showed same. But normal otherwise     First troponin actually was elevated in 60s     Second troponin went to 90s     They gave her aspirin PO       And called us for admission to our service for NSTEMI       Patient was having no new pain and  actually pain was gone when I saw her   She was doing fine     Procedure(s) (LRB):  Left heart cath (Left)  Percutaneous coronary intervention (N/A)  IVUS, Coronary      Hospital Course:   63 y.o. F presents with chest pain from Cardiology office, Dr. Nesbitt - after noticing non-specific ST changes on EKG. Elevated trops peaked at 94.4. 2D echo showed EF 55-70% with NDF but showed moderate aortic valve stenosis.  Patient underwent a C with PCI to RCA.  She was cleared for discharge home by cardiology on DAPT, toprol, and NTG PRN.  Patient seen on day of discharge and in stable condition for discharge home.  She is to follow up with PCP and Cardiology in 1-2 weeks.  She was also referred to outpatient cardiac rehab.        Goals of Care Treatment Preferences:  Code Status: Full Code      Consults:   Consults (From admission, onward)          Status Ordering Provider     Inpatient consult to Cardiology  Once        Provider:  Tony Stevens MD    Completed JAIME PINK            No new Assessment & Plan notes have been filed under this hospital service since the last note was generated.  Service: Hospital Medicine    Final Active Diagnoses:    Diagnosis Date Noted POA    PRINCIPAL PROBLEM:  NSTEMI (non-ST elevated myocardial infarction) [I21.4] 04/08/2024 Yes    Hypomagnesemia [E83.42] 04/09/2024 Yes    Essential hypertension [I10] 11/08/2020 Yes    Controlled type 2 diabetes mellitus with complication, without long-term current use of insulin [E11.8] 07/12/2013 Yes      Problems Resolved During this Admission:    Diagnosis Date Noted Date Resolved POA    Diabetic neuropathy [E11.40] 07/12/2013 04/09/2024 Yes       Discharged Condition: stable    Disposition: Home or Self Care    Follow Up:   Follow-up Information       Angela Gtz MD. Go on 5/3/2024.    Specialty: Family Medicine  Why: at 3:00 pm.    You have been placed on a wait list to be contacted if a sooner appointment becomes  available.  Contact information:  [address] [place] [address]  [phone]               Horacio Lee MD Follow up in 1 week(s).    Specialties: Interventional Cardiology, Cardiology  Contact information:  [address]  [address]  [place] [address]  [phone]                           Patient Instructions:      Ambulatory referral/consult to Cardiac Rehab   Standing Status: Future   Referral Priority: Routine Referral Type: Consultation   Referral Reason: Specialty Services Required   Requested Specialty: Cardiac Rehabilitation   Number of Visits Requested: 1     Diet diabetic     Diet Cardiac     Lifting restrictions   Order Comments: Right wrist     Notify your health care provider if you experience any of the following:  temperature >100.4     Notify your health care provider if you experience any of the following:  persistent nausea and vomiting or diarrhea     Notify your health care provider if you experience any of the following:  severe uncontrolled pain     Notify your health care provider if you experience any of the following:  difficulty breathing or increased cough     Notify your health care provider if you experience any of the following:  persistent dizziness, light-headedness, or visual disturbances     Activity as tolerated       Significant Diagnostic Studies: Labs: BMP:   Recent Labs   Lab 04/08/24  1639 04/09/24  0633 04/10/24  0425   GLU 93 91 79   * 135* 134*   K 4.8 4.6 3.8   CL 98 101 101   CO2 24 27 25   BUN 15 16 13   CREATININE 1.2 1.1 1.0   CALCIUM 10.6* 10.0 8.9   MG 1.1* 1.9 1.4*   , CMP   Recent Labs   Lab 04/08/24  1639 04/09/24  0633 04/10/24  0425   * 135* 134*   K 4.8 4.6 3.8   CL 98 101 101   CO2 24 27 25   GLU 93 91 79   BUN 15 16 13   CREATININE 1.2 1.1 1.0   CALCIUM 10.6* 10.0 8.9   PROT 7.8 7.3 6.7   ALBUMIN 4.6 4.2 3.9   BILITOT 0.5 0.4 0.4   ALKPHOS 67 61 56   AST 28 24 22   ALT 24 21 18   ANIONGAP 12 7* 8   , CBC   Recent Labs   Lab  "04/08/24  1639 04/10/24  0425   WBC 6.00 5.25   HGB 11.2* 10.7*   HCT 33.0* 30.7*    263   , and Troponin No results for input(s): "TROPONINI" in the last 168 hours.  All labs in last 24 hours have been reviewed.     Pending Diagnostic Studies:       Procedure Component Value Units Date/Time    EKG 12-LEAD starting tomorrow [[id number]] Collected: 04/09/24 1335    Order Status: Sent Lab Status: In process Updated: 04/09/24 1337     QRS Duration 88 ms      OHS QTC Calculation 449 ms     Narrative:      Test Reason : Z95.5,    Vent. Rate : 071 BPM     Atrial Rate : 071 BPM     P-R Int : 228 ms          QRS Dur : 088 ms      QT Int : 414 ms       P-R-T Axes : 048 039 027 degrees     QTc Int : 449 ms    Sinus rhythm with 1st degree A-V block  Otherwise normal ECG  When compared with ECG of 08-APR-2024 16:30,  Nonspecific T wave abnormality no longer evident in Anterior leads    Referred By: ZEINAB DOLAN           Confirmed By:            Medications:  Reconciled Home Medications:      Medication List        START taking these medications      clopidogreL 75 mg tablet  Commonly known as: PLAVIX  Take 1 tablet (75 mg total) by mouth once daily.  Start taking on: April 11, 2024     magnesium oxide 400 mg (241.3 mg magnesium) tablet  Commonly known as: MAG-OX  Take 1 tablet (400 mg total) by mouth once daily.  Start taking on: April 11, 2024     metoprolol succinate 25 MG 24 hr tablet  Commonly known as: TOPROL-XL  Take 1 tablet (25 mg total) by mouth once daily.  Start taking on: April 11, 2024     nitroGLYCERIN 0.4 MG SL tablet  Commonly known as: NITROSTAT  Place 1 tablet (0.4 mg total) under the tongue every 5 (five) minutes as needed for Chest pain.            CONTINUE taking these medications      acetaminophen 500 MG tablet  Commonly known as: TYLENOL  Take 1 tablet (500 mg total) by mouth every 6 (six) hours as needed for Pain.     albuterol 90 mcg/actuation inhaler  Commonly known as: VENTOLIN HFA  Inhale 2 " puffs into the lungs every 6 (six) hours as needed for Wheezing or Shortness of Breath. Rescue     aspirin 81 MG Chew  Take 1 tablet (81 mg total) by mouth 3 (three) times a week.     CENTRUM SILVER 0.4 mg-300 mcg- 250 mcg Tab  Generic drug: multivit-min-FA-lycopen-lutein  Take 1 tablet by mouth once daily.     gabapentin 100 MG capsule  Commonly known as: NEURONTIN  Take 100 mg by mouth.     hydroxychloroquine 200 mg tablet  Commonly known as: PLAQUENIL  Take 1 tablet (200 mg total) by mouth once daily.     levothyroxine 50 MCG tablet  Commonly known as: SYNTHROID  TAKE 1 TABLET BY MOUTH BEFORE BREAKFAST.     loratadine 10 mg tablet  Commonly known as: CLARITIN  Take 10 mg by mouth once daily.     meloxicam 7.5 MG tablet  Commonly known as: MOBIC  Take 7.5 mg by mouth once daily.     pantoprazole 40 MG tablet  Commonly known as: PROTONIX  TAKE 1 TABLET BY MOUTH EVERY DAY     potassium chloride SA 20 MEQ tablet  Commonly known as: K-DUR,KLOR-CON  TAKE 1 TABLET BY MOUTH ONCE DAILY     rosuvastatin 5 MG tablet  Commonly known as: CRESTOR  Take 1 tablet (5 mg total) by mouth once daily.     traMADoL 50 mg tablet  Commonly known as: ULTRAM  Take 50 mg by mouth every evening.     WESTAB MAX 2.5-25-2 mg Tab  Generic drug: folic acid-vit B6-vit B12 2.5-25-2 mg  TAKE 1 TABLET BY MOUTH EVERY DAY            STOP taking these medications      losartan 25 MG tablet  Commonly known as: COZAAR     NIFEdipine 30 MG Tbsr  Commonly known as: ADALAT CC              Indwelling Lines/Drains at time of discharge:   Lines/Drains/Airways       None                   Time spent on the discharge of patient: 35 minutes         Gabby Asencio NP  Department of Hospital Medicine  [facility]

## 2024-04-10 NOTE — CARE UPDATE
04/09/24 9651   Patient Assessment/Suction   Level of Consciousness (AVPU) alert   Respiratory Effort Normal;Unlabored   PRE-TX-O2   Device (Oxygen Therapy) room air   SpO2 95 %   Pulse Oximetry Type Intermittent   $ Pulse Oximetry - Single Charge Pulse Oximetry - Single   $ Pulse Oximetry - Multiple Charge Pulse Oximetry - Multiple   Pulse 81   Resp 18   Positioning Left side;HOB elevated 30 degrees   Positioning   Body Position position changed independently

## 2024-04-10 NOTE — PLAN OF CARE
DC orders and chart reviewed. No discharge needs noted.  Patient cleared for discharge from .    Patient is discharging to home.  Hospital follow up appointment scheduled and added to AVS.      Per charge nurse, patient was supposed to have discharge meds delivered at bedside prior to discharge.  Due to the inclement weather, the hospital pharmacy is closed.  Charge nurse stated they will provide patient with paper scripts to bring to pharmacy near her home.        04/10/24 1428   Final Note   Assessment Type Final Discharge Note   Anticipated Discharge Disposition Home   What phone number can be called within the next 1-3 days to see how you are doing after discharge? [phone]   Hospital Resources/Appts/Education Provided Appointments scheduled and added to AVS   Post-Acute Status   Discharge Delays None known at this time

## 2024-04-10 NOTE — PLAN OF CARE
Problem: Adult Inpatient Plan of Care  Goal: Plan of Care Review  Outcome: Ongoing, Progressing  Goal: Patient-Specific Goal (Individualized)  Outcome: Ongoing, Progressing  Goal: Absence of Hospital-Acquired Illness or Injury  Outcome: Ongoing, Progressing  Goal: Optimal Comfort and Wellbeing  Outcome: Ongoing, Progressing  Goal: Readiness for Transition of Care  Outcome: Ongoing, Progressing     Problem: Diabetes Comorbidity  Goal: Blood Glucose Level Within Targeted Range  Outcome: Ongoing, Progressing     Problem: Chest Pain  Goal: Resolution of Chest Pain Symptoms  Outcome: Ongoing, Progressing     Problem: Fall Injury Risk  Goal: Absence of Fall and Fall-Related Injury  Outcome: Ongoing, Progressing     Problem: Arrhythmia/Dysrhythmia (Cardiac Catheterization)  Goal: Stable Heart Rate and Rhythm  Outcome: Ongoing, Progressing     Problem: Bleeding (Cardiac Catheterization)  Goal: Absence of Bleeding  Outcome: Ongoing, Progressing     Problem: Contrast-Induced Injury Risk (Cardiac Catheterization)  Goal: Absence of Contrast-Induced Injury  Outcome: Ongoing, Progressing     Problem: Embolism (Cardiac Catheterization)  Goal: Absence of Embolism Signs and Symptoms  Outcome: Ongoing, Progressing     Problem: Ongoing Anesthesia/Sedation Effects (Cardiac Catheterization)  Goal: Anesthesia/Sedation Recovery  Outcome: Ongoing, Progressing     Problem: Pain (Cardiac Catheterization)  Goal: Acceptable Pain Control  Outcome: Ongoing, Progressing     Problem: Vascular Access Protection (Cardiac Catheterization)  Goal: Absence of Vascular Access Complication  Outcome: Ongoing, Progressing

## 2024-04-10 NOTE — PROGRESS NOTES
[facility]  Department of Cardiology  Progress Note      PATIENT NAME: Cristin Segal    MRN: [id number]  TODAY'S DATE: 04/10/2024  ADMIT DATE: 4/8/2024                          CONSULT REQUESTED BY: Laurie Farris MD    SUBJECTIVE     PRINCIPAL PROBLEM: NSTEMI (non-ST elevated myocardial infarction)    04/10/2024  Patient seen resting in bed. Denies any chest pain or SOB. She is feeling better post PCI.     HPI:  Patient is a 63-year-old female who presented to the emergency room with complaints of ongoing chest discomfort with burning and pressure in her chest not relieved with antacids.  Patient was at the visit with her primary cardiologist at that time and he advised her to go to the emergency room due to concerning symptoms.  Patient found to have a mildly elevated high sensitivity troponin which ryley slightly and is now trending downward.  Patient was placed on IV fluids as well as heparin drip.  She is chest pain-free this morning but states the symptoms have been intermittent over the past 2 weeks.  Patient had an echocardiogram which showed a normal ejection fraction with no significant abnormalities noted.        REASON FOR CONSULT:  From Hospitalist H&P:    Heartburn       Heartburn x 2 weeks. Pt was seen at Dr. Nesbitt's office today and sent straight here. EKG changes.     ekg changes       Sent from dr. Nesbitt's office          HPI: 63 WF With pmh of HTN , Smoking long term ,  Lung CA 2023 with resection , DMII not on insulin , GERD      Still smokes 1 pack a day   Non drinker  Lives at home      Cc;  chest pain         She Presents with Chest pain  from Dr Nesbitt office .   She actually was going into His office ( cardiologist )  for follow up about her ECHO results.    I'm assuming because she had a murmur.   Echo was normal but had Aortic valve stenosis ( 1.4 cm valve area)   normal EF .         And in the office she mentions that she is having this recurring chest pain  again .  Dull pain lower sternum center chest area.    She thinks sometimes it comes on after eating food or spicy food.   So lately all she has been eating is Soups.     She will try to take TUMS and then 30 min later It eases up.      But never radiates .  Its not sharp or extreme pain.         So they did EKG in the office and she had some non specific ST-T wave changes      Of note she did have stress test here and obs admit overnight for some chest pain 6 years ago and it was all negative / normal .         She really hasn't had pain since then until just lately .           Dr Nesbitt's office brought her down to the ER for work up and admission possibly      In the ER EKG showed same. But normal otherwise      First troponin actually was elevated in 60s      Second troponin went to 90s      They gave her aspirin PO         And called us for admission to our service for NSTEMI         Patient was having no new pain and actually pain was gone when I saw her   She was doing fine       Review of patient's allergies indicates:   Allergen Reactions    Pcn [penicillins] Anaphylaxis     Unknown for her- family history with anaphylaxis    Lipitor [atorvastatin]        Past Medical History:   Diagnosis Date    Arthritis     Cataract     Diabetes mellitus type II     diet controlled    Fracture     left 4th finger  - splinted    Hyperlipidemia     MCTD (mixed connective tissue disease)     Raynaud's disease     Thyroid disease     Hypothyroidism    Wears glasses     contacs     Past Surgical History:   Procedure Laterality Date    CARPAL TUNNEL RELEASE      right    CATARACT EXTRACTION W/ INTRAOCULAR LENS  IMPLANT, BILATERAL      EXCISION OF PAROTID GLAND Right 8/1/2019    Procedure: PAROTIDECTOMY;  Surgeon: Abner Maki MD;  Location: [facility];  Service: ENT;  Laterality: Right;    HIP SURGERY Left 6/18/15    JANA    INJECTION OF ANESTHETIC AGENT AROUND MULTIPLE INTERCOSTAL NERVES Left 2/9/2023    Procedure: BLOCK,  NERVE, INTERCOSTAL, 2 OR MORE;  Surgeon: Isaiah Allan MD;  Location: [facility];  Service: Thoracic;  Laterality: Left;    IVUS, CORONARY  2024    Procedure: IVUS, Coronary;  Surgeon: Horacio Lee MD;  Location: [facility] CATH/EP LAB;  Service: Cardiology;;    JOINT REPLACEMENT Bilateral     HIP    KNEE CARTILAGE SURGERY      right    LEFT HEART CATHETERIZATION Left 2024    Procedure: Left heart cath;  Surgeon: Horacio Lee MD;  Location: [facility] CATH/EP LAB;  Service: Cardiology;  Laterality: Left;    LYMPHADENECTOMY Left 2023    Procedure: LYMPHADENECTOMY;  Surgeon: Isaiah Allan MD;  [facility];  Service: Thoracic;  Laterality: Left;    PERCUTANEOUS CORONARY INTERVENTION, ARTERY N/A 2024    Procedure: Percutaneous coronary intervention;  Surgeon: Horacio Lee MD;  Location: [facility] CATH/EP LAB;  Service: Cardiology;  Laterality: N/A;    TOTAL HIP ARTHROPLASTY Right 2016    JANA    XI ROBOTIC RATS,WITH LOBECTOMY,LUNG Left 2023    Procedure: XI ROBOTIC RATS,WITH LEFT UPPER LOBECTOMY,LUNG;  Surgeon: Isaiah Allan MD;  [facility];  Service: Thoracic;  Laterality: Left;  extended lingulectomy     Social History     Tobacco Use    Smoking status: Former     Current packs/day: 0.00     Average packs/day: 0.5 packs/day for 45.1 years (22.6 ttl pk-yrs)     Types: Cigarettes     Start date:      Quit date: 2023     Years since quittin.1     Passive exposure: Past    Smokeless tobacco: Never   Substance Use Topics    Alcohol use: Yes     Comment: occasional    Drug use: No        REVIEW OF SYSTEMS  Per HPI    OBJECTIVE     VITAL SIGNS (Most Recent)  Temp: 98.4 °F (36.9 °C) (04/10/24 07)  Pulse: 76 (04/10/24 0700)  Resp: 18 (04/10/24 0700)  BP: 110/63 (04/10/24 0700)  SpO2: 97 % (04/10/24 0700)    VENTILATION STATUS  Resp: 18 (04/10/24 0700)  SpO2: 97 % (04/10/24 0700)           I & O (Last 24H):  Intake/Output Summary (Last 24 hours)  at 4/10/2024 0825  Last data filed at 4/9/2024 1556  Gross per 24 hour   Intake 595.68 ml   Output 0 ml   Net 595.68 ml         WEIGHTS  Wt Readings from Last 1 Encounters:   04/10/24 0404 66.6 kg (146 lb 13.2 oz)   04/09/24 0051 62.7 kg (138 lb 3.7 oz)   04/08/24 1618 69.4 kg (153 lb)       PHYSICAL EXAM  CONSTITUTIONAL: No fever, no chills  HEENT: Normocephalic, atraumatic,pupils reactive to light                 NECK:  No JVD no carotid bruit  CVS: S1S2+, RRR , positive murmur  LUNGS: Clear  ABDOMEN: Soft, NT, BS+  EXTREMITIES: No cyanosis, edema  : No milton catheter  NEURO: AAO X 3  PSY: Normal affect      HOME MEDICATIONS:  Current Facility-Administered Medications on File Prior to Encounter   Medication Dose Route Frequency Provider Last Rate Last Admin    lactated ringers infusion   Intravenous Continuous Kev Bai MD         Current Outpatient Medications on File Prior to Encounter   Medication Sig Dispense Refill    aspirin 81 MG Chew Take 1 tablet (81 mg total) by mouth 3 (three) times a week. 13 tablet 0    hydrOXYchloroQUINE (PLAQUENIL) 200 mg tablet Take 1 tablet (200 mg total) by mouth once daily. 90 tablet 1    levothyroxine (SYNTHROID) 50 MCG tablet TAKE 1 TABLET BY MOUTH BEFORE BREAKFAST. 90 tablet 3    loratadine (CLARITIN) 10 mg tablet Take 10 mg by mouth once daily.      losartan (COZAAR) 25 MG tablet TAKE 1 TABLET BY MOUTH EVERY DAY 90 tablet 2    meloxicam (MOBIC) 7.5 MG tablet Take 7.5 mg by mouth once daily.      multivit-min-FA-lycopen-lutein (CENTRUM SILVER) 0.4 mg-300 mcg- 250 mcg Tab Take 1 tablet by mouth once daily.      NIFEdipine (ADALAT CC) 30 MG TbSR TAKE 1 TABLET BY MOUTH EVERY DAY 90 tablet 2    pantoprazole (PROTONIX) 40 MG tablet TAKE 1 TABLET BY MOUTH EVERY DAY 90 tablet 3    potassium chloride SA (K-DUR,KLOR-CON) 20 MEQ tablet TAKE 1 TABLET BY MOUTH ONCE DAILY 90 tablet 3    rosuvastatin (CRESTOR) 5 MG tablet Take 1 tablet (5 mg total) by mouth once daily. 90 tablet 3     traMADoL (ULTRAM) 50 mg tablet Take 50 mg by mouth every evening.      WESTAB MAX 2.5-25-2 mg Tab TAKE 1 TABLET BY MOUTH EVERY DAY 90 tablet 3    acetaminophen (TYLENOL) 500 MG tablet Take 1 tablet (500 mg total) by mouth every 6 (six) hours as needed for Pain.  0    albuterol (VENTOLIN HFA) 90 mcg/actuation inhaler Inhale 2 puffs into the lungs every 6 (six) hours as needed for Wheezing or Shortness of Breath. Rescue 8 g 5    gabapentin (NEURONTIN) 100 MG capsule Take 100 mg by mouth.         SCHEDULED MEDS:   albuterol-ipratropium  3 mL Nebulization Q6H    aspirin  81 mg Oral Daily    clopidogreL  75 mg Oral Daily    famotidine (PF)  20 mg Intravenous Daily    levothyroxine  50 mcg Oral Before breakfast    magnesium oxide  400 mg Oral Daily    metoprolol succinate  25 mg Oral Daily    tirofiban-0.9% sodium chloride 12.5 mg/250ml  25 mcg/kg Intravenous Once       CONTINUOUS INFUSIONS:        PRN MEDS:acetaminophen, acetaminophen, aluminum-magnesium hydroxide-simethicone, dextrose 50%, dextrose 50%, glucagon (human recombinant), glucose, glucose, magnesium oxide, magnesium oxide, melatonin, morphine, naloxone, ondansetron, potassium bicarbonate, potassium bicarbonate, potassium bicarbonate, potassium, sodium phosphates, potassium, sodium phosphates, potassium, sodium phosphates, sodium chloride 0.9%    LABS AND DIAGNOSTICS     CBC LAST 3 DAYS  Recent Labs   Lab 04/08/24  1639 04/10/24  0425   WBC 6.00 5.25   RBC 3.18* 2.96*   HGB 11.2* 10.7*   HCT 33.0* 30.7*   * 104*   MCH 35.2* 36.1*   MCHC 33.9 34.9   RDW 13.1 13.1    263   MPV 9.0* 8.9*   GRAN 50.0  3.0 51.9  2.7   LYMPH 35.7  2.1 33.7  1.8   MONO 12.8  0.8 10.3  0.5   BASO 0.05 0.05   NRBC 0 0         COAGULATION LAST 3 DAYS  Recent Labs   Lab 04/08/24  2350 04/09/24  0633 04/09/24  1805   INR 0.9  --   --    APTT 27.0 54.1* 37.0*         CHEMISTRY LAST 3 DAYS  Recent Labs   Lab 04/08/24  1639 04/09/24  0633 04/10/24  0425   * 135*  "134*   K 4.8 4.6 3.8   CL 98 101 101   CO2 24 27 25   ANIONGAP 12 7* 8   BUN 15 16 13   CREATININE 1.2 1.1 1.0   GLU 93 91 79   CALCIUM 10.6* 10.0 8.9   MG 1.1* 1.9 1.4*   ALBUMIN 4.6 4.2 3.9   PROT 7.8 7.3 6.7   ALKPHOS 67 61 56   ALT 24 21 18   AST 28 24 22   BILITOT 0.5 0.4 0.4         CARDIAC PROFILE LAST 3 DAYS  Recent Labs   Lab 04/08/24  1639 04/08/24  1926 04/09/24  0633   *  --   --    TROPONINIHS 67.9* 94.4* 70.0*         ENDOCRINE LAST 3 DAYS  Recent Labs   Lab 04/08/24 1926   TSH 2.340         LAST ARTERIAL BLOOD GAS  ABG  No results for input(s): "PH", "PO2", "PCO2", "HCO3", "BE" in the last 168 hours.    LAST 7 DAYS MICROBIOLOGY   Microbiology Results (last 7 days)       ** No results found for the last 168 hours. **            MOST RECENT IMAGING  Cardiac catheterization    The pre-procedure left ventricular end diastolic pressure was 22.    The estimated blood loss was <50 mL.    Mild nonobstructive stenosis of LAD up to 30%    Moderate disease of distal circumflex and 1st OM around 60% for which   initial medical management is recommended    Severe proximal RCA disease and critical subtotal mid RCA stenosis,   successfully treated with intravascular ultrasound guided PCI with 1   drug-eluting stent    The Prox RCA to Mid RCA lesion was 99% stenosed with 10% stenosis   post-intervention.    Prox RCA to Mid RCA lesion: A STENT FRONTIER ANNI [id number] stent was   successfully placed at 12 DONNA for 15 sec. post dilated with a 2.75 NC   balloon.    The procedure log was documented by Documenter: La Young RN and   verified by Horacio Lee MD.    Date: 4/9/2024  Time: 1:20 PM      ECHOCARDIOGRAM RESULTS (last 5)  Results for orders placed during the hospital encounter of 02/28/24    Echo    Interpretation Summary    Left Ventricle: There is normal systolic function with a visually estimated ejection fraction of 55 - 70%. There is normal diastolic function.    Right Ventricle: Normal right " ventricular cavity size. Wall thickness is normal. Right ventricle wall motion  is normal. Systolic function is normal.    Left Atrium: Left atrium is mildly dilated.    Aortic Valve: Moderately calcified left, right and noncoronary cusps. Moderately restricted motion. There is moderate stenosis. Aortic valve area by VTI is 1.48 cm². Aortic valve peak velocity is 2.88 m/s. Mean gradient is 19 mmHg. The dimensionless index is 0.47. There is mild aortic regurgitation with a centrally directed jet.    IVC/SVC: Normal venous pressure at 3 mmHg.      Results for orders placed during the hospital encounter of 09/20/21    Echo Color Flow Doppler? Yes    Interpretation Summary  · The left ventricle is normal in size with normal systolic function.  · The estimated ejection fraction is 70%.  · Normal right ventricular size with normal right ventricular systolic function.  · Mild to moderate tricuspid regurgitation.  · Mild mitral regurgitation.  · There is mild-to-moderate aortic valve stenosis.  · Aortic valve area is 1.33 cm2; peak velocity is 2.39 m/s; mean gradient is 16 mmHg.  · Normal left ventricular diastolic function.      CURRENT/PREVIOUS VISIT EKG  Results for orders placed or performed during the hospital encounter of 04/08/24   EKG 12-LEAD starting tomorrow    Collection Time: 04/09/24  1:35 PM   Result Value Ref Range    QRS Duration 88 ms    [facility] QTC Calculation 449 ms    Narrative    Test Reason : Z95.5,    Vent. Rate : 071 BPM     Atrial Rate : 071 BPM     P-R Int : 228 ms          QRS Dur : 088 ms      QT Int : 414 ms       P-R-T Axes : 048 039 027 degrees     QTc Int : 449 ms    Sinus rhythm with 1st degree A-V block  Otherwise normal ECG  When compared with ECG of 08-APR-2024 16:30,  Nonspecific T wave abnormality no longer evident in Anterior leads    Referred By: ZEINAB DOLAN           Confirmed By:            ASSESSMENT/PLAN:     Active Hospital Problems    Diagnosis    *NSTEMI (non-ST elevated myocardial  infarction)    Hypomagnesemia    Essential hypertension    Controlled type 2 diabetes mellitus with complication, without long-term current use of insulin     Dx updated per 2019 IMO Load         ASSESSMENT & PLAN:      NSTEMI   Hypomagnesemia   hypertension      RECOMMENDATIONS:    Advised patient the need to continue DAPT without interruption for minimum of 12-18 months post PCI.  Should the patient develop any bleeding issues or have any falls with head injury should report to the nearest emergency room for evaluation.  Continue Plavix 75 mg po daily and aspirin 81 mg po daily. Please have meds delivered to her bedside before dc home.   Continue to check and replace potassium and magnesium. Goal for potassium is 4.0, and goal for magnesium is 2.0.   Continue mag ox 400 mg po daily. May need to increase to BID dosing if she can tolerate.  Would like her to be up walking to see how she does. She is hopeful for dc home soon.       Gabby Tejeda NP  Date of Service: 04/10/2024  10:50 AM

## 2024-04-10 NOTE — PLAN OF CARE
[facility]  Initial Discharge Assessment      Assessment completed at bedside with Pt and her spouse and all information on FaceSheet confirmed, including demographics, PCP, pharmacy and insurance. Pt has not addressed advance directives. She currently lives in [place] with her spouse. Her PCP is Fermin Smith her last appointment was about three months ago. Her preferred pharmacy [facility] [address] [place] . She denies any HH/HD/Blood Thinners but does use a cane and a potty chair. For transportation to appointments, she usually drives herself and her spouse is going to transport her home at discharge. She denies any recent hospitalizations. Plan for discharge is to return home. Case Management to continue to follow for discharge planning needs.          Primary Care Provider: Angela Gtz MD    Admission Diagnosis: Chest pain [R07.9]  NSTEMI (non-ST elevated myocardial infarction) [I21.4]    Admission Date: 4/8/2024  Expected Discharge Date: 4/10/2024    Transition of Care Barriers: (P) None    Payor: [employer] / Plan: [place] PPO / Product Type: PPO /     Extended Emergency Contact Information  Primary Emergency Contact: Jose De Jesus Segal  Address: [facility] [address]           [place] [address] [place]  Home Phone: [phone]  Work Phone: [phone]  Mobile Phone: [phone]  Relation: Spouse  Preferred language: English   needed? No    Discharge Plan A: (P) Home  Discharge Plan B: (P) Home      [id number] [place] - [address] [facility]  [address]  [place] [address]  Phone: [phone] Fax: [phone]    [facility]  [address]  [facility] [address]  Phone: [phone] Fax: [phone]      Initial Assessment (most recent)       Adult Discharge Assessment - 04/10/24 1308          Discharge Assessment    Assessment Type Discharge Planning Assessment (P)       Confirmed/corrected address, phone number and insurance Yes (P)      Confirmed Demographics Correct on Facesheet (P)      Source of Information patient (P)      When was your last doctors appointment? -- (P)    Pt reported her last appointment was about three months ago.    Does patient/caregiver understand observation status Yes (P)      Reason For Admission Chest pain (P)      People in Home spouse (P)      Do you expect to return to your current living situation? Yes (P)      Do you have help at home or someone to help you manage your care at home? Yes (P)      Who are your caregiver(s) and their phone number(s)? Jose De Jesus Segal (Spouse) [phone] (Home Phone) (P)      Prior to hospitilization cognitive status: Alert/Oriented (P)      Current cognitive status: Alert/Oriented (P)      Walking or Climbing Stairs Difficulty no (P)      Dressing/Bathing Difficulty no (P)      Home Accessibility not wheelchair accessible (P)      Home Layout Able to live on 1st floor (P)      Equipment Currently Used at Home cane, straight;commode (P)      Readmission within 30 days? No (P)      Patient currently being followed by outpatient case management? No (P)      Do you currently have service(s) that help you manage your care at home? No (P)      Do you take prescription medications? Yes (P)      Do you have prescription coverage? Yes (P)      [facility] (P)      Do you have any problems affording any of your prescribed medications? No (P)      Is the patient taking medications as prescribed? yes (P)      Who is going to help you get home at discharge? Jose De Jesus Segal (Spouse) [phone] (Home Phone) (P)      How do you get to doctors appointments? car, drives self (P)      Are you on dialysis? No (P)      Do you take coumadin? No (P)      Discharge Plan A Home (P)      Discharge Plan B Home (P)      DME Needed Upon Discharge  none (P)      Discharge Plan discussed with: Patient;Spouse/sig  other (P)      Name(s) and Number(s) Jose De Jesus Segal (Spouse) [phone] (Home Phone) (P)      Transition of Care Barriers None (P)         OTHER    Name(s) of People in Home Jose De Jesus Segal (Spouse) [phone] (Home Phone) (P)

## 2024-04-11 ENCOUNTER — PATIENT OUTREACH (OUTPATIENT)
Dept: ADMINISTRATIVE | Facility: CLINIC | Age: 64
End: 2024-04-11
Payer: COMMERCIAL

## 2024-04-11 ENCOUNTER — TELEPHONE (OUTPATIENT)
Dept: CARDIOLOGY | Facility: CLINIC | Age: 64
End: 2024-04-11
Payer: COMMERCIAL

## 2024-04-11 NOTE — TELEPHONE ENCOUNTER
----- Message from Martha Quintero sent at 4/11/2024 12:40 PM CDT -----  Contact: pt [phone]  Type: Needs Medical Advice  Who Called:  Pt     Best Call Back Number: [phone]    Additional Information: Pt stated she was discharged 04/10 and was given an appt dated of 04/17 but no time for her appt. Pt also stated she needs to schedule cardiac rehab. Pls call back and advise

## 2024-04-11 NOTE — PROGRESS NOTES
C3 nurse spoke with Cristin Segal  for a TCC post hospital discharge follow up call. The patient has a scheduled [facility] appointment with Angela Gtz MD  on 5/3/2024 @ 3PM.

## 2024-04-12 NOTE — TELEPHONE ENCOUNTER
I tried to call this patient , with a no answer . I don't see her on the schedule for next week .

## 2024-04-15 ENCOUNTER — TELEPHONE (OUTPATIENT)
Dept: CARDIOLOGY | Facility: CLINIC | Age: 64
End: 2024-04-15
Payer: COMMERCIAL

## 2024-04-15 NOTE — TELEPHONE ENCOUNTER
----- Message from Lashell Durán sent at 4/15/2024 11:40 AM CDT -----  Type:  Sooner Appointment Request    Caller is requesting a sooner appointment.  Caller declined first available appointment listed below.  Caller will not accept being placed on the waitlist and is requesting a message be sent to doctor.    Name of Caller:  pt  When is the first available appointment?  None--said she need to be seen soon--please call and advise  Symptoms:  hospital f/u dc 4/10 need a 1 week appt-- no one called her about cardic rehad  Would the patient rather a call back or a response via [facility]? call  Best Call Back Number:  [phone] (home)     Additional Information:  thank you

## 2024-04-23 ENCOUNTER — OFFICE VISIT (OUTPATIENT)
Dept: CARDIOLOGY | Facility: CLINIC | Age: 64
End: 2024-04-23
Payer: COMMERCIAL

## 2024-04-23 VITALS
DIASTOLIC BLOOD PRESSURE: 66 MMHG | SYSTOLIC BLOOD PRESSURE: 120 MMHG | OXYGEN SATURATION: 98 % | HEART RATE: 69 BPM | HEIGHT: 62 IN | BODY MASS INDEX: 28.48 KG/M2 | WEIGHT: 154.75 LBS

## 2024-04-23 DIAGNOSIS — R79.89 ELEVATED BRAIN NATRIURETIC PEPTIDE (BNP) LEVEL: ICD-10-CM

## 2024-04-23 DIAGNOSIS — I25.10 CORONARY ARTERY DISEASE INVOLVING NATIVE CORONARY ARTERY OF NATIVE HEART WITHOUT ANGINA PECTORIS: ICD-10-CM

## 2024-04-23 DIAGNOSIS — F17.200 CURRENT SMOKER: ICD-10-CM

## 2024-04-23 DIAGNOSIS — I25.2 HX OF NON-ST ELEVATION MYOCARDIAL INFARCTION (NSTEMI): Primary | ICD-10-CM

## 2024-04-23 DIAGNOSIS — Z95.5 STATUS POST INSERTION OF DRUG ELUTING CORONARY ARTERY STENT: ICD-10-CM

## 2024-04-23 PROBLEM — R00.0 TACHYCARDIA: Status: RESOLVED | Noted: 2021-02-22 | Resolved: 2024-04-23

## 2024-04-23 PROCEDURE — 3074F SYST BP LT 130 MM HG: CPT | Mod: CPTII,S$GLB,, | Performed by: INTERNAL MEDICINE

## 2024-04-23 PROCEDURE — 99999 PR PBB SHADOW E&M-EST. PATIENT-LVL IV: CPT | Mod: PBBFAC,,, | Performed by: INTERNAL MEDICINE

## 2024-04-23 PROCEDURE — 3008F BODY MASS INDEX DOCD: CPT | Mod: CPTII,S$GLB,, | Performed by: INTERNAL MEDICINE

## 2024-04-23 PROCEDURE — 4010F ACE/ARB THERAPY RXD/TAKEN: CPT | Mod: CPTII,S$GLB,, | Performed by: INTERNAL MEDICINE

## 2024-04-23 PROCEDURE — 1111F DSCHRG MED/CURRENT MED MERGE: CPT | Mod: CPTII,S$GLB,, | Performed by: INTERNAL MEDICINE

## 2024-04-23 PROCEDURE — 1159F MED LIST DOCD IN RCRD: CPT | Mod: CPTII,S$GLB,, | Performed by: INTERNAL MEDICINE

## 2024-04-23 PROCEDURE — 3078F DIAST BP <80 MM HG: CPT | Mod: CPTII,S$GLB,, | Performed by: INTERNAL MEDICINE

## 2024-04-23 PROCEDURE — 3044F HG A1C LEVEL LT 7.0%: CPT | Mod: CPTII,S$GLB,, | Performed by: INTERNAL MEDICINE

## 2024-04-23 PROCEDURE — 99214 OFFICE O/P EST MOD 30 MIN: CPT | Mod: S$GLB,,, | Performed by: INTERNAL MEDICINE

## 2024-04-23 RX ORDER — CLOPIDOGREL BISULFATE 75 MG/1
75 TABLET ORAL DAILY
Qty: 90 TABLET | Refills: 3 | Status: SHIPPED | OUTPATIENT
Start: 2024-04-23 | End: 2025-04-23

## 2024-04-23 RX ORDER — NAPROXEN SODIUM 220 MG/1
81 TABLET, FILM COATED ORAL DAILY
Start: 2024-04-23 | End: 2025-04-23

## 2024-04-23 RX ORDER — METOPROLOL SUCCINATE 25 MG/1
25 TABLET, EXTENDED RELEASE ORAL DAILY
Qty: 90 TABLET | Refills: 3 | Status: SHIPPED | OUTPATIENT
Start: 2024-04-23 | End: 2025-04-23

## 2024-04-23 NOTE — PROGRESS NOTES
[facility] [place] includes the [facility]    Subjective:     Patient ID:  Cristin Segal is a 63 y.o. female patient here for evaluation Hospital Follow Up (Patient had an angiogram on 04/08/2024 , one stent .)      HPI:  63-year-old female here for follow-up.  Admitted recently with NSTEMI status post PCI of RCA.  Doing well.  Stable shortness of breath on walking back and forth from the mailbox which is about 300-400 yd away.  This is unchanged from the past.  Her anginal symptom was burning gastric discomfort for 2 days prior to admission.  No recurrence of such symptoms since discharge.    Review of Systems   All other systems reviewed and are negative.       Past Medical History:   Diagnosis Date    Arthritis     Cataract     Diabetes mellitus type II     diet controlled    Fracture     left 4th finger  - splinted    Hyperlipidemia     MCTD (mixed connective tissue disease)     Raynaud's disease     Thyroid disease     Hypothyroidism    Wears glasses     contacs       Past Surgical History:   Procedure Laterality Date    CARPAL TUNNEL RELEASE      right    CATARACT EXTRACTION W/ INTRAOCULAR LENS  IMPLANT, BILATERAL      EXCISION OF PAROTID GLAND Right 8/1/2019    Procedure: PAROTIDECTOMY;  Surgeon: Abner Maki MD;  Location: [facility];  Service: ENT;  Laterality: Right;    HIP SURGERY Left 6/18/15    JANA    INJECTION OF ANESTHETIC AGENT AROUND MULTIPLE INTERCOSTAL NERVES Left 2/9/2023    Procedure: BLOCK, NERVE, INTERCOSTAL, 2 OR MORE;  Surgeon: Isaiah Allan MD;  Location: [address];  Service: Thoracic;  Laterality: Left;    IVUS, CORONARY  4/9/2024    Procedure: IVUS, Coronary;  Surgeon: Horacio Lee MD;  Location: [facility] CATH/EP LAB;  Service: Cardiology;;    JOINT REPLACEMENT Bilateral     HIP    KNEE CARTILAGE SURGERY      right    LEFT HEART CATHETERIZATION Left 4/9/2024    Procedure: Left heart cath;  Surgeon: Horacio Lee MD;  Location: [facility]  CATH/EP LAB;  Service: Cardiology;  Laterality: Left;    LYMPHADENECTOMY Left 2023    Procedure: LYMPHADENECTOMY;  Surgeon: Isaiah Allan MD;  Location: [facility] OR [address] [place] [address];  Service: Thoracic;  Laterality: Left;    PERCUTANEOUS CORONARY INTERVENTION, ARTERY N/A 2024    Procedure: Percutaneous coronary intervention;  Surgeon: Horacio Lee MD;  Location: [facility] CATH/EP LAB;  Service: Cardiology;  Laterality: N/A;    TOTAL HIP ARTHROPLASTY Right 2016    JANA    XI ROBOTIC RATS,WITH LOBECTOMY,LUNG Left 2023    Procedure: XI ROBOTIC RATS,WITH LEFT UPPER LOBECTOMY,LUNG;  Surgeon: Isaiah Allan MD;  Location: [facility] OR [address] [place] [address];  Service: Thoracic;  Laterality: Left;  extended lingulectomy       Family History   Problem Relation Name Age of Onset    Diabetes Mother      Kidney disease Mother      Aneurysm Mother  73        brain    Hyperlipidemia Mother      Hypertension Sister      Breast cancer Sister      Diabetes Sister      Ovarian cancer Neg Hx         Social History     Socioeconomic History    Marital status:    Occupational History     Employer: [employer]   Tobacco Use    Smoking status: Former     Current packs/day: 0.00     Average packs/day: 0.5 packs/day for 45.1 years (22.6 ttl pk-yrs)     Types: Cigarettes     Start date:      Quit date: 2023     Years since quittin.1     Passive exposure: Past    Smokeless tobacco: Never   Substance and Sexual Activity    Alcohol use: Yes     Comment: occasional    Drug use: No    Sexual activity: Not Currently     Birth control/protection: Post-menopausal       Current Outpatient Medications   Medication Sig Dispense Refill    acetaminophen (TYLENOL) 500 MG tablet Take 1 tablet (500 mg total) by mouth every 6 (six) hours as needed for Pain.  0    albuterol (VENTOLIN HFA) 90 mcg/actuation inhaler Inhale 2 puffs into the lungs every 6 (six) hours as needed for Wheezing or Shortness of Breath. Rescue 8 g 0     gabapentin (NEURONTIN) 100 MG capsule Take 100 mg by mouth.      hydrOXYchloroQUINE (PLAQUENIL) 200 mg tablet Take 1 tablet (200 mg total) by mouth once daily. 90 tablet 1    levothyroxine (SYNTHROID) 50 MCG tablet TAKE 1 TABLET BY MOUTH BEFORE BREAKFAST. 90 tablet 3    loratadine (CLARITIN) 10 mg tablet Take 10 mg by mouth once daily.      magnesium oxide (MAG-OX) 400 mg (241.3 mg magnesium) tablet Take 1 tablet (400 mg total) by mouth once daily. 30 tablet 0    meloxicam (MOBIC) 7.5 MG tablet Take 7.5 mg by mouth once daily.      multivit-min-FA-lycopen-lutein (CENTRUM SILVER) 0.4 mg-300 mcg- 250 mcg Tab Take 1 tablet by mouth once daily.      nitroGLYCERIN (NITROSTAT) 0.4 MG SL tablet Place 1 tablet (0.4 mg total) under the tongue every 5 (five) minutes as needed for Chest pain. 30 tablet 0    pantoprazole (PROTONIX) 40 MG tablet TAKE 1 TABLET BY MOUTH EVERY DAY 90 tablet 3    potassium chloride SA (K-DUR,KLOR-CON) 20 MEQ tablet TAKE 1 TABLET BY MOUTH ONCE DAILY 90 tablet 3    rosuvastatin (CRESTOR) 5 MG tablet Take 1 tablet (5 mg total) by mouth once daily. 90 tablet 3    traMADoL (ULTRAM) 50 mg tablet Take 50 mg by mouth every evening.      WESTAB MAX 2.5-25-2 mg Tab TAKE 1 TABLET BY MOUTH EVERY DAY 90 tablet 3    aspirin 81 MG Chew Take 1 tablet (81 mg total) by mouth once daily.      clopidogreL (PLAVIX) 75 mg tablet Take 1 tablet (75 mg total) by mouth once daily. 90 tablet 3    metoprolol succinate (TOPROL-XL) 25 MG 24 hr tablet Take 1 tablet (25 mg total) by mouth once daily. 90 tablet 3     No current facility-administered medications for this visit.     Facility-Administered Medications Ordered in Other Visits   Medication Dose Route Frequency Provider Last Rate Last Admin    lactated ringers infusion   Intravenous Continuous Kev Bai MD           Review of patient's allergies indicates:   Allergen Reactions    Pcn [penicillins] Anaphylaxis     Unknown for her- family history with anaphylaxis     Lipitor [atorvastatin]          Objective:        Vitals:    04/23/24 1015   BP: 120/66   Pulse: 69       Physical Exam  Vitals reviewed.   Constitutional:       Appearance: Normal appearance.   HENT:      Mouth/Throat:      Mouth: Mucous membranes are moist.   Eyes:      Extraocular Movements: Extraocular movements intact.      Pupils: Pupils are equal, round, and reactive to light.   Cardiovascular:      Rate and Rhythm: Normal rate and regular rhythm.      Pulses: Normal pulses.      Heart sounds: Normal heart sounds. No murmur heard.     No gallop.   Pulmonary:      Effort: Pulmonary effort is normal.      Breath sounds: Normal breath sounds.   Abdominal:      General: Bowel sounds are normal.      Palpations: Abdomen is soft.   Musculoskeletal:         General: Normal range of motion.   Skin:     General: Skin is warm and dry.   Neurological:      General: No focal deficit present.      Mental Status: She is alert and oriented to person, place, and time.   Psychiatric:         Mood and Affect: Mood normal.       LIPIDS - LAST 2   Lab Results   Component Value Date    CHOL 209 (H) 04/09/2024    CHOL 245 (H) 12/18/2023     (H) 04/09/2024     (H) 12/18/2023    LDLCALC 65.4 04/09/2024    LDLCALC 106.6 12/18/2023    TRIG 78 04/09/2024    TRIG 52 12/18/2023    CHOLHDL 61.2 (H) 04/09/2024    CHOLHDL 52.2 (H) 12/18/2023       CBC - LAST 2  Lab Results   Component Value Date    WBC 5.25 04/10/2024    WBC 6.00 04/08/2024    RBC 2.96 (L) 04/10/2024    RBC 3.18 (L) 04/08/2024    HGB 10.7 (L) 04/10/2024    HGB 11.2 (L) 04/08/2024    HCT 30.7 (L) 04/10/2024    HCT 33.0 (L) 04/08/2024     (H) 04/10/2024     (H) 04/08/2024    MCH 36.1 (H) 04/10/2024    MCH 35.2 (H) 04/08/2024    MCHC 34.9 04/10/2024    MCHC 33.9 04/08/2024    RDW 13.1 04/10/2024    RDW 13.1 04/08/2024     04/10/2024     04/08/2024    MPV 8.9 (L) 04/10/2024    MPV 9.0 (L) 04/08/2024    GRAN 2.7 04/10/2024    GRAN 51.9  04/10/2024    LYMPH 1.8 04/10/2024    LYMPH 33.7 04/10/2024    MONO 0.5 04/10/2024    MONO 10.3 04/10/2024    BASO 0.05 04/10/2024    BASO 0.05 04/08/2024    NRBC 0 04/10/2024    NRBC 0 04/08/2024       CHEMISTRY & LIVER FUNCTION - LAST 2  Lab Results   Component Value Date     (L) 04/10/2024     (L) 04/09/2024    K 3.8 04/10/2024    K 4.6 04/09/2024     04/10/2024     04/09/2024    CO2 25 04/10/2024    CO2 27 04/09/2024    ANIONGAP 8 04/10/2024    ANIONGAP 7 (L) 04/09/2024    BUN 13 04/10/2024    BUN 16 04/09/2024    CREATININE 1.0 04/10/2024    CREATININE 1.1 04/09/2024    GLU 79 04/10/2024    GLU 91 04/09/2024    CALCIUM 8.9 04/10/2024    CALCIUM 10.0 04/09/2024    MG 1.4 (L) 04/10/2024    MG 1.9 04/09/2024    ALBUMIN 3.9 04/10/2024    ALBUMIN 4.2 04/09/2024    PROT 6.7 04/10/2024    PROT 7.3 04/09/2024    ALKPHOS 56 04/10/2024    ALKPHOS 61 04/09/2024    ALT 18 04/10/2024    ALT 21 04/09/2024    AST 22 04/10/2024    AST 24 04/09/2024    BILITOT 0.4 04/10/2024    BILITOT 0.4 04/09/2024        CARDIAC PROFILE - LAST 2  Lab Results   Component Value Date     (H) 04/08/2024    CPK 89 02/03/2020     (H) 03/30/2017    CPKMB 3.8 02/03/2020    TROPONINI <0.030 02/03/2020        COAGULATION - LAST 2  Lab Results   Component Value Date    LABPT 13.3 11/08/2020    INR 0.9 04/08/2024    INR 1.0 01/06/2023    APTT 37.0 (H) 04/09/2024    APTT 54.1 (H) 04/09/2024       ENDOCRINE & PSA - LAST 2  Lab Results   Component Value Date    HGBA1C 4.5 04/09/2024    HGBA1C 4.8 12/18/2023    TSH 2.340 04/08/2024    TSH 1.718 12/18/2023        ECHOCARDIOGRAM RESULTS  Results for orders placed during the hospital encounter of 02/28/24    Echo    Interpretation Summary    Left Ventricle: There is normal systolic function with a visually estimated ejection fraction of 55 - 70%. There is normal diastolic function.    Right Ventricle: Normal right ventricular cavity size. Wall thickness is normal. Right  ventricle wall motion  is normal. Systolic function is normal.    Left Atrium: Left atrium is mildly dilated.    Aortic Valve: Moderately calcified left, right and noncoronary cusps. Moderately restricted motion. There is moderate stenosis. Aortic valve area by VTI is 1.48 cm². Aortic valve peak velocity is 2.88 m/s. Mean gradient is 19 mmHg. The dimensionless index is 0.47. There is mild aortic regurgitation with a centrally directed jet.    IVC/SVC: Normal venous pressure at 3 mmHg.      CURRENT/PREVIOUS VISIT EKG  Results for orders placed or performed during the hospital encounter of 04/08/24   EKG 12-LEAD starting tomorrow    Collection Time: 04/09/24  1:35 PM   Result Value Ref Range    QRS Duration 88 ms    OHS QTC Calculation 449 ms    Narrative    Test Reason : Z95.5,    Vent. Rate : 071 BPM     Atrial Rate : 071 BPM     P-R Int : 228 ms          QRS Dur : 088 ms      QT Int : 414 ms       P-R-T Axes : 048 039 027 degrees     QTc Int : 449 ms    Sinus rhythm with 1st degree A-V block  Otherwise normal ECG  When compared with ECG of 08-APR-2024 16:30,  Nonspecific T wave abnormality no longer evident in Anterior leads    Referred By: ZEINAB DOLAN           Confirmed By:      No valid procedures specified.   No results found for this or any previous visit.    No valid procedures specified.        Assessment:       1. Hx of non-ST elevation myocardial infarction (NSTEMI)    2. Coronary artery disease involving native coronary artery of native heart without angina pectoris    3. Status post insertion of drug eluting coronary artery stent    4. Elevated brain natriuretic peptide (BNP) level    5. Current smoker           Plan:       Hx of non-ST elevation myocardial infarction (NSTEMI)  -     clopidogreL (PLAVIX) 75 mg tablet; Take 1 tablet (75 mg total) by mouth once daily.  Dispense: 90 tablet; Refill: 3  -     aspirin 81 MG Chew; Take 1 tablet (81 mg total) by mouth once daily.  -     metoprolol succinate  (TOPROL-XL) 25 MG 24 hr tablet; Take 1 tablet (25 mg total) by mouth once daily.  Dispense: 90 tablet; Refill: 3  -     Radiology US Carotid Bilateral; Future; Expected date: 04/23/2024    Coronary artery disease involving native coronary artery of native heart without angina pectoris  -     Radiology US Carotid Bilateral; Future; Expected date: 04/23/2024    Status post insertion of drug eluting coronary artery stent  -     clopidogreL (PLAVIX) 75 mg tablet; Take 1 tablet (75 mg total) by mouth once daily.  Dispense: 90 tablet; Refill: 3  -     aspirin 81 MG Chew; Take 1 tablet (81 mg total) by mouth once daily.    Elevated brain natriuretic peptide (BNP) level    Current smoker    Patient advised tobacco cessation.  Continue with aspirin and Plavix for 1 year.  Continue with statin, last LDL at target.  Cardiac rehab has already contacted the patient.    Follow up in about 3 months (around 7/23/2024) for f/u carotid US.          MD Valentina [facility] [place]  [place]

## 2024-04-29 LAB
OHS QRS DURATION: 88 MS
OHS QRS DURATION: 90 MS
OHS QTC CALCULATION: 440 MS
OHS QTC CALCULATION: 449 MS

## 2024-04-30 DIAGNOSIS — E03.9 HYPOTHYROIDISM, UNSPECIFIED TYPE: ICD-10-CM

## 2024-04-30 RX ORDER — LEVOTHYROXINE SODIUM 50 UG/1
50 TABLET ORAL
Qty: 90 TABLET | Refills: 3 | Status: SHIPPED | OUTPATIENT
Start: 2024-04-30

## 2024-05-02 ENCOUNTER — CLINICAL SUPPORT (OUTPATIENT)
Dept: CARDIAC REHAB | Facility: HOSPITAL | Age: 64
End: 2024-05-02
Payer: COMMERCIAL

## 2024-05-02 ENCOUNTER — HOSPITAL ENCOUNTER (OUTPATIENT)
Dept: RADIOLOGY | Facility: HOSPITAL | Age: 64
Discharge: HOME | End: 2024-05-02
Attending: INTERNAL MEDICINE
Payer: COMMERCIAL

## 2024-05-02 DIAGNOSIS — I25.10 CORONARY ARTERY DISEASE INVOLVING NATIVE CORONARY ARTERY OF NATIVE HEART WITHOUT ANGINA PECTORIS: ICD-10-CM

## 2024-05-02 DIAGNOSIS — I25.2 HX OF NON-ST ELEVATION MYOCARDIAL INFARCTION (NSTEMI): ICD-10-CM

## 2024-05-02 DIAGNOSIS — I21.4 NSTEMI (NON-ST ELEVATED MYOCARDIAL INFARCTION): ICD-10-CM

## 2024-05-02 LAB
OHS QRS DURATION: 94 MS
OHS QTC CALCULATION: 444 MS

## 2024-05-02 PROCEDURE — 93880 EXTRACRANIAL BILAT STUDY: CPT | Mod: 26,,, | Performed by: RADIOLOGY

## 2024-05-02 PROCEDURE — 93880 EXTRACRANIAL BILAT STUDY: CPT | Mod: TC,PO

## 2024-05-02 PROCEDURE — 93797 PHYS/QHP OP CAR RHAB WO ECG: CPT

## 2024-05-03 ENCOUNTER — OFFICE VISIT (OUTPATIENT)
Dept: FAMILY MEDICINE | Facility: CLINIC | Age: 64
End: 2024-05-03
Payer: COMMERCIAL

## 2024-05-03 VITALS
RESPIRATION RATE: 14 BRPM | BODY MASS INDEX: 28.36 KG/M2 | HEIGHT: 62 IN | TEMPERATURE: 98 F | OXYGEN SATURATION: 99 % | DIASTOLIC BLOOD PRESSURE: 60 MMHG | HEART RATE: 83 BPM | SYSTOLIC BLOOD PRESSURE: 120 MMHG | WEIGHT: 154.13 LBS

## 2024-05-03 DIAGNOSIS — E83.42 HYPOMAGNESEMIA: ICD-10-CM

## 2024-05-03 DIAGNOSIS — E11.8 CONTROLLED TYPE 2 DIABETES MELLITUS WITH COMPLICATION, WITHOUT LONG-TERM CURRENT USE OF INSULIN: ICD-10-CM

## 2024-05-03 DIAGNOSIS — E03.9 HYPOTHYROIDISM, UNSPECIFIED TYPE: ICD-10-CM

## 2024-05-03 DIAGNOSIS — E72.11 HYPERHOMOCYSTEINEMIA: ICD-10-CM

## 2024-05-03 DIAGNOSIS — I10 ESSENTIAL HYPERTENSION: ICD-10-CM

## 2024-05-03 DIAGNOSIS — E78.2 MIXED HYPERLIPIDEMIA: Primary | ICD-10-CM

## 2024-05-03 PROCEDURE — 1160F RVW MEDS BY RX/DR IN RCRD: CPT | Mod: CPTII,S$GLB,, | Performed by: FAMILY MEDICINE

## 2024-05-03 PROCEDURE — 1159F MED LIST DOCD IN RCRD: CPT | Mod: CPTII,S$GLB,, | Performed by: FAMILY MEDICINE

## 2024-05-03 PROCEDURE — 3078F DIAST BP <80 MM HG: CPT | Mod: CPTII,S$GLB,, | Performed by: FAMILY MEDICINE

## 2024-05-03 PROCEDURE — 99999 PR PBB SHADOW E&M-EST. PATIENT-LVL IV: CPT | Mod: PBBFAC,,, | Performed by: FAMILY MEDICINE

## 2024-05-03 PROCEDURE — 3044F HG A1C LEVEL LT 7.0%: CPT | Mod: CPTII,S$GLB,, | Performed by: FAMILY MEDICINE

## 2024-05-03 PROCEDURE — 1111F DSCHRG MED/CURRENT MED MERGE: CPT | Mod: CPTII,S$GLB,, | Performed by: FAMILY MEDICINE

## 2024-05-03 PROCEDURE — 4010F ACE/ARB THERAPY RXD/TAKEN: CPT | Mod: CPTII,S$GLB,, | Performed by: FAMILY MEDICINE

## 2024-05-03 PROCEDURE — 3074F SYST BP LT 130 MM HG: CPT | Mod: CPTII,S$GLB,, | Performed by: FAMILY MEDICINE

## 2024-05-03 PROCEDURE — 3008F BODY MASS INDEX DOCD: CPT | Mod: CPTII,S$GLB,, | Performed by: FAMILY MEDICINE

## 2024-05-03 PROCEDURE — 99214 OFFICE O/P EST MOD 30 MIN: CPT | Mod: S$GLB,,, | Performed by: FAMILY MEDICINE

## 2024-05-03 RX ORDER — PANTOPRAZOLE SODIUM 40 MG/1
40 TABLET, DELAYED RELEASE ORAL DAILY
Qty: 90 TABLET | Refills: 3 | Status: SHIPPED | OUTPATIENT
Start: 2024-05-03

## 2024-05-03 RX ORDER — ROSUVASTATIN CALCIUM 5 MG/1
5 TABLET, COATED ORAL DAILY
Qty: 90 TABLET | Refills: 3 | Status: SHIPPED | OUTPATIENT
Start: 2024-05-03

## 2024-05-03 RX ORDER — CYANOCOBALAMIN/FOLIC AC/VIT B6 2-2.5-25MG
1 TABLET ORAL DAILY
Qty: 90 TABLET | Refills: 3 | Status: SHIPPED | OUTPATIENT
Start: 2024-05-03

## 2024-05-03 RX ORDER — POTASSIUM CHLORIDE 20 MEQ/1
20 TABLET, EXTENDED RELEASE ORAL DAILY
Qty: 90 TABLET | Refills: 3 | Status: SHIPPED | OUTPATIENT
Start: 2024-05-03

## 2024-05-03 NOTE — PROGRESS NOTES
Subjective:       Patient ID: Cristin Segal is a 63 y.o. female.    Chief Complaint: Transitional Care    HPI  Review of Systems   Constitutional:  Negative for fatigue and unexpected weight change.   Respiratory:  Negative for chest tightness and shortness of breath.    Cardiovascular:  Negative for chest pain, palpitations and leg swelling.   Gastrointestinal:  Negative for abdominal pain.   Musculoskeletal:  Negative for arthralgias.   Neurological:  Negative for dizziness, syncope, light-headedness and headaches.       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    Reviewed labs 4/24. F/u fsating labs scheduled 6/20/24   Mag 1.4 -started on mag ox  CKD stage 3, anemia  A1c 4.5  high homocysteine- on folbic-yes  HPL-   On crestor? Yes 5mg   Mammo 6/23 neg   Tft nl    Admitted [facility] 4/8/24 CP upon presentation to Jaden Nesbitt's office to f/u echo. Ecg showed non specific ST changes.  Had some elevated  troponins. echo showing mod AS , EF 55-70%.Patient underwent a LHC with PCI to RCA. She was cleared for discharge home by cardiology on DAPT, toprol, and NTG PRN . Starts rehab Monday. No HH needed.  No CP since home.  No use opf ntg        FIT 12/2021 + . Referred to GI but deferred colonoscopy until after treatment for lung CA. Planned 6/6/24     T surgery Dr. Allan and Heme/onc Dr. Carrasco treating  SCCA left lung diagnosed 2023. Pulm Dr. Espino lung nodule. CT chest 2/2022 showed slightly enlarging nodule left upper lobe.  1/23 bx invasive SCCA. Mri brain-no mets. Bone scan T11 uptake with h/o vertebroplasty.  S/p left lingulectomy margins neg, lymph nodes neg. F/u CT chest planned 3/24 . Heme/onc Dr. Carrasco      Spine Dr. Victor low back pain/Dr. Bai pain. H/o closed vertebral compression T12 s/p kyphoplasty 12/2021. Doing pt and taking pain.  Completed DMV handicapped form for [place]        Card Dr. brooks aortic stenosis     Dr. Cat eye exam     Had ED visit 3/20 [facility] for fall resulting in left ankle fracture. Dr. Tee treateed     Had ED visit 9/2/20 [facility] for hitting head on freezer door then fell backwards striking back of head     DEXA 8/20 -nl BMD     History:   Ortho Dr. 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 Maki 8/19        Rheum Dr. GRACE Guzman MCTD on plaquenil.  Takes adalat for raynauds.      HTN controlled with diet       H/o Elevated LFTs - NL LFTS 12/20.no fatty liver or enlargement on either CT ab 5/15 nor u/s and 1/19. Hepatitis panel normal     Type 2 DM- controlled without meds.  Last a1c 4.5. Eye Dr Cat .  On crestor. Not on ace or ARB-urine ma scheduled 6/20/24     GYn PAP Dr. Elmore < 3 years     Macrocytosis- b12 and folate normal 1/19 and 12/20  Objective:      Physical Exam  Vitals and nursing note reviewed.   Constitutional:       Appearance: She is  well-developed.   Cardiovascular:      Rate and Rhythm: Normal rate and regular rhythm.      Heart sounds: Murmur heard.   Pulmonary:      Effort: Pulmonary effort is normal.      Breath sounds: Normal breath sounds.   Skin:     General: Skin is warm and dry.   Neurological:      Mental Status: She is alert and oriented to person, place, and time.         Assessment:       1. Mixed hyperlipidemia    2. Essential hypertension    3. Hypothyroidism, unspecified type    4. Controlled type 2 diabetes mellitus with complication, without long-term current use of insulin    5. Hypomagnesemia    6. Hyperhomocysteinemia        Plan:         1. Mixed hyperlipidemia  Controlled on current medications.  Continue current medications.    - Comprehensive Metabolic Panel; Future  - Lipid Panel; Future    2. Essential hypertension  Controlled on current medications.  Continue current medications.      3. Hypothyroidism, unspecified type  Controlled on current medications.  Continue current medications.    - CBC Auto Differential; Future  - TSH; Future  - T4, Free; Future    4. Controlled type 2 diabetes mellitus with complication, without long-term current use of insulin  Controlled with diet  - Hemoglobin A1C; Future    5. Hypomagnesemia  Continue magnesium supplement and monitor  - Magnesium; Future    6. Hyperhomocysteinemia  Cont supplement  - folic acid-vit B6-vit B12 2.5-25-2 mg (WESTAB MAX) 2.5-25-2 mg Tab; Take 1 tablet by mouth once daily.  Dispense: 90 tablet; Refill: 3      Time spent with patient: 20 minutes    Patient with be reevaluated in 3 months or sooner prn    Greater than 50% of this visit was spent counseling as described in above documentation:Yes

## 2024-05-06 ENCOUNTER — CLINICAL SUPPORT (OUTPATIENT)
Dept: CARDIAC REHAB | Facility: HOSPITAL | Age: 64
End: 2024-05-06
Payer: COMMERCIAL

## 2024-05-06 DIAGNOSIS — I21.4 NSTEMI (NON-ST ELEVATED MYOCARDIAL INFARCTION): Primary | ICD-10-CM

## 2024-05-06 PROCEDURE — 93798 PHYS/QHP OP CAR RHAB W/ECG: CPT

## 2024-05-08 ENCOUNTER — TELEPHONE (OUTPATIENT)
Dept: CARDIOLOGY | Facility: CLINIC | Age: 64
End: 2024-05-08
Payer: COMMERCIAL

## 2024-05-08 ENCOUNTER — CLINICAL SUPPORT (OUTPATIENT)
Dept: CARDIAC REHAB | Facility: HOSPITAL | Age: 64
End: 2024-05-08
Payer: COMMERCIAL

## 2024-05-08 DIAGNOSIS — I21.4 NSTEMI (NON-ST ELEVATED MYOCARDIAL INFARCTION): Primary | ICD-10-CM

## 2024-05-08 PROCEDURE — 93798 PHYS/QHP OP CAR RHAB W/ECG: CPT

## 2024-05-08 NOTE — TELEPHONE ENCOUNTER
----- Message from Ricardo Hays sent at 5/8/2024 11:02 AM CDT -----  Regarding: Medication Refill  thalia soares'am, i have Cristin Segal.. mrn [phone]... needing prescriptions.. for [facility]... needs a new approval faxed to them. please call pt when it is done so she knows

## 2024-05-08 NOTE — TELEPHONE ENCOUNTER
Patient is calling and wants medications call into [facility] [phone]  This was e prescribed at an earlier date     Asa 81 mg , qd , 90 , 3 rf   Plavix 75 mg , qd , 90 , 3 rf  Metoprolol succ ( Toprol  XL ) 25 mg , qd , 90 , 3rf     I called and let this message on the pharmacy voice mail .

## 2024-05-10 ENCOUNTER — PATIENT OUTREACH (OUTPATIENT)
Dept: ADMINISTRATIVE | Facility: HOSPITAL | Age: 64
End: 2024-05-10
Payer: COMMERCIAL

## 2024-05-10 ENCOUNTER — CLINICAL SUPPORT (OUTPATIENT)
Dept: CARDIAC REHAB | Facility: HOSPITAL | Age: 64
End: 2024-05-10
Payer: COMMERCIAL

## 2024-05-10 DIAGNOSIS — I21.4 NSTEMI (NON-ST ELEVATED MYOCARDIAL INFARCTION): Primary | ICD-10-CM

## 2024-05-10 PROCEDURE — 93798 PHYS/QHP OP CAR RHAB W/ECG: CPT

## 2024-05-10 RX ORDER — LANOLIN ALCOHOL/MO/W.PET/CERES
400 CREAM (GRAM) TOPICAL DAILY
Qty: 30 TABLET | Refills: 0 | Status: SHIPPED | OUTPATIENT
Start: 2024-05-10 | End: 2024-06-09

## 2024-05-10 NOTE — LETTER
AUTHORIZATION FOR RELEASE OF   CONFIDENTIAL INFORMATION    Dear Ryne Rodriguez OD,    We are seeing Cristin Segal, date of birth 1960, in the clinic at [facility]. Angela Gtz MD is the patient's PCP. Cristin Segal has an outstanding lab/procedure at the time we reviewed her chart. In order to help keep her health information updated, she has authorized us to request the following medical record(s):        (  )  MAMMOGRAM                                      (  )  COLONOSCOPY      (  )  PAP SMEAR                                          (  )  OUTSIDE LAB RESULTS     (  )  DEXA SCAN                                          (X)  EYE EXAM            (  )  FOOT EXAM                                          (  )  ENTIRE RECORD     (  )  OUTSIDE IMMUNIZATIONS                 (  )  _______________         Please fax records to [facility], Hammons, Amy L., MD, [phone]    If you have any questions, please contact Cristian Escamilla LPN Care Coordinator  at [phone].             Patient Name: Cristin Segal  : 1960  Patient Phone #: [phone]                         Cristin Segal  MRN: [id number]  : 1960  Age: 63 y.o.  Sex: female         Patient/Legal Guardian Signature  This signature was collected at 2023    Cristin Segal       _______________________________   Printed Name/Relationship to Patient      Consent for Examination and Treatment: I hereby authorize the providers and employees of [facility] ([facility]) to provide medical treatment/services which includes, but is not limited to, performing and administering tests and diagnostic procedures that are deemed necessary, including, but not limited to, imaging examinations, blood tests and other laboratory procedures as may be required by the hospital, clinic, or may be ordered by my physician(s) or persons working under the general and/or special instructions of my physician(s).       I understand and agree that this consent covers all authorized persons, including but not limited to physicians, residents, nurse practitioners, physicians' assistants, specialists, consultants, student nurses, and independently contracted physicians, who are called upon by the physician in charge, to carry out the diagnostic procedures and medical or surgical treatment.     I hereby authorize [facility] to retain or dispose of any specimens or tissue, should there be such remaining from any test or procedure.     I hereby authorize and give consent for [facility] providers and employees to take photographs, images or videotapes of such diagnostic, surgical or treatment procedures of Patient as may be required by [facility] or as may be ordered by a physician. I further acknowledge and agree that [facility] may use cameras or other devices for patient monitoring.     I am aware that the practice of medicine is not an exact science, and I acknowledge that no guarantees have been made to me as to the outcome of any tests, procedures or treatment.     Authorization for Release of Information: I understand that my insurance company and/or their agents may need information necessary to make determinations about payment/reimbursement. I hereby provide authorization to release to all insurance companies, their successors, assignees, other parties with whom they may have contracted, or others acting on their behalf, that are involved with payment for any hospital and/or clinic charges incurred by the patient, any information that they request and deem necessary for payment/reimbursement, and/or quality review.  I further authorize the release of my health information to physicians or other health care practitioners on staff who are involved in my health care now and in the future, and to other health care providers, entities, or institutions for the purpose of my continued care and treatment, including referrals.     REGISTRATION  AUTHORIZATION  Form No. 93849 (Rev. 7/13/2022)       Medicare Patient's Certification and Authorization to Release Information and Payment Request:  I certify that the information given by me in applying for payment under Title XVIII of the Social Security Act is correct. I authorize any mantilla of medical or other information about me to release to the Social Security[facility] Center[facility]istration, or its intermediaries or carriers, any information needed for this or a related Medicare claim. I request that payment of authorized benefits be made on my behalf.     Assignment of Insurance Benefits:   I hereby authorize any and all insurance companies, health plans, defined   benefit plans, health insurers or any entity that is or may be responsible for payment of my medical expenses to pay all hospital and medical benefits now due, and to become due and payable to me under any hospital benefits, sick benefits, injury benefits or any other benefit for services rendered to me, including Major Medical Benefits, direct to [facility] and all independently contracted physicians. I assign any and all rights that I may have against any and all insurance companies, health plans, defined benefit plans, health insurers or any entity that is or may be responsible for payment of my medical expenses, including, but not limited to any right to appeal a denial of a claim, any right to bring any action, lawsuit, administrative proceeding, or other cause of action on my behalf. I specifically assign my right to pursue litigation against any and all insurance companies, health plans, defined benefit plans, health insurers or any entity that is or may be responsible for payment of my medical expenses based upon a refusal to pay charges.            E. Valuables: It is understood and agreed that [facility] is not liable for the damage to or loss of any money, jewelry,   documents, dentures, eye glasses, hearing aids, prosthetics, or other property of value.      F. Computer Equipment: I understand and agree that should I choose to use computer equipment owned by [facility] or if I choose to access the Internet via [facility]s network, I do so at my own risk. [facility] is not responsible for any damage to my computer equipment or to any damages of any type that might arise from my loss of equipment or data.     G. Acceptance of Financial Responsibility:  I agree that in consideration of the services and   supplies that have been   or will be furnished to the patient, I am hereby obligated to pay all charges made for or on the account of the patient according to the standard rates (in effect at the time the services and supplies are delivered) established by [facility], including its Patient Financial Assistance Policy to the extent it is applicable. I understand that I am responsible for all charges, or portions thereof, not covered by insurance or other sources. Patient refunds will be distributed only after balances at all [facility] facilities are paid.     H. Communication Authorization:  I hereby authorize [facility] and its representatives, along with any billing service   or  who may work on their behalf, to contact me on   my cell phone and/or home phone using pre- recorded messages, artificial voice messages, automatic telephone dialing devices or other computer assisted technology, or by electronic      mail, text messaging, or by any other form of electronic communication. This includes, but is not limited to, appointment reminders, yearly physical exam reminders, preventive care reminders, patient campaigns, welcome calls, and calls about account balances on my account or any account on which I am listed as a guarantor. I understand I have the right to opt out of these communications at any time.      Relationship  Between  Facility and  Provider:      I understand that some, but not all, providers furnishing services to the patient are not employees or agents  of [facility]. The patient is under the care and supervision of his/her attending physician, and it is the responsibility of the facility and its nursing staff to carry out the instructions of such physicians. It is the responsibility of the patient's physician/designee to obtain the patient's informed consent, when required, for medical or surgical treatment, special diagnostic or therapeutic procedures, or hospital services rendered for the patient under the special instructions of the physician/designee.     REGISTRATION AUTHORIZATION  Form No. 16532 (Rev. 7/13/2022)      Notice of Privacy Practices: I acknowledge I have received a copy of [facility]'s Notice of Privacy Practices.     Facility  Directory: I have discussed with the organization my desire to be either included or excluded  in the facility directory in the event of my being an inpatient at an [facility] facility. I understand that if my choice is to opt-out of being identified in the facility directory that the facility will not provide any information about me such as my condition (e.g. fair, stable, etc.) or my location in the facility (e.g., room number, department).     Immunizations: [facility] shares immunization information with state sponsored health departments to help you and your doctor keep track of your immunization records. By signing, you consent to have this information shared with the health department in your state:                                [place] - LINKS ([place] Immunization Network for Kids Statewide)                                [place] - MIIX ([place] Immunization Information eXchange)                                [place] - ImmPRINT (Immunization Patient Registry with Integrated Technology)     TERM: This authorization is valid for this and subsequent care/treatment I receive at [facility] and will remain valid unless/until revoked in writing by me.     [facility]: As used in this document, [facility]  means all [facility] owned and managed facilities, including, but not limited to, all health centers, surgery centers, clinics, urgent care centers, and hospitals.         [facility] complies with applicable Federal civil rights laws and does not discriminate on the basis of race, color, national origin, age, disability, or sex.  ATENCIÓN: si habla periañol, tiene a chance disposición servicios gratuitos de asistencia lingüística. Elizabeth al [phone].  CHÚ Ý: N?u b?n nói Ti?ng Vi?t, có các d?ch v? h? tr? ngôn ng? mi?n phí dành cho b?n. G?i s? [phone].        REGISTRATION AUTHORIZATION  Form No. 11806 (Rev. 7/13/2022)

## 2024-05-10 NOTE — PROGRESS NOTES
Population Health Chart Review & Patient Outreach Details      Additional [facility] Health Notes:               Updates Requested / Reviewed:      Updated Care Coordination Note, , and Immunizations Reconciliation Completed or Queried: [place]         Health Maintenance Topics Overdue:      [facility] Score: 2     Eye Exam  Foot Exam    RSV Vaccine                  Health Maintenance Topic(s) Outreach Outcomes & Actions Taken:    Eye Exam - Outreach Outcomes & Actions Taken  : External Records Requested & Care Team Updated if Applicable

## 2024-05-13 ENCOUNTER — CLINICAL SUPPORT (OUTPATIENT)
Dept: CARDIAC REHAB | Facility: HOSPITAL | Age: 64
End: 2024-05-13
Payer: COMMERCIAL

## 2024-05-13 DIAGNOSIS — I21.4 NSTEMI (NON-ST ELEVATED MYOCARDIAL INFARCTION): Primary | ICD-10-CM

## 2024-05-13 PROCEDURE — 93798 PHYS/QHP OP CAR RHAB W/ECG: CPT

## 2024-05-15 ENCOUNTER — CLINICAL SUPPORT (OUTPATIENT)
Dept: CARDIAC REHAB | Facility: HOSPITAL | Age: 64
End: 2024-05-15
Payer: COMMERCIAL

## 2024-05-15 DIAGNOSIS — I21.4 NSTEMI (NON-ST ELEVATED MYOCARDIAL INFARCTION): Primary | ICD-10-CM

## 2024-05-15 PROCEDURE — 93798 PHYS/QHP OP CAR RHAB W/ECG: CPT

## 2024-05-17 ENCOUNTER — CLINICAL SUPPORT (OUTPATIENT)
Dept: CARDIAC REHAB | Facility: HOSPITAL | Age: 64
End: 2024-05-17
Payer: COMMERCIAL

## 2024-05-17 DIAGNOSIS — I21.4 NSTEMI (NON-ST ELEVATED MYOCARDIAL INFARCTION): Primary | ICD-10-CM

## 2024-05-17 PROCEDURE — 93798 PHYS/QHP OP CAR RHAB W/ECG: CPT

## 2024-05-20 ENCOUNTER — CLINICAL SUPPORT (OUTPATIENT)
Dept: CARDIAC REHAB | Facility: HOSPITAL | Age: 64
End: 2024-05-20
Payer: COMMERCIAL

## 2024-05-20 DIAGNOSIS — I21.4 NSTEMI (NON-ST ELEVATED MYOCARDIAL INFARCTION): Primary | ICD-10-CM

## 2024-05-20 PROCEDURE — 93798 PHYS/QHP OP CAR RHAB W/ECG: CPT

## 2024-05-22 ENCOUNTER — CLINICAL SUPPORT (OUTPATIENT)
Dept: CARDIAC REHAB | Facility: HOSPITAL | Age: 64
End: 2024-05-22
Payer: COMMERCIAL

## 2024-05-22 DIAGNOSIS — I21.4 NSTEMI (NON-ST ELEVATED MYOCARDIAL INFARCTION): Primary | ICD-10-CM

## 2024-05-22 PROCEDURE — 93798 PHYS/QHP OP CAR RHAB W/ECG: CPT

## 2024-05-24 ENCOUNTER — CLINICAL SUPPORT (OUTPATIENT)
Dept: CARDIAC REHAB | Facility: HOSPITAL | Age: 64
End: 2024-05-24
Payer: COMMERCIAL

## 2024-05-24 DIAGNOSIS — I21.4 NSTEMI (NON-ST ELEVATED MYOCARDIAL INFARCTION): Primary | ICD-10-CM

## 2024-05-24 PROCEDURE — 93798 PHYS/QHP OP CAR RHAB W/ECG: CPT

## 2024-05-27 ENCOUNTER — CLINICAL SUPPORT (OUTPATIENT)
Dept: CARDIAC REHAB | Facility: HOSPITAL | Age: 64
End: 2024-05-27
Payer: COMMERCIAL

## 2024-05-27 DIAGNOSIS — I21.4 NSTEMI (NON-ST ELEVATED MYOCARDIAL INFARCTION): Primary | ICD-10-CM

## 2024-05-27 PROCEDURE — 93798 PHYS/QHP OP CAR RHAB W/ECG: CPT

## 2024-05-28 ENCOUNTER — PATIENT OUTREACH (OUTPATIENT)
Dept: ADMINISTRATIVE | Facility: HOSPITAL | Age: 64
End: 2024-05-28
Payer: COMMERCIAL

## 2024-05-28 NOTE — PROGRESS NOTES
Population Health Chart Review & Patient Outreach Details      Additional [facility] Health Notes:               Updates Requested / Reviewed:      Updated Care Coordination Note, , and Immunizations Reconciliation Completed or Queried: [place]         Health Maintenance Topics Overdue:      [facility] Score: 2     Eye Exam  Foot Exam    RSV Vaccine                  Health Maintenance Topic(s) Outreach Outcomes & Actions Taken:    Eye Exam - Outreach Outcomes & Actions Taken  : Answering service will forward request to Dr Cat's office

## 2024-05-29 ENCOUNTER — CLINICAL SUPPORT (OUTPATIENT)
Dept: CARDIAC REHAB | Facility: HOSPITAL | Age: 64
End: 2024-05-29
Payer: COMMERCIAL

## 2024-05-29 DIAGNOSIS — I21.4 NSTEMI (NON-ST ELEVATED MYOCARDIAL INFARCTION): Primary | ICD-10-CM

## 2024-05-29 PROCEDURE — 93798 PHYS/QHP OP CAR RHAB W/ECG: CPT

## 2024-05-31 ENCOUNTER — CLINICAL SUPPORT (OUTPATIENT)
Dept: CARDIAC REHAB | Facility: HOSPITAL | Age: 64
End: 2024-05-31
Payer: COMMERCIAL

## 2024-05-31 DIAGNOSIS — I21.4 NSTEMI (NON-ST ELEVATED MYOCARDIAL INFARCTION): Primary | ICD-10-CM

## 2024-05-31 PROCEDURE — 93798 PHYS/QHP OP CAR RHAB W/ECG: CPT

## 2024-06-03 ENCOUNTER — CLINICAL SUPPORT (OUTPATIENT)
Dept: CARDIAC REHAB | Facility: HOSPITAL | Age: 64
End: 2024-06-03
Payer: COMMERCIAL

## 2024-06-03 DIAGNOSIS — I21.4 NSTEMI (NON-ST ELEVATED MYOCARDIAL INFARCTION): Primary | ICD-10-CM

## 2024-06-03 PROCEDURE — 93798 PHYS/QHP OP CAR RHAB W/ECG: CPT

## 2024-06-05 ENCOUNTER — CLINICAL SUPPORT (OUTPATIENT)
Dept: CARDIAC REHAB | Facility: HOSPITAL | Age: 64
End: 2024-06-05
Payer: COMMERCIAL

## 2024-06-05 DIAGNOSIS — I21.4 NSTEMI (NON-ST ELEVATED MYOCARDIAL INFARCTION): Primary | ICD-10-CM

## 2024-06-05 PROCEDURE — 93798 PHYS/QHP OP CAR RHAB W/ECG: CPT

## 2024-06-07 ENCOUNTER — CLINICAL SUPPORT (OUTPATIENT)
Dept: CARDIAC REHAB | Facility: HOSPITAL | Age: 64
End: 2024-06-07
Payer: COMMERCIAL

## 2024-06-07 DIAGNOSIS — I21.4 NSTEMI (NON-ST ELEVATED MYOCARDIAL INFARCTION): Primary | ICD-10-CM

## 2024-06-07 PROCEDURE — 93798 PHYS/QHP OP CAR RHAB W/ECG: CPT

## 2024-06-10 ENCOUNTER — CLINICAL SUPPORT (OUTPATIENT)
Dept: CARDIAC REHAB | Facility: HOSPITAL | Age: 64
End: 2024-06-10
Payer: COMMERCIAL

## 2024-06-10 DIAGNOSIS — I21.4 NSTEMI (NON-ST ELEVATED MYOCARDIAL INFARCTION): Primary | ICD-10-CM

## 2024-06-10 PROCEDURE — 93798 PHYS/QHP OP CAR RHAB W/ECG: CPT

## 2024-06-12 ENCOUNTER — CLINICAL SUPPORT (OUTPATIENT)
Dept: CARDIAC REHAB | Facility: HOSPITAL | Age: 64
End: 2024-06-12
Payer: COMMERCIAL

## 2024-06-12 DIAGNOSIS — I21.4 NSTEMI (NON-ST ELEVATED MYOCARDIAL INFARCTION): Primary | ICD-10-CM

## 2024-06-12 PROCEDURE — 93798 PHYS/QHP OP CAR RHAB W/ECG: CPT

## 2024-06-14 ENCOUNTER — CLINICAL SUPPORT (OUTPATIENT)
Dept: CARDIAC REHAB | Facility: HOSPITAL | Age: 64
End: 2024-06-14
Payer: COMMERCIAL

## 2024-06-14 DIAGNOSIS — I21.4 NSTEMI (NON-ST ELEVATED MYOCARDIAL INFARCTION): Primary | ICD-10-CM

## 2024-06-14 PROCEDURE — 93798 PHYS/QHP OP CAR RHAB W/ECG: CPT

## 2024-06-17 ENCOUNTER — CLINICAL SUPPORT (OUTPATIENT)
Dept: CARDIAC REHAB | Facility: HOSPITAL | Age: 64
End: 2024-06-17
Payer: COMMERCIAL

## 2024-06-17 DIAGNOSIS — I21.4 NSTEMI (NON-ST ELEVATED MYOCARDIAL INFARCTION): Primary | ICD-10-CM

## 2024-06-17 PROCEDURE — 93798 PHYS/QHP OP CAR RHAB W/ECG: CPT

## 2024-06-19 ENCOUNTER — CLINICAL SUPPORT (OUTPATIENT)
Dept: CARDIAC REHAB | Facility: HOSPITAL | Age: 64
End: 2024-06-19
Payer: COMMERCIAL

## 2024-06-19 DIAGNOSIS — I21.4 NSTEMI (NON-ST ELEVATED MYOCARDIAL INFARCTION): Primary | ICD-10-CM

## 2024-06-19 DIAGNOSIS — Z12.31 OTHER SCREENING MAMMOGRAM: ICD-10-CM

## 2024-06-19 PROCEDURE — 93798 PHYS/QHP OP CAR RHAB W/ECG: CPT

## 2024-06-21 ENCOUNTER — LAB VISIT (OUTPATIENT)
Dept: LAB | Facility: HOSPITAL | Age: 64
End: 2024-06-21
Attending: FAMILY MEDICINE
Payer: COMMERCIAL

## 2024-06-21 ENCOUNTER — CLINICAL SUPPORT (OUTPATIENT)
Dept: CARDIAC REHAB | Facility: HOSPITAL | Age: 64
End: 2024-06-21
Payer: COMMERCIAL

## 2024-06-21 DIAGNOSIS — E11.8 CONTROLLED TYPE 2 DIABETES MELLITUS WITH COMPLICATION, WITHOUT LONG-TERM CURRENT USE OF INSULIN: ICD-10-CM

## 2024-06-21 DIAGNOSIS — E78.2 MIXED HYPERLIPIDEMIA: ICD-10-CM

## 2024-06-21 DIAGNOSIS — E03.9 HYPOTHYROIDISM, UNSPECIFIED TYPE: ICD-10-CM

## 2024-06-21 DIAGNOSIS — I21.4 NSTEMI (NON-ST ELEVATED MYOCARDIAL INFARCTION): Primary | ICD-10-CM

## 2024-06-21 LAB
ALBUMIN SERPL BCP-MCNC: 4 G/DL (ref 3.5–5.2)
ALP SERPL-CCNC: 70 U/L (ref 55–135)
ALT SERPL W/O P-5'-P-CCNC: 30 U/L (ref 10–44)
ANION GAP SERPL CALC-SCNC: 15 MMOL/L (ref 8–16)
AST SERPL-CCNC: 47 U/L (ref 10–40)
BASOPHILS # BLD AUTO: 0.11 K/UL (ref 0–0.2)
BASOPHILS NFR BLD: 1.8 % (ref 0–1.9)
BILIRUB SERPL-MCNC: 0.7 MG/DL (ref 0.1–1)
BUN SERPL-MCNC: 11 MG/DL (ref 8–23)
CALCIUM SERPL-MCNC: 9.7 MG/DL (ref 8.7–10.5)
CHLORIDE SERPL-SCNC: 94 MMOL/L (ref 95–110)
CHOLEST SERPL-MCNC: 193 MG/DL (ref 120–199)
CHOLEST/HDLC SERPL: 1.7 {RATIO} (ref 2–5)
CO2 SERPL-SCNC: 19 MMOL/L (ref 23–29)
CREAT SERPL-MCNC: 1 MG/DL (ref 0.5–1.4)
DIFFERENTIAL METHOD BLD: ABNORMAL
EOSINOPHIL # BLD AUTO: 0.5 K/UL (ref 0–0.5)
EOSINOPHIL NFR BLD: 8.7 % (ref 0–8)
ERYTHROCYTE [DISTWIDTH] IN BLOOD BY AUTOMATED COUNT: 13.5 % (ref 11.5–14.5)
EST. GFR  (NO RACE VARIABLE): >60 ML/MIN/1.73 M^2
ESTIMATED AVG GLUCOSE: 85 MG/DL (ref 68–131)
GLUCOSE SERPL-MCNC: 63 MG/DL (ref 70–110)
HBA1C MFR BLD: 4.6 % (ref 4–5.6)
HCT VFR BLD AUTO: 31.4 % (ref 37–48.5)
HDLC SERPL-MCNC: 114 MG/DL (ref 40–75)
HDLC SERPL: 59.1 % (ref 20–50)
HGB BLD-MCNC: 10.8 G/DL (ref 12–16)
IMM GRANULOCYTES # BLD AUTO: 0.02 K/UL (ref 0–0.04)
IMM GRANULOCYTES NFR BLD AUTO: 0.3 % (ref 0–0.5)
LDLC SERPL CALC-MCNC: 68.2 MG/DL (ref 63–159)
LYMPHOCYTES # BLD AUTO: 2 K/UL (ref 1–4.8)
LYMPHOCYTES NFR BLD: 33.3 % (ref 18–48)
MCH RBC QN AUTO: 35.9 PG (ref 27–31)
MCHC RBC AUTO-ENTMCNC: 34.4 G/DL (ref 32–36)
MCV RBC AUTO: 104 FL (ref 82–98)
MONOCYTES # BLD AUTO: 0.8 K/UL (ref 0.3–1)
MONOCYTES NFR BLD: 12.5 % (ref 4–15)
NEUTROPHILS # BLD AUTO: 2.6 K/UL (ref 1.8–7.7)
NEUTROPHILS NFR BLD: 43.4 % (ref 38–73)
NONHDLC SERPL-MCNC: 79 MG/DL
NRBC BLD-RTO: 0 /100 WBC
PLATELET # BLD AUTO: 306 K/UL (ref 150–450)
PMV BLD AUTO: 8.8 FL (ref 9.2–12.9)
POTASSIUM SERPL-SCNC: 4 MMOL/L (ref 3.5–5.1)
PROT SERPL-MCNC: 7.7 G/DL (ref 6–8.4)
RBC # BLD AUTO: 3.01 M/UL (ref 4–5.4)
SODIUM SERPL-SCNC: 128 MMOL/L (ref 136–145)
T4 FREE SERPL-MCNC: 1.1 NG/DL (ref 0.71–1.51)
TRIGL SERPL-MCNC: 54 MG/DL (ref 30–150)
TSH SERPL DL<=0.005 MIU/L-ACNC: 1.43 UIU/ML (ref 0.4–4)
WBC # BLD AUTO: 6.09 K/UL (ref 3.9–12.7)

## 2024-06-21 PROCEDURE — 85025 COMPLETE CBC W/AUTO DIFF WBC: CPT | Performed by: FAMILY MEDICINE

## 2024-06-21 PROCEDURE — 83036 HEMOGLOBIN GLYCOSYLATED A1C: CPT | Performed by: FAMILY MEDICINE

## 2024-06-21 PROCEDURE — 84443 ASSAY THYROID STIM HORMONE: CPT | Performed by: FAMILY MEDICINE

## 2024-06-21 PROCEDURE — 80053 COMPREHEN METABOLIC PANEL: CPT | Performed by: FAMILY MEDICINE

## 2024-06-21 PROCEDURE — 93798 PHYS/QHP OP CAR RHAB W/ECG: CPT

## 2024-06-21 PROCEDURE — 80061 LIPID PANEL: CPT | Performed by: FAMILY MEDICINE

## 2024-06-21 PROCEDURE — 84439 ASSAY OF FREE THYROXINE: CPT | Performed by: FAMILY MEDICINE

## 2024-06-21 PROCEDURE — 36415 COLL VENOUS BLD VENIPUNCTURE: CPT | Mod: PO | Performed by: FAMILY MEDICINE

## 2024-06-24 ENCOUNTER — CLINICAL SUPPORT (OUTPATIENT)
Dept: CARDIAC REHAB | Facility: HOSPITAL | Age: 64
End: 2024-06-24
Payer: COMMERCIAL

## 2024-06-24 DIAGNOSIS — I21.4 NSTEMI (NON-ST ELEVATED MYOCARDIAL INFARCTION): Primary | ICD-10-CM

## 2024-06-24 PROCEDURE — 93798 PHYS/QHP OP CAR RHAB W/ECG: CPT

## 2024-06-24 RX ORDER — LANOLIN ALCOHOL/MO/W.PET/CERES
1 CREAM (GRAM) TOPICAL
Qty: 30 TABLET | Refills: 0 | Status: SHIPPED | OUTPATIENT
Start: 2024-06-24

## 2024-06-26 ENCOUNTER — CLINICAL SUPPORT (OUTPATIENT)
Dept: CARDIAC REHAB | Facility: HOSPITAL | Age: 64
End: 2024-06-26
Payer: COMMERCIAL

## 2024-06-26 DIAGNOSIS — I21.4 NSTEMI (NON-ST ELEVATED MYOCARDIAL INFARCTION): Primary | ICD-10-CM

## 2024-06-26 PROCEDURE — 93798 PHYS/QHP OP CAR RHAB W/ECG: CPT

## 2024-06-28 ENCOUNTER — CLINICAL SUPPORT (OUTPATIENT)
Dept: CARDIAC REHAB | Facility: HOSPITAL | Age: 64
End: 2024-06-28
Payer: COMMERCIAL

## 2024-06-28 DIAGNOSIS — E87.1 HYPONATREMIA: Primary | ICD-10-CM

## 2024-06-28 DIAGNOSIS — I21.4 NSTEMI (NON-ST ELEVATED MYOCARDIAL INFARCTION): Primary | ICD-10-CM

## 2024-06-28 PROCEDURE — 93798 PHYS/QHP OP CAR RHAB W/ECG: CPT

## 2024-07-01 ENCOUNTER — CLINICAL SUPPORT (OUTPATIENT)
Dept: CARDIAC REHAB | Facility: HOSPITAL | Age: 64
End: 2024-07-01
Payer: COMMERCIAL

## 2024-07-01 DIAGNOSIS — I21.4 NSTEMI (NON-ST ELEVATED MYOCARDIAL INFARCTION): Primary | ICD-10-CM

## 2024-07-01 PROCEDURE — 93798 PHYS/QHP OP CAR RHAB W/ECG: CPT

## 2024-07-03 ENCOUNTER — CLINICAL SUPPORT (OUTPATIENT)
Dept: CARDIAC REHAB | Facility: HOSPITAL | Age: 64
End: 2024-07-03
Payer: COMMERCIAL

## 2024-07-03 DIAGNOSIS — I21.4 NSTEMI (NON-ST ELEVATED MYOCARDIAL INFARCTION): Primary | ICD-10-CM

## 2024-07-03 PROCEDURE — 93798 PHYS/QHP OP CAR RHAB W/ECG: CPT

## 2024-07-05 ENCOUNTER — CLINICAL SUPPORT (OUTPATIENT)
Dept: CARDIAC REHAB | Facility: HOSPITAL | Age: 64
End: 2024-07-05
Payer: COMMERCIAL

## 2024-07-05 DIAGNOSIS — I21.4 NSTEMI (NON-ST ELEVATED MYOCARDIAL INFARCTION): Primary | ICD-10-CM

## 2024-07-05 PROCEDURE — 93798 PHYS/QHP OP CAR RHAB W/ECG: CPT

## 2024-07-08 ENCOUNTER — CLINICAL SUPPORT (OUTPATIENT)
Dept: CARDIAC REHAB | Facility: HOSPITAL | Age: 64
End: 2024-07-08
Payer: COMMERCIAL

## 2024-07-08 DIAGNOSIS — I21.4 NSTEMI (NON-ST ELEVATED MYOCARDIAL INFARCTION): Primary | ICD-10-CM

## 2024-07-08 PROCEDURE — 93798 PHYS/QHP OP CAR RHAB W/ECG: CPT

## 2024-07-10 ENCOUNTER — CLINICAL SUPPORT (OUTPATIENT)
Dept: CARDIAC REHAB | Facility: HOSPITAL | Age: 64
End: 2024-07-10
Payer: COMMERCIAL

## 2024-07-10 DIAGNOSIS — I21.4 NSTEMI (NON-ST ELEVATED MYOCARDIAL INFARCTION): Primary | ICD-10-CM

## 2024-07-10 PROCEDURE — 93798 PHYS/QHP OP CAR RHAB W/ECG: CPT

## 2024-07-11 ENCOUNTER — TELEPHONE (OUTPATIENT)
Dept: FAMILY MEDICINE | Facility: CLINIC | Age: 64
End: 2024-07-11
Payer: COMMERCIAL

## 2024-07-11 NOTE — TELEPHONE ENCOUNTER
----- Message from Millicent Muller sent at 7/11/2024 12:09 PM CDT -----  Contact: self  Type: Sooner Appointment Request        Caller is requesting a sooner appointment. Caller declined first available appointment listed below. Caller will not accept being placed on the waitlist and is requesting a message be sent to doctor.        Name of Caller: Patient   Best Call Back Number: [phone]  Additional Information: Pt has been having bad sinus headaches but the pt is not suppose to take OTC medication. Pt would like a call to see what would be safe to take for her sinus headaches   along with her heart medication so they wont interfere. Thanks

## 2024-07-12 ENCOUNTER — CLINICAL SUPPORT (OUTPATIENT)
Dept: CARDIAC REHAB | Facility: HOSPITAL | Age: 64
End: 2024-07-12
Payer: COMMERCIAL

## 2024-07-12 DIAGNOSIS — I21.4 NSTEMI (NON-ST ELEVATED MYOCARDIAL INFARCTION): Primary | ICD-10-CM

## 2024-07-12 PROCEDURE — 93798 PHYS/QHP OP CAR RHAB W/ECG: CPT

## 2024-07-15 ENCOUNTER — CLINICAL SUPPORT (OUTPATIENT)
Dept: CARDIAC REHAB | Facility: HOSPITAL | Age: 64
End: 2024-07-15
Payer: COMMERCIAL

## 2024-07-15 DIAGNOSIS — I21.4 NSTEMI (NON-ST ELEVATED MYOCARDIAL INFARCTION): Primary | ICD-10-CM

## 2024-07-15 PROCEDURE — 93798 PHYS/QHP OP CAR RHAB W/ECG: CPT

## 2024-07-16 ENCOUNTER — TELEPHONE (OUTPATIENT)
Dept: FAMILY MEDICINE | Facility: CLINIC | Age: 64
End: 2024-07-16
Payer: COMMERCIAL

## 2024-07-16 NOTE — TELEPHONE ENCOUNTER
Avoid decongestants like sudafed.  OK to take non-sedating antihistamines like claritin, zyrtec, allegra or similar.  Avoid benadryl.  OK to take steroid nasal sprays like fluticasone.  Ok to use nasal saline spray.      If sinus pain, fever, symptoms more than 10 days then she should make an appt any provider for possible sinusitis

## 2024-07-16 NOTE — TELEPHONE ENCOUNTER
Returned patients call in regards to wanting to know what to take for a her sinuses due to cardiac medications.   Patient stated her glands are now swollen under her neck and she's really uncomfortable plus the sinus pressure.   Scheduled office visit for patient to come in and be assessed, advised to arrive 15 mins early. Verbalized understanding.

## 2024-07-16 NOTE — TELEPHONE ENCOUNTER
----- Message from Michelle Antonioashley sent at 7/16/2024  9:58 AM CDT -----  Regarding: Patient Returning Call  Contact: patient at [phone]  Type:  Patient Returning Call    Who Called:  patient at [phone]    Who Left Message for Patient:  Graham Manzo LPN  Does the patient know what this is regarding?:  yes  Additional Information:  Please call and advise. Thank you

## 2024-07-17 ENCOUNTER — OFFICE VISIT (OUTPATIENT)
Dept: FAMILY MEDICINE | Facility: CLINIC | Age: 64
End: 2024-07-17
Payer: COMMERCIAL

## 2024-07-17 ENCOUNTER — CLINICAL SUPPORT (OUTPATIENT)
Dept: CARDIAC REHAB | Facility: HOSPITAL | Age: 64
End: 2024-07-17
Payer: COMMERCIAL

## 2024-07-17 VITALS
RESPIRATION RATE: 16 BRPM | BODY MASS INDEX: 28.69 KG/M2 | DIASTOLIC BLOOD PRESSURE: 74 MMHG | HEIGHT: 62 IN | OXYGEN SATURATION: 97 % | WEIGHT: 155.88 LBS | HEART RATE: 72 BPM | SYSTOLIC BLOOD PRESSURE: 140 MMHG

## 2024-07-17 DIAGNOSIS — K11.21 PAROTITIS, ACUTE: ICD-10-CM

## 2024-07-17 DIAGNOSIS — I21.4 NSTEMI (NON-ST ELEVATED MYOCARDIAL INFARCTION): Primary | ICD-10-CM

## 2024-07-17 DIAGNOSIS — J01.40 ACUTE PANSINUSITIS, RECURRENCE NOT SPECIFIED: Primary | ICD-10-CM

## 2024-07-17 PROCEDURE — 99213 OFFICE O/P EST LOW 20 MIN: CPT | Mod: S$GLB,,, | Performed by: FAMILY MEDICINE

## 2024-07-17 PROCEDURE — 3066F NEPHROPATHY DOC TX: CPT | Mod: CPTII,S$GLB,, | Performed by: FAMILY MEDICINE

## 2024-07-17 PROCEDURE — 3077F SYST BP >= 140 MM HG: CPT | Mod: CPTII,S$GLB,, | Performed by: FAMILY MEDICINE

## 2024-07-17 PROCEDURE — 1159F MED LIST DOCD IN RCRD: CPT | Mod: CPTII,S$GLB,, | Performed by: FAMILY MEDICINE

## 2024-07-17 PROCEDURE — 3061F NEG MICROALBUMINURIA REV: CPT | Mod: CPTII,S$GLB,, | Performed by: FAMILY MEDICINE

## 2024-07-17 PROCEDURE — 93798 PHYS/QHP OP CAR RHAB W/ECG: CPT

## 2024-07-17 PROCEDURE — 99999 PR PBB SHADOW E&M-EST. PATIENT-LVL V: CPT | Mod: PBBFAC,,, | Performed by: FAMILY MEDICINE

## 2024-07-17 PROCEDURE — 1160F RVW MEDS BY RX/DR IN RCRD: CPT | Mod: CPTII,S$GLB,, | Performed by: FAMILY MEDICINE

## 2024-07-17 PROCEDURE — 4010F ACE/ARB THERAPY RXD/TAKEN: CPT | Mod: CPTII,S$GLB,, | Performed by: FAMILY MEDICINE

## 2024-07-17 PROCEDURE — 3044F HG A1C LEVEL LT 7.0%: CPT | Mod: CPTII,S$GLB,, | Performed by: FAMILY MEDICINE

## 2024-07-17 PROCEDURE — 3078F DIAST BP <80 MM HG: CPT | Mod: CPTII,S$GLB,, | Performed by: FAMILY MEDICINE

## 2024-07-17 PROCEDURE — 3008F BODY MASS INDEX DOCD: CPT | Mod: CPTII,S$GLB,, | Performed by: FAMILY MEDICINE

## 2024-07-17 RX ORDER — CEPHALEXIN 500 MG/1
500 CAPSULE ORAL 4 TIMES DAILY
Qty: 40 CAPSULE | Refills: 0 | Status: SHIPPED | OUTPATIENT
Start: 2024-07-17

## 2024-07-17 RX ORDER — FLUTICASONE PROPIONATE 50 MCG
1 SPRAY, SUSPENSION (ML) NASAL 2 TIMES DAILY
Qty: 18.2 ML | Refills: 1 | Status: SHIPPED | OUTPATIENT
Start: 2024-07-17

## 2024-07-17 NOTE — PATIENT INSTRUCTIONS
Push fluids intake.  Drink plenty of water.  And sour lemon drops.  And warm compress to left parotid three times a day.    Contact your PCP if any worsening or for any new concerns as we discussed.

## 2024-07-17 NOTE — PROGRESS NOTES
Subjective:       Patient ID: Cristin Segal is a 63 y.o. female.    Chief Complaint: No chief complaint on file.    New to me patient here for UC visit.  Sinus congestion and HA in frontal and maxillary areas.  Swelling left side of face.  No fever.  Hx of allergies; on Claritin.  S/p recent Coronary stent.  Occ cough, no SOB or pleuritic pain.    Review of Systems   Constitutional:  Negative for fever.   HENT:  Positive for congestion, rhinorrhea, sinus pressure and sinus pain.    Respiratory:  Negative for shortness of breath.    Cardiovascular:  Negative for chest pain.   Gastrointestinal:  Negative for abdominal pain and nausea.   Skin:  Negative for rash.   All other systems reviewed and are negative.      Objective:      Physical Exam  Vitals reviewed.   Constitutional:       General: She is not in acute distress.     Appearance: She is well-developed.   HENT:      Head:        Right Ear: Tympanic membrane normal. Tympanic membrane is not erythematous.      Left Ear: Tympanic membrane normal. Tympanic membrane is not erythematous.      Nose: Mucosal edema present.      Right Sinus: No maxillary sinus tenderness.      Left Sinus: No maxillary sinus tenderness.      Mouth/Throat:      Pharynx: Posterior oropharyngeal erythema present.     Cardiovascular:      Rate and Rhythm: Normal rate and regular rhythm.      Heart sounds: No murmur heard.  Pulmonary:      Effort: Pulmonary effort is normal.      Breath sounds: Normal breath sounds.   Musculoskeletal:      Cervical back: Neck supple.   Lymphadenopathy:      Cervical: No cervical adenopathy.   Skin:     General: Skin is warm and dry.   Neurological:      Mental Status: She is alert.         Assessment:       1. Acute pansinusitis, recurrence not specified    2. Parotitis, acute        Plan:     Pt has had Cefazolin and Keflex in the past with no reaction.    Acute pansinusitis, recurrence not specified  -     fluticasone propionate (FLONASE) 50  mcg/actuation nasal spray; 1 spray (50 mcg total) by Each Nostril route 2 (two) times a day.  Dispense: 18.2 mL; Refill: 1    Parotitis, acute  -     cephALEXin (KEFLEX) 500 MG capsule; Take 1 capsule (500 mg total) by mouth 4 (four) times daily.  Dispense: 40 capsule; Refill: 0      Patient Instructions   Push fluids intake.  Drink plenty of water.  And sour lemon drops.  And warm compress to left parotid three times a day.    Contact your PCP if any worsening or for any new concerns as we discussed.

## 2024-07-19 ENCOUNTER — CLINICAL SUPPORT (OUTPATIENT)
Dept: CARDIAC REHAB | Facility: HOSPITAL | Age: 64
End: 2024-07-19
Payer: COMMERCIAL

## 2024-07-19 DIAGNOSIS — I21.4 NSTEMI (NON-ST ELEVATED MYOCARDIAL INFARCTION): Primary | ICD-10-CM

## 2024-07-19 PROCEDURE — 93798 PHYS/QHP OP CAR RHAB W/ECG: CPT

## 2024-07-22 ENCOUNTER — CLINICAL SUPPORT (OUTPATIENT)
Dept: CARDIAC REHAB | Facility: HOSPITAL | Age: 64
End: 2024-07-22
Payer: COMMERCIAL

## 2024-07-22 DIAGNOSIS — I21.4 NSTEMI (NON-ST ELEVATED MYOCARDIAL INFARCTION): Primary | ICD-10-CM

## 2024-07-22 PROCEDURE — 93798 PHYS/QHP OP CAR RHAB W/ECG: CPT

## 2024-07-23 ENCOUNTER — TELEPHONE (OUTPATIENT)
Dept: FAMILY MEDICINE | Facility: CLINIC | Age: 64
End: 2024-07-23
Payer: COMMERCIAL

## 2024-07-23 DIAGNOSIS — B37.31 VAGINITIS DUE TO CANDIDA: Primary | ICD-10-CM

## 2024-07-23 NOTE — TELEPHONE ENCOUNTER
----- Message from Britany Yadav sent at 7/23/2024  1:19 PM CDT -----  Regarding: advice  Contact: patient  Type: Needs Medical Advice  Who Called:  patient  Symptoms (please be specific):  yeast infection  How long has patient had these symptoms:    Pharmacy name and phone #:    [id number] - [place], [place] - [address] AT [facility]  [address]  [place] [address]  Phone: [phone] Fax: [phone]    Best Call Back Number: [phone] (home)     Additional Information: Please call patient to advise.  Thanks!

## 2024-07-23 NOTE — TELEPHONE ENCOUNTER
Patient would like something called in for a yeast infection. Symptoms present x 3 days. OTC not working. Will forward message to provider for further assistance.

## 2024-07-24 ENCOUNTER — CLINICAL SUPPORT (OUTPATIENT)
Dept: CARDIAC REHAB | Facility: HOSPITAL | Age: 64
End: 2024-07-24
Payer: COMMERCIAL

## 2024-07-24 ENCOUNTER — TELEPHONE (OUTPATIENT)
Dept: FAMILY MEDICINE | Facility: CLINIC | Age: 64
End: 2024-07-24
Payer: COMMERCIAL

## 2024-07-24 DIAGNOSIS — I21.4 NSTEMI (NON-ST ELEVATED MYOCARDIAL INFARCTION): Primary | ICD-10-CM

## 2024-07-24 PROCEDURE — 93798 PHYS/QHP OP CAR RHAB W/ECG: CPT

## 2024-07-24 NOTE — TELEPHONE ENCOUNTER
----- Message from Martha Quintero sent at 7/24/2024  4:19 PM CDT -----  Contact: pt [phone]  Type: Needs Medical Advice  Who Called:  Pt     Best Call Back Number: [phone]    Additional Information: Pt calling to f/u on message from yesterday . Pt stated she does not feel she has a yeast infection but rather has vaginal itchiness and frequent urination. Pt stated she thinks this is due to cephALEXin (KEFLEX) 500 MG capsule That she is taking from Dr Garg. Pls call back and advise

## 2024-07-26 ENCOUNTER — PATIENT MESSAGE (OUTPATIENT)
Dept: FAMILY MEDICINE | Facility: CLINIC | Age: 64
End: 2024-07-26
Payer: COMMERCIAL

## 2024-07-26 RX ORDER — FLUCONAZOLE 150 MG/1
TABLET ORAL
Qty: 2 TABLET | Refills: 0 | Status: SHIPPED | OUTPATIENT
Start: 2024-07-26

## 2024-07-26 NOTE — TELEPHONE ENCOUNTER
It does sound suspicious for a yeast infection since she is on abx and main sx is itching.  I called in the diflucan. If symptoms persist or Monitor for worsening symptoms and return to clinic or go to the ER if these occur: fever > 100.4, severe abdominal pain, intractable vomiting, bleeding from the rectum or black tarry stools, dehydration, lethargy or other worsening symptoms.

## 2024-07-31 ENCOUNTER — OFFICE VISIT (OUTPATIENT)
Dept: CARDIOLOGY | Facility: CLINIC | Age: 64
End: 2024-07-31
Payer: COMMERCIAL

## 2024-07-31 VITALS
BODY MASS INDEX: 28.03 KG/M2 | HEART RATE: 70 BPM | WEIGHT: 152.31 LBS | SYSTOLIC BLOOD PRESSURE: 116 MMHG | OXYGEN SATURATION: 96 % | DIASTOLIC BLOOD PRESSURE: 64 MMHG | HEIGHT: 62 IN

## 2024-07-31 DIAGNOSIS — I35.0 AORTIC STENOSIS, MODERATE: ICD-10-CM

## 2024-07-31 DIAGNOSIS — Z72.0 TOBACCO ABUSE: ICD-10-CM

## 2024-07-31 DIAGNOSIS — E78.5 DYSLIPIDEMIA: ICD-10-CM

## 2024-07-31 DIAGNOSIS — I25.2 HX OF NON-ST ELEVATION MYOCARDIAL INFARCTION (NSTEMI): ICD-10-CM

## 2024-07-31 DIAGNOSIS — I25.10 CORONARY ARTERY DISEASE INVOLVING NATIVE CORONARY ARTERY OF NATIVE HEART WITHOUT ANGINA PECTORIS: Primary | ICD-10-CM

## 2024-07-31 DIAGNOSIS — Z95.5 STATUS POST INSERTION OF DRUG ELUTING CORONARY ARTERY STENT: ICD-10-CM

## 2024-07-31 PROCEDURE — 3078F DIAST BP <80 MM HG: CPT | Mod: CPTII,S$GLB,, | Performed by: INTERNAL MEDICINE

## 2024-07-31 PROCEDURE — 1159F MED LIST DOCD IN RCRD: CPT | Mod: CPTII,S$GLB,, | Performed by: INTERNAL MEDICINE

## 2024-07-31 PROCEDURE — 99214 OFFICE O/P EST MOD 30 MIN: CPT | Mod: S$GLB,,, | Performed by: INTERNAL MEDICINE

## 2024-07-31 PROCEDURE — 3061F NEG MICROALBUMINURIA REV: CPT | Mod: CPTII,S$GLB,, | Performed by: INTERNAL MEDICINE

## 2024-07-31 PROCEDURE — 4010F ACE/ARB THERAPY RXD/TAKEN: CPT | Mod: CPTII,S$GLB,, | Performed by: INTERNAL MEDICINE

## 2024-07-31 PROCEDURE — 3066F NEPHROPATHY DOC TX: CPT | Mod: CPTII,S$GLB,, | Performed by: INTERNAL MEDICINE

## 2024-07-31 PROCEDURE — 3074F SYST BP LT 130 MM HG: CPT | Mod: CPTII,S$GLB,, | Performed by: INTERNAL MEDICINE

## 2024-07-31 PROCEDURE — 3044F HG A1C LEVEL LT 7.0%: CPT | Mod: CPTII,S$GLB,, | Performed by: INTERNAL MEDICINE

## 2024-07-31 PROCEDURE — 99999 PR PBB SHADOW E&M-EST. PATIENT-LVL IV: CPT | Mod: PBBFAC,,, | Performed by: INTERNAL MEDICINE

## 2024-07-31 PROCEDURE — 3008F BODY MASS INDEX DOCD: CPT | Mod: CPTII,S$GLB,, | Performed by: INTERNAL MEDICINE

## 2024-07-31 RX ORDER — ROSUVASTATIN CALCIUM 5 MG/1
5 TABLET, COATED ORAL DAILY
Qty: 90 TABLET | Refills: 3 | Status: SHIPPED | OUTPATIENT
Start: 2024-07-31 | End: 2024-07-31

## 2024-07-31 RX ORDER — METOPROLOL SUCCINATE 25 MG/1
25 TABLET, EXTENDED RELEASE ORAL DAILY
Qty: 90 TABLET | Refills: 3 | Status: SHIPPED | OUTPATIENT
Start: 2024-07-31 | End: 2024-07-31

## 2024-07-31 RX ORDER — ROSUVASTATIN CALCIUM 5 MG/1
5 TABLET, COATED ORAL DAILY
Qty: 90 TABLET | Refills: 3 | Status: SHIPPED | OUTPATIENT
Start: 2024-07-31 | End: 2025-07-31

## 2024-07-31 RX ORDER — CLOPIDOGREL BISULFATE 75 MG/1
75 TABLET ORAL DAILY
Qty: 90 TABLET | Refills: 3 | Status: SHIPPED | OUTPATIENT
Start: 2024-07-31 | End: 2024-07-31

## 2024-07-31 RX ORDER — METOPROLOL SUCCINATE 25 MG/1
25 TABLET, EXTENDED RELEASE ORAL DAILY
Qty: 90 TABLET | Refills: 3 | Status: SHIPPED | OUTPATIENT
Start: 2024-07-31 | End: 2025-07-31

## 2024-07-31 RX ORDER — CLOPIDOGREL BISULFATE 75 MG/1
75 TABLET ORAL DAILY
Qty: 90 TABLET | Refills: 3 | Status: SHIPPED | OUTPATIENT
Start: 2024-07-31 | End: 2025-07-31

## 2024-07-31 NOTE — PROGRESS NOTES
[facility]    Subjective:     Patient ID:  Cristin Segal is a 63 y.o. female patient here for evaluation Results (Carotid US . Took nitro last week . )      HPI:  63-year-old female here for follow-up.  Had history of PCI of RCA earlier in the year.  Doing well.  Completed cardiac rehab.  No exertional chest pain or shortness of breath.  Had an episode of chest tightness while lying down at night about 2 weeks ago for which she took 1 nitro and then slept off.  Has been doing okay since then without any exertional chest pain or shortness of breath.    Review of Systems   All other systems reviewed and are negative.       Past Medical History:   Diagnosis Date    Arthritis     Cataract     Diabetes mellitus type II     diet controlled    Fracture     left 4th finger  - splinted    Hyperlipidemia     MCTD (mixed connective tissue disease)     Raynaud's disease     Thyroid disease     Hypothyroidism    Wears glasses     contacs       Past Surgical History:   Procedure Laterality Date    CARPAL TUNNEL RELEASE      right    CATARACT EXTRACTION W/ INTRAOCULAR LENS  IMPLANT, BILATERAL      EXCISION OF PAROTID GLAND Right 8/1/2019    Procedure: PAROTIDECTOMY;  Surgeon: Abner Maki MD;  Location: [facility];  Service: ENT;  Laterality: Right;    HIP SURGERY Left 6/18/15    JANA    INJECTION OF ANESTHETIC AGENT AROUND MULTIPLE INTERCOSTAL NERVES Left 2/9/2023    Procedure: BLOCK, NERVE, INTERCOSTAL, 2 OR MORE;  Surgeon: Isaiah Allan MD;  Location: [address];  Service: Thoracic;  Laterality: Left;    IVUS, CORONARY  4/9/2024    Procedure: IVUS, Coronary;  Surgeon: Horacio Lee MD;  Location: [facility] CATH/EP LAB;  Service: Cardiology;;    JOINT REPLACEMENT Bilateral     HIP    KNEE CARTILAGE SURGERY      right    LEFT HEART CATHETERIZATION Left 4/9/2024    Procedure: Left heart cath;  Surgeon: Horacio Lee MD;  Location: [facility] CATH/EP LAB;   Service: Cardiology;  Laterality: Left;    LYMPHADENECTOMY Left 2023    Procedure: LYMPHADENECTOMY;  Surgeon: Isaiah Allan MD;  [facility];  Service: Thoracic;  Laterality: Left;    PERCUTANEOUS CORONARY INTERVENTION, ARTERY N/A 2024    Procedure: Percutaneous coronary intervention;  Surgeon: Horacio Lee MD;  Location: [facility] CATH/EP LAB;  Service: Cardiology;  Laterality: N/A;    TOTAL HIP ARTHROPLASTY Right 2016    JNAA    XI ROBOTIC RATS,WITH LOBECTOMY,LUNG Left 2023    Procedure: XI ROBOTIC RATS,WITH LEFT UPPER LOBECTOMY,LUNG;  Surgeon: Isaiah Allan MD;  [facility];  Service: Thoracic;  Laterality: Left;  extended lingulectomy       Family History   Problem Relation Name Age of Onset    Diabetes Mother      Kidney disease Mother      Aneurysm Mother  73        brain    Hyperlipidemia Mother      Hypertension Sister      Breast cancer Sister      Diabetes Sister      Ovarian cancer Neg Hx         Social History     Socioeconomic History    Marital status:    Occupational History     Employer: [employer]   Tobacco Use    Smoking status: Former     Current packs/day: 0.00     Average packs/day: 0.5 packs/day for 45.1 years (22.6 ttl pk-yrs)     Types: Cigarettes     Start date:      Quit date: 2023     Years since quittin.4     Passive exposure: Past    Smokeless tobacco: Never   Substance and Sexual Activity    Alcohol use: Yes     Comment: occasional    Drug use: No    Sexual activity: Not Currently     Birth control/protection: Post-menopausal       Current Outpatient Medications   Medication Sig Dispense Refill    acetaminophen (TYLENOL) 500 MG tablet Take 1 tablet (500 mg total) by mouth every 6 (six) hours as needed for Pain.  0    albuterol (VENTOLIN HFA) 90 mcg/actuation inhaler Inhale 2 puffs into the lungs every 6 (six) hours as needed for Wheezing or Shortness of Breath. Rescue 8 g 0    aspirin 81 MG Chew  Take 1 tablet (81 mg total) by mouth once daily.      cephALEXin (KEFLEX) 500 MG capsule Take 1 capsule (500 mg total) by mouth 4 (four) times daily. 40 capsule 0    fluconazole (DIFLUCAN) 150 MG Tab 1 po x 1, may repeat in 48 h prn 2 tablet 0    fluticasone propionate (FLONASE) 50 mcg/actuation nasal spray 1 spray (50 mcg total) by Each Nostril route 2 (two) times a day. 18.2 mL 1    folic acid-vit B6-vit B12 2.5-25-2 mg (WESTAB MAX) 2.5-25-2 mg Tab Take 1 tablet by mouth once daily. 90 tablet 3    gabapentin (NEURONTIN) 100 MG capsule Take 100 mg by mouth.      hydrOXYchloroQUINE (PLAQUENIL) 200 mg tablet Take 1 tablet (200 mg total) by mouth once daily. 90 tablet 1    levothyroxine (SYNTHROID) 50 MCG tablet TAKE 1 TABLET BY MOUTH EVERY DAY BEFORE BREAKFAST 90 tablet 3    loratadine (CLARITIN) 10 mg tablet Take 10 mg by mouth once daily.      magnesium oxide (MAG-OX) 400 mg (241.3 mg magnesium) tablet TAKE 1 TABLET BY MOUTH ONCE DAILY 30 tablet 0    meloxicam (MOBIC) 7.5 MG tablet Take 7.5 mg by mouth once daily.      multivit-min-FA-lycopen-lutein (CENTRUM SILVER) 0.4 mg-300 mcg- 250 mcg Tab Take 1 tablet by mouth once daily.      pantoprazole (PROTONIX) 40 MG tablet Take 1 tablet (40 mg total) by mouth once daily. 90 tablet 3    potassium chloride SA (K-DUR,KLOR-CON) 20 MEQ tablet Take 1 tablet (20 mEq total) by mouth once daily. 90 tablet 3    traMADoL (ULTRAM) 50 mg tablet Take 50 mg by mouth every evening.      clopidogreL (PLAVIX) 75 mg tablet Take 1 tablet (75 mg total) by mouth once daily. 90 tablet 3    metoprolol succinate (TOPROL-XL) 25 MG 24 hr tablet Take 1 tablet (25 mg total) by mouth once daily. 90 tablet 3    nitroGLYCERIN (NITROSTAT) 0.4 MG SL tablet Place 1 tablet (0.4 mg total) under the tongue every 5 (five) minutes as needed for Chest pain. 30 tablet 0    rosuvastatin (CRESTOR) 5 MG tablet Take 1 tablet (5 mg total) by mouth once daily. 90 tablet 3     No current facility-administered  medications for this visit.     Facility-Administered Medications Ordered in Other Visits   Medication Dose Route Frequency Provider Last Rate Last Admin    lactated ringers infusion   Intravenous Continuous Kev Bai MD           Review of patient's allergies indicates:   Allergen Reactions    Pcn [penicillins] Anaphylaxis     Unknown for her- family history with anaphylaxis    Lipitor [atorvastatin]          Objective:        Vitals:    07/31/24 1133   BP: 116/64   Pulse: 70       Physical Exam  Vitals reviewed.   Constitutional:       Appearance: Normal appearance.   HENT:      Mouth/Throat:      Mouth: Mucous membranes are moist.   Eyes:      Pupils: Pupils are equal, round, and reactive to light.   Cardiovascular:      Rate and Rhythm: Normal rate and regular rhythm.      Pulses: Normal pulses.      Heart sounds: Normal heart sounds. No murmur heard.     No gallop.   Pulmonary:      Effort: Pulmonary effort is normal.      Breath sounds: Normal breath sounds.   Skin:     General: Skin is warm and dry.   Neurological:      General: No focal deficit present.      Mental Status: She is alert and oriented to person, place, and time.         LIPIDS - LAST 2   Lab Results   Component Value Date    CHOL 193 06/21/2024    CHOL 209 (H) 04/09/2024     (H) 06/21/2024     (H) 04/09/2024    LDLCALC 68.2 06/21/2024    LDLCALC 65.4 04/09/2024    TRIG 54 06/21/2024    TRIG 78 04/09/2024    CHOLHDL 59.1 (H) 06/21/2024    CHOLHDL 61.2 (H) 04/09/2024       CBC - LAST 2  Lab Results   Component Value Date    WBC 6.09 06/21/2024    WBC 5.25 04/10/2024    RBC 3.01 (L) 06/21/2024    RBC 2.96 (L) 04/10/2024    HGB 10.8 (L) 06/21/2024    HGB 10.7 (L) 04/10/2024    HCT 31.4 (L) 06/21/2024    HCT 30.7 (L) 04/10/2024     (H) 06/21/2024     (H) 04/10/2024    MCH 35.9 (H) 06/21/2024    MCH 36.1 (H) 04/10/2024    MCHC 34.4 06/21/2024    MCHC 34.9 04/10/2024    RDW 13.5 06/21/2024    RDW 13.1 04/10/2024    PLT  306 06/21/2024     04/10/2024    MPV 8.8 (L) 06/21/2024    MPV 8.9 (L) 04/10/2024    GRAN 2.6 06/21/2024    GRAN 43.4 06/21/2024    LYMPH 2.0 06/21/2024    LYMPH 33.3 06/21/2024    MONO 0.8 06/21/2024    MONO 12.5 06/21/2024    BASO 0.11 06/21/2024    BASO 0.05 04/10/2024    NRBC 0 06/21/2024    NRBC 0 04/10/2024       CHEMISTRY & LIVER FUNCTION - LAST 2  Lab Results   Component Value Date     (L) 06/21/2024     (L) 04/10/2024    K 4.0 06/21/2024    K 3.8 04/10/2024    CL 94 (L) 06/21/2024     04/10/2024    CO2 19 (L) 06/21/2024    CO2 25 04/10/2024    ANIONGAP 15 06/21/2024    ANIONGAP 8 04/10/2024    BUN 11 06/21/2024    BUN 13 04/10/2024    CREATININE 1.0 06/21/2024    CREATININE 1.0 04/10/2024    GLU 63 (L) 06/21/2024    GLU 79 04/10/2024    CALCIUM 9.7 06/21/2024    CALCIUM 8.9 04/10/2024    MG 1.4 (L) 04/10/2024    MG 1.9 04/09/2024    ALBUMIN 4.0 06/21/2024    ALBUMIN 3.9 04/10/2024    PROT 7.7 06/21/2024    PROT 6.7 04/10/2024    ALKPHOS 70 06/21/2024    ALKPHOS 56 04/10/2024    ALT 30 06/21/2024    ALT 18 04/10/2024    AST 47 (H) 06/21/2024    AST 22 04/10/2024    BILITOT 0.7 06/21/2024    BILITOT 0.4 04/10/2024        CARDIAC PROFILE - LAST 2  Lab Results   Component Value Date     (H) 04/08/2024    CPK 89 02/03/2020     (H) 03/30/2017    CPKMB 3.8 02/03/2020    TROPONINI <0.030 02/03/2020        COAGULATION - LAST 2  Lab Results   Component Value Date    LABPT 13.3 11/08/2020    INR 0.9 04/08/2024    INR 1.0 01/06/2023    APTT 37.0 (H) 04/09/2024    APTT 54.1 (H) 04/09/2024       ENDOCRINE & PSA - LAST 2  Lab Results   Component Value Date    HGBA1C 4.6 06/21/2024    HGBA1C 4.5 04/09/2024    TSH 1.432 06/21/2024    TSH 2.340 04/08/2024        ECHOCARDIOGRAM RESULTS  Results for orders placed during the hospital encounter of 02/28/24    Echo    Interpretation Summary    Left Ventricle: There is normal systolic function with a visually estimated ejection fraction of 55  - 70%. There is normal diastolic function.    Right Ventricle: Normal right ventricular cavity size. Wall thickness is normal. Right ventricle wall motion  is normal. Systolic function is normal.    Left Atrium: Left atrium is mildly dilated.    Aortic Valve: Moderately calcified left, right and noncoronary cusps. Moderately restricted motion. There is moderate stenosis. Aortic valve area by VTI is 1.48 cm². Aortic valve peak velocity is 2.88 m/s. Mean gradient is 19 mmHg. The dimensionless index is 0.47. There is mild aortic regurgitation with a centrally directed jet.    IVC/SVC: Normal venous pressure at 3 mmHg.      CURRENT/PREVIOUS VISIT EKG  Results for orders placed or performed during the hospital encounter of 04/08/24   EKG 12-LEAD starting tomorrow    Collection Time: 04/09/24  1:35 PM   Result Value Ref Range    QRS Duration 88 ms    [facility] QTC Calculation 449 ms    Narrative    Test Reason : Z95.5,    Vent. Rate : 071 BPM     Atrial Rate : 071 BPM     P-R Int : 228 ms          QRS Dur : 088 ms      QT Int : 414 ms       P-R-T Axes : 048 039 027 degrees     QTc Int : 449 ms    Sinus rhythm with 1st degree A-V block  Otherwise normal ECG  When compared with ECG of 08-APR-2024 16:30,  Nonspecific T wave abnormality no longer evident in Anterior leads  Confirmed by Eduardo MARTINEZ, Jayant JACQUES ([id number]) on 4/29/2024 11:11:26 PM    Referred By: ZEINAB DOLAN           Confirmed By:Jayant Clark MD     No valid procedures specified.   No results found for this or any previous visit.    No valid procedures specified.        Assessment:       1. Coronary artery disease involving native coronary artery of native heart without angina pectoris    2. Hx of non-ST elevation myocardial infarction (NSTEMI)    3. Status post insertion of drug eluting coronary artery stent    4. Aortic stenosis, moderate    5. Dyslipidemia    6. Tobacco abuse           Plan:       Coronary artery disease involving native coronary artery of native  heart without angina pectoris  -     IN OFFICE EKG 12-LEAD (to Muse)    Hx of non-ST elevation myocardial infarction (NSTEMI)  -     Discontinue: clopidogreL (PLAVIX) 75 mg tablet; Take 1 tablet (75 mg total) by mouth once daily.  Dispense: 90 tablet; Refill: 3  -     Discontinue: metoprolol succinate (TOPROL-XL) 25 MG 24 hr tablet; Take 1 tablet (25 mg total) by mouth once daily.  Dispense: 90 tablet; Refill: 3  -     IN OFFICE EKG 12-LEAD (to Muse)  -     clopidogreL (PLAVIX) 75 mg tablet; Take 1 tablet (75 mg total) by mouth once daily.  Dispense: 90 tablet; Refill: 3  -     metoprolol succinate (TOPROL-XL) 25 MG 24 hr tablet; Take 1 tablet (25 mg total) by mouth once daily.  Dispense: 90 tablet; Refill: 3    Status post insertion of drug eluting coronary artery stent  -     Discontinue: clopidogreL (PLAVIX) 75 mg tablet; Take 1 tablet (75 mg total) by mouth once daily.  Dispense: 90 tablet; Refill: 3  -     clopidogreL (PLAVIX) 75 mg tablet; Take 1 tablet (75 mg total) by mouth once daily.  Dispense: 90 tablet; Refill: 3    Aortic stenosis, moderate  Comments:  echo next year, no significant change between 2021 and 2024.    Dyslipidemia  Comments:  LDL at target, c/w crestor 5  Orders:  -     Discontinue: rosuvastatin (CRESTOR) 5 MG tablet; Take 1 tablet (5 mg total) by mouth once daily.  Dispense: 90 tablet; Refill: 3  -     Discontinue: rosuvastatin (CRESTOR) 5 MG tablet; Take 1 tablet (5 mg total) by mouth once daily.  Dispense: 90 tablet; Refill: 3  -     Discontinue: rosuvastatin (CRESTOR) 5 MG tablet; Take 1 tablet (5 mg total) by mouth once daily.  Dispense: 90 tablet; Refill: 3  -     Discontinue: rosuvastatin (CRESTOR) 5 MG tablet; Take 1 tablet (5 mg total) by mouth once daily.  Dispense: 90 tablet; Refill: 3  -     Discontinue: rosuvastatin (CRESTOR) 5 MG tablet; Take 1 tablet (5 mg total) by mouth once daily.  Dispense: 90 tablet; Refill: 3  -     Discontinue: rosuvastatin (CRESTOR) 5 MG tablet; Take  1 tablet (5 mg total) by mouth once daily.  Dispense: 90 tablet; Refill: 3    Tobacco abuse  Comments:  advised tobacco cessation    Other orders  -     rosuvastatin (CRESTOR) 5 MG tablet; Take 1 tablet (5 mg total) by mouth once daily.  Dispense: 90 tablet; Refill: 3      Follow up in about 6 months (around 1/31/2025) for f/u CAD s/p PCI.          MD [facility] [place]  [place]

## 2024-08-06 DIAGNOSIS — E72.11 HYPERHOMOCYSTEINEMIA: ICD-10-CM

## 2024-08-06 RX ORDER — LANOLIN ALCOHOL/MO/W.PET/CERES
1 CREAM (GRAM) TOPICAL
Qty: 30 TABLET | Refills: 0 | Status: SHIPPED | OUTPATIENT
Start: 2024-08-06

## 2024-08-06 RX ORDER — CYANOCOBALAMIN/FOLIC AC/VIT B6 2-2.5-25MG
1 TABLET ORAL
Qty: 90 TABLET | Refills: 3 | Status: SHIPPED | OUTPATIENT
Start: 2024-08-06

## 2024-08-08 DIAGNOSIS — J01.40 ACUTE PANSINUSITIS, RECURRENCE NOT SPECIFIED: ICD-10-CM

## 2024-08-08 RX ORDER — FLUTICASONE PROPIONATE 50 MCG
SPRAY, SUSPENSION (ML) NASAL
Qty: 48 ML | Refills: 1 | Status: SHIPPED | OUTPATIENT
Start: 2024-08-08

## 2024-08-09 ENCOUNTER — OFFICE VISIT (OUTPATIENT)
Dept: FAMILY MEDICINE | Facility: CLINIC | Age: 64
End: 2024-08-09
Payer: COMMERCIAL

## 2024-08-09 ENCOUNTER — HOSPITAL ENCOUNTER (OUTPATIENT)
Dept: RADIOLOGY | Facility: CLINIC | Age: 64
Discharge: HOME | End: 2024-08-09
Attending: FAMILY MEDICINE
Payer: COMMERCIAL

## 2024-08-09 ENCOUNTER — TELEPHONE (OUTPATIENT)
Dept: RADIOLOGY | Facility: HOSPITAL | Age: 64
End: 2024-08-09

## 2024-08-09 VITALS
SYSTOLIC BLOOD PRESSURE: 108 MMHG | DIASTOLIC BLOOD PRESSURE: 52 MMHG | BODY MASS INDEX: 28.32 KG/M2 | WEIGHT: 153.88 LBS | HEIGHT: 62 IN | TEMPERATURE: 98 F | OXYGEN SATURATION: 94 % | HEART RATE: 80 BPM

## 2024-08-09 DIAGNOSIS — E83.42 HYPOMAGNESEMIA: ICD-10-CM

## 2024-08-09 DIAGNOSIS — Z12.31 OTHER SCREENING MAMMOGRAM: ICD-10-CM

## 2024-08-09 DIAGNOSIS — E08.65 DIABETES MELLITUS DUE TO UNDERLYING CONDITION WITH HYPERGLYCEMIA, WITHOUT LONG-TERM CURRENT USE OF INSULIN: ICD-10-CM

## 2024-08-09 DIAGNOSIS — I10 ESSENTIAL HYPERTENSION: Primary | ICD-10-CM

## 2024-08-09 DIAGNOSIS — E03.9 ACQUIRED HYPOTHYROIDISM: ICD-10-CM

## 2024-08-09 DIAGNOSIS — E87.1 HYPONATREMIA: ICD-10-CM

## 2024-08-09 DIAGNOSIS — R19.5 POSITIVE FIT (FECAL IMMUNOCHEMICAL TEST): ICD-10-CM

## 2024-08-09 DIAGNOSIS — C34.12 MALIGNANT NEOPLASM OF UPPER LOBE OF LEFT LUNG: ICD-10-CM

## 2024-08-09 DIAGNOSIS — M35.1 MCTD (MIXED CONNECTIVE TISSUE DISEASE): ICD-10-CM

## 2024-08-09 DIAGNOSIS — N63.20 MASS OF LEFT BREAST, UNSPECIFIED QUADRANT: Primary | ICD-10-CM

## 2024-08-09 DIAGNOSIS — E78.2 MIXED HYPERLIPIDEMIA: ICD-10-CM

## 2024-08-09 PROCEDURE — 77067 SCR MAMMO BI INCL CAD: CPT | Mod: 26,,, | Performed by: RADIOLOGY

## 2024-08-09 PROCEDURE — 77067 SCR MAMMO BI INCL CAD: CPT | Mod: TC,PO

## 2024-08-09 PROCEDURE — 77063 BREAST TOMOSYNTHESIS BI: CPT | Mod: 26,,, | Performed by: RADIOLOGY

## 2024-08-09 PROCEDURE — 99999 PR PBB SHADOW E&M-EST. PATIENT-LVL V: CPT | Mod: PBBFAC,,, | Performed by: NURSE PRACTITIONER

## 2024-08-12 ENCOUNTER — PATIENT MESSAGE (OUTPATIENT)
Dept: GASTROENTEROLOGY | Facility: CLINIC | Age: 64
End: 2024-08-12
Payer: COMMERCIAL

## 2024-08-14 ENCOUNTER — TELEPHONE (OUTPATIENT)
Dept: GASTROENTEROLOGY | Facility: CLINIC | Age: 64
End: 2024-08-14
Payer: COMMERCIAL

## 2024-09-03 ENCOUNTER — DOCUMENTATION ONLY (OUTPATIENT)
Dept: HEMATOLOGY/ONCOLOGY | Facility: CLINIC | Age: 64
End: 2024-09-03
Payer: COMMERCIAL

## 2024-09-03 ENCOUNTER — OFFICE VISIT (OUTPATIENT)
Dept: PULMONOLOGY | Facility: CLINIC | Age: 64
End: 2024-09-03
Payer: COMMERCIAL

## 2024-09-03 ENCOUNTER — HOSPITAL ENCOUNTER (OUTPATIENT)
Dept: RADIOLOGY | Facility: HOSPITAL | Age: 64
Discharge: HOME | End: 2024-09-03
Attending: PHYSICIAN ASSISTANT
Payer: COMMERCIAL

## 2024-09-03 ENCOUNTER — HOSPITAL ENCOUNTER (OUTPATIENT)
Dept: RADIOLOGY | Facility: HOSPITAL | Age: 64
Discharge: HOME | End: 2024-09-03
Attending: FAMILY MEDICINE
Payer: COMMERCIAL

## 2024-09-03 VITALS — WEIGHT: 153 LBS | HEIGHT: 62 IN | BODY MASS INDEX: 28.16 KG/M2

## 2024-09-03 VITALS
SYSTOLIC BLOOD PRESSURE: 122 MMHG | DIASTOLIC BLOOD PRESSURE: 66 MMHG | OXYGEN SATURATION: 98 % | RESPIRATION RATE: 17 BRPM | HEART RATE: 80 BPM | TEMPERATURE: 97 F | WEIGHT: 153.25 LBS | HEIGHT: 62 IN | BODY MASS INDEX: 28.2 KG/M2

## 2024-09-03 DIAGNOSIS — N63.20 MASS OF LEFT BREAST, UNSPECIFIED QUADRANT: ICD-10-CM

## 2024-09-03 DIAGNOSIS — C34.12 MALIGNANT NEOPLASM OF UPPER LOBE OF LEFT LUNG: Primary | ICD-10-CM

## 2024-09-03 DIAGNOSIS — Z85.118 HISTORY OF LUNG CANCER: Primary | ICD-10-CM

## 2024-09-03 DIAGNOSIS — C34.12 MALIGNANT NEOPLASM OF UPPER LOBE OF LEFT LUNG: ICD-10-CM

## 2024-09-03 DIAGNOSIS — C34.92 MALIGNANT NEOPLASM OF LEFT LUNG, UNSPECIFIED PART OF LUNG: ICD-10-CM

## 2024-09-03 PROCEDURE — 99999 PR PBB SHADOW E&M-EST. PATIENT-LVL IV: CPT | Mod: PBBFAC,,, | Performed by: THORACIC SURGERY (CARDIOTHORACIC VASCULAR SURGERY)

## 2024-09-03 PROCEDURE — 77065 DX MAMMO INCL CAD UNI: CPT | Mod: 26,LT,, | Performed by: RADIOLOGY

## 2024-09-03 PROCEDURE — 77061 BREAST TOMOSYNTHESIS UNI: CPT | Mod: 26,LT,, | Performed by: RADIOLOGY

## 2024-09-03 PROCEDURE — 71250 CT THORAX DX C-: CPT | Mod: 26,,, | Performed by: RADIOLOGY

## 2024-09-03 PROCEDURE — 77061 BREAST TOMOSYNTHESIS UNI: CPT | Mod: TC,PO,LT

## 2024-09-03 PROCEDURE — 71250 CT THORAX DX C-: CPT | Mod: TC,PO

## 2024-09-03 NOTE — PROGRESS NOTES
"Subjective:       Patient ID: Cristin Segal is a 63 y.o. female.    Chief Complaint: Pulmonary Nodules (6 months follow up)    Diagnosis:  CASSIDY Squamous Cell Carcinoma     Pre-operative therapy: none    Procedure(s) and date(s): 02/09/23 - Extended robotic lingulectomy, mediastinal lymph node dissection, intercostal nerve block 2 or more intercostal levels    Pathology: 1.6 cm well differentiated keratinizing squamous cell carcinoma. +VPI. Margins negative (1.8 cm bronchovascular). Levels 5, 7, 8, 10, 11, 12 negative for malignancy. [id number], stage IB    Post-operative therapy:  surveillance    HPI  Patient is a 62 y.o. female smoker with DM, HTN, HLD, MVP, MCTD, left parotid gland nodule who presents to clinic today for 1.5 years follow up s/p robotic assisted lingulectomy for CASSIDY NSCLC. Post operative course uncomplicated. NSTEMI in April 2024. Now on plavix and ASA. Completed cardiac rehab. Now staying active at [employer]. Arm bike x 50 minutes three times a week. Endorses post nasal drainage. Denies SOB.     Review of Systems   Constitutional: Negative.    HENT:  Positive for postnasal drip.    Respiratory:  Negative for chest tightness and shortness of breath.    Cardiovascular:  Negative for chest pain.   Neurological:  Negative for coordination difficulties.   Psychiatric/Behavioral: Negative.           Objective:      Vitals:    09/03/24 0925   BP: 122/66   Pulse: 80   Resp: 17   Temp: 96.9 °F (36.1 °C)   TempSrc: Temporal   SpO2: 98%   Weight: 69.5 kg (153 lb 3.5 oz)   Height: 5' 2" (1.575 m)   PainSc: 0-No pain       Physical Exam  Constitutional:       Appearance: Normal appearance.   HENT:      Head: Normocephalic and atraumatic.   Eyes:      Extraocular Movements: Extraocular movements intact.      Conjunctiva/sclera: Conjunctivae normal.   Cardiovascular:      Rate and Rhythm: Normal rate and regular rhythm.      Pulses: Normal pulses.      Heart sounds: Murmur (harsh systolic) heard. "   Pulmonary:      Breath sounds: Wheezing and rhonchi (scattered) present.      Comments: End expiratory wheezes  Abdominal:      Palpations: Abdomen is soft.   Musculoskeletal:      Cervical back: Normal range of motion.   Skin:     General: Skin is warm and dry.      Capillary Refill: Capillary refill takes less than 2 seconds.   Neurological:      General: No focal deficit present.      Mental Status: She is alert and oriented to person, place, and time.   Psychiatric:         Mood and Affect: Mood normal.         Behavior: Behavior normal.         Thought Content: Thought content normal.        Diagnostics:     Chest CT 9/5/23:  I reviewed the images  Postsurgical changes of left lingulectomy.  No evidence of recurrent or metastatic disease within the chest.    Chest CT 03/04/24:  I reviewed the images  Stable CT of the chest status post prior lingulectomy without evidence of locoregional recurrence or metastatic disease within the chest. No acute process.     Chest CT 9/3/24: I reviewed the images  Postsurgical changes of left lingulectomy.  No evidence of recurrent or metastatic disease within the chest. Official read pending. Scattered tiny paty     Assessment:       63 y.o.  female smoker with DM, HTN, HLD, MVP, MCTD, left parotid gland nodule who presents to clinic today for 1 year follow up s/p robotic assisted lingulectomy for CASSIDY NSCLC.  [id number] NSCLC with negative margins, +vpi  LOLIS    Plan:     RTC in 6 months with chest CT.   Continue exercise program.

## 2024-09-03 NOTE — NURSING
Chart reviewed. Plan per Dr. Allan is to f/u in 6 months with CT Chest. Will coordinate appts and notify patient.

## 2024-09-04 ENCOUNTER — PATIENT MESSAGE (OUTPATIENT)
Dept: HEMATOLOGY/ONCOLOGY | Facility: CLINIC | Age: 64
End: 2024-09-04
Payer: COMMERCIAL

## 2024-09-06 ENCOUNTER — TELEPHONE (OUTPATIENT)
Dept: PHARMACY | Facility: CLINIC | Age: 64
End: 2024-09-06
Payer: COMMERCIAL

## 2024-09-06 NOTE — TELEPHONE ENCOUNTER
[facility] Chart Review & Patient Outreach Details For Medication Adherence Project    Reason for Outreach Encounter: 3rd Party payor non-compliance report ([employer], [employer], C, etc)    2.  Patient Outreach Method: Reviewed patient chart     3.   Medication in question:   Hypertension Medications               metoprolol succinate (TOPROL-XL) 25 MG 24 hr tablet Take 1 tablet (25 mg total) by mouth once daily.    nitroGLYCERIN (NITROSTAT) 0.4 MG SL tablet Place 1 tablet (0.4 mg total) under the tongue every 5 (five) minutes as needed for Chest pain.                  4.  Reviewed and or Updates Made To: Patient Chart    5. Outreach Outcomes and/or actions taken: Medication discontinued    Additional Notes:   Discontinued by: Gabby Asencio NP on 4/10/2024

## 2024-09-09 ENCOUNTER — OFFICE VISIT (OUTPATIENT)
Facility: CLINIC | Age: 64
End: 2024-09-09
Payer: COMMERCIAL

## 2024-09-09 VITALS
WEIGHT: 153 LBS | TEMPERATURE: 98 F | BODY MASS INDEX: 27.98 KG/M2 | RESPIRATION RATE: 18 BRPM | SYSTOLIC BLOOD PRESSURE: 151 MMHG | DIASTOLIC BLOOD PRESSURE: 69 MMHG | HEART RATE: 69 BPM

## 2024-09-09 DIAGNOSIS — C34.12 MALIGNANT NEOPLASM OF UPPER LOBE OF LEFT LUNG: Primary | ICD-10-CM

## 2024-09-09 PROCEDURE — 1160F RVW MEDS BY RX/DR IN RCRD: CPT | Mod: CPTII,S$GLB,, | Performed by: INTERNAL MEDICINE

## 2024-09-09 PROCEDURE — 99999 PR PBB SHADOW E&M-EST. PATIENT-LVL IV: CPT | Mod: PBBFAC,,, | Performed by: INTERNAL MEDICINE

## 2024-09-09 PROCEDURE — 3066F NEPHROPATHY DOC TX: CPT | Mod: CPTII,S$GLB,, | Performed by: INTERNAL MEDICINE

## 2024-09-09 PROCEDURE — 3077F SYST BP >= 140 MM HG: CPT | Mod: CPTII,S$GLB,, | Performed by: INTERNAL MEDICINE

## 2024-09-09 PROCEDURE — 3044F HG A1C LEVEL LT 7.0%: CPT | Mod: CPTII,S$GLB,, | Performed by: INTERNAL MEDICINE

## 2024-09-09 PROCEDURE — 3061F NEG MICROALBUMINURIA REV: CPT | Mod: CPTII,S$GLB,, | Performed by: INTERNAL MEDICINE

## 2024-09-09 PROCEDURE — 4010F ACE/ARB THERAPY RXD/TAKEN: CPT | Mod: CPTII,S$GLB,, | Performed by: INTERNAL MEDICINE

## 2024-09-09 PROCEDURE — 99213 OFFICE O/P EST LOW 20 MIN: CPT | Mod: S$GLB,,, | Performed by: INTERNAL MEDICINE

## 2024-09-09 PROCEDURE — 1159F MED LIST DOCD IN RCRD: CPT | Mod: CPTII,S$GLB,, | Performed by: INTERNAL MEDICINE

## 2024-09-09 PROCEDURE — 3078F DIAST BP <80 MM HG: CPT | Mod: CPTII,S$GLB,, | Performed by: INTERNAL MEDICINE

## 2024-09-09 PROCEDURE — 3008F BODY MASS INDEX DOCD: CPT | Mod: CPTII,S$GLB,, | Performed by: INTERNAL MEDICINE

## 2024-09-09 NOTE — PROGRESS NOTES
PROGRESS NOTE    Subjective:       Patient ID: Cristin Segal is a 63 y.o. female.    12/27/2022:  CT chest:  Slowly enlarging soft tissue density nodule of the left upper lobe now measuring 11 mm.  Neoplasm is suspected and percutaneous biopsy is recommended.  Otherwise continued follow-up by CT scan is recommended with consideration of a repeat PET CT    1/6/2023:  CASSIDY Lung Biopsy:  LUNG, LEFT UPPER LOBE MASS, CT-GUIDED BIOPSY: Invasive squamous cell carcinoma, well differentiated.    1/20/2023:  MRI Brain-no mets    1/23/2023:  Bone scan, uptake at T11, history of vertebroplasty    2/9/2023:  Lung, lingulectomy: Invasive well-differentiated keratinizing squamous   cell carcinoma, measuring 16 mm (1.6 cm) in greatest dimension.          Bronchial and vascular margins are negative for atypia or malignancy.          See synoptic report in comment section for further details.   9 lymph nodes taken,  all negative.    Visceral pleural invasion:  Present  [id number]-Stage IB     medically appropriate patients with stage IB disease whose tumors display one or more high-risk features, including lymphovascular invasion, poor differentiation, or high standardized uptake value (SUV) on positron emission tomography (PET; variably defined as SUV 10 or more). However, there is no consensus among expert groups.     9/5/2023-CT chest:  Negative    PLAN:  [facility]  Surveillance and f/u with Dr. Allan and CT in 6 months    9/3/2024:  CT Chest negative for malignancy    Chief Complaint:  No chief complaint on file.  Lung cancer follow up    History of Present Illness:   Cristin Segal is a 63 y.o. female who presents for follow up of above.      Patient is doing ok today.        Family and Social history reviewed and is unchanged from 1/24/2023      ROS:  Review of Systems   Constitutional:  Negative for fever.   Respiratory:   Negative for shortness of breath.    Cardiovascular:  Negative for chest pain and leg swelling.   Gastrointestinal:  Negative for abdominal pain and blood in stool.   Genitourinary:  Negative for hematuria.   Skin:  Negative for rash.          Current Outpatient Medications:     acetaminophen (TYLENOL) 500 MG tablet, Take 1 tablet (500 mg total) by mouth every 6 (six) hours as needed for Pain., Disp: , Rfl: 0    albuterol (VENTOLIN HFA) 90 mcg/actuation inhaler, Inhale 2 puffs into the lungs every 6 (six) hours as needed for Wheezing or Shortness of Breath. Rescue, Disp: 8 g, Rfl: 0    aspirin 81 MG Chew, Take 1 tablet (81 mg total) by mouth once daily., Disp: , Rfl:     cephALEXin (KEFLEX) 500 MG capsule, Take 1 capsule (500 mg total) by mouth 4 (four) times daily., Disp: 40 capsule, Rfl: 0    clopidogreL (PLAVIX) 75 mg tablet, Take 1 tablet (75 mg total) by mouth once daily., Disp: 90 tablet, Rfl: 3    fluconazole (DIFLUCAN) 150 MG Tab, 1 po x 1, may repeat in 48 h prn, Disp: 2 tablet, Rfl: 0    fluticasone propionate (FLONASE) 50 mcg/actuation nasal spray, SPRAY 1 SPRAY (50 MCG TOTAL) INTO INTO EACH NOSTRIL TWICE A DAY, Disp: 48 mL, Rfl: 1    gabapentin (NEURONTIN) 100 MG capsule, Take 100 mg by mouth., Disp: , Rfl:     hydrOXYchloroQUINE (PLAQUENIL) 200 mg tablet, Take 1 tablet (200 mg total) by mouth once daily., Disp: 90 tablet, Rfl: 1    levothyroxine (SYNTHROID) 50 MCG tablet, TAKE 1 TABLET BY MOUTH EVERY DAY BEFORE BREAKFAST, Disp: 90 tablet, Rfl: 3    loratadine (CLARITIN) 10 mg tablet, Take 10 mg by mouth once daily., Disp: , Rfl:     magnesium oxide (MAG-OX) 400 mg (241.3 mg magnesium) tablet, TAKE 1 TABLET BY MOUTH ONCE DAILY, Disp: 30 tablet, Rfl: 0    meloxicam (MOBIC) 7.5 MG tablet, Take 7.5 mg by mouth once daily., Disp: , Rfl:     metoprolol succinate (TOPROL-XL) 25 MG 24 hr tablet, Take 1 tablet (25 mg total) by mouth once daily., Disp: 90 tablet, Rfl: 3    multivit-min-FA-lycopen-lutein (CENTRUM  SILVER) 0.4 mg-300 mcg- 250 mcg Tab, Take 1 tablet by mouth once daily., Disp: , Rfl:     nitroGLYCERIN (NITROSTAT) 0.4 MG SL tablet, Place 1 tablet (0.4 mg total) under the tongue every 5 (five) minutes as needed for Chest pain., Disp: 30 tablet, Rfl: 0    pantoprazole (PROTONIX) 40 MG tablet, Take 1 tablet (40 mg total) by mouth once daily., Disp: 90 tablet, Rfl: 3    potassium chloride SA (K-DUR,KLOR-CON) 20 MEQ tablet, Take 1 tablet (20 mEq total) by mouth once daily., Disp: 90 tablet, Rfl: 3    rosuvastatin (CRESTOR) 5 MG tablet, Take 1 tablet (5 mg total) by mouth once daily., Disp: 90 tablet, Rfl: 3    traMADoL (ULTRAM) 50 mg tablet, Take 50 mg by mouth every evening., Disp: , Rfl:     WESTAB MAX 2.5-25-2 mg Tab, TAKE 1 TABLET BY MOUTH EVERY DAY, Disp: 90 tablet, Rfl: 3  No current facility-administered medications for this visit.    Facility-Administered Medications Ordered in Other Visits:     lactated ringers infusion, , Intravenous, Continuous, Kev Bai MD        Objective:       Physical Examination:     BP (!) 151/69   Pulse 69   Temp 97.7 °F (36.5 °C)   Resp 18   Wt 69.4 kg (153 lb)   BMI 27.98 kg/m²     Physical Exam  Constitutional:       Appearance: She is well-developed.   HENT:      Head: Normocephalic and atraumatic.      Right Ear: External ear normal.      Left Ear: External ear normal.   Eyes:      Conjunctiva/sclera: Conjunctivae normal.      Pupils: Pupils are equal, round, and reactive to light.   Neck:      Thyroid: No thyromegaly.      Trachea: No tracheal deviation.   Cardiovascular:      Rate and Rhythm: Normal rate and regular rhythm.      Heart sounds: Normal heart sounds.   Pulmonary:      Effort: Pulmonary effort is normal.      Breath sounds: Normal breath sounds.   Abdominal:      General: Bowel sounds are normal. There is no distension.      Palpations: Abdomen is soft. There is no mass.      Tenderness: There is no abdominal tenderness.   Skin:     Findings: No rash.    Neurological:      Comments: Neuro intact througout   Psychiatric:         Behavior: Behavior normal.         Thought Content: Thought content normal.         Judgment: Judgment normal.         Labs:   No results found for this or any previous visit (from the past 336 hour(s)).  CMP  Sodium   Date Value Ref Range Status   08/09/2024 136 136 - 145 mmol/L Final   06/28/2019 138 134 - 144 mmol/L      Potassium   Date Value Ref Range Status   08/09/2024 4.7 3.5 - 5.1 mmol/L Final     Chloride   Date Value Ref Range Status   08/09/2024 104 95 - 110 mmol/L Final   06/28/2019 101 98 - 110 mmol/L      CO2   Date Value Ref Range Status   08/09/2024 20 (L) 23 - 29 mmol/L Final     Glucose   Date Value Ref Range Status   08/09/2024 102 70 - 110 mg/dL Final   06/28/2019 111 (H) 70 - 99 mg/dL      BUN   Date Value Ref Range Status   08/09/2024 17 8 - 23 mg/dL Final     Creatinine   Date Value Ref Range Status   08/09/2024 1.0 0.5 - 1.4 mg/dL Final   06/28/2019 0.74 0.60 - 1.40 mg/dL      Calcium   Date Value Ref Range Status   08/09/2024 9.8 8.7 - 10.5 mg/dL Final     Total Protein   Date Value Ref Range Status   08/09/2024 7.6 6.0 - 8.4 g/dL Final     Albumin   Date Value Ref Range Status   08/09/2024 3.9 3.5 - 5.2 g/dL Final   06/28/2019 4.5 3.1 - 4.7 g/dL      Total Bilirubin   Date Value Ref Range Status   08/09/2024 0.5 0.1 - 1.0 mg/dL Final     Comment:     For infants and newborns, interpretation of results should be based  on gestational age, weight and in agreement with clinical  observations.    Premature Infant recommended reference ranges:  Up to 24 hours.............<8.0 mg/dL  Up to 48 hours............<12.0 mg/dL  3-5 days..................<15.0 mg/dL  6-29 days.................<15.0 mg/dL       Alkaline Phosphatase   Date Value Ref Range Status   08/09/2024 69 55 - 135 U/L Final     AST   Date Value Ref Range Status   08/09/2024 37 10 - 40 U/L Final     ALT   Date Value Ref Range Status   08/09/2024 24 10 - 44  "U/L Final     Anion Gap   Date Value Ref Range Status   08/09/2024 12 8 - 16 mmol/L Final     eGFR if    Date Value Ref Range Status   05/11/2022 56.4 (A) >60 mL/min/1.73 m^2 Final   05/11/2022 56.4 (A) >60 mL/min/1.73 m^2 Final     eGFR if non    Date Value Ref Range Status   05/11/2022 48.9 (A) >60 mL/min/1.73 m^2 Final     Comment:     Calculation used to obtain the estimated glomerular filtration  rate (eGFR) is the CKD-EPI equation.      05/11/2022 48.9 (A) >60 mL/min/1.73 m^2 Final     Comment:     Calculation used to obtain the estimated glomerular filtration  rate (eGFR) is the CKD-EPI equation.        No results found for: "CEA"  No results found for: "PSA"        Assessment/Plan:     Problem List Items Addressed This Visit       Squamous Cell Carcinoma of CASSIDY of lung - Primary     Patient is doing well and appears LOLIS at this time.  CT chest is negative and she has no new concerning signs/symptoms.  Will continue to monitor her with CT imaging every 6 months.              Discussion:     Follow up in about 6 months (around 3/9/2025).      Electronically signed by Lars Carrasco      "

## 2024-09-09 NOTE — ASSESSMENT & PLAN NOTE
Patient is doing well and appears LOLIS at this time.  CT chest is negative and she has no new concerning signs/symptoms.  Will continue to monitor her with CT imaging every 6 months.

## 2024-10-01 RX ORDER — LANOLIN ALCOHOL/MO/W.PET/CERES
1 CREAM (GRAM) TOPICAL
Qty: 90 TABLET | Refills: 1 | Status: SHIPPED | OUTPATIENT
Start: 2024-10-01

## 2024-11-18 ENCOUNTER — PATIENT MESSAGE (OUTPATIENT)
Dept: GASTROENTEROLOGY | Facility: CLINIC | Age: 64
End: 2024-11-18
Payer: COMMERCIAL

## 2024-12-17 ENCOUNTER — TELEPHONE (OUTPATIENT)
Dept: CARDIOLOGY | Facility: CLINIC | Age: 64
End: 2024-12-17
Payer: COMMERCIAL

## 2024-12-17 DIAGNOSIS — I25.10 CORONARY ARTERY DISEASE INVOLVING NATIVE CORONARY ARTERY OF NATIVE HEART WITHOUT ANGINA PECTORIS: Primary | ICD-10-CM

## 2024-12-17 NOTE — TELEPHONE ENCOUNTER
----- Message from Abner sent at 12/17/2024  2:30 PM CST -----  Regarding: clearance  Contact: patient  Type:  Patient Returning Call    Who Called:patient  Who Left Message for Patient:nurse  Does the patient know what this is regarding?:clearance for procedure/ did staff receive fax from [facility]/ Do patient need to come in  Would the patient rather a call back or a response via [facility]? Please call  Best Call Back Number:[phone]  Additional Information: Trying to get procedure done before the end of year

## 2024-12-19 ENCOUNTER — E-VISIT (OUTPATIENT)
Dept: CARDIOLOGY | Facility: CLINIC | Age: 64
End: 2024-12-19
Payer: COMMERCIAL

## 2024-12-19 DIAGNOSIS — Z01.810 PREOP CARDIOVASCULAR EXAM: Primary | ICD-10-CM

## 2024-12-19 NOTE — LETTER
2024    Cristin Segal  [address]  [place] [address]             [facility]  [address]  [place] [address]  Phone: [phone]  Fax: [phone] Patient: Cristin Segal  : 1960  Referring Doctor:  Telephone:  Hospital:  Type of Anesthesia:  Date of Last Stent:  Date of Last Office Visit:    Current Outpatient Medications   Medication Sig    acetaminophen (TYLENOL) 500 MG tablet Take 1 tablet (500 mg total) by mouth every 6 (six) hours as needed for Pain.    albuterol (VENTOLIN HFA) 90 mcg/actuation inhaler Inhale 2 puffs into the lungs every 6 (six) hours as needed for Wheezing or Shortness of Breath. Rescue    aspirin 81 MG Chew Take 1 tablet (81 mg total) by mouth once daily.    cephALEXin (KEFLEX) 500 MG capsule Take 1 capsule (500 mg total) by mouth 4 (four) times daily.    clopidogreL (PLAVIX) 75 mg tablet Take 1 tablet (75 mg total) by mouth once daily.    fluconazole (DIFLUCAN) 150 MG Tab 1 po x 1, may repeat in 48 h prn    fluticasone propionate (FLONASE) 50 mcg/actuation nasal spray SPRAY 1 SPRAY (50 MCG TOTAL) INTO INTO EACH NOSTRIL TWICE A DAY    gabapentin (NEURONTIN) 100 MG capsule Take 100 mg by mouth.    hydrOXYchloroQUINE (PLAQUENIL) 200 mg tablet Take 1 tablet (200 mg total) by mouth once daily.    levothyroxine (SYNTHROID) 50 MCG tablet TAKE 1 TABLET BY MOUTH EVERY DAY BEFORE BREAKFAST    loratadine (CLARITIN) 10 mg tablet Take 10 mg by mouth once daily.    magnesium oxide (MAG-OX) 400 mg (241.3 mg magnesium) tablet TAKE 1 TABLET BY MOUTH ONCE DAILY    meloxicam (MOBIC) 7.5 MG tablet Take 7.5 mg by mouth once daily.    metoprolol succinate (TOPROL-XL) 25 MG 24 hr tablet Take 1 tablet (25 mg total) by mouth once daily.    multivit-min-FA-lycopen-lutein (CENTRUM SILVER) 0.4 mg-300 mcg- 250 mcg Tab Take 1 tablet by mouth once daily.    nitroGLYCERIN  (NITROSTAT) 0.4 MG SL tablet Place 1 tablet (0.4 mg total) under the tongue every 5 (five) minutes as needed for Chest pain.    pantoprazole (PROTONIX) 40 MG tablet Take 1 tablet (40 mg total) by mouth once daily.    potassium chloride SA (K-DUR,KLOR-CON) 20 MEQ tablet Take 1 tablet (20 mEq total) by mouth once daily.    rosuvastatin (CRESTOR) 5 MG tablet Take 1 tablet (5 mg total) by mouth once daily.    traMADoL (ULTRAM) 50 mg tablet Take 50 mg by mouth every evening.    WESTAB MAX 2.5-25-2 mg Tab TAKE 1 TABLET BY MOUTH EVERY DAY     No current facility-administered medications for this visit.     Facility-Administered Medications Ordered in Other Visits   Medication    lactated ringers infusion       As long as patient is not having any significant exertional chest pain or shortness of breath, patient will be intermediate risk for perioperative cardiovascular complications for the planned procedure.  There aspirin can be held for 48 hours prior to the procedure and Plavix 5 days prior to the procedure and resumed when adequate hemostasis can be obtained.    If you have any questions regarding the above, please contact my office at [phone].    Sincerely,    Horacio Lee MD  Interventional Cardiologist

## 2024-12-19 NOTE — LETTER
2024    Cristin Segal  [address]  [place] [address]             [facility]  [address]  [facility] [address]  Phone: [phone]  Fax: [phone] Patient: Cristin Segal  : 1960  Referring Doctor: [facility]   Fax : [phone]  Procedure : Left Shoulder Arthroscopy   Date of Last Stent:  Date of Last Office Visit:    Current Outpatient Medications   Medication Sig    acetaminophen (TYLENOL) 500 MG tablet Take 1 tablet (500 mg total) by mouth every 6 (six) hours as needed for Pain.    albuterol (VENTOLIN HFA) 90 mcg/actuation inhaler Inhale 2 puffs into the lungs every 6 (six) hours as needed for Wheezing or Shortness of Breath. Rescue    aspirin 81 MG Chew Take 1 tablet (81 mg total) by mouth once daily.    cephALEXin (KEFLEX) 500 MG capsule Take 1 capsule (500 mg total) by mouth 4 (four) times daily.    clopidogreL (PLAVIX) 75 mg tablet Take 1 tablet (75 mg total) by mouth once daily.    fluconazole (DIFLUCAN) 150 MG Tab 1 po x 1, may repeat in 48 h prn    fluticasone propionate (FLONASE) 50 mcg/actuation nasal spray SPRAY 1 SPRAY (50 MCG TOTAL) INTO INTO EACH NOSTRIL TWICE A DAY    gabapentin (NEURONTIN) 100 MG capsule Take 100 mg by mouth.    hydrOXYchloroQUINE (PLAQUENIL) 200 mg tablet Take 1 tablet (200 mg total) by mouth once daily.    levothyroxine (SYNTHROID) 50 MCG tablet TAKE 1 TABLET BY MOUTH EVERY DAY BEFORE BREAKFAST    loratadine (CLARITIN) 10 mg tablet Take 10 mg by mouth once daily.    magnesium oxide (MAG-OX) 400 mg (241.3 mg magnesium) tablet TAKE 1 TABLET BY MOUTH ONCE DAILY    meloxicam (MOBIC) 7.5 MG tablet Take 7.5 mg by mouth once daily.    metoprolol succinate (TOPROL-XL) 25 MG 24 hr tablet Take 1 tablet (25 mg total) by mouth once daily.    multivit-min-FA-lycopen-lutein (CENTRUM SILVER) 0.4 mg-300 mcg- 250 mcg Tab Take 1 tablet by  mouth once daily.    nitroGLYCERIN (NITROSTAT) 0.4 MG SL tablet Place 1 tablet (0.4 mg total) under the tongue every 5 (five) minutes as needed for Chest pain.    pantoprazole (PROTONIX) 40 MG tablet Take 1 tablet (40 mg total) by mouth once daily.    potassium chloride SA (K-DUR,KLOR-CON) 20 MEQ tablet Take 1 tablet (20 mEq total) by mouth once daily.    rosuvastatin (CRESTOR) 5 MG tablet Take 1 tablet (5 mg total) by mouth once daily.    traMADoL (ULTRAM) 50 mg tablet Take 50 mg by mouth every evening.    WESTAB MAX 2.5-25-2 mg Tab TAKE 1 TABLET BY MOUTH EVERY DAY     No current facility-administered medications for this visit.     Facility-Administered Medications Ordered in Other Visits   Medication    lactated ringers infusion       This patient has been assessed for risk factors for clearance of surgery with the following stipulations:    ___ No contraindications  ___ Recommendations for antiplatelet/anticoagulant medications:  ___ Hold plavix and aspirin for 7 days   ___ Cleared for surgery with the following contraindications/precautions:  ___ Low risk ___ , Moderate risk ___ , High risk ___.   ___ Not cleared for surgery due to the following reasons:      If you have any questions regarding the above, please contact my office at [phone].    Sincerely,

## 2024-12-19 NOTE — PROGRESS NOTES
Dear Cristin,     Thank you for reaching out to me for an E-Visit so we can address your concern regarding: Pre-op Exam     I have reviewed your chart and read the answers to the questionnaire that you completed.  Based on the information provided, my diagnosis and recommendations are as follows:    As long as you are not having any exertional chest pain or shortness of breath, you can proceed with the surgery from a cardiac standpoint.  He should hold her aspirin for 48 hours prior to the procedure and Plavix for 5 days prior to the procedure and resumed when your surgeon is okay with resuming them when adequate hemostasis has been obtained.    Visit Diagnosis    1. Preop cardiovascular exam           Visit Orders  No orders of the defined types were placed in this encounter.       Please let me know if there is something else that you need.  If you send additional messages concerning this illness, please use this message thread to communicate.      Horacio Lee MD

## 2024-12-23 ENCOUNTER — TELEPHONE (OUTPATIENT)
Dept: CARDIOLOGY | Facility: CLINIC | Age: 64
End: 2024-12-23
Payer: COMMERCIAL

## 2024-12-23 NOTE — TELEPHONE ENCOUNTER
----- Message from Reyjusto sent at 12/23/2024 12:22 PM CST -----  Regarding: clearance  Contact: patient  Type:  Patient Returning Call    Who Called:patient  Who Left Message for Patient:nurse  Does the patient know what this is regarding?:clearance sent to [facility] attention: Fany # [phone]  Would the patient rather a call back or a response via [facility]? Please refax  Best Call Back Number:[phone]  Additional Information:

## 2024-12-30 ENCOUNTER — TELEPHONE (OUTPATIENT)
Dept: FAMILY MEDICINE | Facility: CLINIC | Age: 64
End: 2024-12-30
Payer: COMMERCIAL

## 2024-12-30 NOTE — TELEPHONE ENCOUNTER
Unable to locate an appointment in clinic with any provider, recommended urgent care for evaluation.  Patient verbalized understanding.

## 2024-12-30 NOTE — TELEPHONE ENCOUNTER
----- Message from Jaimie sent at 12/30/2024  9:12 AM CST -----  Contact: PT  Type:  Same Day Appointment Request    Caller is requesting a same day appointment.  Caller declined first available appointment listed below.    Who Called: PT  Symptoms (please be specific): COUGH AND BACK PAIN   POSSIBLE PULLED MUSCLE IN RIB CAGE   How long has patient had these symptoms:  12/23[birth year]  Pharmacy name and phone #:    [id number] - [place], [place] - [address] AT [facility]  [address]  [place] [address]  Phone: [phone] Fax: [phone]  Would the patient rather a call back or a response via [facility]? CALL   Best Call Back Number: [phone]  Additional Information: THANK YOU     Symptoms: Cough, Back Pain - Not From Injury  Outcome: Schedule an appointment to be seen within 24 hours.  Reason: Caller denied all higher acuity questions    The caller accepted this outcome.

## 2024-12-31 ENCOUNTER — TELEPHONE (OUTPATIENT)
Dept: CARDIOLOGY | Facility: CLINIC | Age: 64
End: 2024-12-31
Payer: COMMERCIAL

## 2025-01-03 ENCOUNTER — TELEPHONE (OUTPATIENT)
Dept: FAMILY MEDICINE | Facility: CLINIC | Age: 65
End: 2025-01-03
Payer: COMMERCIAL

## 2025-01-03 NOTE — TELEPHONE ENCOUNTER
Spoke to pt who states she would like to discuss concerns prior to surgery, discuss back pain, cough and sinus drainage. Pt req Dr. Gtz only. Appt scheduled

## 2025-01-03 NOTE — TELEPHONE ENCOUNTER
----- Message from [employer] sent at 1/3/2025  1:07 PM CST -----  Type: Needs Medical Advice  Who Called:  kevin    Best Call Back Number: [phone]  or [phone]    Additional Information: kevin states she is having back pain and would like to see above provider before her surgery on 1/20 , and be seen before 2/21 , please call to assist with sooner appointment , thank you .

## 2025-01-06 ENCOUNTER — OFFICE VISIT (OUTPATIENT)
Dept: FAMILY MEDICINE | Facility: CLINIC | Age: 65
End: 2025-01-06
Payer: COMMERCIAL

## 2025-01-06 VITALS
DIASTOLIC BLOOD PRESSURE: 70 MMHG | TEMPERATURE: 98 F | SYSTOLIC BLOOD PRESSURE: 138 MMHG | HEIGHT: 62 IN | WEIGHT: 148.13 LBS | OXYGEN SATURATION: 98 % | HEART RATE: 78 BPM | BODY MASS INDEX: 27.26 KG/M2 | RESPIRATION RATE: 12 BRPM

## 2025-01-06 DIAGNOSIS — G89.29 CHRONIC BACK PAIN, UNSPECIFIED BACK LOCATION, UNSPECIFIED BACK PAIN LATERALITY: ICD-10-CM

## 2025-01-06 DIAGNOSIS — R19.5 POSITIVE FIT (FECAL IMMUNOCHEMICAL TEST): ICD-10-CM

## 2025-01-06 DIAGNOSIS — E03.9 ACQUIRED HYPOTHYROIDISM: ICD-10-CM

## 2025-01-06 DIAGNOSIS — E11.8 CONTROLLED TYPE 2 DIABETES MELLITUS WITH COMPLICATION, WITHOUT LONG-TERM CURRENT USE OF INSULIN: ICD-10-CM

## 2025-01-06 DIAGNOSIS — Z23 NEED FOR PNEUMOCOCCAL VACCINATION: ICD-10-CM

## 2025-01-06 DIAGNOSIS — I10 ESSENTIAL HYPERTENSION: ICD-10-CM

## 2025-01-06 DIAGNOSIS — J06.9 UPPER RESPIRATORY TRACT INFECTION, UNSPECIFIED TYPE: ICD-10-CM

## 2025-01-06 DIAGNOSIS — J30.1 SEASONAL ALLERGIC RHINITIS DUE TO POLLEN: ICD-10-CM

## 2025-01-06 DIAGNOSIS — M54.9 CHRONIC BACK PAIN, UNSPECIFIED BACK LOCATION, UNSPECIFIED BACK PAIN LATERALITY: ICD-10-CM

## 2025-01-06 DIAGNOSIS — E83.42 HYPOMAGNESEMIA: ICD-10-CM

## 2025-01-06 DIAGNOSIS — Z23 FLU VACCINE NEED: ICD-10-CM

## 2025-01-06 DIAGNOSIS — M35.1 MCTD (MIXED CONNECTIVE TISSUE DISEASE): ICD-10-CM

## 2025-01-06 DIAGNOSIS — E72.11 HYPERHOMOCYSTEINEMIA: ICD-10-CM

## 2025-01-06 DIAGNOSIS — E78.2 MIXED HYPERLIPIDEMIA: ICD-10-CM

## 2025-01-06 DIAGNOSIS — C34.12 MALIGNANT NEOPLASM OF UPPER LOBE OF LEFT LUNG: Primary | ICD-10-CM

## 2025-01-06 PROCEDURE — 99499 UNLISTED E&M SERVICE: CPT | Mod: S$GLB,,, | Performed by: FAMILY MEDICINE

## 2025-01-06 PROCEDURE — 99999 PR PBB SHADOW E&M-EST. PATIENT-LVL IV: CPT | Mod: PBBFAC,,, | Performed by: FAMILY MEDICINE

## 2025-01-06 PROCEDURE — 3078F DIAST BP <80 MM HG: CPT | Mod: CPTII,S$GLB,, | Performed by: FAMILY MEDICINE

## 2025-01-06 PROCEDURE — 3075F SYST BP GE 130 - 139MM HG: CPT | Mod: CPTII,S$GLB,, | Performed by: FAMILY MEDICINE

## 2025-01-06 PROCEDURE — 3008F BODY MASS INDEX DOCD: CPT | Mod: CPTII,S$GLB,, | Performed by: FAMILY MEDICINE

## 2025-01-06 PROCEDURE — 99214 OFFICE O/P EST MOD 30 MIN: CPT | Mod: 25,S$GLB,, | Performed by: FAMILY MEDICINE

## 2025-01-06 PROCEDURE — 1160F RVW MEDS BY RX/DR IN RCRD: CPT | Mod: CPTII,S$GLB,, | Performed by: FAMILY MEDICINE

## 2025-01-06 PROCEDURE — 96372 THER/PROPH/DIAG INJ SC/IM: CPT | Mod: S$GLB,,, | Performed by: FAMILY MEDICINE

## 2025-01-06 PROCEDURE — 1159F MED LIST DOCD IN RCRD: CPT | Mod: CPTII,S$GLB,, | Performed by: FAMILY MEDICINE

## 2025-01-06 RX ORDER — GABAPENTIN 100 MG/1
100 CAPSULE ORAL NIGHTLY
Qty: 90 CAPSULE | Refills: 3 | Status: SHIPPED | OUTPATIENT
Start: 2025-01-06

## 2025-01-06 RX ORDER — PROMETHAZINE HYDROCHLORIDE AND DEXTROMETHORPHAN HYDROBROMIDE 6.25; 15 MG/5ML; MG/5ML
5 SYRUP ORAL EVERY 4 HOURS PRN
Qty: 118 ML | Refills: 0 | Status: SHIPPED | OUTPATIENT
Start: 2025-01-06 | End: 2025-01-16

## 2025-01-06 RX ORDER — DEXAMETHASONE SODIUM PHOSPHATE 4 MG/ML
6 INJECTION, SOLUTION INTRA-ARTICULAR; INTRALESIONAL; INTRAMUSCULAR; INTRAVENOUS; SOFT TISSUE
Status: COMPLETED | OUTPATIENT
Start: 2025-01-06 | End: 2025-01-06

## 2025-01-06 RX ADMIN — DEXAMETHASONE SODIUM PHOSPHATE 6 MG: 4 INJECTION, SOLUTION INTRA-ARTICULAR; INTRALESIONAL; INTRAMUSCULAR; INTRAVENOUS; SOFT TISSUE at 11:01

## 2025-01-06 NOTE — PROGRESS NOTES
Subjective:       Patient ID: Cristin Segal is a 64 y.o. female.    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    Reviewed labs 6/2024  History of Present Illness    CHIEF COMPLAINT:  Ms. Segal presents today with allergy symptoms and cough.    ALLERGIC RHINITIS:  She reports clear nasal discharge in the mornings since Abdoul week. She denies sinus headache, pressure, and fever. This time of year typically exacerbates her allergies. She has Fluticasone nasal spray but has not used it recently.    RESPIRATORY:  She reports a productive cough causing back pain. She denies wheezing or shortness of breath.    MEDICAL HISTORY:  She has a history of kyphoplasty and regenerated discs.    MEDICATIONS:  She currently takes Mucinex, Zyrtec, and Tramadol for pain and cough suppression. She reports running low on gabapentin.    SOCIAL HISTORY:  She exercises at the gym 3 times weekly.      ROS:  ROS findings as noted  in HPI. Uri x 2 weeks.  Allergy hx time of year. No sinus pain.       Cough hurting, mucous.  No wheezing or SOB. No fever.  Hurts back from coughing.  No diarrhea        Review of Systems   Constitutional:  Positive for fatigue. Negative for chills and fever.   HENT:  Positive for congestion, postnasal drip, rhinorrhea, sneezing and sore throat. Negative for ear discharge, ear pain and sinus pressure.    Respiratory:  Positive for cough. Negative for shortness of breath and wheezing.       Relevant History:  Reviewed labs 4/24. F/u fsating labs scheduled 6/20/24   Nephro Mag 1.4 -started on mag ox  CKD stage 3, anemia    high homocysteine- on folbic-yes    GYN Dr. Malone. Mammo 9/24 neg     GI FIT 12/2021 + . Referred to GI but deferred colonoscopy until after treatment for lung CA.        Heme/onc Dr. Carrasco Macrocytosis- b12 and folate normal 1/19 and 12/20  Pulm and Heme/onc-CT surgery Dr. Allan and Heme/onc Dr. Carrasco treating  SCCA left lung diagnosed 2023. Pulm Dr. Espino lung nodule. CT chest 2/2022 showed slightly enlarging nodule left upper lobe.  1/23 bx invasive SCCA. Mri brain-no mets. Bone scan T11 uptake with h/o vertebroplasty.  S/p left lingulectomy margins neg, lymph nodes neg. CT chest 9/2024 Postoperative changes of partial resection of the left lung with no significant interval change compared to the prior exam. No findings suggesting recurrent local disease or metastatic disease. No acute findings.      Spine Dr. Victor low back pain/Dr. Bai pain. H/o closed vertebral compression T12 s/p kyphoplasty 12/2021. Doing pt and taking pain.  Completed DMV handicapped form for MS        Jaden Nesbitt/Dr. Lee aortic stenosis and CAD s/p stent 2024 . Admitted [facility] 4/8/24 CP upon presentation to Jaden Nesbitt's office to f/u echo. Ecg showed non specific ST changes.  Had some elevated troponins. echo showing mod AS , EF 55-70%.Patient underwent a LHC with PCI to RCA.    high homocysteine- on  folbic-yes    Eye Dr. Cat eye exam     Ortho Had ED visit 3/20 [facility] for fall resulting in left ankle fracture. Dr. Tee treated  Ortho Dr. Tee treating left hip fracture 11/20 and stable left hip replacement  Dr. Rhodes right rot cuff tear 12/2024      Rheum DEXA 8/20 -nl BMD. Rheum Dr. GRACE Guzman/now Dr. Crespo MCTD on plaquenil.  Takes adalat for raynauds.      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 Maki 8/19         Hepatology H/o Elevated LFTs - NL LFTS 12/20.no fatty liver or enlargement on either CT ab 5/15 nor u/s and 1/19. Hepatitis panel normal     Endocrine Type 2 DM- controlled without meds.  Last a1c 4.5. Eye Dr Cat .  On crestor. Not on ace or ARB-urine ma scheduled 6/20/24      Objective:      Physical Exam  Vitals and nursing note reviewed.   Constitutional:       Appearance: She is well-developed.   HENT:      Right Ear: Tympanic membrane and ear canal normal.      Left Ear: Tympanic membrane and ear canal normal.      Nose: Mucosal edema and rhinorrhea present.      Right Sinus: No maxillary sinus tenderness or frontal sinus tenderness.      Left Sinus: No maxillary sinus tenderness or frontal sinus tenderness.      Mouth/Throat:      Pharynx: Posterior oropharyngeal erythema present. No oropharyngeal exudate.      Tonsils: No tonsillar abscesses.   Cardiovascular:      Rate and Rhythm: Normal rate and regular rhythm.      Heart sounds: Normal heart sounds.   Pulmonary:      Effort: Pulmonary effort is normal.      Breath sounds: Normal breath sounds.   Skin:     General: Skin is warm and dry.         Assessment:       ICD-10-CM ICD-9-CM    1. Malignant neoplasm of upper lobe of left lung  C34.12 162.3       2. MCTD (mixed connective tissue disease)  M35.1 710.8       3. Acquired hypothyroidism  E03.9 244.9 TSH      T4, Free      4. Hypomagnesemia  E83.42 275.2 Magnesium      5. Mixed hyperlipidemia  E78.2 272.2 Lipid Panel      6. Essential  hypertension  I10 401.9       7. Positive FIT (fecal immunochemical test)  R19.5 792.1       8. Hyperhomocysteinemia  E72.11 270.4       9. Controlled type 2 diabetes mellitus with complication, without long-term current use of insulin  E11.8 250.90 CBC Auto Differential      Comprehensive Metabolic Panel      Hemoglobin A1C      10. Flu vaccine need  Z23 V04.81       11. Need for pneumococcal vaccination  Z23 V03.82       12. Seasonal allergic rhinitis due to pollen  J30.1 477.0 dexAMETHasone injection 6 mg      13. Upper respiratory tract infection, unspecified type  J06.9 465.9 promethazine-dextromethorphan (PROMETHAZINE-DM) 6.25-15 mg/5 mL Syrp      14. Chronic back pain, unspecified back location, unspecified back pain laterality  M54.9 724.5 gabapentin (NEURONTIN) 100 MG capsule    G89.29 338.29          Plan:   1. Malignant neoplasm of upper lobe of left lung (Primary)  Cont onc and CT surg monitoring and care    2. MCTD (mixed connective tissue disease)  Cont current mgmt    3. Acquired hypothyroidism  Stable condition.  Continue current medications.  Will adjust based on lab findings or if condition changes.    - TSH; Future  - T4, Free; Future    4. Hypomagnesemia  Screen and treat as indicated:  Cont supplement  - Magnesium; Future    5. Mixed hyperlipidemia  Stable condition.  Continue current medications.  Will adjust based on lab findings or if condition changes.  Crestor 5mg  - Lipid Panel; Future    6. Essential hypertension  Controlled on current medications.  Continue current medications.      7. Positive FIT (fecal immunochemical test)  F/u Gi    8. Hyperhomocysteinemia  CONT CURRENT mgmt [facility]    9. Controlled type 2 diabetes mellitus with complication, without long-term current use of insulin  Cont current mgmt. Diet controlled  - CBC Auto Differential; Future  - Comprehensive Metabolic Panel; Future  - Hemoglobin A1C; Future    10. Flu vaccine need  defer    11. Need for pneumococcal  vaccination  defer    12. Seasonal allergic rhinitis due to pollen  Recommend otc non-sedating antihistamine such as Loratadine and/or steroid nasal spray such as Flonase as directed and as needed.  Please return to clinic if symptoms persist after these interventions.    - dexAMETHasone injection 6 mg    13. Upper respiratory tract infection, unspecified type  General home care guidelines for cough and congestion:increase fluid intake, get plenty of rest, and use OTC cough and cold medications for relief of symptoms.  I recommended guafenesin for congestion and dextromethorphan as directed for cough.  Brands like Mucinex DM or Chloricidin HBP or similar are recommended.  Avoidance of decongestants is recommended for patients with heart problems and hypertension.  Extra vitamin C may also benefit.  Return to clinic if symptoms last longer than 10 days or sooner if worsen with symptoms like fever > 100.4, severe sinus pain or headache, thick yellow nasal discharge or sputum, dehydration, sudden confusion or mental status changes, shortness of breath, labored breathing, or lethargy. Anti-fever medications can be alternated like OTC ibuprofen or tylenol as needed and as directed unless told to avoid these by your doctor.     - promethazine-dextromethorphan (PROMETHAZINE-DM) 6.25-15 mg/5 mL Syrp; Take 5 mLs by mouth every 4 (four) hours as needed (cough).  Dispense: 118 mL; Refill: 0    14. Chronic back pain, unspecified back location, unspecified back pain laterality  cont  - gabapentin (NEURONTIN) 100 MG capsule; Take 1 capsule (100 mg total) by mouth every evening.  Dispense: 90 capsule; Refill: 3    Assessment & Plan    - Explained current prevalence of various viruses including COVID, flu, and other cold viruses.  - Discussed steroid injection benefits for inflammation reduction, cough relief, and back pain alleviation.  - Ms. Segal to resume gym visits after recovery.  - Administered Dexamethasone injection in  upper hip muscle.  - Started cough syrup for nighttime use to aid rest.  - Continued Tramadol, especially at bedtime for cough suppression and back pain relief.  - Continued Mucinex or Mucinex DM.  - Continued Zyrtec.  - Continued Flonase (Fluticasone) nasal spray.  - Refilled Gabapentin for nighttime use as needed.  - Advised against use of Benadryl or pseudoephedrine-containing medications.  - Fasting lab work ordered to be completed before February appointment.  - Follow up in February as scheduled.  - Schedule fasting lab work a few days before February appointment.       Time spent with patient: 20 minutes  Patient with be reevaluated in 4 weeks or sooner prn  Greater than 50% of this visit was spent counseling as described in above documentation:Yes  This note was generated with the assistance of ambient listening technology. Verbal consent was obtained by the patient and accompanying visitor(s) for the recording of patient appointment to facilitate this note. I attest to having reviewed and edited the generated note for accuracy, though some syntax or spelling errors may persist. Please contact the author of this note for any clarification.

## 2025-01-13 RX ORDER — LANOLIN ALCOHOL/MO/W.PET/CERES
1 CREAM (GRAM) TOPICAL
Qty: 90 TABLET | Refills: 1 | Status: SHIPPED | OUTPATIENT
Start: 2025-01-13

## 2025-01-30 DIAGNOSIS — E03.9 HYPOTHYROIDISM, UNSPECIFIED TYPE: ICD-10-CM

## 2025-01-30 RX ORDER — LEVOTHYROXINE SODIUM 50 UG/1
50 TABLET ORAL
Qty: 90 TABLET | Refills: 1 | Status: SHIPPED | OUTPATIENT
Start: 2025-01-30

## 2025-01-30 NOTE — TELEPHONE ENCOUNTER
No care due was identified.  [facility] Embedded Care Due Messages. Reference number: [id number].   1/30/2025 12:03:48 AM CST

## 2025-01-30 NOTE — TELEPHONE ENCOUNTER
Refill Decision Note   Cristin Philipp  is requesting a refill authorization.  Brief Assessment and Rationale for Refill:  Approve     Medication Therapy Plan:        Comments:     Note composed:12:45 AM 01/30/2025

## 2025-02-04 ENCOUNTER — LAB VISIT (OUTPATIENT)
Dept: LAB | Facility: HOSPITAL | Age: 65
End: 2025-02-04
Attending: FAMILY MEDICINE
Payer: COMMERCIAL

## 2025-02-04 DIAGNOSIS — E03.9 ACQUIRED HYPOTHYROIDISM: ICD-10-CM

## 2025-02-04 DIAGNOSIS — E78.2 MIXED HYPERLIPIDEMIA: ICD-10-CM

## 2025-02-04 DIAGNOSIS — E83.42 HYPOMAGNESEMIA: ICD-10-CM

## 2025-02-04 DIAGNOSIS — E11.8 CONTROLLED TYPE 2 DIABETES MELLITUS WITH COMPLICATION, WITHOUT LONG-TERM CURRENT USE OF INSULIN: ICD-10-CM

## 2025-02-04 LAB
ALBUMIN SERPL BCP-MCNC: 3.9 G/DL (ref 3.5–5.2)
ALP SERPL-CCNC: 92 U/L (ref 40–150)
ALT SERPL W/O P-5'-P-CCNC: 11 U/L (ref 10–44)
ANION GAP SERPL CALC-SCNC: 15 MMOL/L (ref 8–16)
AST SERPL-CCNC: 22 U/L (ref 10–40)
BASOPHILS # BLD AUTO: 0.18 K/UL (ref 0–0.2)
BASOPHILS NFR BLD: 1.9 % (ref 0–1.9)
BILIRUB SERPL-MCNC: 0.6 MG/DL (ref 0.1–1)
BUN SERPL-MCNC: 19 MG/DL (ref 8–23)
CALCIUM SERPL-MCNC: 9.9 MG/DL (ref 8.7–10.5)
CHLORIDE SERPL-SCNC: 99 MMOL/L (ref 95–110)
CHOLEST SERPL-MCNC: 209 MG/DL (ref 120–199)
CHOLEST/HDLC SERPL: 2.3 {RATIO} (ref 2–5)
CO2 SERPL-SCNC: 20 MMOL/L (ref 23–29)
CREAT SERPL-MCNC: 1 MG/DL (ref 0.5–1.4)
DIFFERENTIAL METHOD BLD: ABNORMAL
EOSINOPHIL # BLD AUTO: 1.2 K/UL (ref 0–0.5)
EOSINOPHIL NFR BLD: 12.2 % (ref 0–8)
ERYTHROCYTE [DISTWIDTH] IN BLOOD BY AUTOMATED COUNT: 13.3 % (ref 11.5–14.5)
EST. GFR  (NO RACE VARIABLE): >60 ML/MIN/1.73 M^2
ESTIMATED AVG GLUCOSE: 88 MG/DL (ref 68–131)
GLUCOSE SERPL-MCNC: 75 MG/DL (ref 70–110)
HBA1C MFR BLD: 4.7 % (ref 4–5.6)
HCT VFR BLD AUTO: 35.3 % (ref 37–48.5)
HDLC SERPL-MCNC: 90 MG/DL (ref 40–75)
HDLC SERPL: 43.1 % (ref 20–50)
HGB BLD-MCNC: 12 G/DL (ref 12–16)
IMM GRANULOCYTES # BLD AUTO: 0.04 K/UL (ref 0–0.04)
IMM GRANULOCYTES NFR BLD AUTO: 0.4 % (ref 0–0.5)
LDLC SERPL CALC-MCNC: 103 MG/DL (ref 63–159)
LYMPHOCYTES # BLD AUTO: 2.5 K/UL (ref 1–4.8)
LYMPHOCYTES NFR BLD: 25.5 % (ref 18–48)
MAGNESIUM SERPL-MCNC: 1.4 MG/DL (ref 1.6–2.6)
MCH RBC QN AUTO: 36.4 PG (ref 27–31)
MCHC RBC AUTO-ENTMCNC: 34 G/DL (ref 32–36)
MCV RBC AUTO: 107 FL (ref 82–98)
MONOCYTES # BLD AUTO: 0.8 K/UL (ref 0.3–1)
MONOCYTES NFR BLD: 8.4 % (ref 4–15)
NEUTROPHILS # BLD AUTO: 5 K/UL (ref 1.8–7.7)
NEUTROPHILS NFR BLD: 51.6 % (ref 38–73)
NONHDLC SERPL-MCNC: 119 MG/DL
NRBC BLD-RTO: 0 /100 WBC
PLATELET # BLD AUTO: 398 K/UL (ref 150–450)
PMV BLD AUTO: 9 FL (ref 9.2–12.9)
POTASSIUM SERPL-SCNC: 4.4 MMOL/L (ref 3.5–5.1)
PROT SERPL-MCNC: 7.9 G/DL (ref 6–8.4)
RBC # BLD AUTO: 3.3 M/UL (ref 4–5.4)
SODIUM SERPL-SCNC: 134 MMOL/L (ref 136–145)
T4 FREE SERPL-MCNC: 1.18 NG/DL (ref 0.71–1.51)
TRIGL SERPL-MCNC: 80 MG/DL (ref 30–150)
TSH SERPL DL<=0.005 MIU/L-ACNC: 1.61 UIU/ML (ref 0.4–4)
WBC # BLD AUTO: 9.6 K/UL (ref 3.9–12.7)

## 2025-02-04 PROCEDURE — 80061 LIPID PANEL: CPT | Performed by: FAMILY MEDICINE

## 2025-02-04 PROCEDURE — 83735 ASSAY OF MAGNESIUM: CPT | Performed by: FAMILY MEDICINE

## 2025-02-04 PROCEDURE — 80053 COMPREHEN METABOLIC PANEL: CPT | Performed by: FAMILY MEDICINE

## 2025-02-04 PROCEDURE — 83036 HEMOGLOBIN GLYCOSYLATED A1C: CPT | Performed by: FAMILY MEDICINE

## 2025-02-04 PROCEDURE — 36415 COLL VENOUS BLD VENIPUNCTURE: CPT | Mod: PO | Performed by: FAMILY MEDICINE

## 2025-02-04 PROCEDURE — 84439 ASSAY OF FREE THYROXINE: CPT | Performed by: FAMILY MEDICINE

## 2025-02-04 PROCEDURE — 85025 COMPLETE CBC W/AUTO DIFF WBC: CPT | Performed by: FAMILY MEDICINE

## 2025-02-04 PROCEDURE — 84443 ASSAY THYROID STIM HORMONE: CPT | Performed by: FAMILY MEDICINE

## 2025-02-21 ENCOUNTER — OFFICE VISIT (OUTPATIENT)
Dept: FAMILY MEDICINE | Facility: CLINIC | Age: 65
End: 2025-02-21
Payer: COMMERCIAL

## 2025-02-21 VITALS
WEIGHT: 147.25 LBS | HEIGHT: 62 IN | DIASTOLIC BLOOD PRESSURE: 70 MMHG | BODY MASS INDEX: 27.1 KG/M2 | SYSTOLIC BLOOD PRESSURE: 130 MMHG | RESPIRATION RATE: 14 BRPM | HEART RATE: 65 BPM | OXYGEN SATURATION: 98 % | TEMPERATURE: 98 F

## 2025-02-21 DIAGNOSIS — E03.9 ACQUIRED HYPOTHYROIDISM: ICD-10-CM

## 2025-02-21 DIAGNOSIS — R19.5 POSITIVE FIT (FECAL IMMUNOCHEMICAL TEST): ICD-10-CM

## 2025-02-21 DIAGNOSIS — M35.1 MCTD (MIXED CONNECTIVE TISSUE DISEASE): ICD-10-CM

## 2025-02-21 DIAGNOSIS — C34.12 MALIGNANT NEOPLASM OF UPPER LOBE OF LEFT LUNG: Primary | ICD-10-CM

## 2025-02-21 DIAGNOSIS — Z23 FLU VACCINE NEED: ICD-10-CM

## 2025-02-21 DIAGNOSIS — E83.42 HYPOMAGNESEMIA: ICD-10-CM

## 2025-02-21 DIAGNOSIS — E72.11 HYPERHOMOCYSTEINEMIA: ICD-10-CM

## 2025-02-21 DIAGNOSIS — I10 ESSENTIAL HYPERTENSION: ICD-10-CM

## 2025-02-21 DIAGNOSIS — E11.8 CONTROLLED TYPE 2 DIABETES MELLITUS WITH COMPLICATION, WITHOUT LONG-TERM CURRENT USE OF INSULIN: ICD-10-CM

## 2025-02-21 DIAGNOSIS — E78.2 MIXED HYPERLIPIDEMIA: ICD-10-CM

## 2025-02-21 RX ORDER — OXYCODONE AND ACETAMINOPHEN 5; 325 MG/1; MG/1
1 TABLET ORAL EVERY 8 HOURS PRN
COMMUNITY

## 2025-02-21 RX ORDER — SENNOSIDES 8.6 MG
2 TABLET ORAL 2 TIMES DAILY
Qty: 180 TABLET | Refills: 3 | Status: SHIPPED | OUTPATIENT
Start: 2025-02-21

## 2025-02-21 RX ORDER — LANOLIN ALCOHOL/MO/W.PET/CERES
1 CREAM (GRAM) TOPICAL 2 TIMES DAILY
Qty: 180 TABLET | Refills: 3 | Status: CANCELLED | OUTPATIENT
Start: 2025-02-21

## 2025-02-21 NOTE — PROGRESS NOTES
Subjective:       Patient ID: Cristin Segal is a 64 y.o. female.    Chief Complaint: Follow-up (6mth f/u)    Problem List[1]  Patient is here for a chronic conditions follow up.    Reviewed labs 2/2025  History of Present Illness    CHIEF COMPLAINT:  Ms. Segal presents today for follow up of lab results.    RESPIRATORY:  She reports feeling wheezy and congested, describing significant congestion this morning. She is currently taking Zyrtec and Mucinex for symptom management and has an albuterol inhaler available for use as needed.    MUSCULOSKELETAL:  She reports back pain following a recent fall when she tripped over her dog while exiting the bathroom. During the fall, she turned to protect her left shoulder and landed on her elbow and hip. She has history of successful pain management with back injections approximately 1.5-2 years ago. She is scheduled for MRI of the back on Monday to evaluate post-fall condition. Regarding her shoulder, she had unsuccessful rotator cuff repair with three failed attempts at anchor placement. She is currently attending physical therapy with improvement in range of motion, specifically noting increased ability to lift the affected arm.    LABS:  Magnesium levels are low, and she reports experiencing diarrhea with current magnesium supplement. Thyroid levels, including T4 and TSH, are within normal range. Cholesterol profile shows total cholesterol 209 mg/dL with high HDL levels and LDL cholesterol at or below 100 mg/dL. A1C is in the non-diabetic range. Comprehensive metabolic panel shows slightly low sodium, ranging between 134-136 mEq/L, without symptoms. Hemoglobin is currently 12 g/dL, increased from previous levels of 10-11 g/dL.    PREVENTIVE CARE:  She expresses concern about positive FIT test and acknowledges need for colonoscopy, willing to schedule procedure over the summer. She declines flu vaccine during visit, expressing vaccine hesitancy, though aware of  availability at local pharmacy.      ROS:  ROS findings as noted in HPI.        Review of Systems   Constitutional:  Negative for fatigue and unexpected weight change.   Respiratory:  Negative for chest tightness and shortness of breath.    Cardiovascular:  Negative for chest pain, palpitations and leg swelling.   Gastrointestinal:  Negative for abdominal pain.   Musculoskeletal:  Negative for arthralgias.   Neurological:  Negative for dizziness, syncope, light-headedness and headaches.      Relevant History:    Nephro Mag 1.4 -started on mag ox  CKD stage 3, anemia      GYN Dr. Malone. Mammo 9/24 neg      GI FIT 12/2021 + . Referred to GI but deferred colonoscopy until after treatment for lung CA.        Heme/onc Dr. Carrasco Macrocytosis- b12 and folate normal 1/19 and 12/20  Pulm and Heme/onc-CT surgery Dr. Allan and Heme/onc Dr. Carrasco treating  SCCA left lung diagnosed 2023. Pulm Dr. Espino lung nodule. CT chest 2/2022 showed slightly enlarging nodule left upper lobe.  1/23 bx invasive SCCA. Mri brain-no mets. Bone scan T11 uptake with h/o vertebroplasty.  S/p left lingulectomy margins neg, lymph nodes neg. CT chest 9/2024 Postoperative changes of partial resection of the left lung with no significant interval change compared to the prior exam. No findings suggesting recurrent local disease or metastatic disease. No acute findings.      Spine Dr. Victor/Now Dr. Mackey for pain .  low back pain/Dr. Bai pain. H/o closed vertebral compression T12 s/p kyphoplasty 12/2021. Doing pt and taking pain.  Completed DMV handicapped form for MS/ Having worsening LBP since fall at home 12/24.  Has mri next week then pain consult        Card Dr. Nesbitt/Dr. Lee aortic stenosis and CAD s/p stent 2024 . Admitted [facility] 4/8/24 CP upon presentation to Card dr. Nesbitt's office to f/u echo. Ecg showed non specific ST changes.  Had some elevated troponins. echo showing mod AS , EF 55-70%.Patient underwent a LHC with PCI to RCA.   high homocysteine- on folbic-yes. HPL on crestor 5mg     Eye Dr. Cat eye exam     Ortho Had ED visit 3/20 [facility] for fall resulting in left ankle fracture. Dr. Tee treated  Ortho Dr. Tee treating left hip fracture 11/20 and stable left hip replacement  Dr. Rhodes right rot cuff tear 12/2024      Rheum DEXA 8/20 -nl BMD. Rheum Dr. GRACE Guzman/now Dr. Crespo MCTD on plaquenil.  Takes adalat for raynauds.      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 Maki 8/19         Hepatology H/o Elevated LFTs - NL LFTS 12/20.no fatty liver or enlargement on either CT ab 5/15 nor u/s and 1/19. Hepatitis panel normal     Endocrine Type 2 DM- controlled without meds.  Last a1c 4.5. Eye Dr Cat .  On crestor. Not on ace or ARB-urine ma scheduled 6/20/24  Objective:      Physical Exam  Vitals and nursing note reviewed.   Constitutional:       Appearance: She is well-developed.   Cardiovascular:      Rate and Rhythm: Normal rate and regular rhythm.      Heart sounds: Normal heart sounds.   Pulmonary:      Effort: Pulmonary effort is normal.      Breath sounds: Normal breath sounds.   Skin:     General: Skin is warm and dry.   Neurological:      Mental Status: She is alert and oriented to person, place, and time.         Assessment:       ICD-10-CM ICD-9-CM    1. Malignant neoplasm of upper lobe of left lung  C34.12 162.3       2. MCTD (mixed connective tissue disease)  M35.1 710.8       3. Acquired hypothyroidism  E03.9 244.9 CBC Auto Differential      TSH      T4, Free      4. Hypomagnesemia  E83.42 275.2 Magnesium      5. Mixed hyperlipidemia  E78.2 272.2 Comprehensive Metabolic Panel      Lipid Panel      6. Essential hypertension  I10 401.9       7. Positive FIT (fecal immunochemical test)  R19.5 792.1 Case Request Endoscopy: COLONOSCOPY      8. Hyperhomocysteinemia  E72.11 270.4 Homocysteine, Serum      9. Controlled type 2 diabetes mellitus with complication, without long-term  current use of insulin  E11.8 250.90       10. Flu vaccine need  Z23 V04.81          Plan:   1. Malignant neoplasm of upper lobe of left lung (Primary)  Cont onc monitoring and care    2. MCTD (mixed connective tissue disease)  Cont rheum care and mgmt    3. Acquired hypothyroidism  Controlled on current medications.  Continue current medications.    - CBC Auto Differential; Future  - TSH; Future  - T4, Free; Future    4. Hypomagnesemia  Not adequately supplemented. Change to slow mag and increase to bid  - Magnesium; Future    5. Mixed hyperlipidemia  Controlled on current medications.  Continue current medications.  Crestor 5 mg  - Comprehensive Metabolic Panel; Future  - Lipid Panel; Future    6. Essential hypertension  Controlled on current medications.  Continue current medications.      7. Positive FIT (fecal immunochemical test)  Refer for diagnostic  - Case Request Endoscopy: COLONOSCOPY    8. Hyperhomocysteinemia  Cont folbic and monitor  - Homocysteine, Serum; Future    9. Controlled type 2 diabetes mellitus with complication, without long-term current use of insulin  Controlled with diet    10. Flu vaccine need  declined  Assessment & Plan    - Explained that low CO2 levels during blood draw likely due to faster breathing from anxiety.  - Discussed the importance of colonoscopy for early detection and prevention of colon cancer.  - Ms. Segal to consider using albuterol inhaler for wheezing if needed.  - Started Slow Mag 2 times daily to replace current magnesium supplement.  - Continued thyroid hormone at 50 mcg.  - Continued Zyrtec and Mucinex as needed for allergy and congestion symptoms.  - Magnesium and homocysteine level checks ordered in 6 months.  - Colonoscopy ordered.  - GI department to call patient to schedule.  - Follow up in 6 months with labs beforehand.         Time spent with patient: 20 minutes  Patient with be reevaluated in 6 months or sooner prn  Greater than 50% of this visit was  spent counseling as described in above documentation:Yes  This note was generated with the assistance of ambient listening technology. Verbal consent was obtained by the patient and accompanying visitor(s) for the recording of patient appointment to facilitate this note. I attest to having reviewed and edited the generated note for accuracy, though some syntax or spelling errors may persist. Please contact the author of this note for any clarification.            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

## 2025-02-24 ENCOUNTER — PATIENT MESSAGE (OUTPATIENT)
Dept: GASTROENTEROLOGY | Facility: CLINIC | Age: 65
End: 2025-02-24
Payer: COMMERCIAL

## 2025-03-12 NOTE — PROGRESS NOTES
"Subjective:       Patient ID: Cristin Segal is a 64 y.o. female.    Chief Complaint: No chief complaint on file.    Diagnosis:  CASSIDY Squamous Cell Carcinoma     Pre-operative therapy: none    Procedure(s) and date(s): 02/09/23 - Extended robotic lingulectomy, mediastinal lymph node dissection, intercostal nerve block 2 or more intercostal levels    Pathology: 1.6 cm well differentiated keratinizing squamous cell carcinoma. +VPI. Margins negative (1.8 cm bronchovascular). Levels 5, 7, 8, 10, 11, 12 negative for malignancy. [id number], stage IB    Post-operative therapy:  surveillance    HPI  Patient is a 62 y.o. female smoker with DM, HTN, HLD, MVP, MCTD, left parotid gland nodule who presents to clinic today for 2 year follow up s/p robotic assisted lingulectomy for CASSIDY NSCLC. Post operative course uncomplicated. NSTEMI in April 2024. Now on plavix and ASA. Completed cardiac rehab. L rotator cuff repair. Post op recovery complicated by trip and fall over dog. 2 vertebral fractures. Awaiting kyphoplasty.     Review of Systems   Constitutional: Negative.    HENT:  Positive for postnasal drip.    Respiratory:  Negative for chest tightness and shortness of breath.    Cardiovascular:  Negative for chest pain.   Neurological:  Negative for coordination difficulties.   Psychiatric/Behavioral: Negative.           Objective:      Vitals:    03/18/25 0828   BP: (!) 158/91   Pulse: (!) 59   Resp: 16   Temp: 98 °F (36.7 °C)   TempSrc: Temporal   SpO2: 99%   Weight: 67.7 kg (149 lb 4 oz)   Height: 5' 2" (1.575 m)   PainSc:   7   PainLoc: Back       Physical Exam  Constitutional:       Appearance: Normal appearance.   HENT:      Head: Normocephalic and atraumatic.   Eyes:      Extraocular Movements: Extraocular movements intact.      Conjunctiva/sclera: Conjunctivae normal.   Cardiovascular:      Rate and Rhythm: Normal rate and regular rhythm.      Pulses: Normal pulses.      Heart sounds: Murmur (harsh systolic) heard. "   Pulmonary:      Breath sounds: Wheezing and rhonchi (scattered) present.      Comments: End expiratory wheezes  Abdominal:      Palpations: Abdomen is soft.   Musculoskeletal:      Cervical back: Normal range of motion.   Skin:     General: Skin is warm and dry.      Capillary Refill: Capillary refill takes less than 2 seconds.   Neurological:      General: No focal deficit present.      Mental Status: She is alert and oriented to person, place, and time.   Psychiatric:         Mood and Affect: Mood normal.         Behavior: Behavior normal.         Thought Content: Thought content normal.        Diagnostics:     Chest CT 9/5/23:  I reviewed the images  Postsurgical changes of left lingulectomy.  No evidence of recurrent or metastatic disease within the chest.    Chest CT 03/04/24:  I reviewed the images  Stable CT of the chest status post prior lingulectomy without evidence of locoregional recurrence or metastatic disease within the chest. No acute process.     Chest CT 9/3/24: I reviewed the images  Postsurgical changes of left lingulectomy.  No evidence of recurrent or metastatic disease within the chest. Official read pending. Scattered tiny paty     Chest CT 3/18/25: I reviewed the images  Postsurgical changes of left lingulectomy. No findings suggesting recurrent local disease or metastatic disease. Stable mild para aortic lymph nodes. No acute findings.       Assessment:       64 y.o.  female smoker with DM, HTN, HLD, MVP, MCTD, left parotid gland nodule who presents to clinic today for 2 year follow up s/p robotic assisted lingulectomy for CASSIDY NSCLC.  pT2aN0 NSCLC with negative margins, +vpi  LOLIS    Plan:     RTC in 6 months with chest CT.   Prefers later morning appts.

## 2025-03-13 ENCOUNTER — TELEPHONE (OUTPATIENT)
Dept: CARDIOLOGY | Facility: CLINIC | Age: 65
End: 2025-03-13
Payer: COMMERCIAL

## 2025-03-13 DIAGNOSIS — Z01.810 PREOP CARDIOVASCULAR EXAM: Primary | ICD-10-CM

## 2025-03-13 NOTE — TELEPHONE ENCOUNTER
----- Message from Kalina sent at 3/13/2025  1:47 PM CDT -----  Type:  Needs Medical AdviceWho Called: ptWould the patient rather a call back or a response via [facility]? [email or web address] Number: [phone] Additional Information: pt st a form was faxed over for pt to stop taking clopidogreL (PLAVIX) 75 mg tablet for a few days. Pt st surgery can't be socorro until they received the OK from Dr. Lee. [phone] dr. Rodrigo nava office. please call to discuss  ----- Message -----  From: Kalina Sterling  Sent: 3/13/2025   1:49 PM CDT  To: Mickie Persaud MA; Rosa Leonard Staff    Type:  Needs Medical AdviceWho Called: ptWould the patient rather a call back or a response via [facility]? [email or web address] Call Back Number: [phone] Additional Information: pt st a form was faxed over for pt to stop taking clopidogreL (PLAVIX) 75 mg tablet for a few days. Pt st surgery can't be socorro until they received the OK from Dr. Lee. please call to discuss

## 2025-03-18 ENCOUNTER — PATIENT MESSAGE (OUTPATIENT)
Dept: HEMATOLOGY/ONCOLOGY | Facility: CLINIC | Age: 65
End: 2025-03-18
Payer: COMMERCIAL

## 2025-03-18 ENCOUNTER — TELEPHONE (OUTPATIENT)
Dept: CARDIOLOGY | Facility: CLINIC | Age: 65
End: 2025-03-18

## 2025-03-18 ENCOUNTER — HOSPITAL ENCOUNTER (OUTPATIENT)
Dept: RADIOLOGY | Facility: HOSPITAL | Age: 65
Discharge: HOME | End: 2025-03-18
Attending: PHYSICIAN ASSISTANT
Payer: COMMERCIAL

## 2025-03-18 ENCOUNTER — E-VISIT (OUTPATIENT)
Dept: CARDIOLOGY | Facility: CLINIC | Age: 65
End: 2025-03-18
Payer: COMMERCIAL

## 2025-03-18 ENCOUNTER — OFFICE VISIT (OUTPATIENT)
Dept: PULMONOLOGY | Facility: CLINIC | Age: 65
End: 2025-03-18
Payer: COMMERCIAL

## 2025-03-18 ENCOUNTER — DOCUMENTATION ONLY (OUTPATIENT)
Dept: HEMATOLOGY/ONCOLOGY | Facility: CLINIC | Age: 65
End: 2025-03-18
Payer: COMMERCIAL

## 2025-03-18 VITALS
OXYGEN SATURATION: 99 % | HEIGHT: 62 IN | HEART RATE: 59 BPM | DIASTOLIC BLOOD PRESSURE: 91 MMHG | TEMPERATURE: 98 F | SYSTOLIC BLOOD PRESSURE: 158 MMHG | RESPIRATION RATE: 16 BRPM | WEIGHT: 149.25 LBS | BODY MASS INDEX: 27.47 KG/M2

## 2025-03-18 DIAGNOSIS — Z01.810 PREOP CARDIOVASCULAR EXAM: Primary | ICD-10-CM

## 2025-03-18 DIAGNOSIS — C34.12 MALIGNANT NEOPLASM OF UPPER LOBE OF LEFT LUNG: ICD-10-CM

## 2025-03-18 DIAGNOSIS — C34.12 MALIGNANT NEOPLASM OF UPPER LOBE OF LEFT LUNG: Primary | ICD-10-CM

## 2025-03-18 DIAGNOSIS — Z95.5 HX OF HEART ARTERY STENT: ICD-10-CM

## 2025-03-18 PROCEDURE — 3044F HG A1C LEVEL LT 7.0%: CPT | Mod: CPTII,S$GLB,, | Performed by: THORACIC SURGERY (CARDIOTHORACIC VASCULAR SURGERY)

## 2025-03-18 PROCEDURE — 71250 CT THORAX DX C-: CPT | Mod: TC,PO

## 2025-03-18 PROCEDURE — 71250 CT THORAX DX C-: CPT | Mod: 26,,, | Performed by: RADIOLOGY

## 2025-03-18 PROCEDURE — 99999 PR PBB SHADOW E&M-EST. PATIENT-LVL V: CPT | Mod: PBBFAC,,, | Performed by: THORACIC SURGERY (CARDIOTHORACIC VASCULAR SURGERY)

## 2025-03-18 PROCEDURE — 1159F MED LIST DOCD IN RCRD: CPT | Mod: CPTII,S$GLB,, | Performed by: THORACIC SURGERY (CARDIOTHORACIC VASCULAR SURGERY)

## 2025-03-18 PROCEDURE — 3077F SYST BP >= 140 MM HG: CPT | Mod: CPTII,S$GLB,, | Performed by: THORACIC SURGERY (CARDIOTHORACIC VASCULAR SURGERY)

## 2025-03-18 PROCEDURE — 3008F BODY MASS INDEX DOCD: CPT | Mod: CPTII,S$GLB,, | Performed by: THORACIC SURGERY (CARDIOTHORACIC VASCULAR SURGERY)

## 2025-03-18 PROCEDURE — 3080F DIAST BP >= 90 MM HG: CPT | Mod: CPTII,S$GLB,, | Performed by: THORACIC SURGERY (CARDIOTHORACIC VASCULAR SURGERY)

## 2025-03-18 PROCEDURE — 99213 OFFICE O/P EST LOW 20 MIN: CPT | Mod: S$GLB,,, | Performed by: THORACIC SURGERY (CARDIOTHORACIC VASCULAR SURGERY)

## 2025-03-18 RX ORDER — HYDROCODONE BITARTRATE AND ACETAMINOPHEN 5; 325 MG/1; MG/1
TABLET ORAL
COMMUNITY

## 2025-03-18 NOTE — LETTER
2025    Cristin Segal  [address] [place] [address]             [facility]  [address]  [place] [place] [address]  Phone: [phone]  Fax: [phone] Patient: Cristin Segal  : 1960  Referring Doctor:  Telephone:  Hospital:  Type of Anesthesia:  Date of Last Stent:  Date of Last Office Visit:    Current Outpatient Medications   Medication Sig    acetaminophen (TYLENOL) 500 MG tablet Take 1 tablet (500 mg total) by mouth every 6 (six) hours as needed for Pain.    albuterol (VENTOLIN HFA) 90 mcg/actuation inhaler Inhale 2 puffs into the lungs every 6 (six) hours as needed for Wheezing or Shortness of Breath. Rescue    aspirin 81 MG Chew Take 1 tablet (81 mg total) by mouth once daily.    clopidogreL (PLAVIX) 75 mg tablet Take 1 tablet (75 mg total) by mouth once daily.    fluconazole (DIFLUCAN) 150 MG Tab 1 po x 1, may repeat in 48 h prn    fluticasone propionate (FLONASE) 50 mcg/actuation nasal spray SPRAY 1 SPRAY (50 MCG TOTAL) INTO INTO EACH NOSTRIL TWICE A DAY    gabapentin (NEURONTIN) 100 MG capsule Take 1 capsule (100 mg total) by mouth every evening.    hydrOXYchloroQUINE (PLAQUENIL) 200 mg tablet Take 1 tablet (200 mg total) by mouth once daily.    levothyroxine (SYNTHROID) 50 MCG tablet TAKE 1 TABLET BY MOUTH EVERY DAY BEFORE BREAKFAST    loratadine (CLARITIN) 10 mg tablet Take 10 mg by mouth once daily.    magnesium chloride (SLOW-MAG) 71.5 mg TbEC Take 143 mg by mouth 2 (two) times a day.    meloxicam (MOBIC) 7.5 MG tablet Take 7.5 mg by mouth once daily.    metoprolol succinate (TOPROL-XL) 25 MG 24 hr tablet Take 1 tablet (25 mg total) by mouth once daily.    multivit-min-FA-lycopen-lutein (CENTRUM SILVER) 0.4 mg-300 mcg- 250 mcg Tab Take 1 tablet by mouth once daily.    nitroGLYCERIN (NITROSTAT) 0.4 MG SL tablet Place 1 tablet (0.4 mg total) under the tongue every 5 (five) minutes as  needed for Chest pain.    oxyCODONE-acetaminophen (PERCOCET) 5-325 mg per tablet Take 1 tablet by mouth every 8 (eight) hours as needed for Pain.    pantoprazole (PROTONIX) 40 MG tablet Take 1 tablet (40 mg total) by mouth once daily.    potassium chloride SA (K-DUR,KLOR-CON) 20 MEQ tablet Take 1 tablet (20 mEq total) by mouth once daily.    rosuvastatin (CRESTOR) 5 MG tablet Take 1 tablet (5 mg total) by mouth once daily.    traMADoL (ULTRAM) 50 mg tablet Take 50 mg by mouth every evening.    WESTAB MAX 2.5-25-2 mg Tab TAKE 1 TABLET BY MOUTH EVERY DAY     No current facility-administered medications for this visit.     Facility-Administered Medications Ordered in Other Visits   Medication    lactated ringers infusion       Patient will be intermediate risk for perioperative cardiovascular complications for the planned procedure.  Plavix can be held for 5 days prior to the procedure and resumed when adequate hemostasis has been obtained.      If you have any questions regarding the above, please contact my office at [phone].    Sincerely,    Horacio Lee MD

## 2025-03-18 NOTE — PROGRESS NOTES
Patient ID: Cristin Segal is a 64 y.o. female.    Chief Complaint: General Illness (Entered automatically based on order.)    The patient initiated a request through [employer] on 3/18/2025 for evaluation and management with a chief complaint of General Illness (Entered automatically based on order.)     I evaluated the questionnaire submission on preop clearance letter sent to the patient's surgeon.  Hold Plavix for 5 days..    No questionnaires on file.    Encounter Diagnoses   Name Primary?    Preop cardiovascular exam Yes    Hx of heart artery stent         No orders of the defined types were placed in this encounter.           No follow-ups on file.      E-Visit Time Tracking:    Day 1 Time (in minutes): 6    Total Time (in minutes): 6

## 2025-03-18 NOTE — NURSING
Patient saw Dr. Allan in lung clinic this morning for a follow up visit.  Dr. Allan's plan is for patient to follow up in 6 months with a non contrasted Chest CT.  Will schedule that visit and notify patient of appointment.

## 2025-03-18 NOTE — TELEPHONE ENCOUNTER
----- Message from Carroll sent at 3/18/2025  4:16 PM CDT -----  Regarding: rt call  Type:  Patient Returning CallWho Called:ptWho Left Message for Patient:Mickie Persaud Does the patient know what this is regarding?:yesBest Call Back Number:[phone] Information: pt wants a callback to get an update on when she can stop taking her clopidogreL (PLAVIX) 75 mg tablet. So she can get schedule for her sx. Please call to discuss.

## 2025-03-20 ENCOUNTER — TELEPHONE (OUTPATIENT)
Dept: CARDIOLOGY | Facility: CLINIC | Age: 65
End: 2025-03-20
Payer: COMMERCIAL

## 2025-03-20 ENCOUNTER — TELEPHONE (OUTPATIENT)
Dept: CARDIOTHORACIC SURGERY | Facility: HOSPITAL | Age: 65
End: 2025-03-20
Payer: COMMERCIAL

## 2025-03-20 NOTE — TELEPHONE ENCOUNTER
----- Message from Britany sent at 3/20/2025 12:46 PM CDT -----  Regarding: advice  Contact: patient  Type: Needs Medical AdviceWho Called:  patientSymptoms (please be specific):  How long has patient had these symptoms:  Pharmacy name and phone #:  Best Call Back Number: [phone] (home) Additional Information: Patient states her doctors office has not received her surgical clearance.  Please fax to [phone].  Thanks!

## 2025-03-20 NOTE — TELEPHONE ENCOUNTER
I spoke with the patient and updated her on the radiology interpretation of the most recent chest CT.  The study showed a thoracic spine compression fracture.  She indicated that she is well aware and is awaiting cardiac clearance so that she can undergo a T-spine kyphoplasty.  I also informed her of the new onset of a small right upper lobe GGO.  I advised her that we should get a repeat chest CT in 6 months as scheduled.  She is in agreement.

## 2025-03-21 ENCOUNTER — TELEPHONE (OUTPATIENT)
Facility: CLINIC | Age: 65
End: 2025-03-21
Payer: COMMERCIAL

## 2025-03-24 ENCOUNTER — OFFICE VISIT (OUTPATIENT)
Facility: CLINIC | Age: 65
End: 2025-03-24
Payer: COMMERCIAL

## 2025-03-24 VITALS
HEART RATE: 68 BPM | BODY MASS INDEX: 28.72 KG/M2 | DIASTOLIC BLOOD PRESSURE: 65 MMHG | RESPIRATION RATE: 17 BRPM | TEMPERATURE: 98 F | WEIGHT: 157 LBS | SYSTOLIC BLOOD PRESSURE: 145 MMHG

## 2025-03-24 DIAGNOSIS — C34.12 MALIGNANT NEOPLASM OF UPPER LOBE OF LEFT LUNG: Primary | ICD-10-CM

## 2025-03-24 PROCEDURE — 99213 OFFICE O/P EST LOW 20 MIN: CPT | Mod: S$GLB,,, | Performed by: INTERNAL MEDICINE

## 2025-03-24 PROCEDURE — 3008F BODY MASS INDEX DOCD: CPT | Mod: CPTII,S$GLB,, | Performed by: INTERNAL MEDICINE

## 2025-03-24 PROCEDURE — 3077F SYST BP >= 140 MM HG: CPT | Mod: CPTII,S$GLB,, | Performed by: INTERNAL MEDICINE

## 2025-03-24 PROCEDURE — G2211 COMPLEX E/M VISIT ADD ON: HCPCS | Mod: S$GLB,,, | Performed by: INTERNAL MEDICINE

## 2025-03-24 PROCEDURE — 99999 PR PBB SHADOW E&M-EST. PATIENT-LVL IV: CPT | Mod: PBBFAC,,, | Performed by: INTERNAL MEDICINE

## 2025-03-24 PROCEDURE — 3044F HG A1C LEVEL LT 7.0%: CPT | Mod: CPTII,S$GLB,, | Performed by: INTERNAL MEDICINE

## 2025-03-24 PROCEDURE — 1159F MED LIST DOCD IN RCRD: CPT | Mod: CPTII,S$GLB,, | Performed by: INTERNAL MEDICINE

## 2025-03-24 PROCEDURE — 3078F DIAST BP <80 MM HG: CPT | Mod: CPTII,S$GLB,, | Performed by: INTERNAL MEDICINE

## 2025-03-24 NOTE — ASSESSMENT & PLAN NOTE
Patient is doing ok and note is made of new GGO in RUL.  Will observe this for now and rescan in about 2 months.  If present again at that time then will consider biopsy options.  Discussed this today.

## 2025-03-24 NOTE — PROGRESS NOTES
PROGRESS NOTE    Subjective:       Patient ID: Cristin Segal is a 64 y.o. female.    12/27/2022:  CT chest:  Slowly enlarging soft tissue density nodule of the left upper lobe now measuring 11 mm.  Neoplasm is suspected and percutaneous biopsy is recommended.  Otherwise continued follow-up by CT scan is recommended with consideration of a repeat PET CT    1/6/2023:  CASSIDY Lung Biopsy:  LUNG, LEFT UPPER LOBE MASS, CT-GUIDED BIOPSY: Invasive squamous cell carcinoma, well differentiated.    1/20/2023:  MRI Brain-no mets    1/23/2023:  Bone scan, uptake at T11, history of vertebroplasty    2/9/2023:  Lung, lingulectomy: Invasive well-differentiated keratinizing squamous   cell carcinoma, measuring 16 mm (1.6 cm) in greatest dimension.          Bronchial and vascular margins are negative for atypia or malignancy.          See synoptic report in comment section for further details.   9 lymph nodes taken,  all negative.    Visceral pleural invasion:  Present  pT2aN0M0-Stage IB     medically appropriate patients with stage IB disease whose tumors display one or more high-risk features, including lymphovascular invasion, poor differentiation, or high standardized uptake value (SUV) on positron emission tomography (PET; variably defined as SUV 10 or more). However, there is no consensus among expert groups.     9/5/2023-CT chest:  Negative    PLAN:  3/14/2023-[facility] recommendation:  Surveillance and f/u with Dr. Allan and CT in 6 months    9/3/2024:  CT Chest negative for malignancy    3/18/2025-CT Chest:  14mm GGO in RUL-new    Chief Complaint:  No chief complaint on file.  Lung cancer follow up    History of Present Illness:   Cristin Segal is a 64 y.o. female who presents for follow up of above.      Patient is doing ok today.        Family and Social history reviewed and is unchanged from 1/24/2023      ROS:  Review of Systems    Constitutional:  Negative for fever.   Respiratory:  Negative for shortness of breath.    Cardiovascular:  Negative for chest pain and leg swelling.   Gastrointestinal:  Negative for abdominal pain and blood in stool.   Genitourinary:  Negative for hematuria.   Skin:  Negative for rash.          Current Outpatient Medications:     acetaminophen (TYLENOL) 500 MG tablet, Take 1 tablet (500 mg total) by mouth every 6 (six) hours as needed for Pain., Disp: , Rfl: 0    albuterol (VENTOLIN HFA) 90 mcg/actuation inhaler, Inhale 2 puffs into the lungs every 6 (six) hours as needed for Wheezing or Shortness of Breath. Rescue, Disp: 8 g, Rfl: 0    aspirin 81 MG Chew, Take 1 tablet (81 mg total) by mouth once daily., Disp: , Rfl:     clopidogreL (PLAVIX) 75 mg tablet, Take 1 tablet (75 mg total) by mouth once daily., Disp: 90 tablet, Rfl: 3    fluconazole (DIFLUCAN) 150 MG Tab, 1 po x 1, may repeat in 48 h prn, Disp: 2 tablet, Rfl: 0    fluticasone propionate (FLONASE) 50 mcg/actuation nasal spray, SPRAY 1 SPRAY (50 MCG TOTAL) INTO INTO EACH NOSTRIL TWICE A DAY, Disp: 48 mL, Rfl: 1    gabapentin (NEURONTIN) 100 MG capsule, Take 1 capsule (100 mg total) by mouth every evening., Disp: 90 capsule, Rfl: 3    HYDROcodone-acetaminophen (NORCO) 5-325 mg per tablet, , Disp: , Rfl:     hydrOXYchloroQUINE (PLAQUENIL) 200 mg tablet, Take 1 tablet (200 mg total) by mouth once daily., Disp: 90 tablet, Rfl: 1    levothyroxine (SYNTHROID) 50 MCG tablet, TAKE 1 TABLET BY MOUTH EVERY DAY BEFORE BREAKFAST, Disp: 90 tablet, Rfl: 1    loratadine (CLARITIN) 10 mg tablet, Take 10 mg by mouth once daily., Disp: , Rfl:     magnesium chloride (SLOW-MAG) 71.5 mg TbEC, Take 143 mg by mouth 2 (two) times a day., Disp: 180 tablet, Rfl: 3    meloxicam (MOBIC) 7.5 MG tablet, Take 7.5 mg by mouth once daily., Disp: , Rfl:     metoprolol succinate (TOPROL-XL) 25 MG 24 hr tablet, Take 1 tablet (25 mg total) by mouth once daily., Disp: 90 tablet, Rfl: 3     multivit-min-FA-lycopen-lutein (CENTRUM SILVER) 0.4 mg-300 mcg- 250 mcg Tab, Take 1 tablet by mouth once daily., Disp: , Rfl:     oxyCODONE-acetaminophen (PERCOCET) 5-325 mg per tablet, Take 1 tablet by mouth every 8 (eight) hours as needed for Pain., Disp: , Rfl:     pantoprazole (PROTONIX) 40 MG tablet, Take 1 tablet (40 mg total) by mouth once daily., Disp: 90 tablet, Rfl: 3    potassium chloride SA (K-DUR,KLOR-CON) 20 MEQ tablet, Take 1 tablet (20 mEq total) by mouth once daily., Disp: 90 tablet, Rfl: 3    rosuvastatin (CRESTOR) 5 MG tablet, Take 1 tablet (5 mg total) by mouth once daily., Disp: 90 tablet, Rfl: 3    traMADoL (ULTRAM) 50 mg tablet, Take 50 mg by mouth every evening., Disp: , Rfl:     WESTAB MAX 2.5-25-2 mg Tab, TAKE 1 TABLET BY MOUTH EVERY DAY, Disp: 90 tablet, Rfl: 3    nitroGLYCERIN (NITROSTAT) 0.4 MG SL tablet, Place 1 tablet (0.4 mg total) under the tongue every 5 (five) minutes as needed for Chest pain., Disp: 30 tablet, Rfl: 0  No current facility-administered medications for this visit.    Facility-Administered Medications Ordered in Other Visits:     lactated ringers infusion, , Intravenous, Continuous, VuKev MD        Objective:       Physical Examination:     BP (!) 145/65   Pulse 68   Temp 97.9 °F (36.6 °C)   Resp 17   Wt 71.2 kg (157 lb)   BMI 28.72 kg/m²     Physical Exam  Constitutional:       Appearance: She is well-developed.   HENT:      Head: Normocephalic and atraumatic.      Right Ear: External ear normal.      Left Ear: External ear normal.   Eyes:      Conjunctiva/sclera: Conjunctivae normal.      Pupils: Pupils are equal, round, and reactive to light.   Neck:      Thyroid: No thyromegaly.      Trachea: No tracheal deviation.   Cardiovascular:      Rate and Rhythm: Normal rate and regular rhythm.      Heart sounds: Normal heart sounds.   Pulmonary:      Effort: Pulmonary effort is normal.      Breath sounds: Normal breath sounds.   Abdominal:      General: Bowel  sounds are normal. There is no distension.      Palpations: Abdomen is soft. There is no mass.      Tenderness: There is no abdominal tenderness.   Skin:     Findings: No rash.   Neurological:      Comments: Neuro intact througout   Psychiatric:         Behavior: Behavior normal.         Thought Content: Thought content normal.         Judgment: Judgment normal.         Labs:   No results found for this or any previous visit (from the past 2 weeks).  CMP  Sodium   Date Value Ref Range Status   02/04/2025 134 (L) 136 - 145 mmol/L Final   06/28/2019 138 134 - 144 mmol/L      Potassium   Date Value Ref Range Status   02/04/2025 4.4 3.5 - 5.1 mmol/L Final     Chloride   Date Value Ref Range Status   02/04/2025 99 95 - 110 mmol/L Final   06/28/2019 101 98 - 110 mmol/L      CO2   Date Value Ref Range Status   02/04/2025 20 (L) 23 - 29 mmol/L Final     Glucose   Date Value Ref Range Status   02/04/2025 75 70 - 110 mg/dL Final   06/28/2019 111 (H) 70 - 99 mg/dL      BUN   Date Value Ref Range Status   02/04/2025 19 8 - 23 mg/dL Final     Creatinine   Date Value Ref Range Status   02/04/2025 1.0 0.5 - 1.4 mg/dL Final   06/28/2019 0.74 0.60 - 1.40 mg/dL      Calcium   Date Value Ref Range Status   02/04/2025 9.9 8.7 - 10.5 mg/dL Final     Total Protein   Date Value Ref Range Status   02/04/2025 7.9 6.0 - 8.4 g/dL Final     Albumin   Date Value Ref Range Status   02/04/2025 3.9 3.5 - 5.2 g/dL Final   06/28/2019 4.5 3.1 - 4.7 g/dL      Total Bilirubin   Date Value Ref Range Status   02/04/2025 0.6 0.1 - 1.0 mg/dL Final     Comment:     For infants and newborns, interpretation of results should be based  on gestational age, weight and in agreement with clinical  observations.    Premature Infant recommended reference ranges:  Up to 24 hours.............<8.0 mg/dL  Up to 48 hours............<12.0 mg/dL  3-5 days..................<15.0 mg/dL  6-29 days.................<15.0 mg/dL       Alkaline Phosphatase   Date Value Ref Range  "Status   02/04/2025 92 40 - 150 U/L Final     AST   Date Value Ref Range Status   02/04/2025 22 10 - 40 U/L Final     ALT   Date Value Ref Range Status   02/04/2025 11 10 - 44 U/L Final     Anion Gap   Date Value Ref Range Status   02/04/2025 15 8 - 16 mmol/L Final     eGFR if    Date Value Ref Range Status   05/11/2022 56.4 (A) >60 mL/min/1.73 m^2 Final   05/11/2022 56.4 (A) >60 mL/min/1.73 m^2 Final     eGFR if non    Date Value Ref Range Status   05/11/2022 48.9 (A) >60 mL/min/1.73 m^2 Final     Comment:     Calculation used to obtain the estimated glomerular filtration  rate (eGFR) is the CKD-EPI equation.      05/11/2022 48.9 (A) >60 mL/min/1.73 m^2 Final     Comment:     Calculation used to obtain the estimated glomerular filtration  rate (eGFR) is the CKD-EPI equation.        No results found for: "CEA"  No results found for: "PSA"        Assessment/Plan:     Problem List Items Addressed This Visit       Squamous Cell Carcinoma of CASSIDY of lung - Primary    Patient is doing ok and note is made of new GGO in RUL.  Will observe this for now and rescan in about 2 months.  If present again at that time then will consider biopsy options.  Discussed this today.           Relevant Orders    CT Chest Without Contrast         Discussion:     Follow up in about 3 months (around 6/24/2025).      Electronically signed by Lars Carrasco      "

## 2025-03-28 ENCOUNTER — OFFICE VISIT (OUTPATIENT)
Dept: CARDIOLOGY | Facility: CLINIC | Age: 65
End: 2025-03-28
Payer: COMMERCIAL

## 2025-03-28 VITALS
WEIGHT: 145.31 LBS | DIASTOLIC BLOOD PRESSURE: 80 MMHG | OXYGEN SATURATION: 98 % | HEART RATE: 70 BPM | SYSTOLIC BLOOD PRESSURE: 128 MMHG | HEIGHT: 62 IN | BODY MASS INDEX: 26.74 KG/M2

## 2025-03-28 DIAGNOSIS — I25.10 CORONARY ARTERY DISEASE INVOLVING NATIVE CORONARY ARTERY OF NATIVE HEART WITHOUT ANGINA PECTORIS: Primary | ICD-10-CM

## 2025-03-28 DIAGNOSIS — R79.89 ELEVATED BRAIN NATRIURETIC PEPTIDE (BNP) LEVEL: ICD-10-CM

## 2025-03-28 DIAGNOSIS — E78.5 DYSLIPIDEMIA: ICD-10-CM

## 2025-03-28 DIAGNOSIS — I25.2 HX OF NON-ST ELEVATION MYOCARDIAL INFARCTION (NSTEMI): ICD-10-CM

## 2025-03-28 DIAGNOSIS — Z95.5 STATUS POST INSERTION OF DRUG ELUTING CORONARY ARTERY STENT: ICD-10-CM

## 2025-03-28 DIAGNOSIS — I35.0 AORTIC STENOSIS, MODERATE: ICD-10-CM

## 2025-03-28 PROCEDURE — 99999 PR PBB SHADOW E&M-EST. PATIENT-LVL IV: CPT | Mod: PBBFAC,,, | Performed by: INTERNAL MEDICINE

## 2025-03-28 RX ORDER — NITROGLYCERIN 0.4 MG/1
0.4 TABLET SUBLINGUAL EVERY 5 MIN PRN
Qty: 30 TABLET | Refills: 0 | Status: SHIPPED | OUTPATIENT
Start: 2025-03-28 | End: 2025-04-27

## 2025-03-28 RX ORDER — METOPROLOL SUCCINATE 25 MG/1
25 TABLET, EXTENDED RELEASE ORAL DAILY
Qty: 90 TABLET | Refills: 3 | Status: SHIPPED | OUTPATIENT
Start: 2025-03-28 | End: 2026-03-28

## 2025-03-28 RX ORDER — CLOPIDOGREL BISULFATE 75 MG/1
75 TABLET ORAL DAILY
Qty: 90 TABLET | Refills: 3 | Status: CANCELLED | OUTPATIENT
Start: 2025-03-28 | End: 2026-03-28

## 2025-03-28 RX ORDER — ROSUVASTATIN CALCIUM 10 MG/1
10 TABLET, COATED ORAL DAILY
Qty: 90 TABLET | Refills: 3 | Status: SHIPPED | OUTPATIENT
Start: 2025-03-28 | End: 2026-03-28

## 2025-03-28 NOTE — PROGRESS NOTES
[facility]    Subjective:     Patient ID:  Cristin Segal is a 64 y.o. female patient here for evaluation Hypertension (Follow up 6 months )      HPI:  64-year-old female here for follow-up.  History of CAD status post PCI last April.  Reports being active at the gym without any exertional chest pain or shortness of breath.  Reports she had to stop going to the gym recently because she was found to have broken vertebrae for which she needs a kyphoplasty.  Has had moderate aortic stenosis on echocardiogram in 2021 and last year.  Does smoke about 5 cigarettes a day.  No dizziness or lightheadedness.  Does have history of elevated BNP in the past with the elevated LVEDP but patient does not report any shortness of breath or orthopnea or pedal edema.    Review of Systems   All other systems reviewed and are negative.       Past Medical History:   Diagnosis Date    Arthritis     Cataract     Diabetes mellitus type II     diet controlled    Fracture     left 4th finger  - splinted    Hyperlipidemia     MCTD (mixed connective tissue disease)     Raynaud's disease     Thyroid disease     Hypothyroidism    Wears glasses     contacs       Past Surgical History:   Procedure Laterality Date    CARPAL TUNNEL RELEASE      right    CATARACT EXTRACTION W/ INTRAOCULAR LENS  IMPLANT, BILATERAL      EXCISION OF PAROTID GLAND Right 8/1/2019    Procedure: PAROTIDECTOMY;  Surgeon: Abenr Maki MD;  Location: [facility] OR;  Service: ENT;  Laterality: Right;    HIP SURGERY Left 6/18/15    JANA    INJECTION OF ANESTHETIC AGENT AROUND MULTIPLE INTERCOSTAL NERVES Left 2/9/2023    Procedure: BLOCK, NERVE, INTERCOSTAL, 2 OR MORE;  Surgeon: Isaiah Allan MD;  Location: [address];  Service: Thoracic;  Laterality: Left;    IVUS, CORONARY  4/9/2024    Procedure: IVUS, Coronary;  Surgeon: Horacio Lee MD;  Location: [facility] CATH/EP LAB;  Service: Cardiology;;    JOINT  REPLACEMENT Bilateral     HIP    KNEE CARTILAGE SURGERY      right    LEFT HEART CATHETERIZATION Left 2024    Procedure: Left heart cath;  Surgeon: Horacio Lee MD;  Location: [facility] CATH/EP LAB;  Service: Cardiology;  Laterality: Left;    LYMPHADENECTOMY Left 2023    Procedure: LYMPHADENECTOMY;  Surgeon: Isaiah Allan MD;  Location: [facility] OR [address] [place] [address];  Service: Thoracic;  Laterality: Left;    PERCUTANEOUS CORONARY INTERVENTION, ARTERY N/A 2024    Procedure: Percutaneous coronary intervention;  Surgeon: Horacio Lee MD;  Location: [facility] CATH/EP LAB;  Service: Cardiology;  Laterality: N/A;    TOTAL HIP ARTHROPLASTY Right 2016    JANA    XI ROBOTIC RATS,WITH LOBECTOMY,LUNG Left 2023    Procedure: XI ROBOTIC RATS,WITH LEFT UPPER LOBECTOMY,LUNG;  Surgeon: Isaiah Allan MD;  Location: [facility] OR [address] [place] [address];  Service: Thoracic;  Laterality: Left;  extended lingulectomy       Family History   Problem Relation Name Age of Onset    Diabetes Mother      Kidney disease Mother      Aneurysm Mother  73        brain    Hyperlipidemia Mother      Hypertension Sister      Breast cancer Sister      Diabetes Sister      Ovarian cancer Neg Hx         Social History     Socioeconomic History    Marital status:    Occupational History     Employer: [employer]   Tobacco Use    Smoking status: Former     Current packs/day: 0.00     Average packs/day: 0.5 packs/day for 45.1 years (22.6 ttl pk-yrs)     Types: Cigarettes     Start date:      Quit date: 2023     Years since quittin.0     Passive exposure: Past    Smokeless tobacco: Never   Substance and Sexual Activity    Alcohol use: Yes     Comment: occasional    Drug use: No    Sexual activity: Not Currently     Birth control/protection: Post-menopausal       Current Medications[1]    Review of patient's allergies indicates:   Allergen Reactions    Pcn [penicillins] Anaphylaxis     Unknown for her- family history with  anaphylaxis    Lipitor [atorvastatin]          Objective:        Vitals:    03/28/25 1104   BP: 128/80   Pulse: 70       Physical Exam  Vitals reviewed.   Constitutional:       Appearance: Normal appearance.   Cardiovascular:      Rate and Rhythm: Normal rate and regular rhythm.      Pulses: Normal pulses.      Heart sounds: Murmur heard.      No gallop.   Pulmonary:      Effort: Pulmonary effort is normal.      Breath sounds: Normal breath sounds.   Musculoskeletal:      Right lower leg: No edema.      Left lower leg: No edema.   Skin:     General: Skin is warm and dry.   Neurological:      General: No focal deficit present.      Mental Status: She is alert and oriented to person, place, and time.         LIPIDS - LAST 2   Lab Results   Component Value Date    CHOL 209 (H) 02/04/2025    CHOL 193 06/21/2024    HDL 90 (H) 02/04/2025     (H) 06/21/2024    LDLCALC 103.0 02/04/2025    LDLCALC 68.2 06/21/2024    TRIG 80 02/04/2025    TRIG 54 06/21/2024    CHOLHDL 43.1 02/04/2025    CHOLHDL 59.1 (H) 06/21/2024       CBC - LAST 2  Lab Results   Component Value Date    WBC 9.60 02/04/2025    WBC 6.09 06/21/2024    RBC 3.30 (L) 02/04/2025    RBC 3.01 (L) 06/21/2024    HGB 12.0 02/04/2025    HGB 10.8 (L) 06/21/2024    HCT 35.3 (L) 02/04/2025    HCT 31.4 (L) 06/21/2024     (H) 02/04/2025     (H) 06/21/2024    MCH 36.4 (H) 02/04/2025    MCH 35.9 (H) 06/21/2024    MCHC 34.0 02/04/2025    MCHC 34.4 06/21/2024    RDW 13.3 02/04/2025    RDW 13.5 06/21/2024     02/04/2025     06/21/2024    MPV 9.0 (L) 02/04/2025    MPV 8.8 (L) 06/21/2024    GRAN 5.0 02/04/2025    GRAN 51.6 02/04/2025    LYMPH 2.5 02/04/2025    LYMPH 25.5 02/04/2025    MONO 0.8 02/04/2025    MONO 8.4 02/04/2025    BASO 0.18 02/04/2025    BASO 0.11 06/21/2024    NRBC 0 02/04/2025    NRBC 0 06/21/2024       CHEMISTRY & LIVER FUNCTION - LAST 2  Lab Results   Component Value Date     (L) 02/04/2025     08/09/2024    K 4.4  02/04/2025    K 4.7 08/09/2024    CL 99 02/04/2025     08/09/2024    CO2 20 (L) 02/04/2025    CO2 20 (L) 08/09/2024    ANIONGAP 15 02/04/2025    ANIONGAP 12 08/09/2024    BUN 19 02/04/2025    BUN 17 08/09/2024    CREATININE 1.0 02/04/2025    CREATININE 1.0 08/09/2024    GLU 75 02/04/2025     08/09/2024    CALCIUM 9.9 02/04/2025    CALCIUM 9.8 08/09/2024    MG 1.4 (L) 02/04/2025    MG 1.4 (L) 04/10/2024    ALBUMIN 3.9 02/04/2025    ALBUMIN 3.9 08/09/2024    PROT 7.9 02/04/2025    PROT 7.6 08/09/2024    ALKPHOS 92 02/04/2025    ALKPHOS 69 08/09/2024    ALT 11 02/04/2025    ALT 24 08/09/2024    AST 22 02/04/2025    AST 37 08/09/2024    BILITOT 0.6 02/04/2025    BILITOT 0.5 08/09/2024        CARDIAC PROFILE - LAST 2  Lab Results   Component Value Date     (H) 04/08/2024    CPK 89 02/03/2020     (H) 03/30/2017    CPKMB 3.8 02/03/2020    TROPONINI <0.030 02/03/2020        COAGULATION - LAST 2  Lab Results   Component Value Date    LABPT 13.3 11/08/2020    INR 0.9 04/08/2024    INR 1.0 01/06/2023    APTT 37.0 (H) 04/09/2024    APTT 54.1 (H) 04/09/2024       ENDOCRINE & PSA - LAST 2  Lab Results   Component Value Date    HGBA1C 4.7 02/04/2025    HGBA1C 4.6 06/21/2024    TSH 1.610 02/04/2025    TSH 1.432 06/21/2024        ECHOCARDIOGRAM RESULTS  Results for orders placed during the hospital encounter of 02/28/24    Echo    Interpretation Summary    Left Ventricle: There is normal systolic function with a visually estimated ejection fraction of 55 - 70%. There is normal diastolic function.    Right Ventricle: Normal right ventricular cavity size. Wall thickness is normal. Right ventricle wall motion  is normal. Systolic function is normal.    Left Atrium: Left atrium is mildly dilated.    Aortic Valve: Moderately calcified left, right and noncoronary cusps. Moderately restricted motion. There is moderate stenosis. Aortic valve area by VTI is 1.48 cm². Aortic valve peak velocity is 2.88 m/s. Mean  gradient is 19 mmHg. The dimensionless index is 0.47. There is mild aortic regurgitation with a centrally directed jet.    IVC/SVC: Normal venous pressure at 3 mmHg.      CURRENT/PREVIOUS VISIT EKG  Results for orders placed or performed in visit on 07/31/24   IN OFFICE EKG 12-LEAD (to [place])    Collection Time: 07/31/24 11:32 AM   Result Value Ref Range    QRS Duration 88 ms    [facility] QTC Calculation 440 ms    Narrative    Test Reason : I25.10,I25.2,    Vent. Rate : 066 BPM     Atrial Rate : 066 BPM     P-R Int : 224 ms          QRS Dur : 088 ms      QT Int : 420 ms       P-R-T Axes : 035 017 026 degrees     QTc Int : 440 ms    Sinus rhythm with 1st degree A-V block  Cannot rule out Anterior infarct ,age undetermined  Abnormal ECG  When compared with ECG of 09-APR-2024 13:35,  No significant change was found  Confirmed by Eduardo MARTINEZ, Jaynat JACQUES ([id number]) on 8/29/2024 8:00:59 PM    Referred By:             Confirmed By:Jayant Clark MD     No valid procedures specified.   No results found for this or any previous visit.    No valid procedures specified.        Assessment:       1. Coronary artery disease involving native coronary artery of native heart without angina pectoris    2. Hx of non-ST elevation myocardial infarction (NSTEMI)    3. Status post insertion of drug eluting coronary artery stent    4. Aortic stenosis, moderate    5. Dyslipidemia    6. Elevated brain natriuretic peptide (BNP) level           Plan:       Coronary artery disease involving native coronary artery of native heart without angina pectoris  -     IN OFFICE EKG 12-LEAD (to [place])  -     nitroGLYCERIN (NITROSTAT) 0.4 MG SL tablet; Place 1 tablet (0.4 mg total) under the tongue every 5 (five) minutes as needed for Chest pain.  Dispense: 30 tablet; Refill: 0    Hx of non-ST elevation myocardial infarction (NSTEMI)  -     nitroGLYCERIN (NITROSTAT) 0.4 MG SL tablet; Place 1 tablet (0.4 mg total) under the tongue every 5 (five) minutes as needed for  Chest pain.  Dispense: 30 tablet; Refill: 0  -     metoprolol succinate (TOPROL-XL) 25 MG 24 hr tablet; Take 1 tablet (25 mg total) by mouth once daily.  Dispense: 90 tablet; Refill: 3    Status post insertion of drug eluting coronary artery stent  -     nitroGLYCERIN (NITROSTAT) 0.4 MG SL tablet; Place 1 tablet (0.4 mg total) under the tongue every 5 (five) minutes as needed for Chest pain.  Dispense: 30 tablet; Refill: 0    Aortic stenosis, moderate  -     Echo; Future    Dyslipidemia  -     rosuvastatin (CRESTOR) 10 MG tablet; Take 1 tablet (10 mg total) by mouth once daily.  Dispense: 90 tablet; Refill: 3    Elevated brain natriuretic peptide (BNP) level  -     BNP; Future; Expected date: 03/28/2025    Stop Plavix.  Continue with aspirin.  Increase rosuvastatin as LDL is not well controlled.  Has lipid panel ordered via primary care physician which will be checked in August or September, recommend target a LDL of around 70.  Continue with metoprolol.  Will repeat an echo to rule out worsening of the aortic stenosis, stenosis appears to be worsened, will refer her to structural cardiology and repeat an angiogram.  Will check a BNP but patient does not report any significant shortness of breath, unless severely elevated, will continue with lifestyle changes for now.  Heavily advised patient to quit tobacco.    Follow up in about 1 year (around 3/28/2026) for f/u AS, CAD s/p PCI.          MD Valentina [facility] [place] [facility] [place]         [1]   Current Outpatient Medications   Medication Sig Dispense Refill    acetaminophen (TYLENOL) 500 MG tablet Take 1 tablet (500 mg total) by mouth every 6 (six) hours as needed for Pain.  0    albuterol (VENTOLIN HFA) 90 mcg/actuation inhaler Inhale 2 puffs into the lungs every 6 (six) hours as needed for Wheezing or Shortness of Breath. Rescue 8 g 0    aspirin 81 MG Chew Take 1 tablet (81 mg total) by mouth once daily.       fluconazole (DIFLUCAN) 150 MG Tab 1 po x 1, may repeat in 48 h prn 2 tablet 0    fluticasone propionate (FLONASE) 50 mcg/actuation nasal spray SPRAY 1 SPRAY (50 MCG TOTAL) INTO INTO EACH NOSTRIL TWICE A DAY 48 mL 1    gabapentin (NEURONTIN) 100 MG capsule Take 1 capsule (100 mg total) by mouth every evening. 90 capsule 3    HYDROcodone-acetaminophen (NORCO) 5-325 mg per tablet       hydrOXYchloroQUINE (PLAQUENIL) 200 mg tablet Take 1 tablet (200 mg total) by mouth once daily. 90 tablet 1    levothyroxine (SYNTHROID) 50 MCG tablet TAKE 1 TABLET BY MOUTH EVERY DAY BEFORE BREAKFAST 90 tablet 1    loratadine (CLARITIN) 10 mg tablet Take 10 mg by mouth once daily.      magnesium chloride (SLOW-MAG) 71.5 mg TbEC Take 143 mg by mouth 2 (two) times a day. 180 tablet 3    meloxicam (MOBIC) 7.5 MG tablet Take 7.5 mg by mouth once daily.      multivit-min-FA-lycopen-lutein (CENTRUM SILVER) 0.4 mg-300 mcg- 250 mcg Tab Take 1 tablet by mouth once daily.      oxyCODONE-acetaminophen (PERCOCET) 5-325 mg per tablet Take 1 tablet by mouth every 8 (eight) hours as needed for Pain.      pantoprazole (PROTONIX) 40 MG tablet Take 1 tablet (40 mg total) by mouth once daily. 90 tablet 3    potassium chloride SA (K-DUR,KLOR-CON) 20 MEQ tablet Take 1 tablet (20 mEq total) by mouth once daily. 90 tablet 3    traMADoL (ULTRAM) 50 mg tablet Take 50 mg by mouth every evening.      WESTAB MAX 2.5-25-2 mg Tab TAKE 1 TABLET BY MOUTH EVERY DAY 90 tablet 3    metoprolol succinate (TOPROL-XL) 25 MG 24 hr tablet Take 1 tablet (25 mg total) by mouth once daily. 90 tablet 3    nitroGLYCERIN (NITROSTAT) 0.4 MG SL tablet Place 1 tablet (0.4 mg total) under the tongue every 5 (five) minutes as needed for Chest pain. 30 tablet 0    rosuvastatin (CRESTOR) 10 MG tablet Take 1 tablet (10 mg total) by mouth once daily. 90 tablet 3     No current facility-administered medications for this visit.     Facility-Administered Medications Ordered in Other Visits    Medication Dose Route Frequency Provider Last Rate Last Admin    lactated ringers infusion   Intravenous Continuous Kev Bai MD

## 2025-04-04 ENCOUNTER — HOSPITAL ENCOUNTER (OUTPATIENT)
Dept: CARDIOLOGY | Facility: HOSPITAL | Age: 65
Discharge: HOME | End: 2025-04-04
Attending: INTERNAL MEDICINE
Payer: COMMERCIAL

## 2025-04-04 VITALS — HEIGHT: 62 IN | WEIGHT: 145 LBS | BODY MASS INDEX: 26.68 KG/M2

## 2025-04-04 DIAGNOSIS — I35.0 AORTIC STENOSIS, MODERATE: ICD-10-CM

## 2025-04-04 LAB
AORTIC ROOT ANNULUS: 3.4 CM
AORTIC VALVE CUSP SEPERATION: 1.3 CM
APICAL FOUR CHAMBER EJECTION FRACTION: 67 %
ASCENDING AORTA: 3.2 CM
AV INDEX (PROSTH): 0.41
AV MEAN GRADIENT: 12 MMHG
AV PEAK GRADIENT: 21 MMHG
AV REGURGITATION PRESSURE HALF TIME: 725 MS
AV VALVE AREA BY VELOCITY RATIO: 1.1 CM²
AV VALVE AREA: 1.3 CM²
AV VELOCITY RATIO: 0.35
BSA FOR ECHO PROCEDURE: 1.7 M2
CV ECHO LV RWT: 0.47 CM
DOP CALC AO PEAK VEL: 2.3 M/S
DOP CALC AO VTI: 44.7 CM
DOP CALC LVOT AREA: 3.1 CM2
DOP CALC LVOT DIAMETER: 2 CM
DOP CALC LVOT PEAK VEL: 0.8 M/S
DOP CALC LVOT STROKE VOLUME: 58.1 CM3
DOP CALCLVOT PEAK VEL VTI: 18.5 CM
E WAVE DECELERATION TIME: 264 MSEC
E/A RATIO: 0.6
E/E' RATIO: 8 M/S
ECHO LV POSTERIOR WALL: 0.9 CM (ref 0.6–1.1)
FRACTIONAL SHORTENING: 34.2 % (ref 28–44)
INTERVENTRICULAR SEPTUM: 1.3 CM (ref 0.6–1.1)
IVRT: 127 MSEC
LEFT ATRIUM SIZE: 4.2 CM
LEFT INTERNAL DIMENSION IN SYSTOLE: 2.5 CM (ref 2.1–4)
LEFT VENTRICLE DIASTOLIC VOLUME INDEX: 35.93 ML/M2
LEFT VENTRICLE DIASTOLIC VOLUME: 60 ML
LEFT VENTRICLE END DIASTOLIC VOLUME APICAL 4 CHAMBER: 55.6 ML
LEFT VENTRICLE MASS INDEX: 80.6 G/M2
LEFT VENTRICLE SYSTOLIC VOLUME INDEX: 13.8 ML/M2
LEFT VENTRICLE SYSTOLIC VOLUME: 23 ML
LEFT VENTRICULAR INTERNAL DIMENSION IN DIASTOLE: 3.8 CM (ref 3.5–6)
LEFT VENTRICULAR MASS: 134.7 G
LV LATERAL E/E' RATIO: 11 M/S
LV SEPTAL E/E' RATIO: 6 M/S
LVED V (TEICH): 60 ML
LVES V (TEICH): 23 ML
LVOT MG: 2 MMHG
LVOT MV: 0.57 CM/S
MV PEAK A VEL: 1.1 M/S
MV PEAK E VEL: 0.66 M/S
MV STENOSIS PRESSURE HALF TIME: 65 MS
MV VALVE AREA P 1/2 METHOD: 3.38 CM2
PISA AR MAX VEL: 3.57 M/S
TDI LATERAL: 0.06 M/S
TDI SEPTAL: 0.11 M/S
TDI: 0.09 M/S
Z-SCORE OF LEFT VENTRICULAR DIMENSION IN END DIASTOLE: -2.06
Z-SCORE OF LEFT VENTRICULAR DIMENSION IN END SYSTOLE: -1.15

## 2025-04-04 PROCEDURE — 93306 TTE W/DOPPLER COMPLETE: CPT | Mod: 26,,, | Performed by: INTERNAL MEDICINE

## 2025-04-04 PROCEDURE — 93306 TTE W/DOPPLER COMPLETE: CPT

## 2025-04-09 LAB
AORTIC ROOT ANNULUS: 3.4 CM
AORTIC VALVE CUSP SEPERATION: 1.3 CM
APICAL FOUR CHAMBER EJECTION FRACTION: 67 %
ASCENDING AORTA: 3.2 CM
AV INDEX (PROSTH): 0.41
AV MEAN GRADIENT: 12 MMHG
AV PEAK GRADIENT: 21 MMHG
AV REGURGITATION PRESSURE HALF TIME: 725 MS
AV VALVE AREA BY VELOCITY RATIO: 1.1 CM²
AV VALVE AREA: 1.3 CM²
AV VELOCITY RATIO: 0.35
BSA FOR ECHO PROCEDURE: 1.7 M2
CV ECHO LV RWT: 0.47 CM
DOP CALC AO PEAK VEL: 2.3 M/S
DOP CALC AO VTI: 44.7 CM
DOP CALC LVOT AREA: 3.1 CM2
DOP CALC LVOT DIAMETER: 2 CM
DOP CALC LVOT PEAK VEL: 0.8 M/S
DOP CALC LVOT STROKE VOLUME: 58.1 CM3
DOP CALCLVOT PEAK VEL VTI: 18.5 CM
E WAVE DECELERATION TIME: 264 MSEC
E/A RATIO: 0.6
E/E' RATIO: 8 M/S
ECHO LV POSTERIOR WALL: 0.9 CM (ref 0.6–1.1)
FRACTIONAL SHORTENING: 34.2 % (ref 28–44)
INTERVENTRICULAR SEPTUM: 1.3 CM (ref 0.6–1.1)
IVRT: 127 MSEC
LEFT ATRIUM SIZE: 4.2 CM
LEFT INTERNAL DIMENSION IN SYSTOLE: 2.5 CM (ref 2.1–4)
LEFT VENTRICLE DIASTOLIC VOLUME INDEX: 35.93 ML/M2
LEFT VENTRICLE DIASTOLIC VOLUME: 60 ML
LEFT VENTRICLE END DIASTOLIC VOLUME APICAL 4 CHAMBER: 55.6 ML
LEFT VENTRICLE MASS INDEX: 80.6 G/M2
LEFT VENTRICLE SYSTOLIC VOLUME INDEX: 13.8 ML/M2
LEFT VENTRICLE SYSTOLIC VOLUME: 23 ML
LEFT VENTRICULAR INTERNAL DIMENSION IN DIASTOLE: 3.8 CM (ref 3.5–6)
LEFT VENTRICULAR MASS: 134.7 G
LV LATERAL E/E' RATIO: 11 M/S
LV SEPTAL E/E' RATIO: 6 M/S
LVED V (TEICH): 60 ML
LVES V (TEICH): 23 ML
LVOT MG: 2 MMHG
LVOT MV: 0.57 CM/S
MV PEAK A VEL: 1.1 M/S
MV PEAK E VEL: 0.66 M/S
MV STENOSIS PRESSURE HALF TIME: 65 MS
MV VALVE AREA P 1/2 METHOD: 3.38 CM2
PISA AR MAX VEL: 3.57 M/S
RA PRESSURE ESTIMATED: 3 MMHG
TDI LATERAL: 0.06 M/S
TDI SEPTAL: 0.11 M/S
TDI: 0.09 M/S
Z-SCORE OF LEFT VENTRICULAR DIMENSION IN END DIASTOLE: -2.06
Z-SCORE OF LEFT VENTRICULAR DIMENSION IN END SYSTOLE: -1.15

## 2025-04-24 ENCOUNTER — HOSPITAL ENCOUNTER (OUTPATIENT)
Dept: RADIOLOGY | Facility: HOSPITAL | Age: 65
Discharge: HOME | End: 2025-04-24
Attending: INTERNAL MEDICINE
Payer: COMMERCIAL

## 2025-04-24 DIAGNOSIS — C34.12 MALIGNANT NEOPLASM OF UPPER LOBE OF LEFT LUNG: ICD-10-CM

## 2025-04-24 PROCEDURE — 71250 CT THORAX DX C-: CPT | Mod: 26,,, | Performed by: RADIOLOGY

## 2025-04-24 PROCEDURE — 71250 CT THORAX DX C-: CPT | Mod: TC,PO

## 2025-04-25 ENCOUNTER — TELEPHONE (OUTPATIENT)
Facility: CLINIC | Age: 65
End: 2025-04-25
Payer: COMMERCIAL

## 2025-04-25 ENCOUNTER — RESULTS FOLLOW-UP (OUTPATIENT)
Facility: CLINIC | Age: 65
End: 2025-04-25

## 2025-04-25 NOTE — TELEPHONE ENCOUNTER
----- Message from Lars Alvarado MD sent at 4/25/2025  6:33 AM CDT -----  Call patient,   I looked at her CT today.  These nodules look inflammatory to me and still too small to biopsy.  We will discuss further on our follow up in a few weeks.    ----- Message -----  From: Interface, Rad Results In  Sent: 4/24/2025  11:57 AM CDT  To: Lars Alvarado MD

## 2025-05-01 RX ORDER — ASPIRIN 81 MG/1
81 TABLET ORAL
Qty: 90 TABLET | Refills: 3 | Status: SHIPPED | OUTPATIENT
Start: 2025-05-01

## 2025-05-13 ENCOUNTER — PATIENT MESSAGE (OUTPATIENT)
Dept: GASTROENTEROLOGY | Facility: CLINIC | Age: 65
End: 2025-05-13
Payer: COMMERCIAL

## 2025-06-07 RX ORDER — PANTOPRAZOLE SODIUM 40 MG/1
40 TABLET, DELAYED RELEASE ORAL
Qty: 90 TABLET | Refills: 2 | Status: SHIPPED | OUTPATIENT
Start: 2025-06-07

## 2025-06-07 NOTE — TELEPHONE ENCOUNTER
Refill Decision Note   Cristin Esgal  is requesting a refill authorization.  Brief Assessment and Rationale for Refill:  Approve     Medication Therapy Plan:         Comments:     Note composed:4:17 PM 06/07/2025

## 2025-06-07 NOTE — TELEPHONE ENCOUNTER
No care due was identified.  [facility] Embedded Care Due Messages. Reference number: [id number].   6/07/2025 7:13:10 AM CDT

## 2025-06-09 ENCOUNTER — OFFICE VISIT (OUTPATIENT)
Facility: CLINIC | Age: 65
End: 2025-06-09
Payer: COMMERCIAL

## 2025-06-09 VITALS
TEMPERATURE: 97 F | BODY MASS INDEX: 26.52 KG/M2 | SYSTOLIC BLOOD PRESSURE: 125 MMHG | WEIGHT: 145 LBS | HEART RATE: 76 BPM | DIASTOLIC BLOOD PRESSURE: 70 MMHG | RESPIRATION RATE: 16 BRPM

## 2025-06-09 DIAGNOSIS — C34.12 MALIGNANT NEOPLASM OF UPPER LOBE OF LEFT LUNG: Primary | ICD-10-CM

## 2025-06-09 PROCEDURE — 3078F DIAST BP <80 MM HG: CPT | Mod: CPTII,S$GLB,, | Performed by: INTERNAL MEDICINE

## 2025-06-09 PROCEDURE — 3074F SYST BP LT 130 MM HG: CPT | Mod: CPTII,S$GLB,, | Performed by: INTERNAL MEDICINE

## 2025-06-09 PROCEDURE — G2211 COMPLEX E/M VISIT ADD ON: HCPCS | Mod: S$GLB,,, | Performed by: INTERNAL MEDICINE

## 2025-06-09 PROCEDURE — 1159F MED LIST DOCD IN RCRD: CPT | Mod: CPTII,S$GLB,, | Performed by: INTERNAL MEDICINE

## 2025-06-09 PROCEDURE — 99213 OFFICE O/P EST LOW 20 MIN: CPT | Mod: S$GLB,,, | Performed by: INTERNAL MEDICINE

## 2025-06-09 PROCEDURE — 3008F BODY MASS INDEX DOCD: CPT | Mod: CPTII,S$GLB,, | Performed by: INTERNAL MEDICINE

## 2025-06-09 PROCEDURE — 99999 PR PBB SHADOW E&M-EST. PATIENT-LVL IV: CPT | Mod: PBBFAC,,, | Performed by: INTERNAL MEDICINE

## 2025-06-09 PROCEDURE — 3044F HG A1C LEVEL LT 7.0%: CPT | Mod: CPTII,S$GLB,, | Performed by: INTERNAL MEDICINE

## 2025-06-09 NOTE — ASSESSMENT & PLAN NOTE
The GGO is not changed and note is made of smaller nodules.  Don't see anything that can necessarily by reached by biopsy.  She will see Dr. Allan in September with scan.  Will await this scan as monitoring and see if anything changes.  Discussed this today.

## 2025-06-09 NOTE — PROGRESS NOTES
PROGRESS NOTE    Subjective:       Patient ID: Cristin Segal is a 64 y.o. female.    12/27/2022:  CT chest:  Slowly enlarging soft tissue density nodule of the left upper lobe now measuring 11 mm.  Neoplasm is suspected and percutaneous biopsy is recommended.  Otherwise continued follow-up by CT scan is recommended with consideration of a repeat PET CT    1/6/2023:  CASSIDY Lung Biopsy:  LUNG, LEFT UPPER LOBE MASS, CT-GUIDED BIOPSY: Invasive squamous cell carcinoma, well differentiated.    1/20/2023:  MRI Brain-no mets    1/23/2023:  Bone scan, uptake at T11, history of vertebroplasty    2/9/2023:  Lung, lingulectomy: Invasive well-differentiated keratinizing squamous   cell carcinoma, measuring 16 mm (1.6 cm) in greatest dimension.          Bronchial and vascular margins are negative for atypia or malignancy.          See synoptic report in comment section for further details.   9 lymph nodes taken,  all negative.    Visceral pleural invasion:  Present  [id number]-Stage IB     medically appropriate patients with stage IB disease whose tumors display one or more high-risk features, including lymphovascular invasion, poor differentiation, or high standardized uptake value (SUV) on positron emission tomography (PET; variably defined as SUV 10 or more). However, there is no consensus among expert groups.     9/5/2023-CT chest:  Negative    PLAN:  3/14/2023-[facility] Tumor board recommendation:  Surveillance and f/u with Dr. Allan and CT in 6 months    9/3/2024:  CT Chest negative for malignancy    3/18/2025-CT Chest:  14mm GGO in RUL-new    Chief Complaint:  No chief complaint on file.  Lung cancer follow up    History of Present Illness:   Cristin Segal is a 64 y.o. female who presents for follow up of above.      Patient is doing ok today.  No new complaints.       Family and Social history reviewed and is unchanged from 1/24/2023      ROS:  Review of  Systems   Constitutional:  Negative for fever.   Respiratory:  Negative for shortness of breath.    Cardiovascular:  Negative for chest pain and leg swelling.   Gastrointestinal:  Negative for abdominal pain and blood in stool.   Genitourinary:  Negative for hematuria.   Skin:  Negative for rash.          Current Outpatient Medications:     acetaminophen (TYLENOL) 500 MG tablet, Take 1 tablet (500 mg total) by mouth every 6 (six) hours as needed for Pain., Disp: , Rfl: 0    aspirin (ECOTRIN) 81 MG EC tablet, TAKE 1 TABLET BY MOUTH EVERY DAY, Disp: 90 tablet, Rfl: 3    fluconazole (DIFLUCAN) 150 MG Tab, 1 po x 1, may repeat in 48 h prn, Disp: 2 tablet, Rfl: 0    fluticasone propionate (FLONASE) 50 mcg/actuation nasal spray, SPRAY 1 SPRAY (50 MCG TOTAL) INTO INTO EACH NOSTRIL TWICE A DAY, Disp: 48 mL, Rfl: 1    gabapentin (NEURONTIN) 100 MG capsule, Take 1 capsule (100 mg total) by mouth every evening., Disp: 90 capsule, Rfl: 3    HYDROcodone-acetaminophen (NORCO) 5-325 mg per tablet, , Disp: , Rfl:     hydrOXYchloroQUINE (PLAQUENIL) 200 mg tablet, Take 1 tablet (200 mg total) by mouth once daily., Disp: 90 tablet, Rfl: 1    levothyroxine (SYNTHROID) 50 MCG tablet, TAKE 1 TABLET BY MOUTH EVERY DAY BEFORE BREAKFAST, Disp: 90 tablet, Rfl: 1    loratadine (CLARITIN) 10 mg tablet, Take 10 mg by mouth once daily., Disp: , Rfl:     magnesium chloride (SLOW-MAG) 71.5 mg TbEC, Take 143 mg by mouth 2 (two) times a day., Disp: 180 tablet, Rfl: 3    meloxicam (MOBIC) 7.5 MG tablet, Take 7.5 mg by mouth once daily., Disp: , Rfl:     metoprolol succinate (TOPROL-XL) 25 MG 24 hr tablet, Take 1 tablet (25 mg total) by mouth once daily., Disp: 90 tablet, Rfl: 3    multivit-min-FA-lycopen-lutein (CENTRUM SILVER) 0.4 mg-300 mcg- 250 mcg Tab, Take 1 tablet by mouth once daily., Disp: , Rfl:     oxyCODONE-acetaminophen (PERCOCET) 5-325 mg per tablet, Take 1 tablet by mouth every 8 (eight) hours as needed for Pain., Disp: , Rfl:      pantoprazole (PROTONIX) 40 MG tablet, TAKE 1 TABLET BY MOUTH EVERY DAY, Disp: 90 tablet, Rfl: 2    potassium chloride SA (K-DUR,KLOR-CON) 20 MEQ tablet, Take 1 tablet (20 mEq total) by mouth once daily., Disp: 90 tablet, Rfl: 3    rosuvastatin (CRESTOR) 10 MG tablet, Take 1 tablet (10 mg total) by mouth once daily., Disp: 90 tablet, Rfl: 3    traMADoL (ULTRAM) 50 mg tablet, Take 50 mg by mouth every evening., Disp: , Rfl:     WESTAB MAX 2.5-25-2 mg Tab, TAKE 1 TABLET BY MOUTH EVERY DAY, Disp: 90 tablet, Rfl: 3    albuterol (VENTOLIN HFA) 90 mcg/actuation inhaler, Inhale 2 puffs into the lungs every 6 (six) hours as needed for Wheezing or Shortness of Breath. Rescue, Disp: 8 g, Rfl: 0    nitroGLYCERIN (NITROSTAT) 0.4 MG SL tablet, Place 1 tablet (0.4 mg total) under the tongue every 5 (five) minutes as needed for Chest pain., Disp: 30 tablet, Rfl: 0  No current facility-administered medications for this visit.    Facility-Administered Medications Ordered in Other Visits:     lactated ringers infusion, , Intravenous, Continuous, VuKev MD        Objective:       Physical Examination:     /70   Pulse 76   Temp 97.2 °F (36.2 °C)   Resp 16   Wt 65.8 kg (145 lb)   BMI 26.52 kg/m²     Physical Exam  Constitutional:       Appearance: She is well-developed.   HENT:      Head: Normocephalic and atraumatic.      Right Ear: External ear normal.      Left Ear: External ear normal.   Eyes:      Conjunctiva/sclera: Conjunctivae normal.      Pupils: Pupils are equal, round, and reactive to light.   Neck:      Thyroid: No thyromegaly.      Trachea: No tracheal deviation.   Cardiovascular:      Rate and Rhythm: Normal rate and regular rhythm.      Heart sounds: Normal heart sounds.   Pulmonary:      Effort: Pulmonary effort is normal.      Breath sounds: Normal breath sounds.   Abdominal:      General: Bowel sounds are normal. There is no distension.      Palpations: Abdomen is soft. There is no mass.       Tenderness: There is no abdominal tenderness.   Skin:     Findings: No rash.   Neurological:      Comments: Neuro intact througout   Psychiatric:         Behavior: Behavior normal.         Thought Content: Thought content normal.         Judgment: Judgment normal.         Labs:   No results found for this or any previous visit (from the past 2 weeks).  CMP  Sodium   Date Value Ref Range Status   02/04/2025 134 (L) 136 - 145 mmol/L Final   06/28/2019 138 134 - 144 mmol/L      Potassium   Date Value Ref Range Status   02/04/2025 4.4 3.5 - 5.1 mmol/L Final     Chloride   Date Value Ref Range Status   02/04/2025 99 95 - 110 mmol/L Final   06/28/2019 101 98 - 110 mmol/L      CO2   Date Value Ref Range Status   02/04/2025 20 (L) 23 - 29 mmol/L Final     Glucose   Date Value Ref Range Status   02/04/2025 75 70 - 110 mg/dL Final   06/28/2019 111 (H) 70 - 99 mg/dL      BUN   Date Value Ref Range Status   02/04/2025 19 8 - 23 mg/dL Final     Creatinine   Date Value Ref Range Status   02/04/2025 1.0 0.5 - 1.4 mg/dL Final   06/28/2019 0.74 0.60 - 1.40 mg/dL      Calcium   Date Value Ref Range Status   02/04/2025 9.9 8.7 - 10.5 mg/dL Final     Total Protein   Date Value Ref Range Status   02/04/2025 7.9 6.0 - 8.4 g/dL Final     Albumin   Date Value Ref Range Status   02/04/2025 3.9 3.5 - 5.2 g/dL Final   06/28/2019 4.5 3.1 - 4.7 g/dL      Total Bilirubin   Date Value Ref Range Status   02/04/2025 0.6 0.1 - 1.0 mg/dL Final     Comment:     For infants and newborns, interpretation of results should be based  on gestational age, weight and in agreement with clinical  observations.    Premature Infant recommended reference ranges:  Up to 24 hours.............<8.0 mg/dL  Up to 48 hours............<12.0 mg/dL  3-5 days..................<15.0 mg/dL  6-29 days.................<15.0 mg/dL       Alkaline Phosphatase   Date Value Ref Range Status   02/04/2025 92 40 - 150 U/L Final     AST   Date Value Ref Range Status   02/04/2025 22 10 -  "40 U/L Final     ALT   Date Value Ref Range Status   02/04/2025 11 10 - 44 U/L Final     Anion Gap   Date Value Ref Range Status   02/04/2025 15 8 - 16 mmol/L Final     eGFR if    Date Value Ref Range Status   05/11/2022 56.4 (A) >60 mL/min/1.73 m^2 Final   05/11/2022 56.4 (A) >60 mL/min/1.73 m^2 Final     eGFR if non    Date Value Ref Range Status   05/11/2022 48.9 (A) >60 mL/min/1.73 m^2 Final     Comment:     Calculation used to obtain the estimated glomerular filtration  rate (eGFR) is the CKD-EPI equation.      05/11/2022 48.9 (A) >60 mL/min/1.73 m^2 Final     Comment:     Calculation used to obtain the estimated glomerular filtration  rate (eGFR) is the CKD-EPI equation.        No results found for: "CEA"  No results found for: "PSA"        Assessment/Plan:     Problem List Items Addressed This Visit       Squamous Cell Carcinoma of CASSIDY of lung - Primary    The GGO is not changed and note is made of smaller nodules.  Don't see anything that can necessarily by reached by biopsy.  She will see Dr. Allan in September with scan.  Will await this scan as monitoring and see if anything changes.  Discussed this today.                 Discussion:     Follow up in about 3 months (around 9/18/2025).      Electronically signed by Lars Carrasco      "

## 2025-06-23 DIAGNOSIS — I25.10 CORONARY ARTERY DISEASE INVOLVING NATIVE CORONARY ARTERY OF NATIVE HEART WITHOUT ANGINA PECTORIS: ICD-10-CM

## 2025-06-23 DIAGNOSIS — I25.2 HX OF NON-ST ELEVATION MYOCARDIAL INFARCTION (NSTEMI): ICD-10-CM

## 2025-06-23 DIAGNOSIS — Z95.5 STATUS POST INSERTION OF DRUG ELUTING CORONARY ARTERY STENT: ICD-10-CM

## 2025-06-25 DIAGNOSIS — E11.9 TYPE 2 DIABETES MELLITUS WITHOUT COMPLICATION: ICD-10-CM

## 2025-07-02 RX ORDER — ASPIRIN 81 MG/1
81 TABLET ORAL DAILY
Qty: 90 TABLET | Refills: 3 | Status: SHIPPED | OUTPATIENT
Start: 2025-07-02

## 2025-07-02 RX ORDER — NITROGLYCERIN 0.4 MG/1
0.4 TABLET SUBLINGUAL EVERY 5 MIN PRN
Qty: 30 TABLET | Refills: 0 | Status: SHIPPED | OUTPATIENT
Start: 2025-07-02 | End: 2025-08-01

## 2025-08-13 DIAGNOSIS — E11.9 TYPE 2 DIABETES MELLITUS WITHOUT COMPLICATION: ICD-10-CM

## 2025-09-02 PROBLEM — Z72.0 TOBACCO ABUSE: Status: ACTIVE | Noted: 2025-09-02

## 2025-09-02 PROBLEM — R07.9 CHEST PAIN: Status: ACTIVE | Noted: 2025-09-02

## 2025-09-02 PROBLEM — J44.9 COPD (CHRONIC OBSTRUCTIVE PULMONARY DISEASE): Status: ACTIVE | Noted: 2025-09-02

## 2025-09-03 ENCOUNTER — CLINICAL SUPPORT (OUTPATIENT)
Dept: CARDIOLOGY | Facility: HOSPITAL | Age: 65
End: 2025-09-03
Attending: INTERNAL MEDICINE
Payer: MEDICARE

## 2025-09-03 ENCOUNTER — TELEPHONE (OUTPATIENT)
Dept: CARDIOLOGY | Facility: CLINIC | Age: 65
End: 2025-09-03

## 2025-09-03 PROBLEM — E87.1 HYPONATREMIA: Status: ACTIVE | Noted: 2025-09-03

## 2025-09-03 PROBLEM — J44.1 COPD EXACERBATION: Status: ACTIVE | Noted: 2025-09-03

## 2025-09-03 PROCEDURE — 93017 CV STRESS TEST TRACING ONLY: CPT

## 2025-09-03 PROCEDURE — 93016 CV STRESS TEST SUPVJ ONLY: CPT | Mod: ,,, | Performed by: GENERAL PRACTICE

## 2025-09-03 PROCEDURE — 93018 CV STRESS TEST I&R ONLY: CPT | Mod: ,,, | Performed by: GENERAL PRACTICE

## 2025-09-03 PROCEDURE — 63600175 PHARM REV CODE 636 W HCPCS: Performed by: INTERNAL MEDICINE

## 2025-09-03 RX ORDER — REGADENOSON 0.08 MG/ML
0.4 INJECTION, SOLUTION INTRAVENOUS ONCE
Status: COMPLETED | OUTPATIENT
Start: 2025-09-03 | End: 2025-09-03

## 2025-09-03 RX ADMIN — REGADENOSON 0.4 MG: 0.08 INJECTION, SOLUTION INTRAVENOUS at 08:09

## (undated) DEVICE — CANNULA REDUCER 12-8MM

## (undated) DEVICE — CATH LNCHR JR40 6F 110CM

## (undated) DEVICE — UNDERGLOVES BIOGEL PI SIZE 7.5

## (undated) DEVICE — KIT GLIDESHEATH SLEND 6FR 10CM

## (undated) DEVICE — SUT MCRYL PLUS 4-0 PS2 27IN

## (undated) DEVICE — SUT SILK 0 BLK BR FSL 18 IN

## (undated) DEVICE — NDL SPINAL SPINOCAN 22GX3.5

## (undated) DEVICE — FILLER KYPHON CEM GUN BONE SZ2

## (undated) DEVICE — DRESSING TRANS 2X2 TEGADERM

## (undated) DEVICE — CARTRIDGE KYPHON CEMENT DEL

## (undated) DEVICE — TOWEL OR DISP STRL BLUE 4/PK

## (undated) DEVICE — SYR SLIP TIP 20CC

## (undated) DEVICE — DRESSING TELFA N ADH 3X8

## (undated) DEVICE — BLLN SC EUPHORA RX 2.50MMX12MM

## (undated) DEVICE — SYS LABEL CORRECT MED

## (undated) DEVICE — COVER LIGHT HANDLE

## (undated) DEVICE — CLOSURE SKIN STERI STRIP 1/2X4

## (undated) DEVICE — CATH DXTERITY JR40 100CM 5FR

## (undated) DEVICE — DRAPE ABDOMINAL TIBURON 14X11

## (undated) DEVICE — ADHESIVE DERMABOND ADVANCED

## (undated) DEVICE — GLOVE SURG ULTRA TOUCH 7.5

## (undated) DEVICE — ADHESIVE MASTISOL VIAL 48/BX

## (undated) DEVICE — SOL WATER STRL IRR 1000ML

## (undated) DEVICE — ELECTRODE REM PLYHSV RETURN 9

## (undated) DEVICE — SYR ONLY LUER LOCK 20CC

## (undated) DEVICE — SKINMARKER W/RULER DEVON

## (undated) DEVICE — GOWN SMART IMP BREATHABLE XXLG

## (undated) DEVICE — RELOAD SUREFORM 45 3.5 BLU 6R

## (undated) DEVICE — SET TRI-LUMEN FILTERED TUBE

## (undated) DEVICE — HEMOSTAT SURGICEL NUKNIT 3X4IN

## (undated) DEVICE — SYR 10CC LUER LOCK

## (undated) DEVICE — DRAPE MINOR PROCEDURE

## (undated) DEVICE — DRAPE ARM DAVINCI XI

## (undated) DEVICE — ELECTRODE BLADE INSULATED 1 IN

## (undated) DEVICE — SEAL UNIVERSAL 5MM-8MM XI

## (undated) DEVICE — GAUZE SPONGE 4X4 12PLY

## (undated) DEVICE — INTRODUCER KYPHON EX 10GA

## (undated) DEVICE — NDL BOX COUNTER

## (undated) DEVICE — DRESSING TRANS 4X4 TEGADERM

## (undated) DEVICE — KIT ANTIFOG W/SPONG & FLUID

## (undated) DEVICE — GUIDEWIRE RUNTHROUGH EF 180CM

## (undated) DEVICE — CANNULA SEAL 12MM

## (undated) DEVICE — TRAY MINOR GEN SURG OMC

## (undated) DEVICE — DRAPE INCISE IOBAN 2 23X17IN

## (undated) DEVICE — DEVICE BALLOON INFLATION

## (undated) DEVICE — STAPLER ENDOWRIST 45 BLUE XI

## (undated) DEVICE — PACK BASIC

## (undated) DEVICE — CATH ANGIOSCULPT EVO 2.5X10MM

## (undated) DEVICE — SEE MEDLINE ITEM 157117

## (undated) DEVICE — CATH DXTERITY JL35 100CM 5FR

## (undated) DEVICE — CATH EAGLE EYE PLATINUM

## (undated) DEVICE — SPONGE IV DRAIN 4X4 STERILE

## (undated) DEVICE — CONTAINER SPECIMEN STRL 4OZ

## (undated) DEVICE — SUT SILK 0 STRANDS 30IN BLK

## (undated) DEVICE — HEMOSTAT VASC BAND LONG 27CM

## (undated) DEVICE — DRESSING SURGICAL 1X3

## (undated) DEVICE — STRAP OR TABLE 5IN X 72IN

## (undated) DEVICE — DRAIN CHEST DRY SUCTION

## (undated) DEVICE — DRAPE SCOPE PILLOW WARMER

## (undated) DEVICE — DRAPE C ARM 42 X 120 10/BX

## (undated) DEVICE — BAG INZII TISS RETRV 12/15MM

## (undated) DEVICE — DRAPE COLUMN DAVINCI XI

## (undated) DEVICE — BLADE SURG CARBON STEEL SZ11

## (undated) DEVICE — SUT VICRYL 3-0 27 SH

## (undated) DEVICE — NDL SAFETY 25G X 1.5 ECLIPSE

## (undated) DEVICE — GLOVE BIOGEL SKINSENSE PI 7.5

## (undated) DEVICE — TUBING SUC UNIV W/CONN 12FT

## (undated) DEVICE — COVER SURG LIGHT HANDLE

## (undated) DEVICE — STAPLER SUREFORM CRVD TIP 45

## (undated) DEVICE — TAMP KYPHON EXP INF BONE 15MM

## (undated) DEVICE — RELOAD SUREFORM 45 4.3 GRN 6R

## (undated) DEVICE — Device

## (undated) DEVICE — DRAIN CHAN RND HUBLS 8MM 24FR

## (undated) DEVICE — NDL SPINAL 18GX3.5 SPINOCAN

## (undated) DEVICE — CONTAINER SPECIMEN OR STER 4OZ

## (undated) DEVICE — RELOAD SUREFORM 45 2.5 WHT 6R

## (undated) DEVICE — SPONGE LAP 18X18 PREWASHED

## (undated) DEVICE — DRESSING TEGADERM 4.4X5IN

## (undated) DEVICE — APPLICATOR CHLORAPREP ORN 26ML

## (undated) DEVICE — LOOP VESSEL BLUE MAXI

## (undated) DEVICE — TAPE SILK 3IN

## (undated) DEVICE — GUIDEWIRE INQWIRE SE 3MM JTIP

## (undated) DEVICE — BOWL STERILE LARGE 32OZ

## (undated) DEVICE — PORT AIRSEAL 12/120MM LPI